# Patient Record
Sex: MALE | Race: WHITE | URBAN - NONMETROPOLITAN AREA
[De-identification: names, ages, dates, MRNs, and addresses within clinical notes are randomized per-mention and may not be internally consistent; named-entity substitution may affect disease eponyms.]

---

## 2020-02-10 ENCOUNTER — OFFICE VISIT (OUTPATIENT)
Dept: NEUROLOGY | Age: 70
End: 2020-02-10
Payer: MEDICARE

## 2020-02-10 VITALS
HEART RATE: 69 BPM | HEIGHT: 69 IN | WEIGHT: 195 LBS | SYSTOLIC BLOOD PRESSURE: 146 MMHG | BODY MASS INDEX: 28.88 KG/M2 | DIASTOLIC BLOOD PRESSURE: 77 MMHG

## 2020-02-10 PROBLEM — R51.9 HEADACHE DISORDER: Status: ACTIVE | Noted: 2020-02-10

## 2020-02-10 PROBLEM — G25.0 ESSENTIAL TREMOR: Status: ACTIVE | Noted: 2020-02-10

## 2020-02-10 PROCEDURE — 99204 OFFICE O/P NEW MOD 45 MIN: CPT | Performed by: PSYCHIATRY & NEUROLOGY

## 2020-02-10 RX ORDER — DIVALPROEX SODIUM 250 MG/1
TABLET, DELAYED RELEASE ORAL
COMMUNITY
Start: 2020-01-15

## 2020-02-10 RX ORDER — PRIMIDONE 50 MG/1
50 TABLET ORAL 2 TIMES DAILY
Qty: 60 TABLET | Refills: 5 | Status: SHIPPED | OUTPATIENT
Start: 2020-02-10 | End: 2020-07-20

## 2020-02-10 RX ORDER — METOPROLOL SUCCINATE 25 MG/1
TABLET, EXTENDED RELEASE ORAL
COMMUNITY

## 2020-02-10 RX ORDER — SIMVASTATIN 40 MG
TABLET ORAL
COMMUNITY
Start: 2020-01-09

## 2020-02-10 RX ORDER — GLIMEPIRIDE 4 MG/1
TABLET ORAL
COMMUNITY

## 2020-02-10 RX ORDER — CETIRIZINE HYDROCHLORIDE 10 MG/1
10 TABLET ORAL DAILY
COMMUNITY

## 2020-02-10 SDOH — HEALTH STABILITY: MENTAL HEALTH: HOW OFTEN DO YOU HAVE A DRINK CONTAINING ALCOHOL?: NEVER

## 2020-02-10 NOTE — PROGRESS NOTES
Chief Complaint   Patient presents with    New Patient     Referred by Dr. Mikey Pena for hand tremor       Srinivasa Jensen is a 71y.o. year old male who is seen for evaluation of tremor. This has been present for about 6 months to a year. The tremor is in the hands and exacerbated by activity and posture such as writing and drinking. He feels rest makes it better. There is no head, jaw or leg tremor. There is a stroke family history of Parkinson's disease and one member has progressive supranuclear palsy. He denies diplopia, dysarthria, dysphagia, weakness or ataxia. He has been on chronic depoke for about 20 years for left sided headaches which he no longer has. He was told he had seizures due to an abnormal EEG but has never had a clinical seizure. Active Ambulatory Problems     Diagnosis Date Noted    Essential tremor 02/10/2020    Headache disorder 02/10/2020     Resolved Ambulatory Problems     Diagnosis Date Noted    No Resolved Ambulatory Problems     Past Medical History:   Diagnosis Date    Diabetes ([address]     Hyperlipidemia     Hypertension     Seizures ([address]        Past Surgical History:   Procedure Laterality Date    APPENDECTOMY      HEMORRHOID SURGERY      HERNIA REPAIR         History reviewed. No pertinent family history.     Allergies   Allergen Reactions    Iodides        Social History     Socioeconomic History    Marital status:      Spouse name: Not on file    Number of children: Not on file    Years of education: Not on file    Highest education level: Not on file   Occupational History    Not on file   Social Needs    Financial resource strain: Not on file    Food insecurity:     Worry: Not on file     Inability: Not on file    Transportation needs:     Medical: Not on file     Non-medical: Not on file   Tobacco Use    Smoking status: Current Every Day Smoker     Packs/day: 1.00    Smokeless tobacco: Never Used   Substance and Sexual Activity    Alcohol use: Never     Frequency: Never    Drug use: Not on file    Sexual activity: Not on file   Lifestyle    Physical activity:     Days per week: Not on file     Minutes per session: Not on file    Stress: Not on file   Relationships    Social connections:     Talks on phone: Not on file     Gets together: Not on file     Attends Church service: Not on file     Active member of club or organization: Not on file     Attends meetings of clubs or organizations: Not on file     Relationship status: Not on file    Intimate partner violence:     Fear of current or ex partner: Not on file     Emotionally abused: Not on file     Physically abused: Not on file     Forced sexual activity: Not on file   Other Topics Concern    Not on file   Social History Narrative    Not on file     Review of Systems     Constitutional - No fever or chills. yes diaphoresis or significant fatigue. HENT -  No tinnitus or significant hearing loss. Eyes - no sudden vision change or eye pain  Respiratory - no significant shortness of breath or cough  Cardiovascular - no chest pain No palpitations or significant leg swelling  Gastrointestinal - no abdominal swelling or pain. Genitourinary - No difficulty urinating, dysuria  Musculoskeletal - yes back pain or myalgia. Skin - no color change or rash  Neurologic - No recent seizures. No lateralizing weakness. Hematologic - yes easy bruising or excessive bleeding. Psychiatric - no severe anxiety or nervousness. All other review of systems are negative. Current Outpatient Medications   Medication Sig Dispense Refill    divalproex (DEPAKOTE) 250 MG DR tablet TAKE 2 TABLETS BY MOUTH TWO (2) TIMES A DAY FOR 90 DAYS      metoprolol succinate (TOPROL XL) 25 MG extended release tablet metoprolol succinate ER 25 mg tablet,extended release 24 hr   Take 1 tablet every day by oral route for 90 days.       simvastatin (ZOCOR) 40 MG tablet TAKE 1 TABLET BY MOUTH ONCE DAILY      metFORMIN

## 2021-07-19 PROCEDURE — 88305 TISSUE EXAM BY PATHOLOGIST: CPT | Performed by: GENERAL PRACTICE

## 2021-07-20 ENCOUNTER — LAB REQUISITION (OUTPATIENT)
Dept: LAB | Facility: HOSPITAL | Age: 71
End: 2021-07-20

## 2021-07-20 DIAGNOSIS — Z00.00 ENCOUNTER FOR GENERAL ADULT MEDICAL EXAMINATION WITHOUT ABNORMAL FINDINGS: ICD-10-CM

## 2021-07-22 LAB
CYTO UR: NORMAL
LAB AP CASE REPORT: NORMAL
LAB AP CLINICAL INFORMATION: NORMAL
PATH REPORT.FINAL DX SPEC: NORMAL
PATH REPORT.GROSS SPEC: NORMAL

## 2023-01-26 ENCOUNTER — TRANSCRIBE ORDERS (OUTPATIENT)
Dept: ADMINISTRATIVE | Facility: HOSPITAL | Age: 73
End: 2023-01-26
Payer: MEDICARE

## 2023-01-26 ENCOUNTER — LAB REQUISITION (OUTPATIENT)
Dept: LAB | Facility: HOSPITAL | Age: 73
End: 2023-01-26
Payer: MEDICARE

## 2023-01-26 DIAGNOSIS — Z00.00 ENCOUNTER FOR GENERAL ADULT MEDICAL EXAMINATION WITHOUT ABNORMAL FINDINGS: ICD-10-CM

## 2023-01-26 DIAGNOSIS — R91.1 PULMONARY NODULE: Primary | ICD-10-CM

## 2023-01-26 PROCEDURE — 88305 TISSUE EXAM BY PATHOLOGIST: CPT | Performed by: GENERAL PRACTICE

## 2023-01-26 PROCEDURE — 88112 CYTOPATH CELL ENHANCE TECH: CPT | Performed by: GENERAL PRACTICE

## 2023-01-27 LAB
CYTO UR: NORMAL
LAB AP CASE REPORT: NORMAL
LAB AP CLINICAL INFORMATION: NORMAL
Lab: NORMAL
PATH REPORT.FINAL DX SPEC: NORMAL
PATH REPORT.GROSS SPEC: NORMAL

## 2023-02-09 ENCOUNTER — APPOINTMENT (OUTPATIENT)
Dept: OTHER | Facility: HOSPITAL | Age: 73
End: 2023-02-09
Payer: MEDICARE

## 2023-02-09 ENCOUNTER — HOSPITAL ENCOUNTER (OUTPATIENT)
Dept: CT IMAGING | Facility: HOSPITAL | Age: 73
Discharge: HOME | End: 2023-02-09
Payer: MEDICARE

## 2023-02-09 DIAGNOSIS — Z00.6 ENCOUNTER FOR EXAMINATION FOR NORMAL COMPARISON AND CONTROL IN CLINICAL RESEARCH PROGRAM: ICD-10-CM

## 2023-02-09 DIAGNOSIS — R91.1 PULMONARY NODULE: ICD-10-CM

## 2023-02-09 PROCEDURE — 0 FLUDEOXYGLUCOSE F18 SOLUTION: Performed by: GENERAL PRACTICE

## 2023-02-09 PROCEDURE — A9552 F18 FDG: HCPCS | Performed by: GENERAL PRACTICE

## 2023-02-09 PROCEDURE — 78815 PET IMAGE W/CT SKULL-THIGH: CPT

## 2023-02-09 RX ADMIN — FLUDEOXYGLUCOSE F18 1 DOSE: 300 INJECTION INTRAVENOUS at 10:29

## 2023-02-27 PROCEDURE — 88305 TISSUE EXAM BY PATHOLOGIST: CPT | Performed by: GENERAL PRACTICE

## 2023-02-28 ENCOUNTER — LAB REQUISITION (OUTPATIENT)
Dept: LAB | Facility: HOSPITAL | Age: 73
End: 2023-02-28
Payer: MEDICARE

## 2023-02-28 DIAGNOSIS — Z00.00 ENCOUNTER FOR GENERAL ADULT MEDICAL EXAMINATION WITHOUT ABNORMAL FINDINGS: ICD-10-CM

## 2023-02-28 PROCEDURE — 88305 TISSUE EXAM BY PATHOLOGIST: CPT | Performed by: GENERAL PRACTICE

## 2023-02-28 NOTE — PROGRESS NOTES
MEDICAL ONCOLOGY CONSULTATION    Pt Name: Ramon Huang  MRN: [id number]  YOB: 1950  Date of evaluation: 3/1/2023    REASON FOR CONSULTATION:  Lung mass  REQUESTING PHYSICIAN: Dr Gt Negro    History Obtained From:  patient and old medical records    HISTORY OF PRESENT ILLNESS:    Diagnosis  Left upper lobe lung mass, Jan 2023  Rectal polyps awaiting pathology    Treatment Summary  To be determined    Cancer History  Cierra Loredo was seen by me on 3/1/2023. Patient was found to have a lung mass. Had a bronchoscopy/bronchoalveolar lavage with Dr. Yariel Erazo that came back nondiagnostic. He had a PET scan and is here to discuss further treatment recommendations. 1/16/23 CT chest [facility]): Spiculated perihilar left upper lobe pulmonary nodule measures 2.7cm. This is concerning for primary neoplasm. PET/CT or bronchoscopy is recommended to further evaluate. 0.8cm noncalcified pulmonary nodule in the right upper lobe. This is at the lower limits of normal for Pet/CT detection. Benign granulomatous disease, metastatic disease, or 2nd primary pulmonary malignancy could be considered. No enlarged mediastinal or hilar lymph nodes. 1/26/23 Bronchoscopy by Dr Jamil Woodard/[facility]: The right upper lobe, lower lobe, middle lobe were negative. The left upper lobe, we could not see any lesion, lingula could not see any lesion, lower lobe could not see any lesion. We then brushed the left upper lobe and sent it for specimen. We washed the left upper lobe and sent it for specimen. 1/26/23  Lung, left upper lobe, washing: Negative for malignant cells. Reactive bronchial cells, macrophages, and inflammation. Lung, left upper lobe, brushing: Negative for malignant cells. Markedly reactive bronchial cells, macrophages, and inflammation. 2/9/23 PET scan [facility]): 2.8 cm hypermetabolic central LEFT upper lobe pulmonary nodule. This likely represents a primary lung neoplasm.  0.8 cm hypermetabolic pulmonary nodule in the RIGHT upper lobe. This is highly suspicious for either a contralateral pulmonary metastasis or second primary lung neoplasm. No hypermetabolic thoracic lymph nodes. No evidence of hypermetabolic metastatic disease in the neck, abdomen, or pelvis. Destini Estradand 5 cm hypermetabolic nodule in the RIGHT anterior rectum, highly concerning for underlying rectal polyp. Recommend correlation with colonoscopy/sigmoidoscopy. With  3/1/2023-referred to Dr. Lois Borden for bronchoscopy and biopsy of dominant lung lesion as well as 8 mm hypermetabolic pulmonary nodule. Past Medical History:    Past Medical History:   Diagnosis Date    Diabetes ([facility]     Hyperlipidemia     Hypertension     Seizures ([facility]        Past Surgical History:    Past Surgical History:   Procedure Laterality Date    APPENDECTOMY      HEMORRHOID SURGERY      HERNIA REPAIR         Social History:    Marital status:   Smoking status: Currently; 1 1/2 pack daily for 56 years  ETOH status: No  Resides: [place]    Family History:   Family History   Problem Relation Age of Onset    Cancer Father         multiple myeloma    Cancer Brother         bone    Cancer Maternal Grandfather         esophageal       Current Hospital Medications:    Current Outpatient Medications   Medication Sig Dispense Refill    lisinopril (PRINIVIL;ZESTRIL) 10 MG tablet       hydroCHLOROthiazide (HYDRODIURIL) 25 MG tablet       pantoprazole (PROTONIX) 40 MG tablet       loratadine (CLARITIN) 10 MG capsule Take 10 mg by mouth daily      primidone (MYSOLINE) 50 MG tablet Take 1 tablet by mouth twice daily 60 tablet 1    divalproex (DEPAKOTE) 250 MG DR tablet TAKE 2 TABLETS BY MOUTH TWO (2) TIMES A DAY FOR 90 DAYS      metoprolol succinate (TOPROL XL) 25 MG extended release tablet metoprolol succinate ER 25 mg tablet,extended release 24 hr   Take 1 tablet every day by oral route for 90 days.       simvastatin (ZOCOR) 40 MG tablet TAKE 1 TABLET BY MOUTH ONCE DAILY      metFORMIN (GLUCOPHAGE) 500 MG tablet TAKE 1 TABLET BY MOUTH TWICE DAILY      glimepiride (AMARYL) 4 MG tablet glimepiride 4 mg tablet   Take 1 tablet every day by oral route. Cholecalciferol (VITAMIN D3) 125 MCG (5000 UT) TABS Take by mouth      aspirin 81 MG tablet Take 81 mg by mouth daily (Patient not taking: Reported on 3/1/2023)      cetirizine (ZYRTEC) 10 MG tablet Take 10 mg by mouth daily (Patient not taking: Reported on 3/1/2023)       No current facility-administered medications for this visit. Allergies:    Allergies   Allergen Reactions    Iodides        Subjective   REVIEW OF SYSTEMS:   CONSTITUTIONAL: no fever, no night sweats, lung pain,weight loss,fatigue;  HEENT: no blurring of vision, no double vision, no hearing difficulty, no tinnitus, no ulceration, no dysplasia, no epistaxis;  LUNGS: dry cough, no hemoptysis, wheeze, shortness of breath;  CARDIOVASCULAR: no palpitation, no chest pain, shortness of breath;  GI: no abdominal pain, no nausea, no vomiting, no diarrhea, no constipation;  TARAH: no dysuria, no hematuria, no frequency or urgency, no nephrolithiasis;  MUSCULOSKELETAL:back pain, joint pain, no swelling, no stiffness;  ENDOCRINE: no polyuria, no polydipsia, no cold or heat intolerance;  HEMATOLOGY: no easy bruising or bleeding, no history of clotting disorder;  DERMATOLOGY: no skin rash, no eczema, no pruritus;  PSYCHIATRY: no depression, no anxiety, no panic attacks, no suicidal ideation, no homicidal ideation;  NEUROLOGY: seizure disorder,no syncope, no seizures, no numbness or tingling of hands, no numbness or tingling of feet, no paresis;    Objective   /64   Pulse 94   Ht 5' 9\" (1.753 m)   Wt 172 lb 6.4 oz (78.2 kg)   SpO2 98%   BMI 25.46 kg/m²     PHYSICAL EXAM:  CONSTITUTIONAL: Alert, appropriate, no acute distress  EYES: Non icteric, EOM intact, pupils equal round   ENT: Mucus membranes moist, no oral pharyngeal lesions, external inspection of ears and nose are normal  NECK: Supple, no masses. No palpable thyroid mass  CHEST/LUNGS: CTA bilaterally, normal respiratory effort   CARDIOVASCULAR: RRR, no murmurs. No lower extremity edema  ABDOMEN: soft non-tender, active bowel sounds, no HSM. No palpable masses  EXTREMITIES: warm, full ROM in all 4 extremities, no focal weakness. SKIN: warm, dry with no rashes or lesions  LYMPH: No cervical, clavicular, axillary, or inguinal lymphadenopathy  NEUROLOGIC: follows commands, non focal   PSYCH: mood and affect appropriate. Alert and oriented to time, place, person    LABORATORY RESULTS REVIEWED/ANALYZED BY ME:  3/1/23 CBC  WBC 12.49  HGB 13.4    Neut 8.95    RADIOLOGY STUDIES REVIEWED BY ME:  As above      ASSESSMENT:    Orders Placed This Encounter   Procedures    Amb External Referral To Pulmonology     Referral Priority:   Urgent     Referral Reason:   Specialty Services Required     Referred to Provider:   Giovana Chaudhry     Requested Specialty:   Pulmonology     Number of Visits Requested:   1        Sun Hardwick was seen today for new patient. Diagnoses and all orders for this visit:    Care plan discussed with patient    Mass of upper lobe of left lung  -     Amb External Referral To Pulmonology    Rectal polyp       Left upper lobe lung mass, Jan 2023-concern for malignancy.  -1/16/23 CT chest [facility]): Spiculated perihilar left upper lobe pulmonary nodule measures 2.7cm. This is concerning for primary neoplasm. PET/CT or bronchoscopy is recommended to further evaluate. 0.8cm noncalcified pulmonary nodule in the right upper lobe. This is at the lower limits of normal for Pet/CT detection. Benign granulomatous disease, metastatic disease, or 2nd primary pulmonary malignancy could be considered. No enlarged mediastinal or hilar lymph nodes.  -1/26/23  Lung, left upper lobe, washing: Negative for malignant cells. Reactive bronchial cells, macrophages, and inflammation.  Lung, left upper lobe, brushing: Negative for malignant cells. Markedly reactive bronchial cells, macrophages, and inflammation.  -2/9/23 PET scan [facility]): 2.8 cm hypermetabolic central LEFT upper lobe pulmonary nodule. This likely represents a primary lung neoplasm. 0.8 cm hypermetabolic pulmonary nodule in the RIGHT upper lobe. This is highly suspicious for either a contralateral pulmonary metastasis or second primary lung neoplasm. No hypermetabolic thoracic lymph nodes. No evidence of hypermetabolic metastatic disease in the neck, abdomen, or pelvis. Isha Amin 5 cm hypermetabolic nodule in the RIGHT anterior rectum, highly concerning for underlying rectal polyp. Recommend correlation with colonoscopy/sigmoidoscopy. Plan:  -To Dr. Burgess Kaba for biopsy of both pulmonary nodules  -Discussed with Dr. Torri Pérez. Rectal polyps  -Status post colonoscopy and removal of rectal polyp  -Follow-up pathology at [address]:  RTC with MD 3/14/23 in [place] office  Refer to Dr Burgess Kaba for biopsy  Follow-up 2/27 \[facility]\"" colonoscopy pathology  Obtain PFT report from [facility]  Discussed with Dr Kylie Coughlin am pre-charting as a registered nurse for Colton Singh MD. Electronically signed by Helio Ramirez RN on 3/1/2023 at 5:26 PM CST. Genet Marquez am scribing for Colton Singh MD. Electronically signed by Helio Ramirez RN on 3/1/2023 at 8:51 AM CST. I, Dr Giancarlo Matos, personally performed the services described in this documentation as scribed by Helio Ramirez RN in my presence and is both accurate and complete. I have seen, examined and reviewed this patient medication list, appropriate labs and imaging studies. I reviewed relevant medical records and others physicians notes. I discussed the plans of care with the patient. I answered all the questions to the patients satisfaction.  I have also reviewed the chief complaint (CC) and part of the history (History of Present Illness (HPI), Past Family Social Sumit GAUTHIER [facility]), or Review of Systems (ROS) and made changes when appropriated. (Please note that portions of this note were completed with a voice recognition program. Efforts were made to edit the dictations but occasionally words are mis-transcribed. )Electronically signed by Eliza Goddard MD on 3/1/2023 at 10:15 AM      The total time, 62 min I spent to see the patient today includes at least one or more of the following: preparing to see the patient by reviewing prior tests, prior notes or other relevant information, performing appropriate independent examination and evaluation, counseling, ordering of medications, tests or procedures, referrals, communicating with other healthcare professionals when appropriated to coordinate care, documenting clinic information in the electronic medical record or other health records, independently interpreting results of tests, managing test results and communicating the results to the patient/family or caregiver.

## 2023-03-01 ENCOUNTER — OFFICE VISIT (OUTPATIENT)
Dept: HEMATOLOGY | Age: 73
End: 2023-03-01
Payer: MEDICARE

## 2023-03-01 ENCOUNTER — HOSPITAL ENCOUNTER (OUTPATIENT)
Dept: INFUSION THERAPY | Age: 73
Discharge: HOME | End: 2023-03-01
Payer: MEDICARE

## 2023-03-01 VITALS
DIASTOLIC BLOOD PRESSURE: 64 MMHG | BODY MASS INDEX: 25.53 KG/M2 | HEART RATE: 94 BPM | HEIGHT: 69 IN | OXYGEN SATURATION: 98 % | WEIGHT: 172.4 LBS | SYSTOLIC BLOOD PRESSURE: 130 MMHG

## 2023-03-01 DIAGNOSIS — R91.8 MASS OF UPPER LOBE OF LEFT LUNG: Primary | ICD-10-CM

## 2023-03-01 DIAGNOSIS — R91.8 MASS OF UPPER LOBE OF LEFT LUNG: ICD-10-CM

## 2023-03-01 DIAGNOSIS — Z71.89 CARE PLAN DISCUSSED WITH PATIENT: Primary | ICD-10-CM

## 2023-03-01 DIAGNOSIS — K62.1 RECTAL POLYP: ICD-10-CM

## 2023-03-01 LAB
BASOPHILS ABSOLUTE: 0.04 K/UL (ref 0.01–0.08)
BASOPHILS RELATIVE PERCENT: 0.3 % (ref 0.1–1.2)
CYTO UR: NORMAL
EOSINOPHILS ABSOLUTE: 0.18 K/UL (ref 0.04–0.54)
EOSINOPHILS RELATIVE PERCENT: 1.4 % (ref 0.7–7)
HCT VFR BLD CALC: 43.6 % (ref 40.1–51)
HEMOGLOBIN: 13.4 G/DL (ref 13.7–17.5)
LAB AP CASE REPORT: NORMAL
LAB AP CLINICAL INFORMATION: NORMAL
LYMPHOCYTES ABSOLUTE: 2.25 K/UL (ref 1.18–3.74)
LYMPHOCYTES RELATIVE PERCENT: 18 % (ref 19.3–53.1)
Lab: NORMAL
MCH RBC QN AUTO: 30.6 PG (ref 25.7–32.2)
MCHC RBC AUTO-ENTMCNC: 30.7 G/DL (ref 32.3–36.5)
MCV RBC AUTO: 99.5 FL (ref 79–92.2)
MONOCYTES ABSOLUTE: 1.02 K/UL (ref 0.24–0.82)
MONOCYTES RELATIVE PERCENT: 8.2 % (ref 4.7–12.5)
NEUTROPHILS ABSOLUTE: 8.95 K/UL (ref 1.56–6.13)
NEUTROPHILS RELATIVE PERCENT: 71.7 % (ref 34–71.1)
PATH REPORT.FINAL DX SPEC: NORMAL
PATH REPORT.GROSS SPEC: NORMAL
PDW BLD-RTO: 14.5 % (ref 11.6–14.4)
PLATELET # BLD: 191 K/UL (ref 163–337)
PMV BLD AUTO: 10.7 FL (ref 7.4–10.4)
RBC # BLD: 4.38 M/UL (ref 4.63–6.08)
WBC # BLD: 12.49 K/UL (ref 4.23–9.07)

## 2023-03-01 PROCEDURE — 99202 OFFICE O/P NEW SF 15 MIN: CPT

## 2023-03-01 PROCEDURE — 99205 OFFICE O/P NEW HI 60 MIN: CPT | Performed by: INTERNAL MEDICINE

## 2023-03-01 PROCEDURE — 1123F ACP DISCUSS/DSCN MKR DOCD: CPT | Performed by: INTERNAL MEDICINE

## 2023-03-01 PROCEDURE — 85025 COMPLETE CBC W/AUTO DIFF WBC: CPT

## 2023-03-01 PROCEDURE — 36415 COLL VENOUS BLD VENIPUNCTURE: CPT

## 2023-03-01 RX ORDER — PANTOPRAZOLE SODIUM 40 MG/1
TABLET, DELAYED RELEASE ORAL
COMMUNITY
Start: 2023-01-11

## 2023-03-01 RX ORDER — HYDROCHLOROTHIAZIDE 25 MG/1
TABLET ORAL
COMMUNITY
Start: 2023-01-03

## 2023-03-01 RX ORDER — LISINOPRIL 10 MG/1
TABLET ORAL
COMMUNITY
Start: 2023-01-03

## 2023-03-01 RX ORDER — LORATADINE 10 MG/1
10 CAPSULE, LIQUID FILLED ORAL DAILY
COMMUNITY

## 2023-03-01 ASSESSMENT — PROMIS GLOBAL HEALTH SCALE
IN THE PAST 7 DAYS, HOW OFTEN HAVE YOU BEEN BOTHERED BY EMOTIONAL PROBLEMS, SUCH AS FEELING ANXIOUS, DEPRESSED, OR IRRITABLE [ON A SCALE FROM 1 (NEVER) TO 5 (ALWAYS)]?: 2
TO WHAT EXTENT ARE YOU ABLE TO CARRY OUT YOUR EVERYDAY PHYSICAL ACTIVITIES SUCH AS WALKING, CLIMBING STAIRS, CARRYING GROCERIES, OR MOVING A CHAIR [ON A SCALE OF 1 (NOT AT ALL) TO 5 (COMPLETELY)]?: 5
SUM OF RESPONSES TO QUESTIONS 2, 4, 5, & 10: 16
IN GENERAL, WOULD YOU SAY YOUR QUALITY OF LIFE IS...[ON A SCALE OF 1 (POOR) TO 5 (EXCELLENT)]: 5
IN THE PAST 7 DAYS, HOW WOULD YOU RATE YOUR PAIN ON AVERAGE [ON A SCALE FROM 0 (NO PAIN) TO 10 (WORST IMAGINABLE PAIN)]?: 2
IN GENERAL, HOW WOULD YOU RATE YOUR PHYSICAL HEALTH [ON A SCALE OF 1 (POOR) TO 5 (EXCELLENT)]?: 3
SUM OF RESPONSES TO QUESTIONS 3, 6, 7, & 8: 13
IN GENERAL, HOW WOULD YOU RATE YOUR SATISFACTION WITH YOUR SOCIAL ACTIVITIES AND RELATIONSHIPS [ON A SCALE OF 1 (POOR) TO 5 (EXCELLENT)]?: 5
IN GENERAL, HOW WOULD YOU RATE YOUR MENTAL HEALTH, INCLUDING YOUR MOOD AND YOUR ABILITY TO THINK [ON A SCALE OF 1 (POOR) TO 5 (EXCELLENT)]?: 4
IN THE PAST 7 DAYS, HOW WOULD YOU RATE YOUR FATIGUE ON AVERAGE [ON A SCALE FROM 1 (NONE) TO 5 (VERY SEVERE)]?: 3
IN GENERAL, WOULD YOU SAY YOUR HEALTH IS...[ON A SCALE OF 1 (POOR) TO 5 (EXCELLENT)]: 4
IN GENERAL, PLEASE RATE HOW WELL YOU CARRY OUT YOUR USUAL SOCIAL ACTIVITIES (INCLUDES ACTIVITIES AT HOME, AT WORK, AND IN YOUR COMMUNITY, AND RESPONSIBILITIES AS A PARENT, CHILD, SPOUSE, EMPLOYEE, FRIEND, ETC) [ON A SCALE OF 1 (POOR) TO 5 (EXCELLENT)]?: 5

## 2023-03-02 ENCOUNTER — TELEPHONE (OUTPATIENT)
Dept: HEMATOLOGY | Age: 73
End: 2023-03-02

## 2023-03-02 DIAGNOSIS — R91.8 MASS OF RIGHT LUNG: ICD-10-CM

## 2023-03-02 DIAGNOSIS — R91.8 MASS OF UPPER LOBE OF LEFT LUNG: Primary | ICD-10-CM

## 2023-03-02 NOTE — TELEPHONE ENCOUNTER
I have spoken to patient's wife Eliana Marks regarding referral to [address] office not being in insurance network. Dixon Villagomez has recommended for patient to be referred to /Pulmonology [address] [facility]. Patient's wife has stated to proceed with referral.Referral has been sent in.  I have requested for patient to call office if any problems arise with referral.

## 2023-03-07 ENCOUNTER — OFFICE VISIT (OUTPATIENT)
Dept: PULMONOLOGY | Age: 73
End: 2023-03-07
Payer: MEDICARE

## 2023-03-07 ENCOUNTER — TELEPHONE (OUTPATIENT)
Dept: PULMONOLOGY | Age: 73
End: 2023-03-07

## 2023-03-07 VITALS
WEIGHT: 173 LBS | OXYGEN SATURATION: 97 % | DIASTOLIC BLOOD PRESSURE: 70 MMHG | TEMPERATURE: 97.3 F | HEART RATE: 76 BPM | SYSTOLIC BLOOD PRESSURE: 136 MMHG | BODY MASS INDEX: 25.62 KG/M2 | HEIGHT: 69 IN

## 2023-03-07 DIAGNOSIS — F17.210 CIGARETTE NICOTINE DEPENDENCE WITHOUT COMPLICATION: ICD-10-CM

## 2023-03-07 DIAGNOSIS — Z99.89 OSA ON CPAP: ICD-10-CM

## 2023-03-07 DIAGNOSIS — U07.1 COVID-19: ICD-10-CM

## 2023-03-07 DIAGNOSIS — G47.33 OSA ON CPAP: ICD-10-CM

## 2023-03-07 DIAGNOSIS — R91.8 LUNG MASS: Primary | ICD-10-CM

## 2023-03-07 DIAGNOSIS — R91.1 LUNG NODULE: ICD-10-CM

## 2023-03-07 PROCEDURE — 1123F ACP DISCUSS/DSCN MKR DOCD: CPT | Performed by: INTERNAL MEDICINE

## 2023-03-07 PROCEDURE — 99204 OFFICE O/P NEW MOD 45 MIN: CPT | Performed by: INTERNAL MEDICINE

## 2023-03-07 ASSESSMENT — ENCOUNTER SYMPTOMS
WHEEZING: 0
SHORTNESS OF BREATH: 0
ABDOMINAL PAIN: 0
COUGH: 1
ANAL BLEEDING: 0
BACK PAIN: 0
RHINORRHEA: 0
APNEA: 0
ABDOMINAL DISTENTION: 0
CHEST TIGHTNESS: 0

## 2023-03-07 NOTE — PROGRESS NOTES
Pulmonary and Sleep Medicine    Tracy Laguna (:  1950) is a 67 y.o. male,New patient, here for evaluation of the following chief complaint(s):  New Patient (Referred by Dr Mitzy Munson- Lung mass )      Referring physician:  No referring provider defined for this encounter. ASSESSMENT/PLAN:  1. Lung mass  -     Case Request  -     CT CHEST WO CONTRAST; Future  2. Lung nodule  3. JESSIE on CPAP  4. COVID-, May 2022  5. Cigarette nicotine dependence without complication, discussed smoking cessation for 3 min. CT was reviewed from [facility].  The patient does have a left hilar mass with right upper lobe nodule. Discussed EBUS bronchoscopy and fine-needle aspiration. The patient is in agreement. We will repeat CT of the chest prior to the procedure. He did have pulmonary function study at an outside facility we will attempt to obtain a copy of the PFT. Christian Quick MD, [facility], [facility]    Return in about 2 weeks (around 3/21/2023). SUBJECTIVE/OBJECTIVE:  The patient is here for evaluation of lung mass. He the patient is here for had respiratory symptoms in January. He had a chest x-ray done at that showed an abnormality. This was followed by a CT of the chest that showed a left hilar 2.5 cm nodule right upper lobe 8 mm nodule. This was followed by a PET/CT. The PET was positive with an FDG uptake and hilar mass with SUV of 14 and an SUV uptake of about 3 and right upper lobe 8 mm nodule. Patient denies hemoptysis. He admits to some weight loss. He is a chronic heavy smoker. He smokes about a pack and a half a day. I was asked to see him regarding the above. There was an attempt at bronchoscopy in [facility] by Dr. Yobani Bravo but it was non diagnostic. Prior to Visit Medications    Medication Sig Taking?  Authorizing Provider   lisinopril (PRINIVIL;ZESTRIL) 10 MG tablet  Yes Historical Provider, MD   hydroCHLOROthiazide (HYDRODIURIL) 25 MG tablet  Yes Historical Provider, MD   pantoprazole (PROTONIX) 40 MG tablet  Yes Historical Provider, MD   loratadine (CLARITIN) 10 MG capsule Take 10 mg by mouth daily Yes Historical Provider, MD   primidone (MYSOLINE) 50 MG tablet Take 1 tablet by mouth twice daily Yes Yvon Hemphill MD   divalproex (DEPAKOTE) 250 MG DR tablet TAKE 2 TABLETS BY MOUTH TWO (2) TIMES A DAY FOR 90 DAYS Yes Historical Provider, MD   metoprolol succinate (TOPROL XL) 25 MG extended release tablet metoprolol succinate ER 25 mg tablet,extended release 24 hr   Take 1 tablet every day by oral route for 90 days. Yes Historical Provider, MD   simvastatin (ZOCOR) 40 MG tablet TAKE 1 TABLET BY MOUTH ONCE DAILY Yes Historical Provider, MD   metFORMIN (GLUCOPHAGE) 500 MG tablet TAKE 1 TABLET BY MOUTH TWICE DAILY Yes Historical Provider, MD   glimepiride (AMARYL) 4 MG tablet glimepiride 4 mg tablet   Take 1 tablet every day by oral route. Yes Historical Provider, MD   Cholecalciferol (VITAMIN D3) 125 MCG (5000 UT) TABS Take by mouth Yes Historical Provider, MD   aspirin 81 MG tablet Take 81 mg by mouth daily  Patient not taking: No sig reported  Historical Provider, MD   cetirizine (ZYRTEC) 10 MG tablet Take 10 mg by mouth daily  Patient not taking: No sig reported  Historical Provider, MD        Review of Systems   Constitutional:  Negative for activity change, appetite change, chills, diaphoresis and fatigue. HENT:  Negative for congestion, dental problem, drooling, ear discharge, postnasal drip and rhinorrhea. Eyes:  Negative for visual disturbance. Respiratory:  Positive for cough. Negative for apnea, chest tightness, shortness of breath and wheezing. Gastrointestinal:  Negative for abdominal distention, abdominal pain and anal bleeding. Endocrine: Negative for cold intolerance, heat intolerance and polydipsia. Genitourinary:  Negative for difficulty urinating, dysuria, enuresis and flank pain.    Musculoskeletal:  Negative for arthralgias, back pain and gait problem. Allergic/Immunologic: Negative for environmental allergies. Neurological:  Negative for dizziness, facial asymmetry, light-headedness and headaches. Vitals:    03/07/23 1351   BP: 136/70   Pulse: 76   Temp: 97.3 °F (36.3 °C)   SpO2: 97%     BMI Readings from Last 1 Encounters:   03/07/23 25.55 kg/m²         Physical Exam  Vitals reviewed. Constitutional:       Appearance: Normal appearance. HENT:      Head: Normocephalic and atraumatic. Nose: Nose normal.   Eyes:      Extraocular Movements: Extraocular movements intact. Conjunctiva/sclera: Conjunctivae normal.   Cardiovascular:      Rate and Rhythm: Normal rate and regular rhythm. Heart sounds: No murmur heard. No friction rub. Pulmonary:      Effort: Pulmonary effort is normal. No respiratory distress. Breath sounds: Normal breath sounds. No stridor. No wheezing, rhonchi or rales. Abdominal:      General: There is no distension. Palpations: There is no mass. Tenderness: There is no abdominal tenderness. There is no guarding or rebound. Musculoskeletal:      Cervical back: Normal range of motion and neck supple. Neurological:      Mental Status: He is alert and oriented to person, place, and time. This note was generated using a voice recognition software. Errors in voice recognition may have occurred. An electronic signature was used to authenticate this note.     --Jose Elias Carias MD

## 2023-03-08 ENCOUNTER — ANESTHESIA EVENT (OUTPATIENT)
Dept: ENDOSCOPY | Age: 73
End: 2023-03-08
Payer: MEDICARE

## 2023-03-08 ENCOUNTER — HOSPITAL ENCOUNTER (OUTPATIENT)
Dept: CT IMAGING | Age: 73
Discharge: HOME | End: 2023-03-08
Payer: MEDICARE

## 2023-03-08 DIAGNOSIS — R91.8 LUNG MASS: ICD-10-CM

## 2023-03-08 PROCEDURE — 71250 CT THORAX DX C-: CPT

## 2023-03-09 ENCOUNTER — PREP FOR PROCEDURE (OUTPATIENT)
Dept: PULMONOLOGY | Age: 73
End: 2023-03-09

## 2023-03-09 ENCOUNTER — ANESTHESIA (OUTPATIENT)
Dept: ENDOSCOPY | Age: 73
End: 2023-03-09
Payer: MEDICARE

## 2023-03-09 ENCOUNTER — HOSPITAL ENCOUNTER (OUTPATIENT)
Age: 73
Setting detail: OUTPATIENT SURGERY
Discharge: HOME | End: 2023-03-09
Attending: INTERNAL MEDICINE | Admitting: INTERNAL MEDICINE
Payer: MEDICARE

## 2023-03-09 VITALS
OXYGEN SATURATION: 99 % | DIASTOLIC BLOOD PRESSURE: 60 MMHG | RESPIRATION RATE: 16 BRPM | HEIGHT: 69 IN | WEIGHT: 173 LBS | BODY MASS INDEX: 25.62 KG/M2 | TEMPERATURE: 98 F | HEART RATE: 73 BPM | SYSTOLIC BLOOD PRESSURE: 130 MMHG

## 2023-03-09 DIAGNOSIS — R91.1 LUNG NODULE: ICD-10-CM

## 2023-03-09 LAB
GLUCOSE BLD-MCNC: 157 MG/DL (ref 70–99)
PERFORMED ON: ABNORMAL

## 2023-03-09 PROCEDURE — 3609027000 HC BRONCHOSCOPY: Performed by: INTERNAL MEDICINE

## 2023-03-09 PROCEDURE — 88342 IMHCHEM/IMCYTCHM 1ST ANTB: CPT

## 2023-03-09 PROCEDURE — 82962 GLUCOSE BLOOD TEST: CPT

## 2023-03-09 PROCEDURE — 7100000001 HC PACU RECOVERY - ADDTL 15 MIN: Performed by: INTERNAL MEDICINE

## 2023-03-09 PROCEDURE — 2500000003 HC RX 250 WO HCPCS: Performed by: NURSE ANESTHETIST, CERTIFIED REGISTERED

## 2023-03-09 PROCEDURE — 2580000003 HC RX 258: Performed by: INTERNAL MEDICINE

## 2023-03-09 PROCEDURE — 7100000011 HC PHASE II RECOVERY - ADDTL 15 MIN: Performed by: INTERNAL MEDICINE

## 2023-03-09 PROCEDURE — 3603165200 HC BRNCHSC EBUS GUIDED SAMPL 1/2 NODE STATION/STRUX: Performed by: INTERNAL MEDICINE

## 2023-03-09 PROCEDURE — 88173 CYTOPATH EVAL FNA REPORT: CPT

## 2023-03-09 PROCEDURE — 31652 BRONCH EBUS SAMPLNG 1/2 NODE: CPT | Performed by: INTERNAL MEDICINE

## 2023-03-09 PROCEDURE — 6360000002 HC RX W HCPCS: Performed by: NURSE ANESTHETIST, CERTIFIED REGISTERED

## 2023-03-09 PROCEDURE — 3700000000 HC ANESTHESIA ATTENDED CARE: Performed by: INTERNAL MEDICINE

## 2023-03-09 PROCEDURE — 3700000001 HC ADD 15 MINUTES (ANESTHESIA): Performed by: INTERNAL MEDICINE

## 2023-03-09 PROCEDURE — 7100000010 HC PHASE II RECOVERY - FIRST 15 MIN: Performed by: INTERNAL MEDICINE

## 2023-03-09 PROCEDURE — 7100000000 HC PACU RECOVERY - FIRST 15 MIN: Performed by: INTERNAL MEDICINE

## 2023-03-09 PROCEDURE — 31624 DX BRONCHOSCOPE/LAVAGE: CPT | Performed by: INTERNAL MEDICINE

## 2023-03-09 PROCEDURE — 88305 TISSUE EXAM BY PATHOLOGIST: CPT

## 2023-03-09 PROCEDURE — 88341 IMHCHEM/IMCYTCHM EA ADD ANTB: CPT

## 2023-03-09 PROCEDURE — 2709999900 HC NON-CHARGEABLE SUPPLY: Performed by: INTERNAL MEDICINE

## 2023-03-09 PROCEDURE — 88112 CYTOPATH CELL ENHANCE TECH: CPT

## 2023-03-09 RX ORDER — HYDROMORPHONE HYDROCHLORIDE 1 MG/ML
0.5 INJECTION, SOLUTION INTRAMUSCULAR; INTRAVENOUS; SUBCUTANEOUS EVERY 5 MIN PRN
Status: DISCONTINUED | OUTPATIENT
Start: 2023-03-09 | End: 2023-03-09 | Stop reason: HOSPADM

## 2023-03-09 RX ORDER — SODIUM CHLORIDE 0.9 % (FLUSH) 0.9 %
5-40 SYRINGE (ML) INJECTION EVERY 12 HOURS SCHEDULED
Status: DISCONTINUED | OUTPATIENT
Start: 2023-03-09 | End: 2023-03-09 | Stop reason: HOSPADM

## 2023-03-09 RX ORDER — LIDOCAINE HYDROCHLORIDE 10 MG/ML
INJECTION, SOLUTION INFILTRATION; PERINEURAL PRN
Status: DISCONTINUED | OUTPATIENT
Start: 2023-03-09 | End: 2023-03-09 | Stop reason: SDUPTHER

## 2023-03-09 RX ORDER — METOCLOPRAMIDE HYDROCHLORIDE 5 MG/ML
10 INJECTION INTRAMUSCULAR; INTRAVENOUS
Status: DISCONTINUED | OUTPATIENT
Start: 2023-03-09 | End: 2023-03-09 | Stop reason: HOSPADM

## 2023-03-09 RX ORDER — FENTANYL CITRATE 50 UG/ML
INJECTION, SOLUTION INTRAMUSCULAR; INTRAVENOUS PRN
Status: DISCONTINUED | OUTPATIENT
Start: 2023-03-09 | End: 2023-03-09 | Stop reason: SDUPTHER

## 2023-03-09 RX ORDER — HYDROMORPHONE HYDROCHLORIDE 1 MG/ML
0.25 INJECTION, SOLUTION INTRAMUSCULAR; INTRAVENOUS; SUBCUTANEOUS EVERY 5 MIN PRN
Status: DISCONTINUED | OUTPATIENT
Start: 2023-03-09 | End: 2023-03-09 | Stop reason: HOSPADM

## 2023-03-09 RX ORDER — SODIUM CHLORIDE 0.9 % (FLUSH) 0.9 %
5-40 SYRINGE (ML) INJECTION PRN
Status: DISCONTINUED | OUTPATIENT
Start: 2023-03-09 | End: 2023-03-09 | Stop reason: HOSPADM

## 2023-03-09 RX ORDER — ONDANSETRON 2 MG/ML
INJECTION INTRAMUSCULAR; INTRAVENOUS PRN
Status: DISCONTINUED | OUTPATIENT
Start: 2023-03-09 | End: 2023-03-09 | Stop reason: SDUPTHER

## 2023-03-09 RX ORDER — SODIUM CHLORIDE, SODIUM LACTATE, POTASSIUM CHLORIDE, CALCIUM CHLORIDE 600; 310; 30; 20 MG/100ML; MG/100ML; MG/100ML; MG/100ML
INJECTION, SOLUTION INTRAVENOUS CONTINUOUS
Status: DISCONTINUED | OUTPATIENT
Start: 2023-03-09 | End: 2023-03-09 | Stop reason: HOSPADM

## 2023-03-09 RX ORDER — PROPOFOL 10 MG/ML
INJECTION, EMULSION INTRAVENOUS PRN
Status: DISCONTINUED | OUTPATIENT
Start: 2023-03-09 | End: 2023-03-09 | Stop reason: SDUPTHER

## 2023-03-09 RX ORDER — ROCURONIUM BROMIDE 10 MG/ML
INJECTION, SOLUTION INTRAVENOUS PRN
Status: DISCONTINUED | OUTPATIENT
Start: 2023-03-09 | End: 2023-03-09 | Stop reason: SDUPTHER

## 2023-03-09 RX ORDER — MIDAZOLAM HYDROCHLORIDE 1 MG/ML
INJECTION INTRAMUSCULAR; INTRAVENOUS PRN
Status: DISCONTINUED | OUTPATIENT
Start: 2023-03-09 | End: 2023-03-09 | Stop reason: SDUPTHER

## 2023-03-09 RX ORDER — SODIUM CHLORIDE 9 MG/ML
INJECTION, SOLUTION INTRAVENOUS PRN
Status: DISCONTINUED | OUTPATIENT
Start: 2023-03-09 | End: 2023-03-09 | Stop reason: HOSPADM

## 2023-03-09 RX ORDER — DIPHENHYDRAMINE HYDROCHLORIDE 50 MG/ML
12.5 INJECTION INTRAMUSCULAR; INTRAVENOUS
Status: DISCONTINUED | OUTPATIENT
Start: 2023-03-09 | End: 2023-03-09 | Stop reason: HOSPADM

## 2023-03-09 RX ADMIN — SODIUM CHLORIDE, POTASSIUM CHLORIDE, SODIUM LACTATE AND CALCIUM CHLORIDE: 600; 310; 30; 20 INJECTION, SOLUTION INTRAVENOUS at 08:27

## 2023-03-09 RX ADMIN — ONDANSETRON 4 MG: 2 INJECTION INTRAMUSCULAR; INTRAVENOUS at 08:53

## 2023-03-09 RX ADMIN — SUGAMMADEX 314 MG: 100 INJECTION, SOLUTION INTRAVENOUS at 09:34

## 2023-03-09 RX ADMIN — LIDOCAINE HYDROCHLORIDE 40 MG: 10 INJECTION, SOLUTION INFILTRATION; PERINEURAL at 08:55

## 2023-03-09 RX ADMIN — PROPOFOL 150 MG: 10 INJECTION, EMULSION INTRAVENOUS at 08:55

## 2023-03-09 RX ADMIN — MIDAZOLAM 2 MG: 1 INJECTION INTRAMUSCULAR; INTRAVENOUS at 08:53

## 2023-03-09 RX ADMIN — ROCURONIUM BROMIDE 50 MG: 10 INJECTION, SOLUTION INTRAVENOUS at 08:57

## 2023-03-09 RX ADMIN — FENTANYL CITRATE 50 MCG: 50 INJECTION INTRAMUSCULAR; INTRAVENOUS at 08:55

## 2023-03-09 ASSESSMENT — LIFESTYLE VARIABLES: SMOKING_STATUS: 1

## 2023-03-09 ASSESSMENT — PAIN - FUNCTIONAL ASSESSMENT: PAIN_FUNCTIONAL_ASSESSMENT: 0-10

## 2023-03-09 NOTE — ANESTHESIA POSTPROCEDURE EVALUATION
Department of Anesthesiology  Postprocedure Note    Patient: Andrei Yost  MRN: [id number]  YOB: 1950  Date of evaluation: 3/9/2023      Procedure Summary     Date: 03/09/23 Room / Location: [address]    Anesthesia Start: 9719 Anesthesia Stop:     Procedures:       BRONCHOSCOPY ENDOBRONCHIAL ULTRASOUND (Left: Chest)      BRONCHOSCOPY Diagnosis:       Lung nodule      (Lung nodule [R91.1])    Surgeons: Leonid Stringer MD Responsible Provider: MARGO Li CRNA    Anesthesia Type: general ASA Status: 3          Anesthesia Type: No value filed.     Angela Phase I: Angela Score: 10    Angela Phase II:        Anesthesia Post Evaluation    Patient location during evaluation: PACU  Patient participation: complete - patient participated  Level of consciousness: sleepy but conscious  Pain score: 2  Airway patency: patent  Nausea & Vomiting: no nausea and no vomiting  Complications: no  Cardiovascular status: hemodynamically stable and blood pressure returned to baseline  Respiratory status: acceptable and nasal cannula  Hydration status: stable  Comments: /61   Pulse 80   Temp 97 °F (36.1 °C) (Oral)   Resp 19   Ht 5' 9\" (1.753 m)   Wt 173 lb (78.5 kg)   SpO2 92%   BMI 25.55 kg/m²

## 2023-03-09 NOTE — ANESTHESIA PRE PROCEDURE
Department of Anesthesiology  Preprocedure Note       Name:  Wesley Hood   Age:  67 y.o.  :  1950                                          MRN:  [id number]         Date:  3/9/2023      Surgeon: Elke Peres): Jabari Lopez MD    Procedure: Procedure(s):  BRONCHOSCOPY ENDOBRONCHIAL ULTRASOUND    Medications prior to admission:   Prior to Admission medications    Medication Sig Start Date End Date Taking? Authorizing Provider   lisinopril (PRINIVIL;ZESTRIL) 10 MG tablet  1/3/23   Historical Provider, MD   hydroCHLOROthiazide (HYDRODIURIL) 25 MG tablet  1/3/23   Historical Provider, MD   pantoprazole (PROTONIX) 40 MG tablet  23   Historical Provider, MD   loratadine (CLARITIN) 10 MG capsule Take 10 mg by mouth daily    Historical Provider, MD   primidone (MYSOLINE) 50 MG tablet Take 1 tablet by mouth twice daily 20   Veto Tate MD   divalproex (DEPAKOTE) 250 MG DR tablet TAKE 2 TABLETS BY MOUTH TWO (2) TIMES A DAY FOR 90 DAYS 1/15/20   Historical Provider, MD   metoprolol succinate (TOPROL XL) 25 MG extended release tablet metoprolol succinate ER 25 mg tablet,extended release 24 hr   Take 1 tablet every day by oral route for 90 days. Historical Provider, MD   simvastatin (ZOCOR) 40 MG tablet TAKE 1 TABLET BY MOUTH ONCE DAILY 20   Historical Provider, MD   metFORMIN (GLUCOPHAGE) 500 MG tablet TAKE 1 TABLET BY MOUTH TWICE DAILY 19   Historical Provider, MD   glimepiride (AMARYL) 4 MG tablet glimepiride 4 mg tablet   Take 1 tablet every day by oral route.     Historical Provider, MD   aspirin 81 MG tablet Take 81 mg by mouth daily  Patient not taking: No sig reported    Historical Provider, MD   Cholecalciferol (VITAMIN D3) 125 MCG (5000 UT) TABS Take by mouth    Historical Provider, MD   cetirizine (ZYRTEC) 10 MG tablet Take 10 mg by mouth daily  Patient not taking: No sig reported    Historical Provider, MD       Current medications:    Current Facility-Administered Medications   Medication Dose Route Frequency Provider Last Rate Last Admin    lactated ringers IV soln infusion   IntraVENous Continuous Loreto Singh MD           Allergies: Allergies   Allergen Reactions    Iodides        Problem List:    Patient Active Problem List   Diagnosis Code    Essential tremor G25.0    Headache disorder R51.9    Lung nodule R91.1    JESSIE on CPAP G47.33, Z99.89    COVID-19, May 2022 U07.1       Past Medical History:        Diagnosis Date    Diabetes ([facility] [address]     Hyperlipidemia     Hypertension     Seizures ([facility]        Past Surgical History:        Procedure Laterality Date    APPENDECTOMY      HEMORRHOID SURGERY      HERNIA REPAIR         Social History:    Social History     Tobacco Use    Smoking status: Every Day     Packs/day: 1.50     Years: 56.00     Pack years: 84.00     Types: Cigarettes    Smokeless tobacco: Never   Substance Use Topics    Alcohol use: Never                                Ready to quit: Not Answered  Counseling given: Not Answered      Vital Signs (Current): There were no vitals filed for this visit.                                            BP Readings from Last 3 Encounters:   03/07/23 136/70   03/01/23 130/64   02/10/20 (!) 146/77       NPO Status:                                                                                 BMI:   Wt Readings from Last 3 Encounters:   03/07/23 173 lb (78.5 kg)   03/01/23 172 lb 6.4 oz (78.2 kg)   02/10/20 195 lb (88.5 kg)     There is no height or weight on file to calculate BMI.    CBC:   Lab Results   Component Value Date/Time    WBC 12.49 03/01/2023 08:58 AM    RBC 4.38 03/01/2023 08:58 AM    HGB 13.4 03/01/2023 08:58 AM    HCT 43.6 03/01/2023 08:58 AM    MCV 99.5 03/01/2023 08:58 AM    RDW 14.5 03/01/2023 08:58 AM     03/01/2023 08:58 AM       CMP: No results found for: NA, K, CL, CO2, BUN, CREATININE, GFRAA, AGRATIO, LABGLOM, GLUCOSE, GLU, PROT, CALCIUM, BILITOT, ALKPHOS, AST, ALT    POC Tests: No results for input(s): POCGLU, POCNA, POCK, POCCL, POCBUN, POCHEMO, POCHCT in the last 72 hours. Coags: No results found for: PROTIME, INR, APTT    HCG (If Applicable): No results found for: PREGTESTUR, PREGSERUM, HCG, HCGQUANT     ABGs: No results found for: PHART, PO2ART, DHK0TWY, DBN0XWJ, BEART, G1YLAAMX     Type & Screen (If Applicable):  No results found for: LABABO, LABRH    Drug/Infectious Status (If Applicable):  No results found for: HIV, HEPCAB    COVID-19 Screening (If Applicable): No results found for: COVID19        Anesthesia Evaluation  Patient summary reviewed no history of anesthetic complications:   Airway: Mallampati: II  TM distance: >3 FB   Neck ROM: full  Mouth opening: < 3 FB   Dental:    (+) poor dentition      Pulmonary:normal exam    (+) sleep apnea: on CPAP,  current smoker          Patient smoked on day of surgery. ROS comment: 3/8/2023 CT Chest  1. Pulmonary emphysema with LEFT hilar/upper lobe lung mass measuring      approximately 4 cm in greatest dimension. There is mild nodular airspace      disease laterally to this lesion which may represent postobstructive      atelectasis or infiltrate.   2.10 mm irregular nodule within the RIGHT lung apex (series 2, image 101).     This is concerning for metastatic lesion. Additional 4 mm noncalcified      nodule at the RIGHT lung apex (series 2, image 85).   3.Evidence of prior granulomatous disease. Cardiovascular:  Exercise tolerance: poor (<4 METS),   (+) hypertension:, hyperlipidemia         Beta Blocker:  Dose within 24 Hrs         Neuro/Psych:   (+) seizures:,              ROS comment: Essential tremor GI/Hepatic/Renal: Neg GI/Hepatic/Renal ROS            Endo/Other:    (+) DiabetesType II DM, , .                 Abdominal:             Vascular: negative vascular ROS.          Other Findings: Very poor dentition, multiple missing teeth, explained to pt the potential risk of dental damage, pt voices understanding and wished to proceed          Anesthesia Plan      general     ASA 3       Induction: intravenous. MIPS: Postoperative opioids intended and Prophylactic antiemetics administered. Anesthetic plan and risks discussed with patient.         Attending anesthesiologist reviewed and agrees with Preprocedure content                MARGO Mir - CRNA   3/9/2023

## 2023-03-09 NOTE — INTERVAL H&P NOTE
Update History & Physical    The patient's History and Physical of March 7, 2023 was reviewed with the patient and I examined the patient. There was no change. The surgical site was confirmed by the patient and me. Plan: The risks, benefits, expected outcome, and alternative to the recommended procedure have been discussed with the patient. Patient understands and wants to proceed with the procedure.      Electronically signed by Arianne Chaves MD on 3/9/2023 at 8:34 AM

## 2023-03-09 NOTE — H&P (VIEW-ONLY)
Pulmonary and Sleep Medicine    Rosamaria Maloney (:  1950) is a 67 y.o. male,New patient, here for evaluation of the following chief complaint(s):  New Patient (Referred by Dr Cydney Hernández- Lung mass )      Referring physician:  No referring provider defined for this encounter. ASSESSMENT/PLAN:  1. Lung mass  -     Case Request  -     CT CHEST WO CONTRAST; Future  2. Lung nodule  3. JESSIE on CPAP  4. COVID-, May 2022  5. Cigarette nicotine dependence without complication, discussed smoking cessation for 3 min. CT was reviewed from [facility].  The patient does have a left hilar mass with right upper lobe nodule. Discussed EBUS bronchoscopy and fine-needle aspiration. The patient is in agreement. We will repeat CT of the chest prior to the procedure. He did have pulmonary function study at an outside facility we will attempt to obtain a copy of the PFT. Gail Cates MD, [facility], [facility]    Return in about 2 weeks (around 3/21/2023). SUBJECTIVE/OBJECTIVE:  The patient is here for evaluation of lung mass. He the patient is here for had respiratory symptoms in January. He had a chest x-ray done at that showed an abnormality. This was followed by a CT of the chest that showed a left hilar 2.5 cm nodule right upper lobe 8 mm nodule. This was followed by a PET/CT. The PET was positive with an FDG uptake and hilar mass with SUV of 14 and an SUV uptake of about 3 and right upper lobe 8 mm nodule. Patient denies hemoptysis. He admits to some weight loss. He is a chronic heavy smoker. He smokes about a pack and a half a day. I was asked to see him regarding the above. There was an attempt at bronchoscopy in [facility] by Dr. Cindy Bravo but it was non diagnostic. Prior to Visit Medications    Medication Sig Taking?  Authorizing Provider   lisinopril (PRINIVIL;ZESTRIL) 10 MG tablet  Yes Historical Provider, MD   hydroCHLOROthiazide (HYDRODIURIL) 25 MG tablet  Yes Historical Provider, MD   pantoprazole (PROTONIX) 40 MG tablet  Yes Historical Provider, MD   loratadine (CLARITIN) 10 MG capsule Take 10 mg by mouth daily Yes Historical Provider, MD   primidone (MYSOLINE) 50 MG tablet Take 1 tablet by mouth twice daily Yes Antolin Moura MD   divalproex (DEPAKOTE) 250 MG DR tablet TAKE 2 TABLETS BY MOUTH TWO (2) TIMES A DAY FOR 90 DAYS Yes Historical Provider, MD   metoprolol succinate (TOPROL XL) 25 MG extended release tablet metoprolol succinate ER 25 mg tablet,extended release 24 hr   Take 1 tablet every day by oral route for 90 days. Yes Historical Provider, MD   simvastatin (ZOCOR) 40 MG tablet TAKE 1 TABLET BY MOUTH ONCE DAILY Yes Historical Provider, MD   metFORMIN (GLUCOPHAGE) 500 MG tablet TAKE 1 TABLET BY MOUTH TWICE DAILY Yes Historical Provider, MD   glimepiride (AMARYL) 4 MG tablet glimepiride 4 mg tablet   Take 1 tablet every day by oral route. Yes Historical Provider, MD   Cholecalciferol (VITAMIN D3) 125 MCG (5000 UT) TABS Take by mouth Yes Historical Provider, MD   aspirin 81 MG tablet Take 81 mg by mouth daily  Patient not taking: No sig reported  Historical Provider, MD   cetirizine (ZYRTEC) 10 MG tablet Take 10 mg by mouth daily  Patient not taking: No sig reported  Historical Provider, MD        Review of Systems   Constitutional:  Negative for activity change, appetite change, chills, diaphoresis and fatigue. HENT:  Negative for congestion, dental problem, drooling, ear discharge, postnasal drip and rhinorrhea. Eyes:  Negative for visual disturbance. Respiratory:  Positive for cough. Negative for apnea, chest tightness, shortness of breath and wheezing. Gastrointestinal:  Negative for abdominal distention, abdominal pain and anal bleeding. Endocrine: Negative for cold intolerance, heat intolerance and polydipsia. Genitourinary:  Negative for difficulty urinating, dysuria, enuresis and flank pain.    Musculoskeletal:  Negative for arthralgias, back pain and gait problem. Allergic/Immunologic: Negative for environmental allergies. Neurological:  Negative for dizziness, facial asymmetry, light-headedness and headaches. Vitals:    03/07/23 1351   BP: 136/70   Pulse: 76   Temp: 97.3 °F (36.3 °C)   SpO2: 97%     BMI Readings from Last 1 Encounters:   03/07/23 25.55 kg/m²         Physical Exam  Vitals reviewed. Constitutional:       Appearance: Normal appearance. HENT:      Head: Normocephalic and atraumatic. Nose: Nose normal.   Eyes:      Extraocular Movements: Extraocular movements intact. Conjunctiva/sclera: Conjunctivae normal.   Cardiovascular:      Rate and Rhythm: Normal rate and regular rhythm. Heart sounds: No murmur heard. No friction rub. Pulmonary:      Effort: Pulmonary effort is normal. No respiratory distress. Breath sounds: Normal breath sounds. No stridor. No wheezing, rhonchi or rales. Abdominal:      General: There is no distension. Palpations: There is no mass. Tenderness: There is no abdominal tenderness. There is no guarding or rebound. Musculoskeletal:      Cervical back: Normal range of motion and neck supple. Neurological:      Mental Status: He is alert and oriented to person, place, and time. This note was generated using a voice recognition software. Errors in voice recognition may have occurred. An electronic signature was used to authenticate this note.     --Isai Garza MD

## 2023-03-09 NOTE — PROCEDURES
After consent and under general anesthesia provided by the anesthesia service the patient underwent video bronchoscopy through the endotracheal tube. The distal trachea was normal.  Aziza was sharp and midline. Right and left bronchial trees were explored. There was no endobronchial disease on the right. The left upper lobe apical posterior segment was extrinsically compressed. However there was no endobronchial tumor. Endobronchial ultrasound scope was then used to survey the mediastinum. There was no hilar or mediastinal adenopathy seen. The left upper lobe apical posterior segment bronchus was then wedged with the scope. The lung mass was identified. Fine-needle aspiration was done x7. No immediate complications. Specimen submitted in [facility] for cytology evaluation. Estimated blood loss: 1 cc. Complications: None. Specimen:  1. Left upper lobe apical posterior segment fine-needle aspirate x7 in [facility] for cytology. 2.  Left upper lobe bronchoalveolar lavage for cytology.   Disposition: Post anesthesia recovery per anesthesia service

## 2023-03-10 NOTE — PROGRESS NOTES
MEDICAL ONCOLOGY CONSULTATION    Pt Name: Lucy Adan  MRN: [id number]  YOB: 1950  Date of evaluation: 3/14/2023    REASON FOR CONSULTATION:  Lung mass  REQUESTING PHYSICIAN: Dr Armando King    History Obtained From:  patient and old medical records    HISTORY OF PRESENT ILLNESS:    Diagnosis  Left upper lobe lung mass, Jan 2023  Rectal polyps awaiting pathology    Treatment Summary  To be determined    Cancer History  Docia Hatchet was seen by me on 3/1/2023. Patient was found to have a lung mass. Had a bronchoscopy/bronchoalveolar lavage with Dr. Gloria Hart that came back nondiagnostic. He had a PET scan and is here to discuss further treatment recommendations. 1/16/23 CT chest [facility]): Spiculated perihilar left upper lobe pulmonary nodule measures 2.7cm. This is concerning for primary neoplasm. PET/CT or bronchoscopy is recommended to further evaluate. 0.8cm noncalcified pulmonary nodule in the right upper lobe. This is at the lower limits of normal for Pet/CT detection. Benign granulomatous disease, metastatic disease, or 2nd primary pulmonary malignancy could be considered. No enlarged mediastinal or hilar lymph nodes. 1/17/23 PFT testing [facility]): Moderate obstructive lung disease. 1/26/23 Bronchoscopy by Dr Shakila Woodard/[facility]: The right upper lobe, lower lobe, middle lobe were negative. The left upper lobe, we could not see any lesion, lingula could not see any lesion, lower lobe could not see any lesion. We then brushed the left upper lobe and sent it for specimen. We washed the left upper lobe and sent it for specimen. 1/26/23  Lung, left upper lobe, washing: Negative for malignant cells. Reactive bronchial cells, macrophages, and inflammation. Lung, left upper lobe, brushing: Negative for malignant cells. Markedly reactive bronchial cells, macrophages, and inflammation. 2/9/23 PET scan [facility]): 2.8 cm hypermetabolic central LEFT upper lobe pulmonary nodule.  This likely represents a primary lung neoplasm. 0.8 cm hypermetabolic pulmonary nodule in the RIGHT upper lobe. This is highly suspicious for either a contralateral pulmonary metastasis or second primary lung neoplasm. No hypermetabolic thoracic lymph nodes. No evidence of hypermetabolic metastatic disease in the neck, abdomen, or pelvis. 1.5 cm hypermetabolic nodule in the RIGHT anterior rectum, highly concerning for underlying rectal polyp. Recommend correlation with colonoscopy/sigmoidoscopy. 2/28/23 Colonoscopy pathology [facility]): Sections show a villous adenoma which is negative for high-grade glandular dysplasia or malignancy. It is fragmented, and the smaller detached fragments of uncertain orientation submitted in block D consist of villous adenoma, precluding definitive assessment of specimen margin. Focally, adenomatous epithelium is present at a cauterized tissue edge of the polyp. 3/1/2023- Referred to Dr. Annelise Trujillo for bronchoscopy and biopsy of dominant lung lesion as well as 8 mm hypermetabolic pulmonary nodule. Insurance not covered. 3/9/23- Bronchoscope with Dr. Livia Johnson: Pathology [address] [place] [address].      Past Medical History:    Past Medical History:   Diagnosis Date    Diabetes ([address]     Hyperlipidemia     Hypertension     Seizures ([facility] [address]        Past Surgical History:    Past Surgical History:   Procedure Laterality Date    APPENDECTOMY      BRONCHOSCOPY Left 3/9/2023    BRONCHOSCOPY ENDOBRONCHIAL ULTRASOUND performed by Jeyson Martel MD at [facility]    BRONCHOSCOPY  3/9/2023    BRONCHOSCOPY performed by Jeyson Martel MD at [facility]    HEMORRHOID SURGERY      HERNIA REPAIR         Social History:    Marital status:   Smoking status: Currently; 1 1/2 pack daily for 56 years  ETOH status: No  Resides: [place] [facility]    Family History:   Family History   Problem Relation Age of Onset    Cancer Father         multiple myeloma    Cancer Brother         bone    Cancer Maternal Grandfather esophageal       Current Hospital Medications:    Current Outpatient Medications   Medication Sig Dispense Refill    lisinopril (PRINIVIL;ZESTRIL) 10 MG tablet       hydroCHLOROthiazide (HYDRODIURIL) 25 MG tablet       pantoprazole (PROTONIX) 40 MG tablet       loratadine (CLARITIN) 10 MG capsule Take 10 mg by mouth daily      primidone (MYSOLINE) 50 MG tablet Take 1 tablet by mouth twice daily 60 tablet 1    divalproex (DEPAKOTE) 250 MG DR tablet TAKE 2 TABLETS BY MOUTH TWO (2) TIMES A DAY FOR 90 DAYS      metoprolol succinate (TOPROL XL) 25 MG extended release tablet metoprolol succinate ER 25 mg tablet,extended release 24 hr   Take 1 tablet every day by oral route for 90 days. simvastatin (ZOCOR) 40 MG tablet TAKE 1 TABLET BY MOUTH ONCE DAILY      metFORMIN (GLUCOPHAGE) 500 MG tablet TAKE 1 TABLET BY MOUTH TWICE DAILY      glimepiride (AMARYL) 4 MG tablet glimepiride 4 mg tablet   Take 1 tablet every day by oral route. aspirin 81 MG tablet Take 81 mg by mouth daily      Cholecalciferol (VITAMIN D3) 125 MCG (5000 UT) TABS Take by mouth      cetirizine (ZYRTEC) 10 MG tablet Take 10 mg by mouth daily       No current facility-administered medications for this visit. Allergies:    Allergies   Allergen Reactions    Iodides        Subjective   REVIEW OF SYSTEMS:   CONSTITUTIONAL: no fever, no night sweats,  fatigue;  HEENT: no blurring of vision, no double vision, no hearing difficulty, no tinnitus, no ulceration, no dysplasia, no epistaxis;   LUNGS: no cough, no hemoptysis, no wheeze,  no shortness of breath;  CARDIOVASCULAR: no palpitation, no chest pain, no shortness of breath;  GI: no abdominal pain, no nausea, no vomiting, no diarrhea, no constipation;  TARAH: no dysuria, no hematuria, no frequency or urgency, no nephrolithiasis;  MUSCULOSKELETAL: no joint pain, no swelling, no stiffness;  ENDOCRINE: no polyuria, no polydipsia, no cold or heat intolerance;  HEMATOLOGY: no easy bruising or bleeding, no history of clotting disorder;  DERMATOLOGY: no skin rash, no eczema, no pruritus;  PSYCHIATRY: no depression, no anxiety, no panic attacks, no suicidal ideation, no homicidal ideation;  NEUROLOGY: no syncope, no seizures, no numbness or tingling of hands, no numbness or tingling of feet, no paresis;       Objective   /64 (Site: Right Upper Arm, Position: Sitting, Cuff Size: Medium Adult)   Pulse 69   Temp 98.3 °F (36.8 °C) (Oral)   Ht 5' 9\" (1.753 m)   Wt 173 lb (78.5 kg)   SpO2 96%   BMI 25.55 kg/m²        PHYSICAL EXAM:  CONSTITUTIONAL: Alert, appropriate, no acute distress  EYES: Non icteric, EOM intact, pupils equal round   ENT: Mucus membranes moist, no oral pharyngeal lesions, external inspection of ears and nose are normal.  NECK: Supple, no masses. No palpable thyroid mass  CHEST/LUNGS: CTA bilaterally, normal respiratory effort   CARDIOVASCULAR: RRR, no murmurs. No lower extremity edema  ABDOMEN: soft non-tender, active bowel sounds, no HSM. No palpable masses  EXTREMITIES: warm, full ROM in all 4 extremities, no focal weakness. SKIN: warm, dry with no rashes or lesions  LYMPH: No cervical, clavicular, axillary, or inguinal lymphadenopathy  NEUROLOGIC: follows commands, non focal   PSYCH: mood and affect appropriate. Alert and oriented to time, place, person    LABORATORY RESULTS REVIEWED/ANALYZED BY ME:  2/28/23 Colonoscopy pathology [facility]): Sections show a villous adenoma which is negative for high-grade glandular dysplasia or malignancy. It is fragmented, and the smaller detached fragments of uncertain orientation submitted in block D consist of villous adenoma, precluding definitive assessment of specimen margin. Focally, adenomatous epithelium is present at a cauterized tissue edge of the polyp. 1/17/23 PFT study: Moderate obstructive lung deficit, no restrictive lung deficit, mild decrease in diffusing capacity, minimal response to bronchodilator.      RADIOLOGY STUDIES REVIEWED BY ME:  As above      ASSESSMENT:    No orders of the defined types were placed in this encounter. Shahla Tracey was seen today for follow-up. Diagnoses and all orders for this visit:    Care plan discussed with patient    Mass of upper lobe of left lung    Lung nodule       Left upper lobe lung mass, Jan 2023-concern for malignancy.  -1/16/23 CT chest [facility]): Spiculated perihilar left upper lobe pulmonary nodule measures 2.7cm. This is concerning for primary neoplasm. PET/CT or bronchoscopy is recommended to further evaluate. 0.8cm noncalcified pulmonary nodule in the right upper lobe. This is at the lower limits of normal for Pet/CT detection. Benign granulomatous disease, metastatic disease, or 2nd primary pulmonary malignancy could be considered. No enlarged mediastinal or hilar lymph nodes.  -1/26/23  Lung, left upper lobe, washing: Negative for malignant cells. Reactive bronchial cells, macrophages, and inflammation. Lung, left upper lobe, brushing: Negative for malignant cells. Markedly reactive bronchial cells, macrophages, and inflammation.  -2/9/23 PET scan [facility]): 2.8 cm hypermetabolic central LEFT upper lobe pulmonary nodule. This likely represents a primary lung neoplasm. 0.8 cm hypermetabolic pulmonary nodule in the RIGHT upper lobe. This is highly suspicious for either a contralateral pulmonary metastasis or second primary lung neoplasm. No hypermetabolic thoracic lymph nodes. No evidence of hypermetabolic metastatic disease in the neck, abdomen, or pelvis. 1.5 cm hypermetabolic nodule in the RIGHT anterior rectum, highly concerning for underlying rectal polyp. Recommend correlation with colonoscopy/sigmoidoscopy.      Plan:  -Referral to Dr Mahogany Hoff for suregry evaluation  -Repeat Ct chest June 2023    Rectal polyps  -Status post colonoscopy and removal of rectal polyp  -[facility]  2/28/23 Colonoscopy pathology [facility]): Sections show a villous adenoma which is negative for high-grade glandular dysplasia or malignancy. It is fragmented, and the smaller detached fragments of uncertain orientation submitted in block D consist of villous adenoma, precluding definitive assessment of specimen margin. Focally, adenomatous epithelium is present at a cauterized tissue edge of the polyp. PLAN:  RTC with MD 2 weeks in [facility] office  Referral Dr Shukri Jay, CT surgery for surgery evaluation  Recommend Nicotine patch 21mcg x4 weeks, followed by Nicotine patch 14mcg x2 weeks followed by Nicotine patch 7mcg x2 weeks-scripts sent  Continue to follow up with Dr. Lisa Li am pre-charting as a registered nurse for Alexandr Kenney MD. Electronically signed by Nick Garza RN on 3/14/2023 at 8:09 AM CST. Danelle Espinoza am scribing for Alexandr Kenney MD. Electronically signed by Carlos Monk RN on 3/14/2023 at 4:13 PM CDT. I, Dr Nicho Navarro, personally performed the services described in this documentation as scribed by Carlos Monk RN in my presence and is both accurate and complete. I have seen, examined and reviewed this patient medication list, appropriate labs and imaging studies. I reviewed relevant medical records and others physicians notes. I discussed the plans of care with the patient. I answered all the questions to the patients satisfaction. I have also reviewed the chief complaint (CC) and part of the history (History of Present Illness (HPI), Past Family Social History [facility]), or Review of Systems (ROS) and made changes when appropriated. (Please note that portions of this note were completed with a voice recognition program. Efforts were made to edit the dictations but occasionally words are mis-transcribed. )Electronically signed by Alexandr Kenney MD on 3/14/2023 at 4:13 PM

## 2023-03-14 ENCOUNTER — OFFICE VISIT (OUTPATIENT)
Dept: HEMATOLOGY | Age: 73
End: 2023-03-14
Payer: MEDICARE

## 2023-03-14 VITALS
WEIGHT: 173 LBS | HEART RATE: 69 BPM | SYSTOLIC BLOOD PRESSURE: 126 MMHG | BODY MASS INDEX: 25.62 KG/M2 | HEIGHT: 69 IN | OXYGEN SATURATION: 96 % | TEMPERATURE: 98.3 F | DIASTOLIC BLOOD PRESSURE: 64 MMHG

## 2023-03-14 DIAGNOSIS — C34.92 NSCLC OF LEFT LUNG (HCC): ICD-10-CM

## 2023-03-14 DIAGNOSIS — Z71.89 CARE PLAN DISCUSSED WITH PATIENT: ICD-10-CM

## 2023-03-14 DIAGNOSIS — R91.1 LUNG NODULE: ICD-10-CM

## 2023-03-14 DIAGNOSIS — C34.92 SQUAMOUS CELL CARCINOMA LUNG, LEFT (HCC): Primary | ICD-10-CM

## 2023-03-14 PROCEDURE — 1123F ACP DISCUSS/DSCN MKR DOCD: CPT | Performed by: INTERNAL MEDICINE

## 2023-03-14 PROCEDURE — 99214 OFFICE O/P EST MOD 30 MIN: CPT | Performed by: INTERNAL MEDICINE

## 2023-03-14 RX ORDER — NICOTINE 21 MG/24HR
1 PATCH, TRANSDERMAL 24 HOURS TRANSDERMAL DAILY
Qty: 28 PATCH | Refills: 0 | Status: SHIPPED | OUTPATIENT
Start: 2023-03-14 | End: 2023-04-11

## 2023-03-14 RX ORDER — NICOTINE 21 MG/24HR
1 PATCH, TRANSDERMAL 24 HOURS TRANSDERMAL DAILY
Qty: 14 PATCH | Refills: 5 | Status: SHIPPED | OUTPATIENT
Start: 2023-03-14 | End: 2023-03-28

## 2023-03-30 ENCOUNTER — OFFICE VISIT (OUTPATIENT)
Dept: CARDIOTHORACIC SURGERY | Age: 73
End: 2023-03-30
Payer: MEDICARE

## 2023-03-30 VITALS
BODY MASS INDEX: 25.62 KG/M2 | HEIGHT: 69 IN | WEIGHT: 173 LBS | SYSTOLIC BLOOD PRESSURE: 139 MMHG | HEART RATE: 72 BPM | DIASTOLIC BLOOD PRESSURE: 74 MMHG | OXYGEN SATURATION: 93 %

## 2023-03-30 DIAGNOSIS — R91.1 LUNG NODULE: Primary | ICD-10-CM

## 2023-03-30 PROBLEM — C34.92 SQUAMOUS CELL CARCINOMA LUNG, LEFT (HCC): Status: ACTIVE | Noted: 2023-03-30

## 2023-03-30 PROCEDURE — 1123F ACP DISCUSS/DSCN MKR DOCD: CPT | Performed by: SURGERY

## 2023-03-30 PROCEDURE — 99205 OFFICE O/P NEW HI 60 MIN: CPT | Performed by: SURGERY

## 2023-03-30 ASSESSMENT — ENCOUNTER SYMPTOMS
NAUSEA: 0
COUGH: 0
BACK PAIN: 0
SHORTNESS OF BREATH: 0
CHEST TIGHTNESS: 0
ABDOMINAL PAIN: 0
DIARRHEA: 0
VOMITING: 0
CONSTIPATION: 0

## 2023-03-30 NOTE — PROGRESS NOTES
Judgment: Judgment normal.        DATA:  Wt Readings from Last 3 Encounters:   03/14/23 173 lb (78.5 kg)   03/09/23 173 lb (78.5 kg)   03/07/23 173 lb (78.5 kg)     Temp Readings from Last 3 Encounters:   03/14/23 98.3 °F (36.8 °C) (Oral)   03/09/23 98 °F (36.7 °C) (Temporal)   03/07/23 97.3 °F (36.3 °C)     BP Readings from Last 3 Encounters:   03/14/23 126/64   03/09/23 130/60   03/07/23 136/70     Pulse Readings from Last 3 Encounters:   03/14/23 69   03/09/23 73   03/07/23 76      PET scan reviewed by me. There are two PET+ lesions - left hilum of the lung (4 cm diameter) and right apical segment RUL (0.8mm). No other PET+ lesions. PFT's - FEV1 50%, DLCO 70% predicted    ASSESSMENT AND PLAN:      Case discussed with Dr. Tatum Benito. 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 On inspection of the CT scan, the hilar mass appears resectable using a LULobecomty and will not require pneumonectomy. There is no obvious +Lns, so this resection is for cure. In addition, there is a RUL irregular shaped nodule that is also PET+ and therefore concerning for metastasis. This requires RUL wedge resection to help guide postop adjuvant therapy. The patient appears to be a reasonable candidate for surgery based on lack of pulmonary symptoms that would contraindicate lobectomy. Will obtain PFTs to help clarify this risk.       Brianna Stephenson MD

## 2023-04-17 ENCOUNTER — HOSPITAL ENCOUNTER (OUTPATIENT)
Dept: PREADMISSION TESTING | Age: 73
Discharge: HOME | End: 2023-04-21
Payer: MEDICARE

## 2023-04-17 ENCOUNTER — HOSPITAL ENCOUNTER (OUTPATIENT)
Dept: GENERAL RADIOLOGY | Age: 73
Discharge: HOME | End: 2023-04-17
Payer: MEDICARE

## 2023-04-17 VITALS — HEIGHT: 69 IN | BODY MASS INDEX: 27.03 KG/M2 | WEIGHT: 182.5 LBS

## 2023-04-17 DIAGNOSIS — R91.1 LUNG NODULE: ICD-10-CM

## 2023-04-17 LAB
ABO + RH BLD: NORMAL
ALBUMIN SERPL-MCNC: 4.3 G/DL (ref 3.5–5.2)
ALP SERPL-CCNC: 98 U/L (ref 40–130)
ALT SERPL-CCNC: 16 U/L (ref 5–41)
ANION GAP SERPL CALCULATED.3IONS-SCNC: 11 MMOL/L (ref 7–19)
APTT PPP: 33.5 SEC (ref 26–36.2)
AST SERPL-CCNC: 15 U/L (ref 5–40)
BASOPHILS # BLD: 0.1 K/UL (ref 0–0.2)
BASOPHILS NFR BLD: 0.7 % (ref 0–1)
BILIRUB SERPL-MCNC: <0.2 MG/DL (ref 0.2–1.2)
BLD GP AB SCN SERPL QL: NORMAL
BUN SERPL-MCNC: 30 MG/DL (ref 8–23)
CALCIUM SERPL-MCNC: 10.4 MG/DL (ref 8.8–10.2)
CHLORIDE SERPL-SCNC: 100 MMOL/L (ref 98–111)
CO2 SERPL-SCNC: 28 MMOL/L (ref 22–29)
CREAT SERPL-MCNC: 1.2 MG/DL (ref 0.5–1.2)
EKG P AXIS: 46 DEGREES
EKG P-R INTERVAL: 150 MS
EKG Q-T INTERVAL: 368 MS
EKG QRS DURATION: 88 MS
EKG QTC CALCULATION (BAZETT): 371 MS
EKG T AXIS: 66 DEGREES
EOSINOPHIL # BLD: 0.3 K/UL (ref 0–0.6)
EOSINOPHIL NFR BLD: 2.7 % (ref 0–5)
ERYTHROCYTE [DISTWIDTH] IN BLOOD BY AUTOMATED COUNT: 13.9 % (ref 11.5–14.5)
GLUCOSE SERPL-MCNC: 137 MG/DL (ref 74–109)
HCT VFR BLD AUTO: 39.3 % (ref 42–52)
HGB BLD-MCNC: 12.5 G/DL (ref 14–18)
IMM GRANULOCYTES # BLD: 0.1 K/UL
INR PPP: 1.06 (ref 0.88–1.18)
LYMPHOCYTES # BLD: 2.5 K/UL (ref 1.1–4.5)
LYMPHOCYTES NFR BLD: 24.7 % (ref 20–40)
MCH RBC QN AUTO: 31 PG (ref 27–31)
MCHC RBC AUTO-ENTMCNC: 31.8 G/DL (ref 33–37)
MCV RBC AUTO: 97.5 FL (ref 80–94)
MONOCYTES # BLD: 0.9 K/UL (ref 0–0.9)
MONOCYTES NFR BLD: 8.8 % (ref 0–10)
NEUTROPHILS # BLD: 6.2 K/UL (ref 1.5–7.5)
NEUTS SEG NFR BLD: 62.4 % (ref 50–65)
PLATELET # BLD AUTO: 225 K/UL (ref 130–400)
PMV BLD AUTO: 11 FL (ref 9.4–12.4)
POTASSIUM SERPL-SCNC: 4.7 MMOL/L (ref 3.5–5)
PROT SERPL-MCNC: 7.7 G/DL (ref 6.6–8.7)
PROTHROMBIN TIME: 13.7 SEC (ref 12–14.6)
RBC # BLD AUTO: 4.03 M/UL (ref 4.7–6.1)
SARS-COV-2 N GENE RESP QL NAA+PROBE: NOT DETECTED
SODIUM SERPL-SCNC: 139 MMOL/L (ref 136–145)
WBC # BLD AUTO: 10 K/UL (ref 4.8–10.8)

## 2023-04-17 PROCEDURE — U0003 INFECTIOUS AGENT DETECTION BY NUCLEIC ACID (DNA OR RNA); SEVERE ACUTE RESPIRATORY SYNDROME CORONAVIRUS 2 (SARS-COV-2) (CORONAVIRUS DISEASE [COVID-19]), AMPLIFIED PROBE TECHNIQUE, MAKING USE OF HIGH THROUGHPUT TECHNOLOGIES AS DESCRIBED BY CMS-2020-01-R: HCPCS

## 2023-04-17 PROCEDURE — 80053 COMPREHEN METABOLIC PANEL: CPT

## 2023-04-17 PROCEDURE — 85610 PROTHROMBIN TIME: CPT

## 2023-04-17 PROCEDURE — 93005 ELECTROCARDIOGRAM TRACING: CPT

## 2023-04-17 PROCEDURE — 85025 COMPLETE CBC W/AUTO DIFF WBC: CPT

## 2023-04-17 PROCEDURE — 86900 BLOOD TYPING SEROLOGIC ABO: CPT

## 2023-04-17 PROCEDURE — 86901 BLOOD TYPING SEROLOGIC RH(D): CPT

## 2023-04-17 PROCEDURE — 71046 X-RAY EXAM CHEST 2 VIEWS: CPT | Performed by: RADIOLOGY

## 2023-04-17 PROCEDURE — 71046 X-RAY EXAM CHEST 2 VIEWS: CPT

## 2023-04-17 PROCEDURE — U0005 INFEC AGEN DETEC AMPLI PROBE: HCPCS

## 2023-04-17 PROCEDURE — 93010 ELECTROCARDIOGRAM REPORT: CPT | Performed by: INTERNAL MEDICINE

## 2023-04-17 PROCEDURE — 86850 RBC ANTIBODY SCREEN: CPT

## 2023-04-17 PROCEDURE — 85730 THROMBOPLASTIN TIME PARTIAL: CPT

## 2023-04-17 NOTE — DISCHARGE INSTRUCTIONS
the morning of your surgery. DO NOT TAKE ANY SUPPLEMENTS or over the counter medications the morning of  surgery. PREOPERATIVE GUIDELINES WHEN RECEIVING ANESTHESIA    Do not eat or drink anything after midnight, the night before your surgery. No gum or candy the morning of surgery. This is extremely important for your safety. Take a bath (or shower) the night before your surgery and you may brush your teeth the morning of your surgery. You will be scheduled to arrive at the hospital 2 hours before your surgery, or follow your surgeon's instructions. Dress comfortably. Wear loose clothing that will be easy to remove and comfortable for your trip home. You may wear eyeglasses or contacts but bring your cases with you as they must be remove before your surgery. Hearing aids and dentures will need to be removed before your surgery. Do not wear any jewelry, including body jewelry. All jewelry will need to be removed prior to your surgery. Do not wear fingernail polish or make-up. It is best not to bring any valuables with you. If you are to stay in the hospital overnight, bring your robe, slippers and personal toiletries that you may need. POSTOPERATIVE GUIDELINES AFTER RECEIVING ANESTHESIA    If you are to go home after your surgery, you will need a responsible adult to drive you home. You will not be able to take public transportation after your discharge from the Operative Care Unit unless you are accompanied by a        responsible adult. On returning home, be sure to follow your physician's orders regarding diet, activity and medications. Remember, surgery with general anesthesia or sedation may leave you sleepy, very tired and with a decreased appetite for 12 to 24 hours. If you develop any post-surgical complications or problems, call your surgeon or [facility] Emergency Department ([phone]).        [facility]

## 2023-04-19 ENCOUNTER — ANESTHESIA EVENT (OUTPATIENT)
Dept: OPERATING ROOM | Age: 73
End: 2023-04-19
Payer: MEDICARE

## 2023-04-19 ENCOUNTER — APPOINTMENT (OUTPATIENT)
Dept: GENERAL RADIOLOGY | Age: 73
DRG: 163 | End: 2023-04-19
Attending: SURGERY
Payer: MEDICARE

## 2023-04-19 ENCOUNTER — HOSPITAL ENCOUNTER (INPATIENT)
Age: 73
LOS: 12 days | Discharge: HOME | DRG: 163 | End: 2023-05-01
Attending: SURGERY | Admitting: SURGERY
Payer: MEDICARE

## 2023-04-19 ENCOUNTER — ANESTHESIA (OUTPATIENT)
Dept: OPERATING ROOM | Age: 73
End: 2023-04-19
Payer: MEDICARE

## 2023-04-19 DIAGNOSIS — C34.92 SQUAMOUS CARCINOMA OF LUNG, LEFT (HCC): ICD-10-CM

## 2023-04-19 PROBLEM — R91.8 MASS OF LEFT LUNG: Status: ACTIVE | Noted: 2023-04-19

## 2023-04-19 LAB
ABO + RH BLD: NORMAL
ALLENS TEST: ABNORMAL
ANION GAP BLD CALC-SCNC: 6 MMOL/L
ANION GAP BLD CALC-SCNC: 7 MMOL/L
ANION GAP SERPL CALC-SCNC: 105 MEQ/L (ref 99–110)
ANION GAP SERPL CALC-SCNC: 107 MEQ/L (ref 99–110)
ANION GAP SERPL CALCULATED.3IONS-SCNC: 15 MMOL/L (ref 7–19)
BASE EXCESS ARTERIAL: -2 (ref -3–3)
BASE EXCESS ARTERIAL: -3.3 MMOL/L (ref -2–2)
BASE EXCESS ARTERIAL: -4.3 MMOL/L (ref -2–2)
BASE EXCESS ARTERIAL: 0 (ref -3–3)
BASOPHILS # BLD: 0 K/UL (ref 0–0.2)
BASOPHILS NFR BLD: 0 % (ref 0–1)
BLD GP AB SCN SERPL QL: NORMAL
BLOOD BANK DISPENSE STATUS: NORMAL
BLOOD BANK DISPENSE STATUS: NORMAL
BLOOD BANK PRODUCT CODE: NORMAL
BLOOD BANK PRODUCT CODE: NORMAL
BPU ID: NORMAL
BPU ID: NORMAL
BUN SERPL-MCNC: 32 MG/DL (ref 8–23)
CA-I BLD-SCNC: 1.12 MMOL/L (ref 1.1–1.3)
CA-I BLD-SCNC: 1.19 MMOL/L (ref 1.1–1.3)
CALCIUM SERPL-MCNC: 8.4 MG/DL (ref 8.8–10.2)
CARBOXYHEMOGLOBIN ARTERIAL: 2 % (ref 0–5)
CARBOXYHEMOGLOBIN ARTERIAL: 2 % (ref 0–5)
CHLORIDE SERPL-SCNC: 106 MMOL/L (ref 98–111)
CO2 BLD CALC-SCNC: 25 MEQ/L (ref 21–32)
CO2 BLD CALC-SCNC: 27 MEQ/L (ref 21–32)
CO2 SERPL-SCNC: 21 MMOL/L (ref 22–29)
CREAT SERPL-MCNC: 1.3 MG/DL (ref 0.3–1.3)
CREAT SERPL-MCNC: 1.3 MG/DL (ref 0.3–1.3)
CREAT SERPL-MCNC: 1.3 MG/DL (ref 0.5–1.2)
DESCRIPTION BLOOD BANK: NORMAL
DESCRIPTION BLOOD BANK: NORMAL
EOSINOPHIL # BLD: 0 K/UL (ref 0–0.6)
EOSINOPHIL NFR BLD: 0 % (ref 0–5)
ERYTHROCYTE [DISTWIDTH] IN BLOOD BY AUTOMATED COUNT: 16.3 % (ref 11.5–14.5)
FIO2: 40 %
FIO2: 50 %
GLUCOSE BLD-MCNC: 174 MG/DL (ref 70–99)
GLUCOSE BLD-MCNC: 224 MG/DL (ref 70–99)
GLUCOSE BLD-MCNC: 225 MG/DL (ref 70–99)
GLUCOSE BLD-MCNC: 236 MG/DL (ref 70–99)
GLUCOSE BLD-MCNC: 295 MG/DL (ref 70–99)
GLUCOSE BLD-MCNC: 315 MG/DL (ref 70–99)
GLUCOSE SERPL-MCNC: 248 MG/DL (ref 74–109)
HCO3 ARTERIAL: 22.1 MMOL/L (ref 22–26)
HCO3 ARTERIAL: 23.6 MMOL/L (ref 22–26)
HCT VFR BLD AUTO: 29 % (ref 37–52)
HCT VFR BLD AUTO: 31 % (ref 37–52)
HCT VFR BLD AUTO: 36.1 % (ref 42–52)
HEMOGLOBIN, ART, EXTENDED: 11.5 G/DL (ref 14–18)
HEMOGLOBIN, ART, EXTENDED: 11.8 G/DL (ref 14–18)
HGB BLD CALC-MCNC: 10.6 GM/DL (ref 12–18)
HGB BLD CALC-MCNC: 9.7 GM/DL (ref 12–18)
HGB BLD-MCNC: 11.6 G/DL (ref 14–18)
IMM GRANULOCYTES # BLD: 0.3 K/UL
LYMPHOCYTES # BLD: 2.5 K/UL (ref 1.1–4.5)
LYMPHOCYTES NFR BLD: 13 % (ref 20–40)
MCH RBC QN AUTO: 29.6 PG (ref 27–31)
MCHC RBC AUTO-ENTMCNC: 32.1 G/DL (ref 33–37)
MCV RBC AUTO: 92.1 FL (ref 80–94)
MECHANICAL RATE IN BPM: 12
METHEMOGLOBIN ARTERIAL: 0.8 %
METHEMOGLOBIN ARTERIAL: 1.3 %
MODE: ABNORMAL
MODE: ABNORMAL
MONOCYTES # BLD: 2.1 K/UL (ref 0–0.9)
MONOCYTES NFR BLD: 11 % (ref 0–10)
NEUTROPHILS # BLD: 14.4 K/UL (ref 1.5–7.5)
NEUTS BAND NFR BLD MANUAL: 1 % (ref 0–5)
NEUTS SEG NFR BLD: 75 % (ref 50–65)
O2 CONTENT ARTERIAL: 16 ML/DL
O2 CONTENT ARTERIAL: 16.4 ML/DL
O2 SAT, ARTERIAL: 100 % (ref 93–100)
O2 SAT, ARTERIAL: 100 % (ref 93–100)
O2 SAT, ARTERIAL: 96.5 %
O2 SAT, ARTERIAL: 97.1 %
O2 THERAPY: ABNORMAL
O2 THERAPY: ABNORMAL
PCO2 ARTERIAL: 45 MMHG (ref 35–45)
PCO2 ARTERIAL: 49 MMHG (ref 35–45)
PCO2 ARTERIAL: 52 MM HG (ref 35–48)
PCO2 ARTERIAL: 56 MM HG (ref 35–48)
PERFORMED ON: ABNORMAL
PH ARTERIAL: 7.29 (ref 7.35–7.45)
PH ARTERIAL: 7.29 (ref 7.3–7.5)
PH ARTERIAL: 7.29 (ref 7.3–7.5)
PH ARTERIAL: 7.3 (ref 7.35–7.45)
PLATELET # BLD AUTO: 188 K/UL (ref 130–400)
PLATELET SLIDE REVIEW: ADEQUATE
PMV BLD AUTO: 10.7 FL (ref 9.4–12.4)
PO2 ARTERIAL: 142 MMHG (ref 80–100)
PO2 ARTERIAL: 169 MMHG (ref 80–100)
PO2 ARTERIAL: 284 MM HG (ref 83–108)
PO2 ARTERIAL: 295 MM HG (ref 83–108)
POC SAMPLE TYPE: ABNORMAL
POC SAMPLE TYPE: ABNORMAL
POSITIVE END EXP PRESS: 5
POSITIVE END EXP PRESS: 5
POTASSIUM BLD-SCNC: 5.9 MMOL/L
POTASSIUM BLD-SCNC: 6.9 MMOL/L
POTASSIUM SERPL-SCNC: 5.7 MEQ/L (ref 3.5–5.1)
POTASSIUM SERPL-SCNC: 6.3 MEQ/L (ref 3.5–5.1)
POTASSIUM SERPL-SCNC: 6.3 MMOL/L (ref 3.5–5)
POTASSIUM SERPL-SCNC: 6.4 MMOL/L (ref 3.5–5)
RBC # BLD AUTO: 3.92 M/UL (ref 4.7–6.1)
SAMPLE SOURCE: ABNORMAL
SAMPLE SOURCE: ABNORMAL
SODIUM BLD-SCNC: 138 MEQ/L (ref 136–145)
SODIUM BLD-SCNC: 139 MEQ/L (ref 136–145)
SODIUM SERPL-SCNC: 142 MMOL/L (ref 136–145)
TCO2 ARTERIAL: 27 MMOL/L
TCO2 ARTERIAL: 29 MMOL/L
VT MECHANICAL: 450 %
WBC # BLD AUTO: 18.9 K/UL (ref 4.8–10.8)

## 2023-04-19 PROCEDURE — 0BTG0ZZ RESECTION OF LEFT UPPER LUNG LOBE, OPEN APPROACH: ICD-10-PCS | Performed by: SURGERY

## 2023-04-19 PROCEDURE — 82810 BLOOD GASES O2 SAT ONLY: CPT

## 2023-04-19 PROCEDURE — 82435 ASSAY OF BLOOD CHLORIDE: CPT

## 2023-04-19 PROCEDURE — 5A1935Z RESPIRATORY VENTILATION, LESS THAN 24 CONSECUTIVE HOURS: ICD-10-PCS | Performed by: SURGERY

## 2023-04-19 PROCEDURE — 86923 COMPATIBILITY TEST ELECTRIC: CPT

## 2023-04-19 PROCEDURE — C9290 INJ, BUPIVACAINE LIPOSOME: HCPCS

## 2023-04-19 PROCEDURE — 84295 ASSAY OF SERUM SODIUM: CPT

## 2023-04-19 PROCEDURE — 85014 HEMATOCRIT: CPT

## 2023-04-19 PROCEDURE — 37799 UNLISTED PX VASCULAR SURGERY: CPT

## 2023-04-19 PROCEDURE — 2580000003 HC RX 258

## 2023-04-19 PROCEDURE — 6370000000 HC RX 637 (ALT 250 FOR IP)

## 2023-04-19 PROCEDURE — 94640 AIRWAY INHALATION TREATMENT: CPT

## 2023-04-19 PROCEDURE — 84132 ASSAY OF SERUM POTASSIUM: CPT

## 2023-04-19 PROCEDURE — 3700000001 HC ADD 15 MINUTES (ANESTHESIA): Performed by: SURGERY

## 2023-04-19 PROCEDURE — 82947 ASSAY GLUCOSE BLOOD QUANT: CPT

## 2023-04-19 PROCEDURE — 86900 BLOOD TYPING SEROLOGIC ABO: CPT

## 2023-04-19 PROCEDURE — 99024 POSTOP FOLLOW-UP VISIT: CPT | Performed by: SURGERY

## 2023-04-19 PROCEDURE — 2580000003 HC RX 258: Performed by: ANESTHESIOLOGY

## 2023-04-19 PROCEDURE — 3600000019 HC SURGERY ROBOT ADDTL 15MIN: Performed by: SURGERY

## 2023-04-19 PROCEDURE — C1713 ANCHOR/SCREW BN/BN,TIS/BN: HCPCS | Performed by: SURGERY

## 2023-04-19 PROCEDURE — 82374 ASSAY BLOOD CARBON DIOXIDE: CPT

## 2023-04-19 PROCEDURE — C1725 CATH, TRANSLUMIN NON-LASER: HCPCS | Performed by: SURGERY

## 2023-04-19 PROCEDURE — 36415 COLL VENOUS BLD VENIPUNCTURE: CPT

## 2023-04-19 PROCEDURE — 86850 RBC ANTIBODY SCREEN: CPT

## 2023-04-19 PROCEDURE — 6360000002 HC RX W HCPCS

## 2023-04-19 PROCEDURE — 80048 BASIC METABOLIC PNL TOTAL CA: CPT

## 2023-04-19 PROCEDURE — 76942 ECHO GUIDE FOR BIOPSY: CPT

## 2023-04-19 PROCEDURE — C1781 MESH (IMPLANTABLE): HCPCS | Performed by: SURGERY

## 2023-04-19 PROCEDURE — 88309 TISSUE EXAM BY PATHOLOGIST: CPT

## 2023-04-19 PROCEDURE — C1729 CATH, DRAINAGE: HCPCS | Performed by: SURGERY

## 2023-04-19 PROCEDURE — 2000000000 HC ICU R&B

## 2023-04-19 PROCEDURE — 2720000010 HC SURG SUPPLY STERILE: Performed by: SURGERY

## 2023-04-19 PROCEDURE — 2700000000 HC OXYGEN THERAPY PER DAY

## 2023-04-19 PROCEDURE — 6360000002 HC RX W HCPCS: Performed by: ANESTHESIOLOGY

## 2023-04-19 PROCEDURE — 0BJL4ZZ INSPECTION OF LEFT LUNG, PERCUTANEOUS ENDOSCOPIC APPROACH: ICD-10-PCS | Performed by: SURGERY

## 2023-04-19 PROCEDURE — 94002 VENT MGMT INPAT INIT DAY: CPT

## 2023-04-19 PROCEDURE — 6360000002 HC RX W HCPCS: Performed by: SURGERY

## 2023-04-19 PROCEDURE — 88360 TUMOR IMMUNOHISTOCHEM/MANUAL: CPT

## 2023-04-19 PROCEDURE — P9016 RBC LEUKOCYTES REDUCED: HCPCS

## 2023-04-19 PROCEDURE — 82962 GLUCOSE BLOOD TEST: CPT

## 2023-04-19 PROCEDURE — 6370000000 HC RX 637 (ALT 250 FOR IP): Performed by: SURGERY

## 2023-04-19 PROCEDURE — 8E0W4CZ ROBOTIC ASSISTED PROCEDURE OF TRUNK REGION, PERCUTANEOUS ENDOSCOPIC APPROACH: ICD-10-PCS | Performed by: SURGERY

## 2023-04-19 PROCEDURE — 86901 BLOOD TYPING SEROLOGIC RH(D): CPT

## 2023-04-19 PROCEDURE — 82803 BLOOD GASES ANY COMBINATION: CPT

## 2023-04-19 PROCEDURE — 82800 BLOOD PH: CPT

## 2023-04-19 PROCEDURE — 85025 COMPLETE CBC W/AUTO DIFF WBC: CPT

## 2023-04-19 PROCEDURE — P9045 ALBUMIN (HUMAN), 5%, 250 ML: HCPCS

## 2023-04-19 PROCEDURE — 3600000009 HC SURGERY ROBOT BASE: Performed by: SURGERY

## 2023-04-19 PROCEDURE — 71045 X-RAY EXAM CHEST 1 VIEW: CPT

## 2023-04-19 PROCEDURE — 82565 ASSAY OF CREATININE: CPT

## 2023-04-19 PROCEDURE — C1889 IMPLANT/INSERT DEVICE, NOC: HCPCS | Performed by: SURGERY

## 2023-04-19 PROCEDURE — S2900 ROBOTIC SURGICAL SYSTEM: HCPCS | Performed by: SURGERY

## 2023-04-19 PROCEDURE — 2500000003 HC RX 250 WO HCPCS

## 2023-04-19 PROCEDURE — 3700000000 HC ANESTHESIA ATTENDED CARE: Performed by: SURGERY

## 2023-04-19 PROCEDURE — 2580000003 HC RX 258: Performed by: SURGERY

## 2023-04-19 PROCEDURE — 71045 X-RAY EXAM CHEST 1 VIEW: CPT | Performed by: RADIOLOGY

## 2023-04-19 PROCEDURE — 2709999900 HC NON-CHARGEABLE SUPPLY: Performed by: SURGERY

## 2023-04-19 PROCEDURE — 88305 TISSUE EXAM BY PATHOLOGIST: CPT

## 2023-04-19 PROCEDURE — 94760 N-INVAS EAR/PLS OXIMETRY 1: CPT

## 2023-04-19 PROCEDURE — 2500000003 HC RX 250 WO HCPCS: Performed by: SURGERY

## 2023-04-19 PROCEDURE — 82330 ASSAY OF CALCIUM: CPT

## 2023-04-19 DEVICE — CLIP INT L POLYMER LOK LIG HEM O LOK: Type: IMPLANTABLE DEVICE | Site: CHEST  WALL | Status: FUNCTIONAL

## 2023-04-19 DEVICE — PLATE BONE 8 H TI PRECONTOURED FOR 8 AND 9 RT RIB MATRIXRIB: Type: IMPLANTABLE DEVICE | Site: CHEST  WALL | Status: FUNCTIONAL

## 2023-04-19 DEVICE — SCREW BONE LOCKING 2.7X12 MM SELFDRILLING TITANIUM NONSTERIL: Type: IMPLANTABLE DEVICE | Site: CHEST  WALL | Status: FUNCTIONAL

## 2023-04-19 RX ORDER — LIDOCAINE HYDROCHLORIDE 10 MG/ML
INJECTION, SOLUTION EPIDURAL; INFILTRATION; INTRACAUDAL; PERINEURAL PRN
Status: DISCONTINUED | OUTPATIENT
Start: 2023-04-19 | End: 2023-04-19 | Stop reason: SDUPTHER

## 2023-04-19 RX ORDER — DEXAMETHASONE SODIUM PHOSPHATE 10 MG/ML
INJECTION, SOLUTION INTRAMUSCULAR; INTRAVENOUS PRN
Status: DISCONTINUED | OUTPATIENT
Start: 2023-04-19 | End: 2023-04-19 | Stop reason: SDUPTHER

## 2023-04-19 RX ORDER — SODIUM CHLORIDE 9 MG/ML
INJECTION, SOLUTION INTRAVENOUS PRN
Status: DISCONTINUED | OUTPATIENT
Start: 2023-04-19 | End: 2023-04-19 | Stop reason: HOSPADM

## 2023-04-19 RX ORDER — PROPOFOL 10 MG/ML
INJECTION, EMULSION INTRAVENOUS PRN
Status: DISCONTINUED | OUTPATIENT
Start: 2023-04-19 | End: 2023-04-19 | Stop reason: SDUPTHER

## 2023-04-19 RX ORDER — LIDOCAINE HYDROCHLORIDE 10 MG/ML
1 INJECTION, SOLUTION EPIDURAL; INFILTRATION; INTRACAUDAL; PERINEURAL
Status: DISCONTINUED | OUTPATIENT
Start: 2023-04-19 | End: 2023-04-19 | Stop reason: HOSPADM

## 2023-04-19 RX ORDER — ALBUMIN, HUMAN INJ 5% 5 %
SOLUTION INTRAVENOUS PRN
Status: DISCONTINUED | OUTPATIENT
Start: 2023-04-19 | End: 2023-04-19 | Stop reason: SDUPTHER

## 2023-04-19 RX ORDER — ALBUTEROL SULFATE 2.5 MG/3ML
2.5 SOLUTION RESPIRATORY (INHALATION)
Status: DISCONTINUED | OUTPATIENT
Start: 2023-04-19 | End: 2023-04-19

## 2023-04-19 RX ORDER — SODIUM CHLORIDE 9 MG/ML
INJECTION, SOLUTION INTRAVENOUS PRN
Status: DISCONTINUED | OUTPATIENT
Start: 2023-04-19 | End: 2023-04-20 | Stop reason: ALTCHOICE

## 2023-04-19 RX ORDER — SODIUM CHLORIDE 450 MG/100ML
INJECTION, SOLUTION INTRAVENOUS CONTINUOUS
Status: DISCONTINUED | OUTPATIENT
Start: 2023-04-19 | End: 2023-04-20

## 2023-04-19 RX ORDER — INSULIN LISPRO 100 [IU]/ML
0-4 INJECTION, SOLUTION INTRAVENOUS; SUBCUTANEOUS NIGHTLY
Status: DISCONTINUED | OUTPATIENT
Start: 2023-04-19 | End: 2023-05-01 | Stop reason: HOSPADM

## 2023-04-19 RX ORDER — FAMOTIDINE 20 MG/1
20 TABLET, FILM COATED ORAL ONCE
Status: DISCONTINUED | OUTPATIENT
Start: 2023-04-19 | End: 2023-04-19 | Stop reason: HOSPADM

## 2023-04-19 RX ORDER — SODIUM CHLORIDE 0.9 % (FLUSH) 0.9 %
5-40 SYRINGE (ML) INJECTION EVERY 12 HOURS SCHEDULED
Status: DISCONTINUED | OUTPATIENT
Start: 2023-04-19 | End: 2023-04-19 | Stop reason: HOSPADM

## 2023-04-19 RX ORDER — MORPHINE SULFATE 4 MG/ML
4 INJECTION, SOLUTION INTRAMUSCULAR; INTRAVENOUS
Status: DISCONTINUED | OUTPATIENT
Start: 2023-04-19 | End: 2023-04-20 | Stop reason: ALTCHOICE

## 2023-04-19 RX ORDER — ONDANSETRON 2 MG/ML
INJECTION INTRAMUSCULAR; INTRAVENOUS PRN
Status: DISCONTINUED | OUTPATIENT
Start: 2023-04-19 | End: 2023-04-19 | Stop reason: SDUPTHER

## 2023-04-19 RX ORDER — ENOXAPARIN SODIUM 100 MG/ML
40 INJECTION SUBCUTANEOUS EVERY 24 HOURS
Status: DISCONTINUED | OUTPATIENT
Start: 2023-04-19 | End: 2023-05-01 | Stop reason: HOSPADM

## 2023-04-19 RX ORDER — ROCURONIUM BROMIDE 10 MG/ML
INJECTION, SOLUTION INTRAVENOUS PRN
Status: DISCONTINUED | OUTPATIENT
Start: 2023-04-19 | End: 2023-04-19 | Stop reason: SDUPTHER

## 2023-04-19 RX ORDER — SODIUM CHLORIDE 0.9 % (FLUSH) 0.9 %
5-40 SYRINGE (ML) INJECTION EVERY 12 HOURS SCHEDULED
Status: DISCONTINUED | OUTPATIENT
Start: 2023-04-19 | End: 2023-05-01 | Stop reason: HOSPADM

## 2023-04-19 RX ORDER — IPRATROPIUM BROMIDE AND ALBUTEROL SULFATE 2.5; .5 MG/3ML; MG/3ML
1 SOLUTION RESPIRATORY (INHALATION)
Status: DISCONTINUED | OUTPATIENT
Start: 2023-04-19 | End: 2023-05-01 | Stop reason: HOSPADM

## 2023-04-19 RX ORDER — KETOROLAC TROMETHAMINE 30 MG/ML
15 INJECTION, SOLUTION INTRAMUSCULAR; INTRAVENOUS EVERY 6 HOURS PRN
Status: DISCONTINUED | OUTPATIENT
Start: 2023-04-19 | End: 2023-04-20

## 2023-04-19 RX ORDER — OXYCODONE HYDROCHLORIDE 5 MG/1
10 TABLET ORAL EVERY 4 HOURS PRN
Status: DISCONTINUED | OUTPATIENT
Start: 2023-04-19 | End: 2023-04-23

## 2023-04-19 RX ORDER — FUROSEMIDE 10 MG/ML
40 INJECTION INTRAMUSCULAR; INTRAVENOUS ONCE
Status: COMPLETED | OUTPATIENT
Start: 2023-04-19 | End: 2023-04-19

## 2023-04-19 RX ORDER — DEXMEDETOMIDINE HYDROCHLORIDE 4 UG/ML
.1-1.5 INJECTION, SOLUTION INTRAVENOUS CONTINUOUS
Status: DISCONTINUED | OUTPATIENT
Start: 2023-04-19 | End: 2023-04-22

## 2023-04-19 RX ORDER — OXYCODONE HYDROCHLORIDE 5 MG/1
5 TABLET ORAL EVERY 4 HOURS PRN
Status: DISCONTINUED | OUTPATIENT
Start: 2023-04-19 | End: 2023-04-23

## 2023-04-19 RX ORDER — CALCIUM GLUCONATE 20 MG/ML
1000 INJECTION, SOLUTION INTRAVENOUS ONCE
Status: COMPLETED | OUTPATIENT
Start: 2023-04-19 | End: 2023-04-19

## 2023-04-19 RX ORDER — SODIUM CHLORIDE 0.9 % (FLUSH) 0.9 %
5-40 SYRINGE (ML) INJECTION PRN
Status: DISCONTINUED | OUTPATIENT
Start: 2023-04-19 | End: 2023-05-01 | Stop reason: HOSPADM

## 2023-04-19 RX ORDER — SODIUM CHLORIDE 9 MG/ML
INJECTION, SOLUTION INTRAVENOUS PRN
Status: DISCONTINUED | OUTPATIENT
Start: 2023-04-19 | End: 2023-05-01 | Stop reason: HOSPADM

## 2023-04-19 RX ORDER — MORPHINE SULFATE 2 MG/ML
INJECTION, SOLUTION INTRAMUSCULAR; INTRAVENOUS
Status: DISPENSED
Start: 2023-04-19 | End: 2023-04-20

## 2023-04-19 RX ORDER — CEFAZOLIN SODIUM 1 G/3ML
INJECTION, POWDER, FOR SOLUTION INTRAMUSCULAR; INTRAVENOUS PRN
Status: DISCONTINUED | OUTPATIENT
Start: 2023-04-19 | End: 2023-04-19 | Stop reason: SDUPTHER

## 2023-04-19 RX ORDER — DEXTROSE MONOHYDRATE 100 MG/ML
INJECTION, SOLUTION INTRAVENOUS CONTINUOUS PRN
Status: DISCONTINUED | OUTPATIENT
Start: 2023-04-19 | End: 2023-04-20 | Stop reason: SDUPTHER

## 2023-04-19 RX ORDER — EPHEDRINE SULFATE 50 MG/ML
INJECTION, SOLUTION INTRAVENOUS PRN
Status: DISCONTINUED | OUTPATIENT
Start: 2023-04-19 | End: 2023-04-19 | Stop reason: SDUPTHER

## 2023-04-19 RX ORDER — DEXTROSE MONOHYDRATE 25 G/50ML
25 INJECTION, SOLUTION INTRAVENOUS ONCE
Status: DISCONTINUED | OUTPATIENT
Start: 2023-04-19 | End: 2023-04-19

## 2023-04-19 RX ORDER — FENTANYL CITRATE 50 UG/ML
INJECTION, SOLUTION INTRAMUSCULAR; INTRAVENOUS PRN
Status: DISCONTINUED | OUTPATIENT
Start: 2023-04-19 | End: 2023-04-19 | Stop reason: SDUPTHER

## 2023-04-19 RX ORDER — SODIUM CHLORIDE, SODIUM LACTATE, POTASSIUM CHLORIDE, CALCIUM CHLORIDE 600; 310; 30; 20 MG/100ML; MG/100ML; MG/100ML; MG/100ML
INJECTION, SOLUTION INTRAVENOUS CONTINUOUS
Status: DISCONTINUED | OUTPATIENT
Start: 2023-04-19 | End: 2023-04-20

## 2023-04-19 RX ORDER — INSULIN LISPRO 100 [IU]/ML
0-4 INJECTION, SOLUTION INTRAVENOUS; SUBCUTANEOUS
Status: DISCONTINUED | OUTPATIENT
Start: 2023-04-19 | End: 2023-05-01 | Stop reason: HOSPADM

## 2023-04-19 RX ORDER — MIDAZOLAM HYDROCHLORIDE 2 MG/2ML
2 INJECTION, SOLUTION INTRAMUSCULAR; INTRAVENOUS
Status: COMPLETED | OUTPATIENT
Start: 2023-04-19 | End: 2023-04-19

## 2023-04-19 RX ORDER — SODIUM CHLORIDE 0.9 % (FLUSH) 0.9 %
5-40 SYRINGE (ML) INJECTION PRN
Status: DISCONTINUED | OUTPATIENT
Start: 2023-04-19 | End: 2023-04-19 | Stop reason: HOSPADM

## 2023-04-19 RX ORDER — MORPHINE SULFATE 2 MG/ML
2 INJECTION, SOLUTION INTRAMUSCULAR; INTRAVENOUS
Status: DISCONTINUED | OUTPATIENT
Start: 2023-04-19 | End: 2023-04-22

## 2023-04-19 RX ORDER — SCOLOPAMINE TRANSDERMAL SYSTEM 1 MG/1
1 PATCH, EXTENDED RELEASE TRANSDERMAL
Status: DISCONTINUED | OUTPATIENT
Start: 2023-04-19 | End: 2023-04-19 | Stop reason: HOSPADM

## 2023-04-19 RX ORDER — BUPIVACAINE HYDROCHLORIDE 2.5 MG/ML
INJECTION, SOLUTION INFILTRATION; PERINEURAL
Status: COMPLETED | OUTPATIENT
Start: 2023-04-19 | End: 2023-04-19

## 2023-04-19 RX ORDER — SODIUM CHLORIDE 9 MG/ML
INJECTION, SOLUTION INTRAVENOUS CONTINUOUS PRN
Status: DISCONTINUED | OUTPATIENT
Start: 2023-04-19 | End: 2023-04-19 | Stop reason: SDUPTHER

## 2023-04-19 RX ADMIN — ROCURONIUM BROMIDE 30 MG: 10 INJECTION, SOLUTION INTRAVENOUS at 13:48

## 2023-04-19 RX ADMIN — FENTANYL CITRATE 50 MCG: 0.05 INJECTION, SOLUTION INTRAMUSCULAR; INTRAVENOUS at 10:25

## 2023-04-19 RX ADMIN — ROCURONIUM BROMIDE 30 MG: 10 INJECTION, SOLUTION INTRAVENOUS at 08:18

## 2023-04-19 RX ADMIN — DEXMEDETOMIDINE HYDROCHLORIDE 0.4 MCG/KG/HR: 4 INJECTION, SOLUTION INTRAVENOUS at 15:25

## 2023-04-19 RX ADMIN — HYDROMORPHONE HYDROCHLORIDE 0.4 MG: 1 INJECTION, SOLUTION INTRAMUSCULAR; INTRAVENOUS; SUBCUTANEOUS at 12:02

## 2023-04-19 RX ADMIN — EPHEDRINE SULFATE 30 MG: 50 INJECTION INTRAMUSCULAR; INTRAVENOUS; SUBCUTANEOUS at 12:56

## 2023-04-19 RX ADMIN — MIDAZOLAM 2 MG: 1 INJECTION INTRAMUSCULAR; INTRAVENOUS at 07:20

## 2023-04-19 RX ADMIN — ROCURONIUM BROMIDE 20 MG: 10 INJECTION, SOLUTION INTRAVENOUS at 11:40

## 2023-04-19 RX ADMIN — ROCURONIUM BROMIDE 20 MG: 10 INJECTION, SOLUTION INTRAVENOUS at 11:13

## 2023-04-19 RX ADMIN — INSULIN HUMAN 10 UNITS: 100 INJECTION, SOLUTION PARENTERAL at 20:26

## 2023-04-19 RX ADMIN — BUPIVACAINE HYDROCHLORIDE 15 ML: 2.5 INJECTION, SOLUTION INFILTRATION; PERINEURAL at 07:25

## 2023-04-19 RX ADMIN — ROCURONIUM BROMIDE 20 MG: 10 INJECTION, SOLUTION INTRAVENOUS at 12:18

## 2023-04-19 RX ADMIN — ROCURONIUM BROMIDE 30 MG: 10 INJECTION, SOLUTION INTRAVENOUS at 13:15

## 2023-04-19 RX ADMIN — PHENYLEPHRINE HYDROCHLORIDE 100 MCG: 10 INJECTION INTRAVENOUS at 12:23

## 2023-04-19 RX ADMIN — ENOXAPARIN SODIUM 40 MG: 100 INJECTION SUBCUTANEOUS at 19:58

## 2023-04-19 RX ADMIN — IPRATROPIUM BROMIDE AND ALBUTEROL SULFATE 1 AMPULE: 2.5; .5 SOLUTION RESPIRATORY (INHALATION) at 18:51

## 2023-04-19 RX ADMIN — ALBUMIN (HUMAN) 12.5 G: 12.5 INJECTION, SOLUTION INTRAVENOUS at 12:49

## 2023-04-19 RX ADMIN — SUGAMMADEX 500 MG: 100 INJECTION, SOLUTION INTRAVENOUS at 15:01

## 2023-04-19 RX ADMIN — SODIUM CHLORIDE, SODIUM LACTATE, POTASSIUM CHLORIDE, AND CALCIUM CHLORIDE: 600; 310; 30; 20 INJECTION, SOLUTION INTRAVENOUS at 10:33

## 2023-04-19 RX ADMIN — SODIUM CHLORIDE: 4.5 INJECTION, SOLUTION INTRAVENOUS at 18:45

## 2023-04-19 RX ADMIN — LIDOCAINE HYDROCHLORIDE 50 MG: 10 INJECTION, SOLUTION EPIDURAL; INFILTRATION; INTRACAUDAL; PERINEURAL at 07:48

## 2023-04-19 RX ADMIN — CEFAZOLIN SODIUM 2 G: 1 INJECTION, POWDER, FOR SOLUTION INTRAMUSCULAR; INTRAVENOUS at 12:00

## 2023-04-19 RX ADMIN — SODIUM CHLORIDE, SODIUM LACTATE, POTASSIUM CHLORIDE, AND CALCIUM CHLORIDE: 600; 310; 30; 20 INJECTION, SOLUTION INTRAVENOUS at 13:09

## 2023-04-19 RX ADMIN — FENTANYL CITRATE 50 MCG: 0.05 INJECTION, SOLUTION INTRAMUSCULAR; INTRAVENOUS at 08:39

## 2023-04-19 RX ADMIN — FENTANYL CITRATE 50 MCG: 0.05 INJECTION, SOLUTION INTRAMUSCULAR; INTRAVENOUS at 07:48

## 2023-04-19 RX ADMIN — CALCIUM GLUCONATE 1000 MG: 20 INJECTION, SOLUTION INTRAVENOUS at 20:01

## 2023-04-19 RX ADMIN — ROCURONIUM BROMIDE 30 MG: 10 INJECTION, SOLUTION INTRAVENOUS at 09:51

## 2023-04-19 RX ADMIN — SODIUM BICARBONATE 50 MEQ: 84 INJECTION, SOLUTION INTRAVENOUS at 19:50

## 2023-04-19 RX ADMIN — HYDROMORPHONE HYDROCHLORIDE 0.6 MG: 1 INJECTION, SOLUTION INTRAMUSCULAR; INTRAVENOUS; SUBCUTANEOUS at 15:01

## 2023-04-19 RX ADMIN — PHENYLEPHRINE HYDROCHLORIDE 200 MCG: 10 INJECTION INTRAVENOUS at 12:29

## 2023-04-19 RX ADMIN — DEXAMETHASONE SODIUM PHOSPHATE 5 MG: 10 INJECTION, SOLUTION INTRAMUSCULAR; INTRAVENOUS at 08:14

## 2023-04-19 RX ADMIN — PHENYLEPHRINE HYDROCHLORIDE 100 MCG: 10 INJECTION INTRAVENOUS at 12:24

## 2023-04-19 RX ADMIN — CEFAZOLIN SODIUM 2 G: 1 INJECTION, POWDER, FOR SOLUTION INTRAMUSCULAR; INTRAVENOUS at 08:00

## 2023-04-19 RX ADMIN — FENTANYL CITRATE 50 MCG: 0.05 INJECTION, SOLUTION INTRAMUSCULAR; INTRAVENOUS at 09:17

## 2023-04-19 RX ADMIN — FENTANYL CITRATE 50 MCG: 0.05 INJECTION, SOLUTION INTRAMUSCULAR; INTRAVENOUS at 08:27

## 2023-04-19 RX ADMIN — ROCURONIUM BROMIDE 20 MG: 10 INJECTION, SOLUTION INTRAVENOUS at 09:17

## 2023-04-19 RX ADMIN — SODIUM CHLORIDE: 9 INJECTION, SOLUTION INTRAVENOUS at 13:20

## 2023-04-19 RX ADMIN — PHENYLEPHRINE HYDROCHLORIDE 100 MCG: 10 INJECTION INTRAVENOUS at 13:45

## 2023-04-19 RX ADMIN — PROPOFOL 130 MG: 10 INJECTION, EMULSION INTRAVENOUS at 07:48

## 2023-04-19 RX ADMIN — MORPHINE SULFATE 4 MG: 4 INJECTION, SOLUTION INTRAMUSCULAR; INTRAVENOUS at 18:43

## 2023-04-19 RX ADMIN — PHENYLEPHRINE HYDROCHLORIDE 100 MCG: 10 INJECTION INTRAVENOUS at 12:47

## 2023-04-19 RX ADMIN — PHENYLEPHRINE HYDROCHLORIDE 100 MCG: 10 INJECTION INTRAVENOUS at 13:15

## 2023-04-19 RX ADMIN — INSULIN HUMAN 8 UNITS: 100 INJECTION, SOLUTION PARENTERAL at 13:32

## 2023-04-19 RX ADMIN — PHENYLEPHRINE HYDROCHLORIDE 100 MCG: 10 INJECTION INTRAVENOUS at 13:36

## 2023-04-19 RX ADMIN — PHENYLEPHRINE HYDROCHLORIDE 100 MCG: 10 INJECTION INTRAVENOUS at 13:39

## 2023-04-19 RX ADMIN — SODIUM CHLORIDE, PRESERVATIVE FREE 10 ML: 5 INJECTION INTRAVENOUS at 20:10

## 2023-04-19 RX ADMIN — FENTANYL CITRATE 50 MCG: 0.05 INJECTION, SOLUTION INTRAMUSCULAR; INTRAVENOUS at 08:30

## 2023-04-19 RX ADMIN — FUROSEMIDE 40 MG: 10 INJECTION, SOLUTION INTRAMUSCULAR; INTRAVENOUS at 17:10

## 2023-04-19 RX ADMIN — PROPOFOL 50 MG: 10 INJECTION, EMULSION INTRAVENOUS at 08:32

## 2023-04-19 RX ADMIN — SODIUM CHLORIDE, SODIUM LACTATE, POTASSIUM CHLORIDE, AND CALCIUM CHLORIDE: 600; 310; 30; 20 INJECTION, SOLUTION INTRAVENOUS at 06:15

## 2023-04-19 RX ADMIN — ONDANSETRON 4 MG: 2 INJECTION INTRAMUSCULAR; INTRAVENOUS at 14:33

## 2023-04-19 RX ADMIN — PROPOFOL 20 MG: 10 INJECTION, EMULSION INTRAVENOUS at 08:34

## 2023-04-19 RX ADMIN — ROCURONIUM BROMIDE 30 MG: 10 INJECTION, SOLUTION INTRAVENOUS at 10:44

## 2023-04-19 RX ADMIN — BUPIVACAINE 15 ML: 13.3 INJECTION, SUSPENSION, LIPOSOMAL INFILTRATION at 07:25

## 2023-04-19 RX ADMIN — MORPHINE SULFATE 4 MG: 4 INJECTION, SOLUTION INTRAMUSCULAR; INTRAVENOUS at 15:28

## 2023-04-19 RX ADMIN — SODIUM CHLORIDE: 9 INJECTION, SOLUTION INTRAVENOUS at 13:53

## 2023-04-19 RX ADMIN — PHENYLEPHRINE HYDROCHLORIDE 300 MCG: 10 INJECTION INTRAVENOUS at 12:56

## 2023-04-19 RX ADMIN — PHENYLEPHRINE HYDROCHLORIDE 10 MCG/MIN: 10 INJECTION INTRAVENOUS at 13:50

## 2023-04-19 RX ADMIN — SODIUM ZIRCONIUM CYCLOSILICATE 5 G: 5 POWDER, FOR SUSPENSION ORAL at 19:58

## 2023-04-19 RX ADMIN — OXYCODONE 10 MG: 5 TABLET ORAL at 23:12

## 2023-04-19 RX ADMIN — ROCURONIUM BROMIDE 50 MG: 10 INJECTION, SOLUTION INTRAVENOUS at 07:48

## 2023-04-19 RX ADMIN — MORPHINE SULFATE 4 MG: 4 INJECTION, SOLUTION INTRAMUSCULAR; INTRAVENOUS at 17:04

## 2023-04-19 ASSESSMENT — PAIN SCALES - GENERAL
PAINLEVEL_OUTOF10: 0
PAINLEVEL_OUTOF10: 7
PAINLEVEL_OUTOF10: 10

## 2023-04-19 ASSESSMENT — PAIN - FUNCTIONAL ASSESSMENT
PAIN_FUNCTIONAL_ASSESSMENT: PREVENTS OR INTERFERES WITH ALL ACTIVE AND SOME PASSIVE ACTIVITIES
PAIN_FUNCTIONAL_ASSESSMENT: PREVENTS OR INTERFERES WITH ALL ACTIVE AND SOME PASSIVE ACTIVITIES

## 2023-04-19 ASSESSMENT — PAIN DESCRIPTION - DESCRIPTORS
DESCRIPTORS: SORE
DESCRIPTORS: BURNING

## 2023-04-19 ASSESSMENT — PULMONARY FUNCTION TESTS
PIF_VALUE: 11
PIF_VALUE: 16
PIF_VALUE: 16
PIF_VALUE: 21
PIF_VALUE: 11
PIF_VALUE: 22
PIF_VALUE: 22

## 2023-04-19 ASSESSMENT — PAIN DESCRIPTION - ORIENTATION
ORIENTATION: LEFT
ORIENTATION: LEFT

## 2023-04-19 ASSESSMENT — PAIN DESCRIPTION - LOCATION
LOCATION: INCISION;RIB CAGE
LOCATION: CHEST
LOCATION: CHEST

## 2023-04-19 ASSESSMENT — LIFESTYLE VARIABLES: SMOKING_STATUS: 0

## 2023-04-19 NOTE — ANESTHESIA POSTPROCEDURE EVALUATION
Department of Anesthesiology  Postprocedure Note    Patient: Compa Lopez  MRN: [id number]  YOB: 1950  Date of evaluation: 4/19/2023      Procedure Summary     Date: 04/19/23 Room / Location: [address]    Anesthesia Start: 3256 Anesthesia Stop: 1279    Procedure: ROBOTIC LEFT UPPER LOBECTOMY, CONVERTED TO OPEN (Left) Diagnosis:       Squamous carcinoma of lung, left (HCC)      (Squamous carcinoma of lung, left ([facility] [address] [C34.92])    Surgeons: Lazaro Schulz MD Responsible Provider: MARGO Pizarro CRNA    Anesthesia Type: general, regional ASA Status: 3          Anesthesia Type: No value filed. Angela Phase I: Angela Score: 10    Angela Phase II:        Anesthesia Post Evaluation    Patient location during evaluation: ICU  Patient participation: complete - patient cannot participate  Level of consciousness: sedated and ventilated  Pain score: 0  Airway patency: patent  Nausea & Vomiting: no nausea and no vomiting  Complications: no  Cardiovascular status: hemodynamically stable and blood pressure returned to baseline  Respiratory status: acceptable, ventilator and intubated  Hydration status: stable  Comments: /66   Pulse (!) 108   Temp 98.8 °F (37.1 °C)   Resp 20   Ht 5' 9\" (1.753 m)   Wt 182 lb (82.6 kg)   SpO2 100%   BMI 26.88 kg/m²   Pt transported to ICU via hospital bed with monitors in place. CRNA, SRNA, RN at pt bedside. No complications noted during transport. VSS. Oxygen via ambu bag/ETT at 15 liters. Report given to [facility], ICU RN. Transfer of care noted.

## 2023-04-19 NOTE — ANESTHESIA PROCEDURE NOTES
Peripheral Block    Patient location during procedure: holding area  Reason for block: post-op pain management  Start time: 4/19/2023 7:25 AM  Staffing  Performed: anesthesiologist   Anesthesiologist: Tre Youngblood MD  Preanesthetic Checklist  Completed: patient identified, IV checked, site marked, risks and benefits discussed, surgical/procedural consents, equipment checked, pre-op evaluation, timeout performed, anesthesia consent given, oxygen available, monitors applied/VS acknowledged, fire risk safety assessment completed and verbalized and blood product R/B/A discussed and consented  Peripheral Block   Patient position: sitting  Prep: ChloraPrep  Provider prep: sterile gloves and mask  Patient monitoring: cardiac monitor, continuous pulse ox, frequent blood pressure checks, IV access and responsive to questions  Block type: Erector spinae  Laterality: left  Injection technique: single-shot  Guidance: ultrasound guided    Needle   Needle type: insulated echogenic nerve stimulator needle   Needle gauge: 20 G  Needle localization: ultrasound guidance  Needle length: 10 cm  Assessment   Injection assessment: negative aspiration for heme and no paresthesia on injection  Paresthesia pain: none  Slow fractionated injection: yes  Hemodynamics: stable  Real-time US image taken/store: yes  Outcomes: patient tolerated procedure well and uncomplicated    Medications Administered  bupivacaine (MARCAINE) injection 0.25% - Perineural   15 mL - 4/19/2023 7:25:00 AM  bupivacaine liposome (EXPAREL) injection 1.3% - Perineural   15 mL - 4/19/2023 7:25:00 AM

## 2023-04-19 NOTE — ANESTHESIA PRE PROCEDURE
Department of Anesthesiology  Preprocedure Note       Name:  Mau Ramirez   Age:  67 y.o.  :  1950                                          MRN:  [id number]         Date:  2023      Surgeon: Jory Arceo):  Mendoza Monet MD    Procedure: Procedure(s):  ROBOTIC LEFT UPPER LOBECTOMY    Medications prior to admission:   Prior to Admission medications    Medication Sig Start Date End Date Taking? Authorizing Provider   nicotine (NICODERM CQ) 7 MG/24HR Place 1 patch onto the skin every 24 hours    Historical Provider, MD   lisinopril (PRINIVIL;ZESTRIL) 10 MG tablet Take 1 tablet by mouth daily 1/3/23   Historical Provider, MD   hydroCHLOROthiazide (HYDRODIURIL) 25 MG tablet Take 1 tablet by mouth daily 1/3/23   Historical Provider, MD   pantoprazole (PROTONIX) 40 MG tablet Take 1 tablet by mouth in the morning and at bedtime 23   Historical Provider, MD   loratadine (CLARITIN) 10 MG capsule Take 1 capsule by mouth daily    Historical Provider, MD   primidone (MYSOLINE) 50 MG tablet Take 1 tablet by mouth twice daily  Patient taking differently: Take 1 tablet by mouth in the morning and at bedtime 20   Yani De Los Santos MD   divalproex (DEPAKOTE) 250 MG DR tablet Take 1 tablet by mouth in the morning and at bedtime 1/15/20   Historical Provider, MD   metoprolol succinate (TOPROL XL) 25 MG extended release tablet Take 1 tablet by mouth daily    Historical Provider, MD   simvastatin (ZOCOR) 40 MG tablet Take 1 tablet by mouth nightly 20   Historical Provider, MD   metFORMIN (GLUCOPHAGE) 500 MG tablet Take 1 tablet by mouth 2 times daily (with meals) 19   Historical Provider, MD   glimepiride (AMARYL) 4 MG tablet Take 1 tablet by mouth every morning (before breakfast)    Historical Provider, MD   Cholecalciferol (VITAMIN D3) 125 MCG (5000 UT) TABS Take 1 tablet by mouth daily    Historical Provider, MD       Current medications:    No current facility-administered medications for this visit.

## 2023-04-20 ENCOUNTER — APPOINTMENT (OUTPATIENT)
Dept: GENERAL RADIOLOGY | Age: 73
DRG: 163 | End: 2023-04-20
Attending: SURGERY
Payer: MEDICARE

## 2023-04-20 LAB
ALBUMIN SERPL-MCNC: 3.2 G/DL (ref 3.5–5.2)
ALBUMIN SERPL-MCNC: 3.3 G/DL (ref 3.5–5.2)
ALP SERPL-CCNC: 51 U/L (ref 40–130)
ALP SERPL-CCNC: 51 U/L (ref 40–130)
ALT SERPL-CCNC: 18 U/L (ref 5–41)
ALT SERPL-CCNC: 19 U/L (ref 5–41)
ANION GAP SERPL CALCULATED.3IONS-SCNC: 10 MMOL/L (ref 7–19)
ANION GAP SERPL CALCULATED.3IONS-SCNC: 11 MMOL/L (ref 7–19)
AST SERPL-CCNC: 43 U/L (ref 5–40)
AST SERPL-CCNC: 65 U/L (ref 5–40)
BASOPHILS # BLD: 0 K/UL (ref 0–0.2)
BASOPHILS NFR BLD: 0.1 % (ref 0–1)
BILIRUB SERPL-MCNC: 0.3 MG/DL (ref 0.2–1.2)
BILIRUB SERPL-MCNC: <0.2 MG/DL (ref 0.2–1.2)
BUN SERPL-MCNC: 40 MG/DL (ref 8–23)
BUN SERPL-MCNC: 42 MG/DL (ref 8–23)
CALCIUM SERPL-MCNC: 7.9 MG/DL (ref 8.8–10.2)
CALCIUM SERPL-MCNC: 8.2 MG/DL (ref 8.8–10.2)
CHLORIDE SERPL-SCNC: 100 MMOL/L (ref 98–111)
CHLORIDE SERPL-SCNC: 98 MMOL/L (ref 98–111)
CO2 SERPL-SCNC: 24 MMOL/L (ref 22–29)
CO2 SERPL-SCNC: 25 MMOL/L (ref 22–29)
CREAT SERPL-MCNC: 1.4 MG/DL (ref 0.5–1.2)
CREAT SERPL-MCNC: 1.5 MG/DL (ref 0.5–1.2)
EOSINOPHIL # BLD: 0 K/UL (ref 0–0.6)
EOSINOPHIL NFR BLD: 0 % (ref 0–5)
ERYTHROCYTE [DISTWIDTH] IN BLOOD BY AUTOMATED COUNT: 16.7 % (ref 11.5–14.5)
GLUCOSE BLD-MCNC: 238 MG/DL (ref 70–99)
GLUCOSE BLD-MCNC: 246 MG/DL (ref 70–99)
GLUCOSE BLD-MCNC: 293 MG/DL (ref 70–99)
GLUCOSE BLD-MCNC: 314 MG/DL (ref 70–99)
GLUCOSE SERPL-MCNC: 266 MG/DL (ref 74–109)
GLUCOSE SERPL-MCNC: 295 MG/DL (ref 74–109)
HCT VFR BLD AUTO: 30.1 % (ref 42–52)
HGB BLD-MCNC: 9.7 G/DL (ref 14–18)
IMM GRANULOCYTES # BLD: 0.1 K/UL
LYMPHOCYTES # BLD: 1.2 K/UL (ref 1.1–4.5)
LYMPHOCYTES NFR BLD: 9.2 % (ref 20–40)
MAGNESIUM SERPL-MCNC: 1.4 MG/DL (ref 1.6–2.4)
MAGNESIUM SERPL-MCNC: 2.3 MG/DL (ref 1.6–2.4)
MCH RBC QN AUTO: 29.5 PG (ref 27–31)
MCHC RBC AUTO-ENTMCNC: 32.2 G/DL (ref 33–37)
MCV RBC AUTO: 91.5 FL (ref 80–94)
MONOCYTES # BLD: 1.8 K/UL (ref 0–0.9)
MONOCYTES NFR BLD: 13.4 % (ref 0–10)
NEUTROPHILS # BLD: 10.2 K/UL (ref 1.5–7.5)
NEUTS SEG NFR BLD: 76.7 % (ref 50–65)
PERFORMED ON: ABNORMAL
PLATELET # BLD AUTO: 146 K/UL (ref 130–400)
PMV BLD AUTO: 10.7 FL (ref 9.4–12.4)
POTASSIUM SERPL-SCNC: 5.3 MMOL/L (ref 3.5–5)
POTASSIUM SERPL-SCNC: 6.5 MMOL/L (ref 3.5–5)
PROT SERPL-MCNC: 5.5 G/DL (ref 6.6–8.7)
PROT SERPL-MCNC: 5.6 G/DL (ref 6.6–8.7)
RBC # BLD AUTO: 3.29 M/UL (ref 4.7–6.1)
SODIUM SERPL-SCNC: 134 MMOL/L (ref 136–145)
SODIUM SERPL-SCNC: 134 MMOL/L (ref 136–145)
WBC # BLD AUTO: 13.3 K/UL (ref 4.8–10.8)

## 2023-04-20 PROCEDURE — 6360000002 HC RX W HCPCS: Performed by: SURGERY

## 2023-04-20 PROCEDURE — 6370000000 HC RX 637 (ALT 250 FOR IP): Performed by: SURGERY

## 2023-04-20 PROCEDURE — 83735 ASSAY OF MAGNESIUM: CPT

## 2023-04-20 PROCEDURE — 94760 N-INVAS EAR/PLS OXIMETRY 1: CPT

## 2023-04-20 PROCEDURE — 85025 COMPLETE CBC W/AUTO DIFF WBC: CPT

## 2023-04-20 PROCEDURE — 71045 X-RAY EXAM CHEST 1 VIEW: CPT | Performed by: STUDENT IN AN ORGANIZED HEALTH CARE EDUCATION/TRAINING PROGRAM

## 2023-04-20 PROCEDURE — 94640 AIRWAY INHALATION TREATMENT: CPT

## 2023-04-20 PROCEDURE — 99024 POSTOP FOLLOW-UP VISIT: CPT | Performed by: SURGERY

## 2023-04-20 PROCEDURE — 82962 GLUCOSE BLOOD TEST: CPT

## 2023-04-20 PROCEDURE — 2500000003 HC RX 250 WO HCPCS: Performed by: SURGERY

## 2023-04-20 PROCEDURE — 83036 HEMOGLOBIN GLYCOSYLATED A1C: CPT

## 2023-04-20 PROCEDURE — 6370000000 HC RX 637 (ALT 250 FOR IP): Performed by: NURSE PRACTITIONER

## 2023-04-20 PROCEDURE — 2000000000 HC ICU R&B

## 2023-04-20 PROCEDURE — 80053 COMPREHEN METABOLIC PANEL: CPT

## 2023-04-20 PROCEDURE — 2580000003 HC RX 258: Performed by: SURGERY

## 2023-04-20 PROCEDURE — 71045 X-RAY EXAM CHEST 1 VIEW: CPT

## 2023-04-20 PROCEDURE — 2700000000 HC OXYGEN THERAPY PER DAY

## 2023-04-20 PROCEDURE — P9045 ALBUMIN (HUMAN), 5%, 250 ML: HCPCS | Performed by: SURGERY

## 2023-04-20 RX ORDER — DEXTROSE MONOHYDRATE 100 MG/ML
INJECTION, SOLUTION INTRAVENOUS CONTINUOUS PRN
Status: DISCONTINUED | OUTPATIENT
Start: 2023-04-20 | End: 2023-05-01 | Stop reason: HOSPADM

## 2023-04-20 RX ORDER — BUMETANIDE 0.25 MG/ML
2 INJECTION INTRAMUSCULAR; INTRAVENOUS 2 TIMES DAILY
Status: DISCONTINUED | OUTPATIENT
Start: 2023-04-20 | End: 2023-04-29

## 2023-04-20 RX ORDER — ALBUMIN, HUMAN INJ 5% 5 %
25 SOLUTION INTRAVENOUS ONCE
Status: COMPLETED | OUTPATIENT
Start: 2023-04-20 | End: 2023-04-20

## 2023-04-20 RX ORDER — BUMETANIDE 0.25 MG/ML
2 INJECTION INTRAMUSCULAR; INTRAVENOUS ONCE
Status: COMPLETED | OUTPATIENT
Start: 2023-04-20 | End: 2023-04-20

## 2023-04-20 RX ORDER — MAGNESIUM SULFATE IN WATER 40 MG/ML
2000 INJECTION, SOLUTION INTRAVENOUS PRN
Status: DISCONTINUED | OUTPATIENT
Start: 2023-04-20 | End: 2023-05-01 | Stop reason: HOSPADM

## 2023-04-20 RX ORDER — ONDANSETRON 2 MG/ML
4 INJECTION INTRAMUSCULAR; INTRAVENOUS EVERY 6 HOURS PRN
Status: DISCONTINUED | OUTPATIENT
Start: 2023-04-20 | End: 2023-05-01 | Stop reason: HOSPADM

## 2023-04-20 RX ORDER — DOCUSATE SODIUM 100 MG/1
100 CAPSULE, LIQUID FILLED ORAL 2 TIMES DAILY
Status: DISCONTINUED | OUTPATIENT
Start: 2023-04-20 | End: 2023-05-01 | Stop reason: HOSPADM

## 2023-04-20 RX ORDER — CALCIUM GLUCONATE 20 MG/ML
1000 INJECTION, SOLUTION INTRAVENOUS ONCE
Status: COMPLETED | OUTPATIENT
Start: 2023-04-20 | End: 2023-04-20

## 2023-04-20 RX ORDER — POLYETHYLENE GLYCOL 3350 17 G/17G
17 POWDER, FOR SOLUTION ORAL DAILY
Status: DISCONTINUED | OUTPATIENT
Start: 2023-04-20 | End: 2023-05-01 | Stop reason: HOSPADM

## 2023-04-20 RX ADMIN — DOCUSATE SODIUM 100 MG: 100 CAPSULE, LIQUID FILLED ORAL at 20:09

## 2023-04-20 RX ADMIN — INSULIN LISPRO 3 UNITS: 100 INJECTION, SOLUTION INTRAVENOUS; SUBCUTANEOUS at 17:32

## 2023-04-20 RX ADMIN — SODIUM CHLORIDE, PRESERVATIVE FREE 10 ML: 5 INJECTION INTRAVENOUS at 20:11

## 2023-04-20 RX ADMIN — IPRATROPIUM BROMIDE AND ALBUTEROL SULFATE 1 AMPULE: 2.5; .5 SOLUTION RESPIRATORY (INHALATION) at 14:00

## 2023-04-20 RX ADMIN — SODIUM CHLORIDE, PRESERVATIVE FREE 10 ML: 5 INJECTION INTRAVENOUS at 08:04

## 2023-04-20 RX ADMIN — INSULIN LISPRO 1 UNITS: 100 INJECTION, SOLUTION INTRAVENOUS; SUBCUTANEOUS at 08:02

## 2023-04-20 RX ADMIN — SODIUM ZIRCONIUM CYCLOSILICATE 5 G: 10 POWDER, FOR SUSPENSION ORAL at 15:43

## 2023-04-20 RX ADMIN — SODIUM ZIRCONIUM CYCLOSILICATE 5 G: 10 POWDER, FOR SUSPENSION ORAL at 08:02

## 2023-04-20 RX ADMIN — IPRATROPIUM BROMIDE AND ALBUTEROL SULFATE 1 AMPULE: 2.5; .5 SOLUTION RESPIRATORY (INHALATION) at 18:22

## 2023-04-20 RX ADMIN — OXYCODONE 10 MG: 5 TABLET ORAL at 20:09

## 2023-04-20 RX ADMIN — DOCUSATE SODIUM 100 MG: 100 CAPSULE, LIQUID FILLED ORAL at 08:02

## 2023-04-20 RX ADMIN — BUMETANIDE 2 MG: 0.25 INJECTION INTRAMUSCULAR; INTRAVENOUS at 03:06

## 2023-04-20 RX ADMIN — MORPHINE SULFATE 2 MG: 2 INJECTION, SOLUTION INTRAMUSCULAR; INTRAVENOUS at 22:49

## 2023-04-20 RX ADMIN — OXYCODONE 10 MG: 5 TABLET ORAL at 11:27

## 2023-04-20 RX ADMIN — IPRATROPIUM BROMIDE AND ALBUTEROL SULFATE 1 AMPULE: 2.5; .5 SOLUTION RESPIRATORY (INHALATION) at 06:10

## 2023-04-20 RX ADMIN — CALCIUM GLUCONATE 1000 MG: 20 INJECTION, SOLUTION INTRAVENOUS at 03:14

## 2023-04-20 RX ADMIN — ENOXAPARIN SODIUM 40 MG: 100 INJECTION SUBCUTANEOUS at 20:10

## 2023-04-20 RX ADMIN — INSULIN HUMAN 10 UNITS: 100 INJECTION, SOLUTION PARENTERAL at 03:19

## 2023-04-20 RX ADMIN — INSULIN LISPRO 2 UNITS: 100 INJECTION, SOLUTION INTRAVENOUS; SUBCUTANEOUS at 11:30

## 2023-04-20 RX ADMIN — OXYCODONE 10 MG: 5 TABLET ORAL at 05:23

## 2023-04-20 RX ADMIN — MAGNESIUM SULFATE HEPTAHYDRATE 2000 MG: 40 INJECTION, SOLUTION INTRAVENOUS at 03:27

## 2023-04-20 RX ADMIN — SODIUM ZIRCONIUM CYCLOSILICATE 5 G: 10 POWDER, FOR SUSPENSION ORAL at 20:11

## 2023-04-20 RX ADMIN — OXYCODONE 10 MG: 5 TABLET ORAL at 15:43

## 2023-04-20 RX ADMIN — BUMETANIDE 2 MG: 0.25 INJECTION INTRAMUSCULAR; INTRAVENOUS at 15:42

## 2023-04-20 RX ADMIN — IPRATROPIUM BROMIDE AND ALBUTEROL SULFATE 1 AMPULE: 2.5; .5 SOLUTION RESPIRATORY (INHALATION) at 10:10

## 2023-04-20 RX ADMIN — POLYETHYLENE GLYCOL 3350 17 G: 17 POWDER, FOR SOLUTION ORAL at 08:03

## 2023-04-20 RX ADMIN — MORPHINE SULFATE 2 MG: 2 INJECTION, SOLUTION INTRAMUSCULAR; INTRAVENOUS at 02:47

## 2023-04-20 RX ADMIN — SODIUM ZIRCONIUM CYCLOSILICATE 5 G: 10 POWDER, FOR SUSPENSION ORAL at 03:19

## 2023-04-20 RX ADMIN — ALBUMIN (HUMAN) 25 G: 12.5 INJECTION, SOLUTION INTRAVENOUS at 03:12

## 2023-04-20 ASSESSMENT — PAIN DESCRIPTION - DESCRIPTORS
DESCRIPTORS: BURNING;CRAMPING
DESCRIPTORS: BURNING
DESCRIPTORS: ACHING;SORE
DESCRIPTORS: ACHING;SORE
DESCRIPTORS: SORE;ACHING

## 2023-04-20 ASSESSMENT — PAIN SCALES - GENERAL
PAINLEVEL_OUTOF10: 5
PAINLEVEL_OUTOF10: 8
PAINLEVEL_OUTOF10: 8
PAINLEVEL_OUTOF10: 5
PAINLEVEL_OUTOF10: 9
PAINLEVEL_OUTOF10: 7
PAINLEVEL_OUTOF10: 7
PAINLEVEL_OUTOF10: 9
PAINLEVEL_OUTOF10: 4
PAINLEVEL_OUTOF10: 0

## 2023-04-20 ASSESSMENT — PAIN DESCRIPTION - ORIENTATION
ORIENTATION: LEFT

## 2023-04-20 ASSESSMENT — PAIN DESCRIPTION - LOCATION
LOCATION: RIB CAGE
LOCATION: RIB CAGE
LOCATION: CHEST
LOCATION: CHEST
LOCATION: BACK;CHEST
LOCATION: GENERALIZED;INCISION

## 2023-04-20 ASSESSMENT — PAIN - FUNCTIONAL ASSESSMENT
PAIN_FUNCTIONAL_ASSESSMENT: ACTIVITIES ARE NOT PREVENTED
PAIN_FUNCTIONAL_ASSESSMENT: PREVENTS OR INTERFERES SOME ACTIVE ACTIVITIES AND ADLS
PAIN_FUNCTIONAL_ASSESSMENT: PREVENTS OR INTERFERES SOME ACTIVE ACTIVITIES AND ADLS

## 2023-04-20 ASSESSMENT — PAIN DESCRIPTION - PAIN TYPE
TYPE: SURGICAL PAIN
TYPE: SURGICAL PAIN

## 2023-04-20 ASSESSMENT — PAIN DESCRIPTION - FREQUENCY
FREQUENCY: CONTINUOUS
FREQUENCY: CONTINUOUS

## 2023-04-20 ASSESSMENT — PAIN DESCRIPTION - ONSET
ONSET: ON-GOING
ONSET: ON-GOING

## 2023-04-21 ENCOUNTER — APPOINTMENT (OUTPATIENT)
Dept: GENERAL RADIOLOGY | Age: 73
DRG: 163 | End: 2023-04-21
Attending: SURGERY
Payer: MEDICARE

## 2023-04-21 LAB
ALBUMIN SERPL-MCNC: 3.3 G/DL (ref 3.5–5.2)
ALP SERPL-CCNC: 64 U/L (ref 40–130)
ALT SERPL-CCNC: 25 U/L (ref 5–41)
ANION GAP SERPL CALCULATED.3IONS-SCNC: 12 MMOL/L (ref 7–19)
AST SERPL-CCNC: 76 U/L (ref 5–40)
BASOPHILS # BLD: 0 K/UL (ref 0–0.2)
BASOPHILS NFR BLD: 0.2 % (ref 0–1)
BILIRUB SERPL-MCNC: 0.4 MG/DL (ref 0.2–1.2)
BUN SERPL-MCNC: 36 MG/DL (ref 8–23)
CALCIUM SERPL-MCNC: 8.3 MG/DL (ref 8.8–10.2)
CHLORIDE SERPL-SCNC: 92 MMOL/L (ref 98–111)
CO2 SERPL-SCNC: 28 MMOL/L (ref 22–29)
CREAT SERPL-MCNC: 1.4 MG/DL (ref 0.5–1.2)
EOSINOPHIL # BLD: 0 K/UL (ref 0–0.6)
EOSINOPHIL NFR BLD: 0.1 % (ref 0–5)
ERYTHROCYTE [DISTWIDTH] IN BLOOD BY AUTOMATED COUNT: 16.4 % (ref 11.5–14.5)
GLUCOSE BLD-MCNC: 243 MG/DL (ref 70–99)
GLUCOSE BLD-MCNC: 284 MG/DL (ref 70–99)
GLUCOSE BLD-MCNC: 321 MG/DL (ref 70–99)
GLUCOSE BLD-MCNC: 329 MG/DL (ref 70–99)
GLUCOSE SERPL-MCNC: 257 MG/DL (ref 74–109)
HCT VFR BLD AUTO: 27.2 % (ref 42–52)
HGB BLD-MCNC: 8.7 G/DL (ref 14–18)
IMM GRANULOCYTES # BLD: 0.1 K/UL
LYMPHOCYTES # BLD: 1.6 K/UL (ref 1.1–4.5)
LYMPHOCYTES NFR BLD: 12.9 % (ref 20–40)
MAGNESIUM SERPL-MCNC: 2 MG/DL (ref 1.6–2.4)
MCH RBC QN AUTO: 29.7 PG (ref 27–31)
MCHC RBC AUTO-ENTMCNC: 32 G/DL (ref 33–37)
MCV RBC AUTO: 92.8 FL (ref 80–94)
MONOCYTES # BLD: 1.4 K/UL (ref 0–0.9)
MONOCYTES NFR BLD: 11.1 % (ref 0–10)
NEUTROPHILS # BLD: 9.5 K/UL (ref 1.5–7.5)
NEUTS SEG NFR BLD: 75.2 % (ref 50–65)
PERFORMED ON: ABNORMAL
PLATELET # BLD AUTO: 153 K/UL (ref 130–400)
PMV BLD AUTO: 10.9 FL (ref 9.4–12.4)
POTASSIUM SERPL-SCNC: 4.7 MMOL/L (ref 3.5–5)
PROT SERPL-MCNC: 6 G/DL (ref 6.6–8.7)
RBC # BLD AUTO: 2.93 M/UL (ref 4.7–6.1)
SODIUM SERPL-SCNC: 132 MMOL/L (ref 136–145)
WBC # BLD AUTO: 12.7 K/UL (ref 4.8–10.8)

## 2023-04-21 PROCEDURE — 2500000003 HC RX 250 WO HCPCS: Performed by: SURGERY

## 2023-04-21 PROCEDURE — 2700000000 HC OXYGEN THERAPY PER DAY

## 2023-04-21 PROCEDURE — 94150 VITAL CAPACITY TEST: CPT

## 2023-04-21 PROCEDURE — 6370000000 HC RX 637 (ALT 250 FOR IP): Performed by: SURGERY

## 2023-04-21 PROCEDURE — 99024 POSTOP FOLLOW-UP VISIT: CPT | Performed by: SURGERY

## 2023-04-21 PROCEDURE — 71045 X-RAY EXAM CHEST 1 VIEW: CPT | Performed by: STUDENT IN AN ORGANIZED HEALTH CARE EDUCATION/TRAINING PROGRAM

## 2023-04-21 PROCEDURE — 2140000000 HC CCU INTERMEDIATE R&B

## 2023-04-21 PROCEDURE — 6360000002 HC RX W HCPCS: Performed by: SURGERY

## 2023-04-21 PROCEDURE — 2580000003 HC RX 258: Performed by: SURGERY

## 2023-04-21 PROCEDURE — 97530 THERAPEUTIC ACTIVITIES: CPT

## 2023-04-21 PROCEDURE — 71045 X-RAY EXAM CHEST 1 VIEW: CPT

## 2023-04-21 PROCEDURE — 97535 SELF CARE MNGMENT TRAINING: CPT

## 2023-04-21 PROCEDURE — 97165 OT EVAL LOW COMPLEX 30 MIN: CPT

## 2023-04-21 PROCEDURE — 6370000000 HC RX 637 (ALT 250 FOR IP): Performed by: NURSE PRACTITIONER

## 2023-04-21 PROCEDURE — 94640 AIRWAY INHALATION TREATMENT: CPT

## 2023-04-21 PROCEDURE — 83735 ASSAY OF MAGNESIUM: CPT

## 2023-04-21 PROCEDURE — 6370000000 HC RX 637 (ALT 250 FOR IP): Performed by: PHYSICIAN ASSISTANT

## 2023-04-21 PROCEDURE — 82962 GLUCOSE BLOOD TEST: CPT

## 2023-04-21 PROCEDURE — 94760 N-INVAS EAR/PLS OXIMETRY 1: CPT

## 2023-04-21 PROCEDURE — 97161 PT EVAL LOW COMPLEX 20 MIN: CPT

## 2023-04-21 PROCEDURE — 36415 COLL VENOUS BLD VENIPUNCTURE: CPT

## 2023-04-21 PROCEDURE — 80053 COMPREHEN METABOLIC PANEL: CPT

## 2023-04-21 PROCEDURE — 85025 COMPLETE CBC W/AUTO DIFF WBC: CPT

## 2023-04-21 RX ORDER — PRIMIDONE 50 MG/1
50 TABLET ORAL 2 TIMES DAILY
Status: DISCONTINUED | OUTPATIENT
Start: 2023-04-21 | End: 2023-05-01 | Stop reason: HOSPADM

## 2023-04-21 RX ORDER — PANTOPRAZOLE SODIUM 40 MG/1
40 TABLET, DELAYED RELEASE ORAL 2 TIMES DAILY
Status: DISCONTINUED | OUTPATIENT
Start: 2023-04-21 | End: 2023-05-01 | Stop reason: HOSPADM

## 2023-04-21 RX ORDER — INSULIN GLARGINE 100 [IU]/ML
15 INJECTION, SOLUTION SUBCUTANEOUS NIGHTLY
Status: DISCONTINUED | OUTPATIENT
Start: 2023-04-21 | End: 2023-05-01 | Stop reason: HOSPADM

## 2023-04-21 RX ORDER — DIVALPROEX SODIUM 250 MG/1
250 TABLET, DELAYED RELEASE ORAL 2 TIMES DAILY
Status: DISCONTINUED | OUTPATIENT
Start: 2023-04-21 | End: 2023-05-01 | Stop reason: HOSPADM

## 2023-04-21 RX ORDER — METOPROLOL SUCCINATE 25 MG/1
25 TABLET, EXTENDED RELEASE ORAL DAILY
Status: DISCONTINUED | OUTPATIENT
Start: 2023-04-21 | End: 2023-05-01 | Stop reason: HOSPADM

## 2023-04-21 RX ORDER — ATORVASTATIN CALCIUM 40 MG/1
40 TABLET, FILM COATED ORAL NIGHTLY
Status: DISCONTINUED | OUTPATIENT
Start: 2023-04-21 | End: 2023-05-01 | Stop reason: HOSPADM

## 2023-04-21 RX ORDER — BISACODYL 10 MG
10 SUPPOSITORY, RECTAL RECTAL ONCE
Status: COMPLETED | OUTPATIENT
Start: 2023-04-21 | End: 2023-04-21

## 2023-04-21 RX ORDER — INSULIN GLARGINE 100 [IU]/ML
1 INJECTION, SOLUTION SUBCUTANEOUS NIGHTLY
Status: DISCONTINUED | OUTPATIENT
Start: 2023-04-21 | End: 2023-04-21

## 2023-04-21 RX ADMIN — MAGNESIUM HYDROXIDE 30 ML: 400 SUSPENSION ORAL at 08:44

## 2023-04-21 RX ADMIN — IPRATROPIUM BROMIDE AND ALBUTEROL SULFATE 1 AMPULE: 2.5; .5 SOLUTION RESPIRATORY (INHALATION) at 10:18

## 2023-04-21 RX ADMIN — IPRATROPIUM BROMIDE AND ALBUTEROL SULFATE 1 AMPULE: 2.5; .5 SOLUTION RESPIRATORY (INHALATION) at 18:50

## 2023-04-21 RX ADMIN — PANTOPRAZOLE SODIUM 40 MG: 40 TABLET, DELAYED RELEASE ORAL at 10:07

## 2023-04-21 RX ADMIN — ATORVASTATIN CALCIUM 40 MG: 40 TABLET, FILM COATED ORAL at 20:02

## 2023-04-21 RX ADMIN — OXYCODONE 10 MG: 5 TABLET ORAL at 13:15

## 2023-04-21 RX ADMIN — SODIUM CHLORIDE, PRESERVATIVE FREE 10 ML: 5 INJECTION INTRAVENOUS at 20:03

## 2023-04-21 RX ADMIN — IPRATROPIUM BROMIDE AND ALBUTEROL SULFATE 1 AMPULE: 2.5; .5 SOLUTION RESPIRATORY (INHALATION) at 14:55

## 2023-04-21 RX ADMIN — INSULIN LISPRO 3 UNITS: 100 INJECTION, SOLUTION INTRAVENOUS; SUBCUTANEOUS at 16:40

## 2023-04-21 RX ADMIN — INSULIN LISPRO 2 UNITS: 100 INJECTION, SOLUTION INTRAVENOUS; SUBCUTANEOUS at 08:43

## 2023-04-21 RX ADMIN — ENOXAPARIN SODIUM 40 MG: 100 INJECTION SUBCUTANEOUS at 20:02

## 2023-04-21 RX ADMIN — SODIUM ZIRCONIUM CYCLOSILICATE 5 G: 10 POWDER, FOR SUSPENSION ORAL at 13:15

## 2023-04-21 RX ADMIN — DOCUSATE SODIUM 100 MG: 100 CAPSULE, LIQUID FILLED ORAL at 08:44

## 2023-04-21 RX ADMIN — OXYCODONE 10 MG: 5 TABLET ORAL at 00:10

## 2023-04-21 RX ADMIN — DIVALPROEX SODIUM 250 MG: 250 TABLET, DELAYED RELEASE ORAL at 10:07

## 2023-04-21 RX ADMIN — PANTOPRAZOLE SODIUM 40 MG: 40 TABLET, DELAYED RELEASE ORAL at 20:02

## 2023-04-21 RX ADMIN — INSULIN GLARGINE 15 UNITS: 100 INJECTION, SOLUTION SUBCUTANEOUS at 20:04

## 2023-04-21 RX ADMIN — OXYCODONE 10 MG: 5 TABLET ORAL at 05:04

## 2023-04-21 RX ADMIN — OXYCODONE 5 MG: 5 TABLET ORAL at 20:16

## 2023-04-21 RX ADMIN — SODIUM ZIRCONIUM CYCLOSILICATE 5 G: 10 POWDER, FOR SUSPENSION ORAL at 08:44

## 2023-04-21 RX ADMIN — PRIMIDONE 50 MG: 50 TABLET ORAL at 20:02

## 2023-04-21 RX ADMIN — BUMETANIDE 2 MG: 0.25 INJECTION INTRAMUSCULAR; INTRAVENOUS at 08:43

## 2023-04-21 RX ADMIN — POLYETHYLENE GLYCOL 3350 17 G: 17 POWDER, FOR SOLUTION ORAL at 08:44

## 2023-04-21 RX ADMIN — DOCUSATE SODIUM 100 MG: 100 CAPSULE, LIQUID FILLED ORAL at 20:03

## 2023-04-21 RX ADMIN — BUMETANIDE 2 MG: 0.25 INJECTION INTRAMUSCULAR; INTRAVENOUS at 20:02

## 2023-04-21 RX ADMIN — DIVALPROEX SODIUM 250 MG: 250 TABLET, DELAYED RELEASE ORAL at 20:02

## 2023-04-21 RX ADMIN — INSULIN LISPRO 3 UNITS: 100 INJECTION, SOLUTION INTRAVENOUS; SUBCUTANEOUS at 13:28

## 2023-04-21 RX ADMIN — SODIUM CHLORIDE, PRESERVATIVE FREE 10 ML: 5 INJECTION INTRAVENOUS at 08:44

## 2023-04-21 RX ADMIN — PRIMIDONE 50 MG: 50 TABLET ORAL at 10:07

## 2023-04-21 RX ADMIN — IPRATROPIUM BROMIDE AND ALBUTEROL SULFATE 1 AMPULE: 2.5; .5 SOLUTION RESPIRATORY (INHALATION) at 06:17

## 2023-04-21 RX ADMIN — BISACODYL 10 MG: 10 SUPPOSITORY RECTAL at 13:15

## 2023-04-21 RX ADMIN — SODIUM ZIRCONIUM CYCLOSILICATE 5 G: 10 POWDER, FOR SUSPENSION ORAL at 20:03

## 2023-04-21 RX ADMIN — METOPROLOL SUCCINATE 25 MG: 25 TABLET, EXTENDED RELEASE ORAL at 10:07

## 2023-04-21 ASSESSMENT — PAIN DESCRIPTION - ORIENTATION
ORIENTATION: LEFT

## 2023-04-21 ASSESSMENT — PAIN DESCRIPTION - LOCATION
LOCATION: RIB CAGE;INCISION
LOCATION: CHEST
LOCATION: OTHER (COMMENT)
LOCATION: RIB CAGE;INCISION
LOCATION: RIB CAGE;BACK

## 2023-04-21 ASSESSMENT — PAIN DESCRIPTION - DESCRIPTORS
DESCRIPTORS: SORE
DESCRIPTORS: ACHING;DULL
DESCRIPTORS: SORE
DESCRIPTORS: ACHING
DESCRIPTORS: ACHING

## 2023-04-21 ASSESSMENT — PAIN SCALES - GENERAL
PAINLEVEL_OUTOF10: 3
PAINLEVEL_OUTOF10: 7
PAINLEVEL_OUTOF10: 5
PAINLEVEL_OUTOF10: 7
PAINLEVEL_OUTOF10: 8

## 2023-04-21 ASSESSMENT — PAIN - FUNCTIONAL ASSESSMENT
PAIN_FUNCTIONAL_ASSESSMENT: PREVENTS OR INTERFERES SOME ACTIVE ACTIVITIES AND ADLS
PAIN_FUNCTIONAL_ASSESSMENT: PREVENTS OR INTERFERES SOME ACTIVE ACTIVITIES AND ADLS
PAIN_FUNCTIONAL_ASSESSMENT: ACTIVITIES ARE NOT PREVENTED

## 2023-04-21 ASSESSMENT — PAIN SCALES - WONG BAKER: WONGBAKER_NUMERICALRESPONSE: 0

## 2023-04-21 ASSESSMENT — PAIN DESCRIPTION - ONSET: ONSET: ON-GOING

## 2023-04-21 ASSESSMENT — PAIN DESCRIPTION - PAIN TYPE: TYPE: SURGICAL PAIN

## 2023-04-21 ASSESSMENT — PAIN DESCRIPTION - FREQUENCY: FREQUENCY: CONTINUOUS

## 2023-04-22 ENCOUNTER — APPOINTMENT (OUTPATIENT)
Dept: GENERAL RADIOLOGY | Age: 73
DRG: 163 | End: 2023-04-22
Attending: SURGERY
Payer: MEDICARE

## 2023-04-22 LAB
ALBUMIN SERPL-MCNC: 3.1 G/DL (ref 3.5–5.2)
ALP SERPL-CCNC: 73 U/L (ref 40–130)
ALT SERPL-CCNC: 22 U/L (ref 5–41)
ANION GAP SERPL CALCULATED.3IONS-SCNC: 8 MMOL/L (ref 7–19)
AST SERPL-CCNC: 45 U/L (ref 5–40)
BILIRUB SERPL-MCNC: 0.5 MG/DL (ref 0.2–1.2)
BUN SERPL-MCNC: 34 MG/DL (ref 8–23)
CALCIUM SERPL-MCNC: 8.1 MG/DL (ref 8.8–10.2)
CHLORIDE SERPL-SCNC: 88 MMOL/L (ref 98–111)
CO2 SERPL-SCNC: 36 MMOL/L (ref 22–29)
CREAT SERPL-MCNC: 1.3 MG/DL (ref 0.5–1.2)
ERYTHROCYTE [DISTWIDTH] IN BLOOD BY AUTOMATED COUNT: 16.2 % (ref 11.5–14.5)
GLUCOSE BLD-MCNC: 259 MG/DL (ref 70–99)
GLUCOSE BLD-MCNC: 282 MG/DL (ref 70–99)
GLUCOSE BLD-MCNC: 283 MG/DL (ref 70–99)
GLUCOSE BLD-MCNC: 299 MG/DL (ref 70–99)
GLUCOSE BLD-MCNC: 313 MG/DL (ref 70–99)
GLUCOSE SERPL-MCNC: 277 MG/DL (ref 74–109)
HCT VFR BLD AUTO: 25.1 % (ref 42–52)
HGB BLD-MCNC: 7.9 G/DL (ref 14–18)
MCH RBC QN AUTO: 29 PG (ref 27–31)
MCHC RBC AUTO-ENTMCNC: 31.5 G/DL (ref 33–37)
MCV RBC AUTO: 92.3 FL (ref 80–94)
PERFORMED ON: ABNORMAL
PLATELET # BLD AUTO: 168 K/UL (ref 130–400)
PMV BLD AUTO: 9.9 FL (ref 9.4–12.4)
POTASSIUM SERPL-SCNC: 4 MMOL/L (ref 3.5–5)
PROT SERPL-MCNC: 6 G/DL (ref 6.6–8.7)
RBC # BLD AUTO: 2.72 M/UL (ref 4.7–6.1)
SODIUM SERPL-SCNC: 132 MMOL/L (ref 136–145)
WBC # BLD AUTO: 7.4 K/UL (ref 4.8–10.8)

## 2023-04-22 PROCEDURE — 6370000000 HC RX 637 (ALT 250 FOR IP): Performed by: PHYSICIAN ASSISTANT

## 2023-04-22 PROCEDURE — 36415 COLL VENOUS BLD VENIPUNCTURE: CPT

## 2023-04-22 PROCEDURE — 74018 RADEX ABDOMEN 1 VIEW: CPT | Performed by: RADIOLOGY

## 2023-04-22 PROCEDURE — 74018 RADEX ABDOMEN 1 VIEW: CPT

## 2023-04-22 PROCEDURE — 6360000002 HC RX W HCPCS: Performed by: PHYSICIAN ASSISTANT

## 2023-04-22 PROCEDURE — 94640 AIRWAY INHALATION TREATMENT: CPT

## 2023-04-22 PROCEDURE — 2500000003 HC RX 250 WO HCPCS: Performed by: PHYSICIAN ASSISTANT

## 2023-04-22 PROCEDURE — 71045 X-RAY EXAM CHEST 1 VIEW: CPT | Performed by: RADIOLOGY

## 2023-04-22 PROCEDURE — 6360000002 HC RX W HCPCS: Performed by: SURGERY

## 2023-04-22 PROCEDURE — 85027 COMPLETE CBC AUTOMATED: CPT

## 2023-04-22 PROCEDURE — 2580000003 HC RX 258: Performed by: PHYSICIAN ASSISTANT

## 2023-04-22 PROCEDURE — 71045 X-RAY EXAM CHEST 1 VIEW: CPT

## 2023-04-22 PROCEDURE — 94760 N-INVAS EAR/PLS OXIMETRY 1: CPT

## 2023-04-22 PROCEDURE — 80053 COMPREHEN METABOLIC PANEL: CPT

## 2023-04-22 PROCEDURE — 82962 GLUCOSE BLOOD TEST: CPT

## 2023-04-22 PROCEDURE — 6370000000 HC RX 637 (ALT 250 FOR IP): Performed by: SURGERY

## 2023-04-22 PROCEDURE — 2140000000 HC CCU INTERMEDIATE R&B

## 2023-04-22 PROCEDURE — 2700000000 HC OXYGEN THERAPY PER DAY

## 2023-04-22 RX ORDER — METOCLOPRAMIDE HYDROCHLORIDE 5 MG/ML
10 INJECTION INTRAMUSCULAR; INTRAVENOUS EVERY 6 HOURS
Status: DISCONTINUED | OUTPATIENT
Start: 2023-04-22 | End: 2023-04-23

## 2023-04-22 RX ADMIN — SODIUM CHLORIDE, PRESERVATIVE FREE 10 ML: 5 INJECTION INTRAVENOUS at 20:35

## 2023-04-22 RX ADMIN — ONDANSETRON 4 MG: 2 INJECTION INTRAMUSCULAR; INTRAVENOUS at 05:06

## 2023-04-22 RX ADMIN — ATORVASTATIN CALCIUM 40 MG: 40 TABLET, FILM COATED ORAL at 20:08

## 2023-04-22 RX ADMIN — BUMETANIDE 2 MG: 0.25 INJECTION INTRAMUSCULAR; INTRAVENOUS at 20:07

## 2023-04-22 RX ADMIN — PRIMIDONE 50 MG: 50 TABLET ORAL at 08:42

## 2023-04-22 RX ADMIN — INSULIN GLARGINE 15 UNITS: 100 INJECTION, SOLUTION SUBCUTANEOUS at 20:35

## 2023-04-22 RX ADMIN — OXYCODONE 5 MG: 5 TABLET ORAL at 16:08

## 2023-04-22 RX ADMIN — PANTOPRAZOLE SODIUM 40 MG: 40 TABLET, DELAYED RELEASE ORAL at 20:08

## 2023-04-22 RX ADMIN — PANTOPRAZOLE SODIUM 40 MG: 40 TABLET, DELAYED RELEASE ORAL at 08:42

## 2023-04-22 RX ADMIN — IPRATROPIUM BROMIDE AND ALBUTEROL SULFATE 1 AMPULE: 2.5; .5 SOLUTION RESPIRATORY (INHALATION) at 14:29

## 2023-04-22 RX ADMIN — DIVALPROEX SODIUM 250 MG: 250 TABLET, DELAYED RELEASE ORAL at 20:08

## 2023-04-22 RX ADMIN — METOPROLOL SUCCINATE 25 MG: 25 TABLET, EXTENDED RELEASE ORAL at 08:42

## 2023-04-22 RX ADMIN — DIVALPROEX SODIUM 250 MG: 250 TABLET, DELAYED RELEASE ORAL at 08:42

## 2023-04-22 RX ADMIN — DOCUSATE SODIUM 100 MG: 100 CAPSULE, LIQUID FILLED ORAL at 08:42

## 2023-04-22 RX ADMIN — POLYETHYLENE GLYCOL 3350 17 G: 17 POWDER, FOR SOLUTION ORAL at 08:42

## 2023-04-22 RX ADMIN — ENOXAPARIN SODIUM 40 MG: 100 INJECTION SUBCUTANEOUS at 20:08

## 2023-04-22 RX ADMIN — ONDANSETRON 4 MG: 2 INJECTION INTRAMUSCULAR; INTRAVENOUS at 17:44

## 2023-04-22 RX ADMIN — PRIMIDONE 50 MG: 50 TABLET ORAL at 20:08

## 2023-04-22 RX ADMIN — DOCUSATE SODIUM 100 MG: 100 CAPSULE, LIQUID FILLED ORAL at 20:08

## 2023-04-22 RX ADMIN — BUMETANIDE 2 MG: 0.25 INJECTION INTRAMUSCULAR; INTRAVENOUS at 08:34

## 2023-04-22 RX ADMIN — INSULIN LISPRO 2 UNITS: 100 INJECTION, SOLUTION INTRAVENOUS; SUBCUTANEOUS at 08:44

## 2023-04-22 RX ADMIN — IPRATROPIUM BROMIDE AND ALBUTEROL SULFATE 1 AMPULE: 2.5; .5 SOLUTION RESPIRATORY (INHALATION) at 10:24

## 2023-04-22 RX ADMIN — OXYCODONE 5 MG: 5 TABLET ORAL at 05:03

## 2023-04-22 RX ADMIN — INSULIN LISPRO 3 UNITS: 100 INJECTION, SOLUTION INTRAVENOUS; SUBCUTANEOUS at 17:44

## 2023-04-22 RX ADMIN — IPRATROPIUM BROMIDE AND ALBUTEROL SULFATE 1 AMPULE: 2.5; .5 SOLUTION RESPIRATORY (INHALATION) at 07:02

## 2023-04-22 RX ADMIN — METOCLOPRAMIDE 10 MG: 5 INJECTION, SOLUTION INTRAMUSCULAR; INTRAVENOUS at 20:35

## 2023-04-22 ASSESSMENT — PAIN SCALES - GENERAL
PAINLEVEL_OUTOF10: 5
PAINLEVEL_OUTOF10: 8

## 2023-04-22 ASSESSMENT — PAIN - FUNCTIONAL ASSESSMENT
PAIN_FUNCTIONAL_ASSESSMENT: PREVENTS OR INTERFERES SOME ACTIVE ACTIVITIES AND ADLS
PAIN_FUNCTIONAL_ASSESSMENT: ACTIVITIES ARE NOT PREVENTED

## 2023-04-22 ASSESSMENT — PAIN SCALES - WONG BAKER: WONGBAKER_NUMERICALRESPONSE: 0

## 2023-04-22 ASSESSMENT — PAIN DESCRIPTION - LOCATION
LOCATION: CHEST
LOCATION: GENERALIZED

## 2023-04-22 ASSESSMENT — PAIN DESCRIPTION - DESCRIPTORS
DESCRIPTORS: STABBING;ACHING;BURNING
DESCRIPTORS: ACHING

## 2023-04-22 ASSESSMENT — PAIN DESCRIPTION - ORIENTATION
ORIENTATION: MID
ORIENTATION: LEFT

## 2023-04-23 ENCOUNTER — APPOINTMENT (OUTPATIENT)
Dept: GENERAL RADIOLOGY | Age: 73
DRG: 163 | End: 2023-04-23
Attending: SURGERY
Payer: MEDICARE

## 2023-04-23 LAB
ALBUMIN SERPL-MCNC: 3.2 G/DL (ref 3.5–5.2)
ALP SERPL-CCNC: 100 U/L (ref 40–130)
ALT SERPL-CCNC: 22 U/L (ref 5–41)
ANION GAP SERPL CALCULATED.3IONS-SCNC: 10 MMOL/L (ref 7–19)
AST SERPL-CCNC: 40 U/L (ref 5–40)
BILIRUB SERPL-MCNC: 0.4 MG/DL (ref 0.2–1.2)
BUN SERPL-MCNC: 35 MG/DL (ref 8–23)
CALCIUM SERPL-MCNC: 8.4 MG/DL (ref 8.8–10.2)
CHLORIDE SERPL-SCNC: 89 MMOL/L (ref 98–111)
CO2 SERPL-SCNC: 35 MMOL/L (ref 22–29)
CREAT SERPL-MCNC: 1.4 MG/DL (ref 0.5–1.2)
ERYTHROCYTE [DISTWIDTH] IN BLOOD BY AUTOMATED COUNT: 15.8 % (ref 11.5–14.5)
GLUCOSE BLD-MCNC: 245 MG/DL (ref 70–99)
GLUCOSE BLD-MCNC: 272 MG/DL (ref 70–99)
GLUCOSE BLD-MCNC: 280 MG/DL (ref 70–99)
GLUCOSE SERPL-MCNC: 241 MG/DL (ref 74–109)
HCT VFR BLD AUTO: 24.4 % (ref 42–52)
HGB BLD-MCNC: 7.9 G/DL (ref 14–18)
MCH RBC QN AUTO: 30 PG (ref 27–31)
MCHC RBC AUTO-ENTMCNC: 32.4 G/DL (ref 33–37)
MCV RBC AUTO: 92.8 FL (ref 80–94)
PERFORMED ON: ABNORMAL
PLATELET # BLD AUTO: 217 K/UL (ref 130–400)
PMV BLD AUTO: 10.5 FL (ref 9.4–12.4)
POTASSIUM SERPL-SCNC: 4.2 MMOL/L (ref 3.5–5)
PROT SERPL-MCNC: 5.6 G/DL (ref 6.6–8.7)
RBC # BLD AUTO: 2.63 M/UL (ref 4.7–6.1)
SODIUM SERPL-SCNC: 134 MMOL/L (ref 136–145)
WBC # BLD AUTO: 7.6 K/UL (ref 4.8–10.8)

## 2023-04-23 PROCEDURE — 85027 COMPLETE CBC AUTOMATED: CPT

## 2023-04-23 PROCEDURE — 6370000000 HC RX 637 (ALT 250 FOR IP): Performed by: PHYSICIAN ASSISTANT

## 2023-04-23 PROCEDURE — 2140000000 HC CCU INTERMEDIATE R&B

## 2023-04-23 PROCEDURE — 6360000002 HC RX W HCPCS: Performed by: SURGERY

## 2023-04-23 PROCEDURE — 2700000000 HC OXYGEN THERAPY PER DAY

## 2023-04-23 PROCEDURE — 2500000003 HC RX 250 WO HCPCS: Performed by: PHYSICIAN ASSISTANT

## 2023-04-23 PROCEDURE — 94640 AIRWAY INHALATION TREATMENT: CPT

## 2023-04-23 PROCEDURE — 71045 X-RAY EXAM CHEST 1 VIEW: CPT | Performed by: STUDENT IN AN ORGANIZED HEALTH CARE EDUCATION/TRAINING PROGRAM

## 2023-04-23 PROCEDURE — 2580000003 HC RX 258: Performed by: SURGERY

## 2023-04-23 PROCEDURE — 82962 GLUCOSE BLOOD TEST: CPT

## 2023-04-23 PROCEDURE — 6370000000 HC RX 637 (ALT 250 FOR IP): Performed by: SURGERY

## 2023-04-23 PROCEDURE — 71045 X-RAY EXAM CHEST 1 VIEW: CPT

## 2023-04-23 PROCEDURE — 80053 COMPREHEN METABOLIC PANEL: CPT

## 2023-04-23 PROCEDURE — 36415 COLL VENOUS BLD VENIPUNCTURE: CPT

## 2023-04-23 PROCEDURE — 99024 POSTOP FOLLOW-UP VISIT: CPT | Performed by: SURGERY

## 2023-04-23 PROCEDURE — 6360000002 HC RX W HCPCS: Performed by: PHYSICIAN ASSISTANT

## 2023-04-23 PROCEDURE — 2580000003 HC RX 258: Performed by: PHYSICIAN ASSISTANT

## 2023-04-23 PROCEDURE — 94760 N-INVAS EAR/PLS OXIMETRY 1: CPT

## 2023-04-23 RX ORDER — SODIUM CHLORIDE, SODIUM LACTATE, POTASSIUM CHLORIDE, CALCIUM CHLORIDE 600; 310; 30; 20 MG/100ML; MG/100ML; MG/100ML; MG/100ML
INJECTION, SOLUTION INTRAVENOUS CONTINUOUS
Status: DISCONTINUED | OUTPATIENT
Start: 2023-04-23 | End: 2023-04-27

## 2023-04-23 RX ORDER — METOCLOPRAMIDE HYDROCHLORIDE 5 MG/ML
5 INJECTION INTRAMUSCULAR; INTRAVENOUS EVERY 6 HOURS
Status: DISCONTINUED | OUTPATIENT
Start: 2023-04-23 | End: 2023-05-01

## 2023-04-23 RX ORDER — BISACODYL 10 MG
10 SUPPOSITORY, RECTAL RECTAL DAILY PRN
Status: DISCONTINUED | OUTPATIENT
Start: 2023-04-23 | End: 2023-05-01

## 2023-04-23 RX ADMIN — PANTOPRAZOLE SODIUM 40 MG: 40 TABLET, DELAYED RELEASE ORAL at 08:49

## 2023-04-23 RX ADMIN — IPRATROPIUM BROMIDE AND ALBUTEROL SULFATE 1 AMPULE: 2.5; .5 SOLUTION RESPIRATORY (INHALATION) at 07:09

## 2023-04-23 RX ADMIN — DOCUSATE SODIUM 100 MG: 100 CAPSULE, LIQUID FILLED ORAL at 08:49

## 2023-04-23 RX ADMIN — ENOXAPARIN SODIUM 40 MG: 100 INJECTION SUBCUTANEOUS at 22:00

## 2023-04-23 RX ADMIN — SODIUM CHLORIDE, PRESERVATIVE FREE 10 ML: 5 INJECTION INTRAVENOUS at 22:55

## 2023-04-23 RX ADMIN — METOCLOPRAMIDE 5 MG: 5 INJECTION, SOLUTION INTRAMUSCULAR; INTRAVENOUS at 08:49

## 2023-04-23 RX ADMIN — IPRATROPIUM BROMIDE AND ALBUTEROL SULFATE 1 AMPULE: 2.5; .5 SOLUTION RESPIRATORY (INHALATION) at 14:46

## 2023-04-23 RX ADMIN — IPRATROPIUM BROMIDE AND ALBUTEROL SULFATE 1 AMPULE: 2.5; .5 SOLUTION RESPIRATORY (INHALATION) at 18:50

## 2023-04-23 RX ADMIN — DIVALPROEX SODIUM 250 MG: 250 TABLET, DELAYED RELEASE ORAL at 08:49

## 2023-04-23 RX ADMIN — SODIUM CHLORIDE, PRESERVATIVE FREE 10 ML: 5 INJECTION INTRAVENOUS at 22:00

## 2023-04-23 RX ADMIN — INSULIN GLARGINE 15 UNITS: 100 INJECTION, SOLUTION SUBCUTANEOUS at 22:00

## 2023-04-23 RX ADMIN — ONDANSETRON 4 MG: 2 INJECTION INTRAMUSCULAR; INTRAVENOUS at 22:54

## 2023-04-23 RX ADMIN — PRIMIDONE 50 MG: 50 TABLET ORAL at 08:49

## 2023-04-23 RX ADMIN — BUMETANIDE 2 MG: 0.25 INJECTION INTRAMUSCULAR; INTRAVENOUS at 22:54

## 2023-04-23 RX ADMIN — METOCLOPRAMIDE 10 MG: 5 INJECTION, SOLUTION INTRAMUSCULAR; INTRAVENOUS at 03:07

## 2023-04-23 RX ADMIN — IPRATROPIUM BROMIDE AND ALBUTEROL SULFATE 1 AMPULE: 2.5; .5 SOLUTION RESPIRATORY (INHALATION) at 10:31

## 2023-04-23 RX ADMIN — ONDANSETRON 4 MG: 2 INJECTION INTRAMUSCULAR; INTRAVENOUS at 14:03

## 2023-04-23 RX ADMIN — BUMETANIDE 2 MG: 0.25 INJECTION INTRAMUSCULAR; INTRAVENOUS at 08:49

## 2023-04-23 RX ADMIN — METOCLOPRAMIDE 5 MG: 5 INJECTION, SOLUTION INTRAMUSCULAR; INTRAVENOUS at 22:54

## 2023-04-23 RX ADMIN — OXYCODONE 5 MG: 5 TABLET ORAL at 03:05

## 2023-04-23 RX ADMIN — METOCLOPRAMIDE 5 MG: 5 INJECTION, SOLUTION INTRAMUSCULAR; INTRAVENOUS at 18:28

## 2023-04-23 RX ADMIN — SODIUM CHLORIDE, POTASSIUM CHLORIDE, SODIUM LACTATE AND CALCIUM CHLORIDE: 600; 310; 30; 20 INJECTION, SOLUTION INTRAVENOUS at 00:46

## 2023-04-23 RX ADMIN — METOPROLOL SUCCINATE 25 MG: 25 TABLET, EXTENDED RELEASE ORAL at 08:49

## 2023-04-23 ASSESSMENT — PAIN DESCRIPTION - ORIENTATION: ORIENTATION: LEFT

## 2023-04-23 ASSESSMENT — PAIN DESCRIPTION - LOCATION: LOCATION: INCISION;CHEST

## 2023-04-23 ASSESSMENT — PAIN DESCRIPTION - DESCRIPTORS: DESCRIPTORS: ACHING

## 2023-04-23 ASSESSMENT — PAIN SCALES - GENERAL: PAINLEVEL_OUTOF10: 6

## 2023-04-23 ASSESSMENT — PAIN - FUNCTIONAL ASSESSMENT: PAIN_FUNCTIONAL_ASSESSMENT: ACTIVITIES ARE NOT PREVENTED

## 2023-04-24 ENCOUNTER — APPOINTMENT (OUTPATIENT)
Dept: GENERAL RADIOLOGY | Age: 73
DRG: 163 | End: 2023-04-24
Attending: SURGERY
Payer: MEDICARE

## 2023-04-24 ENCOUNTER — APPOINTMENT (OUTPATIENT)
Dept: CT IMAGING | Age: 73
DRG: 163 | End: 2023-04-24
Attending: SURGERY
Payer: MEDICARE

## 2023-04-24 LAB
ALBUMIN SERPL-MCNC: 3.6 G/DL (ref 3.5–5.2)
ALP SERPL-CCNC: 91 U/L (ref 40–130)
ALT SERPL-CCNC: 21 U/L (ref 5–41)
ANION GAP SERPL CALCULATED.3IONS-SCNC: 11 MMOL/L (ref 7–19)
AST SERPL-CCNC: 35 U/L (ref 5–40)
BILIRUB SERPL-MCNC: 0.5 MG/DL (ref 0.2–1.2)
BUN SERPL-MCNC: 47 MG/DL (ref 8–23)
CALCIUM SERPL-MCNC: 8.8 MG/DL (ref 8.8–10.2)
CHLORIDE SERPL-SCNC: 90 MMOL/L (ref 98–111)
CO2 SERPL-SCNC: 32 MMOL/L (ref 22–29)
CREAT SERPL-MCNC: 1.5 MG/DL (ref 0.5–1.2)
ERYTHROCYTE [DISTWIDTH] IN BLOOD BY AUTOMATED COUNT: 15.9 % (ref 11.5–14.5)
GLUCOSE BLD-MCNC: 174 MG/DL (ref 70–99)
GLUCOSE BLD-MCNC: 203 MG/DL (ref 70–99)
GLUCOSE BLD-MCNC: 231 MG/DL (ref 70–99)
GLUCOSE BLD-MCNC: 239 MG/DL (ref 70–99)
GLUCOSE BLD-MCNC: 240 MG/DL (ref 70–99)
GLUCOSE SERPL-MCNC: 213 MG/DL (ref 74–109)
HCT VFR BLD AUTO: 24.6 % (ref 42–52)
HGB BLD-MCNC: 7.7 G/DL (ref 14–18)
MAGNESIUM SERPL-MCNC: 2.7 MG/DL (ref 1.6–2.4)
MCH RBC QN AUTO: 29.3 PG (ref 27–31)
MCHC RBC AUTO-ENTMCNC: 31.3 G/DL (ref 33–37)
MCV RBC AUTO: 93.5 FL (ref 80–94)
PERFORMED ON: ABNORMAL
PLATELET # BLD AUTO: 232 K/UL (ref 130–400)
PMV BLD AUTO: 9.6 FL (ref 9.4–12.4)
POTASSIUM SERPL-SCNC: 3.6 MMOL/L (ref 3.5–5)
PROT SERPL-MCNC: 6.7 G/DL (ref 6.6–8.7)
RBC # BLD AUTO: 2.63 M/UL (ref 4.7–6.1)
SODIUM SERPL-SCNC: 133 MMOL/L (ref 136–145)
WBC # BLD AUTO: 6.7 K/UL (ref 4.8–10.8)

## 2023-04-24 PROCEDURE — 6370000000 HC RX 637 (ALT 250 FOR IP): Performed by: PHYSICIAN ASSISTANT

## 2023-04-24 PROCEDURE — 6360000002 HC RX W HCPCS: Performed by: SURGERY

## 2023-04-24 PROCEDURE — 71045 X-RAY EXAM CHEST 1 VIEW: CPT

## 2023-04-24 PROCEDURE — P9045 ALBUMIN (HUMAN), 5%, 250 ML: HCPCS | Performed by: SURGERY

## 2023-04-24 PROCEDURE — 97116 GAIT TRAINING THERAPY: CPT

## 2023-04-24 PROCEDURE — 36415 COLL VENOUS BLD VENIPUNCTURE: CPT

## 2023-04-24 PROCEDURE — 2500000003 HC RX 250 WO HCPCS: Performed by: PHYSICIAN ASSISTANT

## 2023-04-24 PROCEDURE — 94640 AIRWAY INHALATION TREATMENT: CPT

## 2023-04-24 PROCEDURE — 71250 CT THORAX DX C-: CPT

## 2023-04-24 PROCEDURE — 85027 COMPLETE CBC AUTOMATED: CPT

## 2023-04-24 PROCEDURE — 94760 N-INVAS EAR/PLS OXIMETRY 1: CPT

## 2023-04-24 PROCEDURE — 0W9B30Z DRAINAGE OF LEFT PLEURAL CAVITY WITH DRAINAGE DEVICE, PERCUTANEOUS APPROACH: ICD-10-PCS | Performed by: SURGERY

## 2023-04-24 PROCEDURE — 2140000000 HC CCU INTERMEDIATE R&B

## 2023-04-24 PROCEDURE — 83735 ASSAY OF MAGNESIUM: CPT

## 2023-04-24 PROCEDURE — 80053 COMPREHEN METABOLIC PANEL: CPT

## 2023-04-24 PROCEDURE — 97530 THERAPEUTIC ACTIVITIES: CPT

## 2023-04-24 PROCEDURE — 82962 GLUCOSE BLOOD TEST: CPT

## 2023-04-24 PROCEDURE — 2700000000 HC OXYGEN THERAPY PER DAY

## 2023-04-24 PROCEDURE — 2580000003 HC RX 258: Performed by: SURGERY

## 2023-04-24 PROCEDURE — 99223 1ST HOSP IP/OBS HIGH 75: CPT | Performed by: INTERNAL MEDICINE

## 2023-04-24 PROCEDURE — 6370000000 HC RX 637 (ALT 250 FOR IP): Performed by: SURGERY

## 2023-04-24 PROCEDURE — 2580000003 HC RX 258: Performed by: PHYSICIAN ASSISTANT

## 2023-04-24 RX ORDER — OXYCODONE HYDROCHLORIDE AND ACETAMINOPHEN 5; 325 MG/1; MG/1
1 TABLET ORAL EVERY 4 HOURS PRN
Status: DISCONTINUED | OUTPATIENT
Start: 2023-04-24 | End: 2023-05-01 | Stop reason: HOSPADM

## 2023-04-24 RX ORDER — MORPHINE SULFATE 4 MG/ML
4 INJECTION, SOLUTION INTRAMUSCULAR; INTRAVENOUS ONCE
Status: COMPLETED | OUTPATIENT
Start: 2023-04-24 | End: 2023-04-24

## 2023-04-24 RX ORDER — ALBUMIN, HUMAN INJ 5% 5 %
25 SOLUTION INTRAVENOUS ONCE
Status: COMPLETED | OUTPATIENT
Start: 2023-04-24 | End: 2023-04-24

## 2023-04-24 RX ADMIN — SODIUM CHLORIDE, PRESERVATIVE FREE 10 ML: 5 INJECTION INTRAVENOUS at 22:22

## 2023-04-24 RX ADMIN — INSULIN GLARGINE 15 UNITS: 100 INJECTION, SOLUTION SUBCUTANEOUS at 22:23

## 2023-04-24 RX ADMIN — ALBUMIN (HUMAN) 25 G: 12.5 INJECTION, SOLUTION INTRAVENOUS at 01:50

## 2023-04-24 RX ADMIN — METFORMIN HYDROCHLORIDE 500 MG: 500 TABLET ORAL at 10:06

## 2023-04-24 RX ADMIN — METOPROLOL SUCCINATE 25 MG: 25 TABLET, EXTENDED RELEASE ORAL at 10:06

## 2023-04-24 RX ADMIN — METOCLOPRAMIDE 5 MG: 5 INJECTION, SOLUTION INTRAMUSCULAR; INTRAVENOUS at 22:21

## 2023-04-24 RX ADMIN — METOCLOPRAMIDE 5 MG: 5 INJECTION, SOLUTION INTRAMUSCULAR; INTRAVENOUS at 03:00

## 2023-04-24 RX ADMIN — MORPHINE SULFATE 4 MG: 4 INJECTION, SOLUTION INTRAMUSCULAR; INTRAVENOUS at 15:53

## 2023-04-24 RX ADMIN — METFORMIN HYDROCHLORIDE 500 MG: 500 TABLET ORAL at 16:21

## 2023-04-24 RX ADMIN — PANTOPRAZOLE SODIUM 40 MG: 40 TABLET, DELAYED RELEASE ORAL at 22:21

## 2023-04-24 RX ADMIN — DIVALPROEX SODIUM 250 MG: 250 TABLET, DELAYED RELEASE ORAL at 10:06

## 2023-04-24 RX ADMIN — DOCUSATE SODIUM 100 MG: 100 CAPSULE, LIQUID FILLED ORAL at 10:06

## 2023-04-24 RX ADMIN — METOCLOPRAMIDE 5 MG: 5 INJECTION, SOLUTION INTRAMUSCULAR; INTRAVENOUS at 10:18

## 2023-04-24 RX ADMIN — SODIUM CHLORIDE, PRESERVATIVE FREE 10 ML: 5 INJECTION INTRAVENOUS at 10:19

## 2023-04-24 RX ADMIN — SODIUM CHLORIDE, POTASSIUM CHLORIDE, SODIUM LACTATE AND CALCIUM CHLORIDE: 600; 310; 30; 20 INJECTION, SOLUTION INTRAVENOUS at 01:49

## 2023-04-24 RX ADMIN — IPRATROPIUM BROMIDE AND ALBUTEROL SULFATE 1 AMPULE: 2.5; .5 SOLUTION RESPIRATORY (INHALATION) at 10:35

## 2023-04-24 RX ADMIN — ATORVASTATIN CALCIUM 40 MG: 40 TABLET, FILM COATED ORAL at 22:22

## 2023-04-24 RX ADMIN — OXYCODONE HYDROCHLORIDE AND ACETAMINOPHEN 1 TABLET: 5; 325 TABLET ORAL at 16:29

## 2023-04-24 RX ADMIN — PRIMIDONE 50 MG: 50 TABLET ORAL at 10:05

## 2023-04-24 RX ADMIN — DIVALPROEX SODIUM 250 MG: 250 TABLET, DELAYED RELEASE ORAL at 22:22

## 2023-04-24 RX ADMIN — PANTOPRAZOLE SODIUM 40 MG: 40 TABLET, DELAYED RELEASE ORAL at 10:06

## 2023-04-24 RX ADMIN — PRIMIDONE 50 MG: 50 TABLET ORAL at 22:22

## 2023-04-24 RX ADMIN — IPRATROPIUM BROMIDE AND ALBUTEROL SULFATE 1 AMPULE: 2.5; .5 SOLUTION RESPIRATORY (INHALATION) at 06:30

## 2023-04-24 RX ADMIN — BUMETANIDE 2 MG: 0.25 INJECTION INTRAMUSCULAR; INTRAVENOUS at 22:21

## 2023-04-24 RX ADMIN — BISACODYL 10 MG: 10 SUPPOSITORY RECTAL at 05:37

## 2023-04-24 RX ADMIN — IPRATROPIUM BROMIDE AND ALBUTEROL SULFATE 1 AMPULE: 2.5; .5 SOLUTION RESPIRATORY (INHALATION) at 18:34

## 2023-04-24 RX ADMIN — BUMETANIDE 2 MG: 0.25 INJECTION INTRAMUSCULAR; INTRAVENOUS at 10:18

## 2023-04-24 RX ADMIN — METOCLOPRAMIDE 5 MG: 5 INJECTION, SOLUTION INTRAMUSCULAR; INTRAVENOUS at 15:17

## 2023-04-24 RX ADMIN — DOCUSATE SODIUM 100 MG: 100 CAPSULE, LIQUID FILLED ORAL at 22:22

## 2023-04-24 ASSESSMENT — PAIN SCALES - GENERAL: PAINLEVEL_OUTOF10: 9

## 2023-04-24 ASSESSMENT — PAIN - FUNCTIONAL ASSESSMENT: PAIN_FUNCTIONAL_ASSESSMENT: PREVENTS OR INTERFERES WITH MANY ACTIVE NOT PASSIVE ACTIVITIES

## 2023-04-24 ASSESSMENT — PAIN DESCRIPTION - DESCRIPTORS: DESCRIPTORS: ACHING

## 2023-04-24 ASSESSMENT — PAIN DESCRIPTION - ORIENTATION: ORIENTATION: LEFT

## 2023-04-24 ASSESSMENT — PAIN DESCRIPTION - LOCATION: LOCATION: CHEST

## 2023-04-25 ENCOUNTER — APPOINTMENT (OUTPATIENT)
Dept: CT IMAGING | Age: 73
DRG: 163 | End: 2023-04-25
Attending: SURGERY
Payer: MEDICARE

## 2023-04-25 ENCOUNTER — APPOINTMENT (OUTPATIENT)
Dept: GENERAL RADIOLOGY | Age: 73
DRG: 163 | End: 2023-04-25
Attending: SURGERY
Payer: MEDICARE

## 2023-04-25 PROBLEM — C34.92 SQUAMOUS CARCINOMA OF LUNG, LEFT (HCC): Status: ACTIVE | Noted: 2023-04-25

## 2023-04-25 PROBLEM — K56.7 ILEUS (HCC): Status: ACTIVE | Noted: 2023-04-25

## 2023-04-25 LAB
ALBUMIN SERPL-MCNC: 3.3 G/DL (ref 3.5–5.2)
ALP SERPL-CCNC: 84 U/L (ref 40–130)
ALT SERPL-CCNC: 19 U/L (ref 5–41)
ANION GAP SERPL CALCULATED.3IONS-SCNC: 15 MMOL/L (ref 7–19)
AST SERPL-CCNC: 26 U/L (ref 5–40)
BILIRUB SERPL-MCNC: 0.3 MG/DL (ref 0.2–1.2)
BUN SERPL-MCNC: 46 MG/DL (ref 8–23)
CALCIUM SERPL-MCNC: 8.2 MG/DL (ref 8.8–10.2)
CHLORIDE SERPL-SCNC: 94 MMOL/L (ref 98–111)
CO2 SERPL-SCNC: 32 MMOL/L (ref 22–29)
CREAT SERPL-MCNC: 1.3 MG/DL (ref 0.5–1.2)
ERYTHROCYTE [DISTWIDTH] IN BLOOD BY AUTOMATED COUNT: 15.8 % (ref 11.5–14.5)
GLUCOSE BLD-MCNC: 128 MG/DL (ref 70–99)
GLUCOSE BLD-MCNC: 132 MG/DL (ref 70–99)
GLUCOSE BLD-MCNC: 142 MG/DL (ref 70–99)
GLUCOSE BLD-MCNC: 171 MG/DL (ref 70–99)
GLUCOSE BLD-MCNC: 194 MG/DL (ref 70–99)
GLUCOSE SERPL-MCNC: 163 MG/DL (ref 74–109)
HCT VFR BLD AUTO: 24.7 % (ref 42–52)
HGB BLD-MCNC: 7.5 G/DL (ref 14–18)
LACTATE BLDV-SCNC: 1.3 MMOL/L (ref 0.5–1.9)
MAGNESIUM SERPL-MCNC: 2.7 MG/DL (ref 1.6–2.4)
MCH RBC QN AUTO: 29 PG (ref 27–31)
MCHC RBC AUTO-ENTMCNC: 30.4 G/DL (ref 33–37)
MCV RBC AUTO: 95.4 FL (ref 80–94)
PERFORMED ON: ABNORMAL
PLATELET # BLD AUTO: 255 K/UL (ref 130–400)
PMV BLD AUTO: 9.7 FL (ref 9.4–12.4)
POTASSIUM SERPL-SCNC: 3.7 MMOL/L (ref 3.5–5)
PROT SERPL-MCNC: 6.2 G/DL (ref 6.6–8.7)
RBC # BLD AUTO: 2.59 M/UL (ref 4.7–6.1)
SODIUM SERPL-SCNC: 141 MMOL/L (ref 136–145)
WBC # BLD AUTO: 7.2 K/UL (ref 4.8–10.8)

## 2023-04-25 PROCEDURE — 2140000000 HC CCU INTERMEDIATE R&B

## 2023-04-25 PROCEDURE — 99233 SBSQ HOSP IP/OBS HIGH 50: CPT | Performed by: INTERNAL MEDICINE

## 2023-04-25 PROCEDURE — 6370000000 HC RX 637 (ALT 250 FOR IP): Performed by: SURGERY

## 2023-04-25 PROCEDURE — 32551 INSERTION OF CHEST TUBE: CPT | Performed by: SURGERY

## 2023-04-25 PROCEDURE — 6360000002 HC RX W HCPCS: Performed by: PHYSICIAN ASSISTANT

## 2023-04-25 PROCEDURE — 6370000000 HC RX 637 (ALT 250 FOR IP): Performed by: PHYSICIAN ASSISTANT

## 2023-04-25 PROCEDURE — 83735 ASSAY OF MAGNESIUM: CPT

## 2023-04-25 PROCEDURE — 36415 COLL VENOUS BLD VENIPUNCTURE: CPT

## 2023-04-25 PROCEDURE — 2580000003 HC RX 258: Performed by: PHYSICIAN ASSISTANT

## 2023-04-25 PROCEDURE — 94640 AIRWAY INHALATION TREATMENT: CPT

## 2023-04-25 PROCEDURE — 2500000003 HC RX 250 WO HCPCS: Performed by: PHYSICIAN ASSISTANT

## 2023-04-25 PROCEDURE — 97110 THERAPEUTIC EXERCISES: CPT

## 2023-04-25 PROCEDURE — 97530 THERAPEUTIC ACTIVITIES: CPT

## 2023-04-25 PROCEDURE — 85027 COMPLETE CBC AUTOMATED: CPT

## 2023-04-25 PROCEDURE — 80053 COMPREHEN METABOLIC PANEL: CPT

## 2023-04-25 PROCEDURE — 99221 1ST HOSP IP/OBS SF/LOW 40: CPT | Performed by: SURGERY

## 2023-04-25 PROCEDURE — 71045 X-RAY EXAM CHEST 1 VIEW: CPT

## 2023-04-25 PROCEDURE — 74018 RADEX ABDOMEN 1 VIEW: CPT

## 2023-04-25 PROCEDURE — 94760 N-INVAS EAR/PLS OXIMETRY 1: CPT

## 2023-04-25 PROCEDURE — 83605 ASSAY OF LACTIC ACID: CPT

## 2023-04-25 PROCEDURE — 6360000002 HC RX W HCPCS: Performed by: SURGERY

## 2023-04-25 PROCEDURE — 82962 GLUCOSE BLOOD TEST: CPT

## 2023-04-25 PROCEDURE — 71250 CT THORAX DX C-: CPT

## 2023-04-25 PROCEDURE — 2700000000 HC OXYGEN THERAPY PER DAY

## 2023-04-25 PROCEDURE — 2580000003 HC RX 258: Performed by: SURGERY

## 2023-04-25 RX ADMIN — SODIUM CHLORIDE, PRESERVATIVE FREE 10 ML: 5 INJECTION INTRAVENOUS at 23:07

## 2023-04-25 RX ADMIN — METOCLOPRAMIDE 5 MG: 5 INJECTION, SOLUTION INTRAMUSCULAR; INTRAVENOUS at 03:44

## 2023-04-25 RX ADMIN — BUMETANIDE 2 MG: 0.25 INJECTION INTRAMUSCULAR; INTRAVENOUS at 09:44

## 2023-04-25 RX ADMIN — POLYETHYLENE GLYCOL 3350 17 G: 17 POWDER, FOR SOLUTION ORAL at 09:44

## 2023-04-25 RX ADMIN — ATORVASTATIN CALCIUM 40 MG: 40 TABLET, FILM COATED ORAL at 23:05

## 2023-04-25 RX ADMIN — PRIMIDONE 50 MG: 50 TABLET ORAL at 09:44

## 2023-04-25 RX ADMIN — SODIUM CHLORIDE, POTASSIUM CHLORIDE, SODIUM LACTATE AND CALCIUM CHLORIDE: 600; 310; 30; 20 INJECTION, SOLUTION INTRAVENOUS at 12:19

## 2023-04-25 RX ADMIN — METOCLOPRAMIDE 5 MG: 5 INJECTION, SOLUTION INTRAMUSCULAR; INTRAVENOUS at 16:27

## 2023-04-25 RX ADMIN — ENOXAPARIN SODIUM 40 MG: 100 INJECTION SUBCUTANEOUS at 23:14

## 2023-04-25 RX ADMIN — SODIUM CHLORIDE, PRESERVATIVE FREE 10 ML: 5 INJECTION INTRAVENOUS at 09:45

## 2023-04-25 RX ADMIN — DOCUSATE SODIUM 100 MG: 100 CAPSULE, LIQUID FILLED ORAL at 23:06

## 2023-04-25 RX ADMIN — METOCLOPRAMIDE 5 MG: 5 INJECTION, SOLUTION INTRAMUSCULAR; INTRAVENOUS at 09:44

## 2023-04-25 RX ADMIN — BUMETANIDE 2 MG: 0.25 INJECTION INTRAMUSCULAR; INTRAVENOUS at 23:06

## 2023-04-25 RX ADMIN — IPRATROPIUM BROMIDE AND ALBUTEROL SULFATE 1 AMPULE: 2.5; .5 SOLUTION RESPIRATORY (INHALATION) at 19:01

## 2023-04-25 RX ADMIN — DIVALPROEX SODIUM 250 MG: 250 TABLET, DELAYED RELEASE ORAL at 09:45

## 2023-04-25 RX ADMIN — DOCUSATE SODIUM 100 MG: 100 CAPSULE, LIQUID FILLED ORAL at 09:45

## 2023-04-25 RX ADMIN — DIVALPROEX SODIUM 250 MG: 250 TABLET, DELAYED RELEASE ORAL at 23:05

## 2023-04-25 RX ADMIN — IPRATROPIUM BROMIDE AND ALBUTEROL SULFATE 1 AMPULE: 2.5; .5 SOLUTION RESPIRATORY (INHALATION) at 14:32

## 2023-04-25 RX ADMIN — PRIMIDONE 50 MG: 50 TABLET ORAL at 23:06

## 2023-04-25 RX ADMIN — OXYCODONE HYDROCHLORIDE AND ACETAMINOPHEN 1 TABLET: 5; 325 TABLET ORAL at 10:48

## 2023-04-25 RX ADMIN — PANTOPRAZOLE SODIUM 40 MG: 40 TABLET, DELAYED RELEASE ORAL at 23:06

## 2023-04-25 RX ADMIN — OXYCODONE HYDROCHLORIDE AND ACETAMINOPHEN 1 TABLET: 5; 325 TABLET ORAL at 18:05

## 2023-04-25 RX ADMIN — METOCLOPRAMIDE 5 MG: 5 INJECTION, SOLUTION INTRAMUSCULAR; INTRAVENOUS at 23:07

## 2023-04-25 RX ADMIN — IPRATROPIUM BROMIDE AND ALBUTEROL SULFATE 1 AMPULE: 2.5; .5 SOLUTION RESPIRATORY (INHALATION) at 10:07

## 2023-04-25 RX ADMIN — INSULIN GLARGINE 15 UNITS: 100 INJECTION, SOLUTION SUBCUTANEOUS at 23:24

## 2023-04-25 RX ADMIN — METFORMIN HYDROCHLORIDE 500 MG: 500 TABLET ORAL at 09:56

## 2023-04-25 RX ADMIN — METOPROLOL SUCCINATE 25 MG: 25 TABLET, EXTENDED RELEASE ORAL at 09:44

## 2023-04-25 RX ADMIN — PANTOPRAZOLE SODIUM 40 MG: 40 TABLET, DELAYED RELEASE ORAL at 09:44

## 2023-04-25 RX ADMIN — IPRATROPIUM BROMIDE AND ALBUTEROL SULFATE 1 AMPULE: 2.5; .5 SOLUTION RESPIRATORY (INHALATION) at 06:52

## 2023-04-25 ASSESSMENT — ENCOUNTER SYMPTOMS
EYE PAIN: 0
ABDOMINAL DISTENTION: 1
VOMITING: 1
ABDOMINAL PAIN: 1
DIARRHEA: 0
SORE THROAT: 0
COUGH: 1
CONSTIPATION: 1
EYE REDNESS: 0
NAUSEA: 1
SHORTNESS OF BREATH: 1

## 2023-04-25 ASSESSMENT — PAIN SCALES - GENERAL
PAINLEVEL_OUTOF10: 7
PAINLEVEL_OUTOF10: 7

## 2023-04-26 ENCOUNTER — APPOINTMENT (OUTPATIENT)
Dept: GENERAL RADIOLOGY | Age: 73
DRG: 163 | End: 2023-04-26
Attending: SURGERY
Payer: MEDICARE

## 2023-04-26 LAB
ALBUMIN SERPL-MCNC: 3.2 G/DL (ref 3.5–5.2)
ALP SERPL-CCNC: 89 U/L (ref 40–130)
ALT SERPL-CCNC: 17 U/L (ref 5–41)
ANION GAP SERPL CALCULATED.3IONS-SCNC: 13 MMOL/L (ref 7–19)
AST SERPL-CCNC: 23 U/L (ref 5–40)
BILIRUB SERPL-MCNC: 0.3 MG/DL (ref 0.2–1.2)
BUN SERPL-MCNC: 37 MG/DL (ref 8–23)
CALCIUM SERPL-MCNC: 8.3 MG/DL (ref 8.8–10.2)
CHLORIDE SERPL-SCNC: 95 MMOL/L (ref 98–111)
CO2 SERPL-SCNC: 33 MMOL/L (ref 22–29)
CREAT SERPL-MCNC: 1.2 MG/DL (ref 0.5–1.2)
ERYTHROCYTE [DISTWIDTH] IN BLOOD BY AUTOMATED COUNT: 15.9 % (ref 11.5–14.5)
GLUCOSE BLD-MCNC: 145 MG/DL (ref 70–99)
GLUCOSE BLD-MCNC: 160 MG/DL (ref 70–99)
GLUCOSE BLD-MCNC: 66 MG/DL (ref 70–99)
GLUCOSE BLD-MCNC: 74 MG/DL (ref 70–99)
GLUCOSE BLD-MCNC: 94 MG/DL (ref 70–99)
GLUCOSE SERPL-MCNC: 108 MG/DL (ref 74–109)
HCT VFR BLD AUTO: 25.8 % (ref 42–52)
HGB BLD-MCNC: 8.1 G/DL (ref 14–18)
MAGNESIUM SERPL-MCNC: 2.3 MG/DL (ref 1.6–2.4)
MCH RBC QN AUTO: 28.8 PG (ref 27–31)
MCHC RBC AUTO-ENTMCNC: 31.4 G/DL (ref 33–37)
MCV RBC AUTO: 91.8 FL (ref 80–94)
PERFORMED ON: ABNORMAL
PERFORMED ON: NORMAL
PERFORMED ON: NORMAL
PLATELET # BLD AUTO: 313 K/UL (ref 130–400)
PMV BLD AUTO: 9.9 FL (ref 9.4–12.4)
POTASSIUM SERPL-SCNC: 3.4 MMOL/L (ref 3.5–5)
PROT SERPL-MCNC: 6.3 G/DL (ref 6.6–8.7)
RBC # BLD AUTO: 2.81 M/UL (ref 4.7–6.1)
SODIUM SERPL-SCNC: 141 MMOL/L (ref 136–145)
WBC # BLD AUTO: 14.3 K/UL (ref 4.8–10.8)

## 2023-04-26 PROCEDURE — 2140000000 HC CCU INTERMEDIATE R&B

## 2023-04-26 PROCEDURE — 99232 SBSQ HOSP IP/OBS MODERATE 35: CPT | Performed by: SURGERY

## 2023-04-26 PROCEDURE — 6360000002 HC RX W HCPCS: Performed by: PHYSICIAN ASSISTANT

## 2023-04-26 PROCEDURE — 2580000003 HC RX 258: Performed by: PHYSICIAN ASSISTANT

## 2023-04-26 PROCEDURE — 83735 ASSAY OF MAGNESIUM: CPT

## 2023-04-26 PROCEDURE — 6370000000 HC RX 637 (ALT 250 FOR IP): Performed by: PHYSICIAN ASSISTANT

## 2023-04-26 PROCEDURE — 2700000000 HC OXYGEN THERAPY PER DAY

## 2023-04-26 PROCEDURE — 82962 GLUCOSE BLOOD TEST: CPT

## 2023-04-26 PROCEDURE — 85027 COMPLETE CBC AUTOMATED: CPT

## 2023-04-26 PROCEDURE — 2500000003 HC RX 250 WO HCPCS: Performed by: PHYSICIAN ASSISTANT

## 2023-04-26 PROCEDURE — 94640 AIRWAY INHALATION TREATMENT: CPT

## 2023-04-26 PROCEDURE — 80053 COMPREHEN METABOLIC PANEL: CPT

## 2023-04-26 PROCEDURE — 6370000000 HC RX 637 (ALT 250 FOR IP): Performed by: SURGERY

## 2023-04-26 PROCEDURE — 6360000002 HC RX W HCPCS: Performed by: SURGERY

## 2023-04-26 PROCEDURE — 71045 X-RAY EXAM CHEST 1 VIEW: CPT

## 2023-04-26 PROCEDURE — 97530 THERAPEUTIC ACTIVITIES: CPT

## 2023-04-26 PROCEDURE — 36415 COLL VENOUS BLD VENIPUNCTURE: CPT

## 2023-04-26 PROCEDURE — 94760 N-INVAS EAR/PLS OXIMETRY 1: CPT

## 2023-04-26 PROCEDURE — 97116 GAIT TRAINING THERAPY: CPT

## 2023-04-26 PROCEDURE — 97110 THERAPEUTIC EXERCISES: CPT

## 2023-04-26 PROCEDURE — 2580000003 HC RX 258: Performed by: SURGERY

## 2023-04-26 RX ADMIN — DOCUSATE SODIUM 100 MG: 100 CAPSULE, LIQUID FILLED ORAL at 23:25

## 2023-04-26 RX ADMIN — ENOXAPARIN SODIUM 40 MG: 100 INJECTION SUBCUTANEOUS at 23:12

## 2023-04-26 RX ADMIN — INSULIN GLARGINE 15 UNITS: 100 INJECTION, SOLUTION SUBCUTANEOUS at 23:35

## 2023-04-26 RX ADMIN — METOCLOPRAMIDE 5 MG: 5 INJECTION, SOLUTION INTRAMUSCULAR; INTRAVENOUS at 04:08

## 2023-04-26 RX ADMIN — SODIUM CHLORIDE, POTASSIUM CHLORIDE, SODIUM LACTATE AND CALCIUM CHLORIDE: 600; 310; 30; 20 INJECTION, SOLUTION INTRAVENOUS at 01:10

## 2023-04-26 RX ADMIN — METOCLOPRAMIDE 5 MG: 5 INJECTION, SOLUTION INTRAMUSCULAR; INTRAVENOUS at 08:45

## 2023-04-26 RX ADMIN — ATORVASTATIN CALCIUM 40 MG: 40 TABLET, FILM COATED ORAL at 23:12

## 2023-04-26 RX ADMIN — IPRATROPIUM BROMIDE AND ALBUTEROL SULFATE 1 AMPULE: 2.5; .5 SOLUTION RESPIRATORY (INHALATION) at 14:34

## 2023-04-26 RX ADMIN — IPRATROPIUM BROMIDE AND ALBUTEROL SULFATE 1 AMPULE: 2.5; .5 SOLUTION RESPIRATORY (INHALATION) at 19:20

## 2023-04-26 RX ADMIN — IPRATROPIUM BROMIDE AND ALBUTEROL SULFATE 1 AMPULE: 2.5; .5 SOLUTION RESPIRATORY (INHALATION) at 07:05

## 2023-04-26 RX ADMIN — METOCLOPRAMIDE 5 MG: 5 INJECTION, SOLUTION INTRAMUSCULAR; INTRAVENOUS at 23:14

## 2023-04-26 RX ADMIN — DIVALPROEX SODIUM 250 MG: 250 TABLET, DELAYED RELEASE ORAL at 23:20

## 2023-04-26 RX ADMIN — DOCUSATE SODIUM 100 MG: 100 CAPSULE, LIQUID FILLED ORAL at 08:45

## 2023-04-26 RX ADMIN — PRIMIDONE 50 MG: 50 TABLET ORAL at 23:24

## 2023-04-26 RX ADMIN — PANTOPRAZOLE SODIUM 40 MG: 40 TABLET, DELAYED RELEASE ORAL at 23:26

## 2023-04-26 RX ADMIN — DEXTROSE MONOHYDRATE 125 ML: 100 INJECTION, SOLUTION INTRAVENOUS at 16:52

## 2023-04-26 RX ADMIN — METOCLOPRAMIDE 5 MG: 5 INJECTION, SOLUTION INTRAMUSCULAR; INTRAVENOUS at 15:01

## 2023-04-26 RX ADMIN — PRIMIDONE 50 MG: 50 TABLET ORAL at 08:45

## 2023-04-26 RX ADMIN — OXYCODONE HYDROCHLORIDE AND ACETAMINOPHEN 1 TABLET: 5; 325 TABLET ORAL at 08:43

## 2023-04-26 RX ADMIN — BUMETANIDE 2 MG: 0.25 INJECTION INTRAMUSCULAR; INTRAVENOUS at 23:17

## 2023-04-26 RX ADMIN — IPRATROPIUM BROMIDE AND ALBUTEROL SULFATE 1 AMPULE: 2.5; .5 SOLUTION RESPIRATORY (INHALATION) at 10:28

## 2023-04-26 RX ADMIN — DIVALPROEX SODIUM 250 MG: 250 TABLET, DELAYED RELEASE ORAL at 08:45

## 2023-04-26 RX ADMIN — PANTOPRAZOLE SODIUM 40 MG: 40 TABLET, DELAYED RELEASE ORAL at 08:45

## 2023-04-26 RX ADMIN — BUMETANIDE 2 MG: 0.25 INJECTION INTRAMUSCULAR; INTRAVENOUS at 08:45

## 2023-04-26 RX ADMIN — METOPROLOL SUCCINATE 25 MG: 25 TABLET, EXTENDED RELEASE ORAL at 08:45

## 2023-04-26 RX ADMIN — SODIUM CHLORIDE, PRESERVATIVE FREE 10 ML: 5 INJECTION INTRAVENOUS at 23:26

## 2023-04-26 ASSESSMENT — PAIN DESCRIPTION - FREQUENCY: FREQUENCY: CONTINUOUS

## 2023-04-26 ASSESSMENT — PAIN SCALES - GENERAL
PAINLEVEL_OUTOF10: 0
PAINLEVEL_OUTOF10: 0
PAINLEVEL_OUTOF10: 8
PAINLEVEL_OUTOF10: 2

## 2023-04-26 ASSESSMENT — PAIN DESCRIPTION - LOCATION
LOCATION: GENERALIZED
LOCATION: GENERALIZED

## 2023-04-26 ASSESSMENT — PAIN DESCRIPTION - ORIENTATION: ORIENTATION: LEFT

## 2023-04-26 ASSESSMENT — PAIN - FUNCTIONAL ASSESSMENT
PAIN_FUNCTIONAL_ASSESSMENT: PREVENTS OR INTERFERES WITH MANY ACTIVE NOT PASSIVE ACTIVITIES
PAIN_FUNCTIONAL_ASSESSMENT: PREVENTS OR INTERFERES SOME ACTIVE ACTIVITIES AND ADLS

## 2023-04-26 ASSESSMENT — PAIN DESCRIPTION - DESCRIPTORS
DESCRIPTORS: ACHING
DESCRIPTORS: ACHING

## 2023-04-26 ASSESSMENT — PAIN DESCRIPTION - PAIN TYPE: TYPE: SURGICAL PAIN

## 2023-04-26 ASSESSMENT — PAIN DESCRIPTION - ONSET: ONSET: PROGRESSIVE

## 2023-04-27 ENCOUNTER — APPOINTMENT (OUTPATIENT)
Dept: GENERAL RADIOLOGY | Age: 73
DRG: 163 | End: 2023-04-27
Attending: SURGERY
Payer: MEDICARE

## 2023-04-27 LAB
ALBUMIN SERPL-MCNC: 3.2 G/DL (ref 3.5–5.2)
ALP SERPL-CCNC: 100 U/L (ref 40–130)
ALT SERPL-CCNC: 16 U/L (ref 5–41)
ANION GAP SERPL CALCULATED.3IONS-SCNC: 18 MMOL/L (ref 7–19)
AST SERPL-CCNC: 28 U/L (ref 5–40)
BILIRUB SERPL-MCNC: 0.4 MG/DL (ref 0.2–1.2)
BILIRUB UR QL STRIP: NEGATIVE
BUN SERPL-MCNC: 38 MG/DL (ref 8–23)
CALCIUM SERPL-MCNC: 7.7 MG/DL (ref 8.8–10.2)
CHLORIDE SERPL-SCNC: 88 MMOL/L (ref 98–111)
CLARITY UR: CLEAR
CO2 SERPL-SCNC: 29 MMOL/L (ref 22–29)
COLOR UR: YELLOW
CREAT SERPL-MCNC: 1.2 MG/DL (ref 0.5–1.2)
ERYTHROCYTE [DISTWIDTH] IN BLOOD BY AUTOMATED COUNT: 15.7 % (ref 11.5–14.5)
GLUCOSE BLD-MCNC: 177 MG/DL (ref 70–99)
GLUCOSE BLD-MCNC: 180 MG/DL (ref 70–99)
GLUCOSE BLD-MCNC: 197 MG/DL (ref 70–99)
GLUCOSE BLD-MCNC: 227 MG/DL (ref 70–99)
GLUCOSE SERPL-MCNC: 153 MG/DL (ref 74–109)
GLUCOSE UR STRIP.AUTO-MCNC: NEGATIVE MG/DL
HCT VFR BLD AUTO: 24.2 % (ref 42–52)
HGB BLD-MCNC: 7.8 G/DL (ref 14–18)
HGB UR STRIP.AUTO-MCNC: NEGATIVE MG/L
KETONES UR STRIP.AUTO-MCNC: ABNORMAL MG/DL
LEUKOCYTE ESTERASE UR QL STRIP.AUTO: NEGATIVE
MAGNESIUM SERPL-MCNC: 2.3 MG/DL (ref 1.6–2.4)
MCH RBC QN AUTO: 29.1 PG (ref 27–31)
MCHC RBC AUTO-ENTMCNC: 32.2 G/DL (ref 33–37)
MCV RBC AUTO: 90.3 FL (ref 80–94)
NITRITE UR QL STRIP.AUTO: NEGATIVE
PERFORMED ON: ABNORMAL
PH UR STRIP.AUTO: 5 [PH] (ref 5–8)
PLATELET # BLD AUTO: 343 K/UL (ref 130–400)
PMV BLD AUTO: 10.2 FL (ref 9.4–12.4)
POTASSIUM SERPL-SCNC: 3.4 MMOL/L (ref 3.5–5)
PROT SERPL-MCNC: 5.7 G/DL (ref 6.6–8.7)
PROT UR STRIP.AUTO-MCNC: NEGATIVE MG/DL
RBC # BLD AUTO: 2.68 M/UL (ref 4.7–6.1)
SODIUM SERPL-SCNC: 135 MMOL/L (ref 136–145)
SP GR UR STRIP.AUTO: 1.01 (ref 1–1.03)
UROBILINOGEN UR STRIP.AUTO-MCNC: 0.2 E.U./DL
WBC # BLD AUTO: 17.9 K/UL (ref 4.8–10.8)

## 2023-04-27 PROCEDURE — 6360000002 HC RX W HCPCS: Performed by: PHYSICIAN ASSISTANT

## 2023-04-27 PROCEDURE — 82962 GLUCOSE BLOOD TEST: CPT

## 2023-04-27 PROCEDURE — 2580000003 HC RX 258: Performed by: PHYSICIAN ASSISTANT

## 2023-04-27 PROCEDURE — 6360000002 HC RX W HCPCS: Performed by: NURSE PRACTITIONER

## 2023-04-27 PROCEDURE — 2140000000 HC CCU INTERMEDIATE R&B

## 2023-04-27 PROCEDURE — 81003 URINALYSIS AUTO W/O SCOPE: CPT

## 2023-04-27 PROCEDURE — 94760 N-INVAS EAR/PLS OXIMETRY 1: CPT

## 2023-04-27 PROCEDURE — 6370000000 HC RX 637 (ALT 250 FOR IP): Performed by: PHYSICIAN ASSISTANT

## 2023-04-27 PROCEDURE — 99233 SBSQ HOSP IP/OBS HIGH 50: CPT | Performed by: INTERNAL MEDICINE

## 2023-04-27 PROCEDURE — 94640 AIRWAY INHALATION TREATMENT: CPT

## 2023-04-27 PROCEDURE — 2700000000 HC OXYGEN THERAPY PER DAY

## 2023-04-27 PROCEDURE — 6360000002 HC RX W HCPCS: Performed by: SURGERY

## 2023-04-27 PROCEDURE — 2500000003 HC RX 250 WO HCPCS: Performed by: PHYSICIAN ASSISTANT

## 2023-04-27 PROCEDURE — 6370000000 HC RX 637 (ALT 250 FOR IP): Performed by: SURGERY

## 2023-04-27 PROCEDURE — 71045 X-RAY EXAM CHEST 1 VIEW: CPT

## 2023-04-27 PROCEDURE — 83735 ASSAY OF MAGNESIUM: CPT

## 2023-04-27 PROCEDURE — 80053 COMPREHEN METABOLIC PANEL: CPT

## 2023-04-27 PROCEDURE — 85027 COMPLETE CBC AUTOMATED: CPT

## 2023-04-27 PROCEDURE — 36415 COLL VENOUS BLD VENIPUNCTURE: CPT

## 2023-04-27 PROCEDURE — 6370000000 HC RX 637 (ALT 250 FOR IP): Performed by: NURSE PRACTITIONER

## 2023-04-27 RX ORDER — POTASSIUM CHLORIDE 7.45 MG/ML
10 INJECTION INTRAVENOUS
Status: DISPENSED | OUTPATIENT
Start: 2023-04-27 | End: 2023-04-27

## 2023-04-27 RX ORDER — TAMSULOSIN HYDROCHLORIDE 0.4 MG/1
0.4 CAPSULE ORAL DAILY
Status: DISCONTINUED | OUTPATIENT
Start: 2023-04-27 | End: 2023-04-28

## 2023-04-27 RX ADMIN — ENOXAPARIN SODIUM 40 MG: 100 INJECTION SUBCUTANEOUS at 21:23

## 2023-04-27 RX ADMIN — POTASSIUM CHLORIDE 10 MEQ: 7.46 INJECTION, SOLUTION INTRAVENOUS at 21:27

## 2023-04-27 RX ADMIN — PANTOPRAZOLE SODIUM 40 MG: 40 TABLET, DELAYED RELEASE ORAL at 21:23

## 2023-04-27 RX ADMIN — METOPROLOL SUCCINATE 25 MG: 25 TABLET, EXTENDED RELEASE ORAL at 08:59

## 2023-04-27 RX ADMIN — TAMSULOSIN HYDROCHLORIDE 0.4 MG: 0.4 CAPSULE ORAL at 14:30

## 2023-04-27 RX ADMIN — OXYCODONE HYDROCHLORIDE AND ACETAMINOPHEN 1 TABLET: 5; 325 TABLET ORAL at 08:58

## 2023-04-27 RX ADMIN — SODIUM CHLORIDE, PRESERVATIVE FREE 10 ML: 5 INJECTION INTRAVENOUS at 08:59

## 2023-04-27 RX ADMIN — IPRATROPIUM BROMIDE AND ALBUTEROL SULFATE 1 AMPULE: 2.5; .5 SOLUTION RESPIRATORY (INHALATION) at 10:45

## 2023-04-27 RX ADMIN — ATORVASTATIN CALCIUM 40 MG: 40 TABLET, FILM COATED ORAL at 21:23

## 2023-04-27 RX ADMIN — POTASSIUM CHLORIDE 10 MEQ: 7.46 INJECTION, SOLUTION INTRAVENOUS at 14:30

## 2023-04-27 RX ADMIN — PRIMIDONE 50 MG: 50 TABLET ORAL at 08:58

## 2023-04-27 RX ADMIN — IPRATROPIUM BROMIDE AND ALBUTEROL SULFATE 1 AMPULE: 2.5; .5 SOLUTION RESPIRATORY (INHALATION) at 14:36

## 2023-04-27 RX ADMIN — BUMETANIDE 2 MG: 0.25 INJECTION INTRAMUSCULAR; INTRAVENOUS at 08:57

## 2023-04-27 RX ADMIN — IPRATROPIUM BROMIDE AND ALBUTEROL SULFATE 1 AMPULE: 2.5; .5 SOLUTION RESPIRATORY (INHALATION) at 19:56

## 2023-04-27 RX ADMIN — PANTOPRAZOLE SODIUM 40 MG: 40 TABLET, DELAYED RELEASE ORAL at 08:59

## 2023-04-27 RX ADMIN — DOCUSATE SODIUM 100 MG: 100 CAPSULE, LIQUID FILLED ORAL at 21:23

## 2023-04-27 RX ADMIN — OXYCODONE HYDROCHLORIDE AND ACETAMINOPHEN 1 TABLET: 5; 325 TABLET ORAL at 16:29

## 2023-04-27 RX ADMIN — IPRATROPIUM BROMIDE AND ALBUTEROL SULFATE 1 AMPULE: 2.5; .5 SOLUTION RESPIRATORY (INHALATION) at 06:50

## 2023-04-27 RX ADMIN — METOCLOPRAMIDE 5 MG: 5 INJECTION, SOLUTION INTRAMUSCULAR; INTRAVENOUS at 08:58

## 2023-04-27 RX ADMIN — DIVALPROEX SODIUM 250 MG: 250 TABLET, DELAYED RELEASE ORAL at 21:23

## 2023-04-27 RX ADMIN — DIVALPROEX SODIUM 250 MG: 250 TABLET, DELAYED RELEASE ORAL at 08:59

## 2023-04-27 RX ADMIN — INSULIN GLARGINE 15 UNITS: 100 INJECTION, SOLUTION SUBCUTANEOUS at 21:36

## 2023-04-27 RX ADMIN — METOCLOPRAMIDE 5 MG: 5 INJECTION, SOLUTION INTRAMUSCULAR; INTRAVENOUS at 15:30

## 2023-04-27 RX ADMIN — SODIUM CHLORIDE, PRESERVATIVE FREE 10 ML: 5 INJECTION INTRAVENOUS at 21:28

## 2023-04-27 RX ADMIN — METOCLOPRAMIDE 5 MG: 5 INJECTION, SOLUTION INTRAMUSCULAR; INTRAVENOUS at 03:52

## 2023-04-27 RX ADMIN — BUMETANIDE 2 MG: 0.25 INJECTION INTRAMUSCULAR; INTRAVENOUS at 21:23

## 2023-04-27 RX ADMIN — PRIMIDONE 50 MG: 50 TABLET ORAL at 21:23

## 2023-04-27 RX ADMIN — METOCLOPRAMIDE 5 MG: 5 INJECTION, SOLUTION INTRAMUSCULAR; INTRAVENOUS at 21:23

## 2023-04-27 ASSESSMENT — PAIN - FUNCTIONAL ASSESSMENT
PAIN_FUNCTIONAL_ASSESSMENT: PREVENTS OR INTERFERES WITH MANY ACTIVE NOT PASSIVE ACTIVITIES
PAIN_FUNCTIONAL_ASSESSMENT: PREVENTS OR INTERFERES WITH MANY ACTIVE NOT PASSIVE ACTIVITIES

## 2023-04-27 ASSESSMENT — PAIN DESCRIPTION - FREQUENCY
FREQUENCY: CONTINUOUS
FREQUENCY: CONTINUOUS

## 2023-04-27 ASSESSMENT — PAIN DESCRIPTION - ONSET
ONSET: PROGRESSIVE
ONSET: PROGRESSIVE

## 2023-04-27 ASSESSMENT — PAIN DESCRIPTION - LOCATION
LOCATION: CHEST
LOCATION: GENERALIZED

## 2023-04-27 ASSESSMENT — PAIN DESCRIPTION - PAIN TYPE
TYPE: SURGICAL PAIN
TYPE: SURGICAL PAIN

## 2023-04-27 ASSESSMENT — PAIN DESCRIPTION - DESCRIPTORS
DESCRIPTORS: ACHING
DESCRIPTORS: ACHING

## 2023-04-27 ASSESSMENT — PAIN SCALES - GENERAL
PAINLEVEL_OUTOF10: 0
PAINLEVEL_OUTOF10: 0
PAINLEVEL_OUTOF10: 9
PAINLEVEL_OUTOF10: 6
PAINLEVEL_OUTOF10: 2
PAINLEVEL_OUTOF10: 0

## 2023-04-27 ASSESSMENT — PAIN DESCRIPTION - ORIENTATION: ORIENTATION: LEFT

## 2023-04-28 ENCOUNTER — APPOINTMENT (OUTPATIENT)
Dept: GENERAL RADIOLOGY | Age: 73
DRG: 163 | End: 2023-04-28
Attending: SURGERY
Payer: MEDICARE

## 2023-04-28 LAB
ALBUMIN SERPL-MCNC: 3 G/DL (ref 3.5–5.2)
ALP SERPL-CCNC: 101 U/L (ref 40–130)
ALT SERPL-CCNC: 16 U/L (ref 5–41)
ANION GAP SERPL CALCULATED.3IONS-SCNC: 16 MMOL/L (ref 7–19)
AST SERPL-CCNC: 28 U/L (ref 5–40)
BILIRUB SERPL-MCNC: 0.4 MG/DL (ref 0.2–1.2)
BUN SERPL-MCNC: 33 MG/DL (ref 8–23)
CALCIUM SERPL-MCNC: 7.8 MG/DL (ref 8.8–10.2)
CHLORIDE SERPL-SCNC: 89 MMOL/L (ref 98–111)
CO2 SERPL-SCNC: 29 MMOL/L (ref 22–29)
CREAT SERPL-MCNC: 1.1 MG/DL (ref 0.5–1.2)
ERYTHROCYTE [DISTWIDTH] IN BLOOD BY AUTOMATED COUNT: 15.5 % (ref 11.5–14.5)
GLUCOSE BLD-MCNC: 168 MG/DL (ref 70–99)
GLUCOSE BLD-MCNC: 169 MG/DL (ref 70–99)
GLUCOSE BLD-MCNC: 186 MG/DL (ref 70–99)
GLUCOSE BLD-MCNC: 210 MG/DL (ref 70–99)
GLUCOSE BLD-MCNC: 272 MG/DL (ref 70–99)
GLUCOSE SERPL-MCNC: 169 MG/DL (ref 74–109)
HCT VFR BLD AUTO: 23.7 % (ref 42–52)
HGB BLD-MCNC: 7.7 G/DL (ref 14–18)
MAGNESIUM SERPL-MCNC: 2.1 MG/DL (ref 1.6–2.4)
MCH RBC QN AUTO: 29.3 PG (ref 27–31)
MCHC RBC AUTO-ENTMCNC: 32.5 G/DL (ref 33–37)
MCV RBC AUTO: 90.1 FL (ref 80–94)
PERFORMED ON: ABNORMAL
PLATELET # BLD AUTO: 341 K/UL (ref 130–400)
PMV BLD AUTO: 10 FL (ref 9.4–12.4)
POTASSIUM SERPL-SCNC: 3.2 MMOL/L (ref 3.5–5)
PROT SERPL-MCNC: 6.2 G/DL (ref 6.6–8.7)
RBC # BLD AUTO: 2.63 M/UL (ref 4.7–6.1)
SODIUM SERPL-SCNC: 134 MMOL/L (ref 136–145)
WBC # BLD AUTO: 16.8 K/UL (ref 4.8–10.8)

## 2023-04-28 PROCEDURE — 94760 N-INVAS EAR/PLS OXIMETRY 1: CPT

## 2023-04-28 PROCEDURE — 2140000000 HC CCU INTERMEDIATE R&B

## 2023-04-28 PROCEDURE — 2580000003 HC RX 258: Performed by: PHYSICIAN ASSISTANT

## 2023-04-28 PROCEDURE — 71045 X-RAY EXAM CHEST 1 VIEW: CPT

## 2023-04-28 PROCEDURE — 6370000000 HC RX 637 (ALT 250 FOR IP): Performed by: PHYSICIAN ASSISTANT

## 2023-04-28 PROCEDURE — 97530 THERAPEUTIC ACTIVITIES: CPT

## 2023-04-28 PROCEDURE — 99231 SBSQ HOSP IP/OBS SF/LOW 25: CPT | Performed by: SURGERY

## 2023-04-28 PROCEDURE — 36415 COLL VENOUS BLD VENIPUNCTURE: CPT

## 2023-04-28 PROCEDURE — 2500000003 HC RX 250 WO HCPCS: Performed by: PHYSICIAN ASSISTANT

## 2023-04-28 PROCEDURE — 80053 COMPREHEN METABOLIC PANEL: CPT

## 2023-04-28 PROCEDURE — 6370000000 HC RX 637 (ALT 250 FOR IP): Performed by: SURGERY

## 2023-04-28 PROCEDURE — 6360000002 HC RX W HCPCS: Performed by: SURGERY

## 2023-04-28 PROCEDURE — 2700000000 HC OXYGEN THERAPY PER DAY

## 2023-04-28 PROCEDURE — 94640 AIRWAY INHALATION TREATMENT: CPT

## 2023-04-28 PROCEDURE — 97116 GAIT TRAINING THERAPY: CPT

## 2023-04-28 PROCEDURE — 85027 COMPLETE CBC AUTOMATED: CPT

## 2023-04-28 PROCEDURE — 97535 SELF CARE MNGMENT TRAINING: CPT

## 2023-04-28 PROCEDURE — 82962 GLUCOSE BLOOD TEST: CPT

## 2023-04-28 PROCEDURE — 83735 ASSAY OF MAGNESIUM: CPT

## 2023-04-28 PROCEDURE — 6360000002 HC RX W HCPCS: Performed by: PHYSICIAN ASSISTANT

## 2023-04-28 PROCEDURE — 6370000000 HC RX 637 (ALT 250 FOR IP): Performed by: NURSE PRACTITIONER

## 2023-04-28 RX ORDER — POTASSIUM CHLORIDE 20 MEQ/1
40 TABLET, EXTENDED RELEASE ORAL ONCE
Status: COMPLETED | OUTPATIENT
Start: 2023-04-28 | End: 2023-04-28

## 2023-04-28 RX ORDER — TAMSULOSIN HYDROCHLORIDE 0.4 MG/1
0.8 CAPSULE ORAL DAILY
Status: DISCONTINUED | OUTPATIENT
Start: 2023-04-29 | End: 2023-05-01 | Stop reason: HOSPADM

## 2023-04-28 RX ADMIN — POLYETHYLENE GLYCOL 3350 17 G: 17 POWDER, FOR SOLUTION ORAL at 07:52

## 2023-04-28 RX ADMIN — POTASSIUM CHLORIDE 40 MEQ: 1500 TABLET, EXTENDED RELEASE ORAL at 06:51

## 2023-04-28 RX ADMIN — DIVALPROEX SODIUM 250 MG: 250 TABLET, DELAYED RELEASE ORAL at 21:12

## 2023-04-28 RX ADMIN — TAMSULOSIN HYDROCHLORIDE 0.4 MG: 0.4 CAPSULE ORAL at 07:51

## 2023-04-28 RX ADMIN — METOCLOPRAMIDE 5 MG: 5 INJECTION, SOLUTION INTRAMUSCULAR; INTRAVENOUS at 16:30

## 2023-04-28 RX ADMIN — IPRATROPIUM BROMIDE AND ALBUTEROL SULFATE 1 AMPULE: 2.5; .5 SOLUTION RESPIRATORY (INHALATION) at 10:11

## 2023-04-28 RX ADMIN — ATORVASTATIN CALCIUM 40 MG: 40 TABLET, FILM COATED ORAL at 21:12

## 2023-04-28 RX ADMIN — BUMETANIDE 2 MG: 0.25 INJECTION INTRAMUSCULAR; INTRAVENOUS at 21:12

## 2023-04-28 RX ADMIN — INSULIN GLARGINE 15 UNITS: 100 INJECTION, SOLUTION SUBCUTANEOUS at 21:13

## 2023-04-28 RX ADMIN — PANTOPRAZOLE SODIUM 40 MG: 40 TABLET, DELAYED RELEASE ORAL at 21:12

## 2023-04-28 RX ADMIN — IPRATROPIUM BROMIDE AND ALBUTEROL SULFATE 1 AMPULE: 2.5; .5 SOLUTION RESPIRATORY (INHALATION) at 06:53

## 2023-04-28 RX ADMIN — BUMETANIDE 2 MG: 0.25 INJECTION INTRAMUSCULAR; INTRAVENOUS at 07:51

## 2023-04-28 RX ADMIN — ENOXAPARIN SODIUM 40 MG: 100 INJECTION SUBCUTANEOUS at 21:12

## 2023-04-28 RX ADMIN — DOCUSATE SODIUM 100 MG: 100 CAPSULE, LIQUID FILLED ORAL at 07:51

## 2023-04-28 RX ADMIN — PANTOPRAZOLE SODIUM 40 MG: 40 TABLET, DELAYED RELEASE ORAL at 07:51

## 2023-04-28 RX ADMIN — PRIMIDONE 50 MG: 50 TABLET ORAL at 21:12

## 2023-04-28 RX ADMIN — IPRATROPIUM BROMIDE AND ALBUTEROL SULFATE 1 AMPULE: 2.5; .5 SOLUTION RESPIRATORY (INHALATION) at 18:30

## 2023-04-28 RX ADMIN — DIVALPROEX SODIUM 250 MG: 250 TABLET, DELAYED RELEASE ORAL at 07:51

## 2023-04-28 RX ADMIN — SODIUM CHLORIDE, PRESERVATIVE FREE 10 ML: 5 INJECTION INTRAVENOUS at 21:13

## 2023-04-28 RX ADMIN — OXYCODONE HYDROCHLORIDE AND ACETAMINOPHEN 1 TABLET: 5; 325 TABLET ORAL at 03:35

## 2023-04-28 RX ADMIN — METOPROLOL SUCCINATE 25 MG: 25 TABLET, EXTENDED RELEASE ORAL at 07:51

## 2023-04-28 RX ADMIN — PRIMIDONE 50 MG: 50 TABLET ORAL at 07:51

## 2023-04-28 RX ADMIN — IPRATROPIUM BROMIDE AND ALBUTEROL SULFATE 1 AMPULE: 2.5; .5 SOLUTION RESPIRATORY (INHALATION) at 14:11

## 2023-04-28 RX ADMIN — METOCLOPRAMIDE 5 MG: 5 INJECTION, SOLUTION INTRAMUSCULAR; INTRAVENOUS at 21:11

## 2023-04-28 RX ADMIN — METOCLOPRAMIDE 5 MG: 5 INJECTION, SOLUTION INTRAMUSCULAR; INTRAVENOUS at 03:35

## 2023-04-28 RX ADMIN — METOCLOPRAMIDE 5 MG: 5 INJECTION, SOLUTION INTRAMUSCULAR; INTRAVENOUS at 07:51

## 2023-04-28 RX ADMIN — DOCUSATE SODIUM 100 MG: 100 CAPSULE, LIQUID FILLED ORAL at 21:12

## 2023-04-28 ASSESSMENT — PAIN DESCRIPTION - ORIENTATION
ORIENTATION: LEFT
ORIENTATION: LOWER

## 2023-04-28 ASSESSMENT — PAIN DESCRIPTION - DESCRIPTORS: DESCRIPTORS: ACHING

## 2023-04-28 ASSESSMENT — PAIN SCALES - GENERAL
PAINLEVEL_OUTOF10: 3
PAINLEVEL_OUTOF10: 7
PAINLEVEL_OUTOF10: 0

## 2023-04-28 ASSESSMENT — PAIN DESCRIPTION - LOCATION
LOCATION: BACK
LOCATION: CHEST

## 2023-04-29 ENCOUNTER — APPOINTMENT (OUTPATIENT)
Dept: GENERAL RADIOLOGY | Age: 73
DRG: 163 | End: 2023-04-29
Attending: SURGERY
Payer: MEDICARE

## 2023-04-29 LAB
ALBUMIN SERPL-MCNC: 2.8 G/DL (ref 3.5–5.2)
ALP SERPL-CCNC: 106 U/L (ref 40–130)
ALT SERPL-CCNC: 15 U/L (ref 5–41)
ANION GAP SERPL CALCULATED.3IONS-SCNC: 12 MMOL/L (ref 7–19)
AST SERPL-CCNC: 27 U/L (ref 5–40)
BILIRUB SERPL-MCNC: 0.3 MG/DL (ref 0.2–1.2)
BUN SERPL-MCNC: 30 MG/DL (ref 8–23)
CALCIUM SERPL-MCNC: 7.7 MG/DL (ref 8.8–10.2)
CHLORIDE SERPL-SCNC: 93 MMOL/L (ref 98–111)
CO2 SERPL-SCNC: 30 MMOL/L (ref 22–29)
CREAT SERPL-MCNC: 1.1 MG/DL (ref 0.5–1.2)
ERYTHROCYTE [DISTWIDTH] IN BLOOD BY AUTOMATED COUNT: 15.6 % (ref 11.5–14.5)
GLUCOSE BLD-MCNC: 186 MG/DL (ref 70–99)
GLUCOSE BLD-MCNC: 209 MG/DL (ref 70–99)
GLUCOSE BLD-MCNC: 213 MG/DL (ref 70–99)
GLUCOSE BLD-MCNC: 276 MG/DL (ref 70–99)
GLUCOSE SERPL-MCNC: 153 MG/DL (ref 74–109)
HCT VFR BLD AUTO: 22.8 % (ref 42–52)
HGB BLD-MCNC: 7.2 G/DL (ref 14–18)
MAGNESIUM SERPL-MCNC: 2.1 MG/DL (ref 1.6–2.4)
MCH RBC QN AUTO: 28.7 PG (ref 27–31)
MCHC RBC AUTO-ENTMCNC: 31.6 G/DL (ref 33–37)
MCV RBC AUTO: 90.8 FL (ref 80–94)
PERFORMED ON: ABNORMAL
PLATELET # BLD AUTO: 335 K/UL (ref 130–400)
PMV BLD AUTO: 10.4 FL (ref 9.4–12.4)
POTASSIUM SERPL-SCNC: 3.4 MMOL/L (ref 3.5–5)
PROT SERPL-MCNC: 5.3 G/DL (ref 6.6–8.7)
RBC # BLD AUTO: 2.51 M/UL (ref 4.7–6.1)
SODIUM SERPL-SCNC: 135 MMOL/L (ref 136–145)
WBC # BLD AUTO: 16.1 K/UL (ref 4.8–10.8)

## 2023-04-29 PROCEDURE — 6370000000 HC RX 637 (ALT 250 FOR IP): Performed by: PHYSICIAN ASSISTANT

## 2023-04-29 PROCEDURE — 94640 AIRWAY INHALATION TREATMENT: CPT

## 2023-04-29 PROCEDURE — 97530 THERAPEUTIC ACTIVITIES: CPT

## 2023-04-29 PROCEDURE — 6360000002 HC RX W HCPCS: Performed by: SURGERY

## 2023-04-29 PROCEDURE — 71045 X-RAY EXAM CHEST 1 VIEW: CPT

## 2023-04-29 PROCEDURE — 85027 COMPLETE CBC AUTOMATED: CPT

## 2023-04-29 PROCEDURE — 99024 POSTOP FOLLOW-UP VISIT: CPT | Performed by: SURGERY

## 2023-04-29 PROCEDURE — 6360000002 HC RX W HCPCS: Performed by: PHYSICIAN ASSISTANT

## 2023-04-29 PROCEDURE — 94760 N-INVAS EAR/PLS OXIMETRY 1: CPT

## 2023-04-29 PROCEDURE — 36415 COLL VENOUS BLD VENIPUNCTURE: CPT

## 2023-04-29 PROCEDURE — 6370000000 HC RX 637 (ALT 250 FOR IP): Performed by: NURSE PRACTITIONER

## 2023-04-29 PROCEDURE — 2500000003 HC RX 250 WO HCPCS: Performed by: SURGERY

## 2023-04-29 PROCEDURE — 99233 SBSQ HOSP IP/OBS HIGH 50: CPT | Performed by: INTERNAL MEDICINE

## 2023-04-29 PROCEDURE — 71046 X-RAY EXAM CHEST 2 VIEWS: CPT

## 2023-04-29 PROCEDURE — 2580000003 HC RX 258: Performed by: PHYSICIAN ASSISTANT

## 2023-04-29 PROCEDURE — 2140000000 HC CCU INTERMEDIATE R&B

## 2023-04-29 PROCEDURE — 80053 COMPREHEN METABOLIC PANEL: CPT

## 2023-04-29 PROCEDURE — 83735 ASSAY OF MAGNESIUM: CPT

## 2023-04-29 PROCEDURE — 2700000000 HC OXYGEN THERAPY PER DAY

## 2023-04-29 PROCEDURE — 82962 GLUCOSE BLOOD TEST: CPT

## 2023-04-29 RX ORDER — BUMETANIDE 0.25 MG/ML
1 INJECTION INTRAMUSCULAR; INTRAVENOUS 2 TIMES DAILY
Status: DISCONTINUED | OUTPATIENT
Start: 2023-04-29 | End: 2023-05-01 | Stop reason: HOSPADM

## 2023-04-29 RX ADMIN — ENOXAPARIN SODIUM 40 MG: 100 INJECTION SUBCUTANEOUS at 21:24

## 2023-04-29 RX ADMIN — METOCLOPRAMIDE 5 MG: 5 INJECTION, SOLUTION INTRAMUSCULAR; INTRAVENOUS at 21:26

## 2023-04-29 RX ADMIN — IPRATROPIUM BROMIDE AND ALBUTEROL SULFATE 1 AMPULE: 2.5; .5 SOLUTION RESPIRATORY (INHALATION) at 06:58

## 2023-04-29 RX ADMIN — BUMETANIDE 1 MG: 0.25 INJECTION INTRAMUSCULAR; INTRAVENOUS at 08:58

## 2023-04-29 RX ADMIN — BUMETANIDE 1 MG: 0.25 INJECTION INTRAMUSCULAR; INTRAVENOUS at 21:25

## 2023-04-29 RX ADMIN — SODIUM CHLORIDE, PRESERVATIVE FREE 10 ML: 5 INJECTION INTRAVENOUS at 21:27

## 2023-04-29 RX ADMIN — TAMSULOSIN HYDROCHLORIDE 0.8 MG: 0.4 CAPSULE ORAL at 08:57

## 2023-04-29 RX ADMIN — ATORVASTATIN CALCIUM 40 MG: 40 TABLET, FILM COATED ORAL at 21:24

## 2023-04-29 RX ADMIN — METOCLOPRAMIDE 5 MG: 5 INJECTION, SOLUTION INTRAMUSCULAR; INTRAVENOUS at 04:37

## 2023-04-29 RX ADMIN — DOCUSATE SODIUM 100 MG: 100 CAPSULE, LIQUID FILLED ORAL at 21:24

## 2023-04-29 RX ADMIN — PRIMIDONE 50 MG: 50 TABLET ORAL at 08:57

## 2023-04-29 RX ADMIN — SODIUM CHLORIDE, PRESERVATIVE FREE 10 ML: 5 INJECTION INTRAVENOUS at 09:00

## 2023-04-29 RX ADMIN — METOPROLOL SUCCINATE 25 MG: 25 TABLET, EXTENDED RELEASE ORAL at 08:57

## 2023-04-29 RX ADMIN — PANTOPRAZOLE SODIUM 40 MG: 40 TABLET, DELAYED RELEASE ORAL at 21:24

## 2023-04-29 RX ADMIN — DIVALPROEX SODIUM 250 MG: 250 TABLET, DELAYED RELEASE ORAL at 21:25

## 2023-04-29 RX ADMIN — IPRATROPIUM BROMIDE AND ALBUTEROL SULFATE 1 AMPULE: 2.5; .5 SOLUTION RESPIRATORY (INHALATION) at 10:37

## 2023-04-29 RX ADMIN — IPRATROPIUM BROMIDE AND ALBUTEROL SULFATE 1 AMPULE: 2.5; .5 SOLUTION RESPIRATORY (INHALATION) at 18:35

## 2023-04-29 RX ADMIN — DIVALPROEX SODIUM 250 MG: 250 TABLET, DELAYED RELEASE ORAL at 08:57

## 2023-04-29 RX ADMIN — PRIMIDONE 50 MG: 50 TABLET ORAL at 21:25

## 2023-04-29 RX ADMIN — PANTOPRAZOLE SODIUM 40 MG: 40 TABLET, DELAYED RELEASE ORAL at 08:57

## 2023-04-29 RX ADMIN — METOCLOPRAMIDE 5 MG: 5 INJECTION, SOLUTION INTRAMUSCULAR; INTRAVENOUS at 08:59

## 2023-04-29 RX ADMIN — INSULIN GLARGINE 15 UNITS: 100 INJECTION, SOLUTION SUBCUTANEOUS at 21:27

## 2023-04-29 RX ADMIN — IPRATROPIUM BROMIDE AND ALBUTEROL SULFATE 1 AMPULE: 2.5; .5 SOLUTION RESPIRATORY (INHALATION) at 14:14

## 2023-04-30 LAB
GLUCOSE BLD-MCNC: 200 MG/DL (ref 70–99)
GLUCOSE BLD-MCNC: 201 MG/DL (ref 70–99)
GLUCOSE BLD-MCNC: 220 MG/DL (ref 70–99)
GLUCOSE BLD-MCNC: 336 MG/DL (ref 70–99)
PERFORMED ON: ABNORMAL

## 2023-04-30 PROCEDURE — 6370000000 HC RX 637 (ALT 250 FOR IP): Performed by: PHYSICIAN ASSISTANT

## 2023-04-30 PROCEDURE — 99024 POSTOP FOLLOW-UP VISIT: CPT | Performed by: SURGERY

## 2023-04-30 PROCEDURE — 6370000000 HC RX 637 (ALT 250 FOR IP): Performed by: NURSE PRACTITIONER

## 2023-04-30 PROCEDURE — 2140000000 HC CCU INTERMEDIATE R&B

## 2023-04-30 PROCEDURE — 82962 GLUCOSE BLOOD TEST: CPT

## 2023-04-30 PROCEDURE — 99232 SBSQ HOSP IP/OBS MODERATE 35: CPT | Performed by: INTERNAL MEDICINE

## 2023-04-30 PROCEDURE — 2500000003 HC RX 250 WO HCPCS: Performed by: SURGERY

## 2023-04-30 PROCEDURE — 6360000002 HC RX W HCPCS: Performed by: PHYSICIAN ASSISTANT

## 2023-04-30 PROCEDURE — 6360000002 HC RX W HCPCS: Performed by: SURGERY

## 2023-04-30 PROCEDURE — 2700000000 HC OXYGEN THERAPY PER DAY

## 2023-04-30 PROCEDURE — 2580000003 HC RX 258: Performed by: PHYSICIAN ASSISTANT

## 2023-04-30 PROCEDURE — 94760 N-INVAS EAR/PLS OXIMETRY 1: CPT

## 2023-04-30 PROCEDURE — 94640 AIRWAY INHALATION TREATMENT: CPT

## 2023-04-30 RX ADMIN — METOPROLOL SUCCINATE 25 MG: 25 TABLET, EXTENDED RELEASE ORAL at 09:43

## 2023-04-30 RX ADMIN — IPRATROPIUM BROMIDE AND ALBUTEROL SULFATE 1 AMPULE: 2.5; .5 SOLUTION RESPIRATORY (INHALATION) at 18:48

## 2023-04-30 RX ADMIN — IPRATROPIUM BROMIDE AND ALBUTEROL SULFATE 1 AMPULE: 2.5; .5 SOLUTION RESPIRATORY (INHALATION) at 14:11

## 2023-04-30 RX ADMIN — INSULIN GLARGINE 15 UNITS: 100 INJECTION, SOLUTION SUBCUTANEOUS at 19:59

## 2023-04-30 RX ADMIN — BUMETANIDE 1 MG: 0.25 INJECTION INTRAMUSCULAR; INTRAVENOUS at 19:58

## 2023-04-30 RX ADMIN — PANTOPRAZOLE SODIUM 40 MG: 40 TABLET, DELAYED RELEASE ORAL at 19:58

## 2023-04-30 RX ADMIN — METOCLOPRAMIDE 5 MG: 5 INJECTION, SOLUTION INTRAMUSCULAR; INTRAVENOUS at 09:44

## 2023-04-30 RX ADMIN — SODIUM CHLORIDE, PRESERVATIVE FREE 10 ML: 5 INJECTION INTRAVENOUS at 19:59

## 2023-04-30 RX ADMIN — METOCLOPRAMIDE 5 MG: 5 INJECTION, SOLUTION INTRAMUSCULAR; INTRAVENOUS at 19:57

## 2023-04-30 RX ADMIN — ATORVASTATIN CALCIUM 40 MG: 40 TABLET, FILM COATED ORAL at 19:58

## 2023-04-30 RX ADMIN — BUMETANIDE 1 MG: 0.25 INJECTION INTRAMUSCULAR; INTRAVENOUS at 09:44

## 2023-04-30 RX ADMIN — SODIUM CHLORIDE, PRESERVATIVE FREE 10 ML: 5 INJECTION INTRAVENOUS at 09:47

## 2023-04-30 RX ADMIN — METOCLOPRAMIDE 5 MG: 5 INJECTION, SOLUTION INTRAMUSCULAR; INTRAVENOUS at 02:00

## 2023-04-30 RX ADMIN — PANTOPRAZOLE SODIUM 40 MG: 40 TABLET, DELAYED RELEASE ORAL at 09:43

## 2023-04-30 RX ADMIN — DOCUSATE SODIUM 100 MG: 100 CAPSULE, LIQUID FILLED ORAL at 19:58

## 2023-04-30 RX ADMIN — IPRATROPIUM BROMIDE AND ALBUTEROL SULFATE 1 AMPULE: 2.5; .5 SOLUTION RESPIRATORY (INHALATION) at 06:40

## 2023-04-30 RX ADMIN — ENOXAPARIN SODIUM 40 MG: 100 INJECTION SUBCUTANEOUS at 19:58

## 2023-04-30 RX ADMIN — METOCLOPRAMIDE 5 MG: 5 INJECTION, SOLUTION INTRAMUSCULAR; INTRAVENOUS at 16:34

## 2023-04-30 RX ADMIN — DIVALPROEX SODIUM 250 MG: 250 TABLET, DELAYED RELEASE ORAL at 19:58

## 2023-04-30 RX ADMIN — PRIMIDONE 50 MG: 50 TABLET ORAL at 19:58

## 2023-04-30 RX ADMIN — PRIMIDONE 50 MG: 50 TABLET ORAL at 09:43

## 2023-04-30 RX ADMIN — INSULIN LISPRO 3 UNITS: 100 INJECTION, SOLUTION INTRAVENOUS; SUBCUTANEOUS at 12:23

## 2023-04-30 RX ADMIN — IPRATROPIUM BROMIDE AND ALBUTEROL SULFATE 1 AMPULE: 2.5; .5 SOLUTION RESPIRATORY (INHALATION) at 10:40

## 2023-04-30 RX ADMIN — DIVALPROEX SODIUM 250 MG: 250 TABLET, DELAYED RELEASE ORAL at 09:44

## 2023-04-30 RX ADMIN — TAMSULOSIN HYDROCHLORIDE 0.8 MG: 0.4 CAPSULE ORAL at 09:43

## 2023-04-30 ASSESSMENT — PAIN DESCRIPTION - LOCATION: LOCATION: CHEST

## 2023-04-30 ASSESSMENT — PAIN SCALES - GENERAL: PAINLEVEL_OUTOF10: 0

## 2023-05-01 VITALS
RESPIRATION RATE: 17 BRPM | HEIGHT: 69 IN | OXYGEN SATURATION: 94 % | DIASTOLIC BLOOD PRESSURE: 69 MMHG | WEIGHT: 164.8 LBS | TEMPERATURE: 97.7 F | SYSTOLIC BLOOD PRESSURE: 115 MMHG | BODY MASS INDEX: 24.41 KG/M2 | HEART RATE: 95 BPM

## 2023-05-01 DIAGNOSIS — Z98.890 STATUS POST THORACOTOMY: Primary | ICD-10-CM

## 2023-05-01 LAB
ALBUMIN SERPL-MCNC: 2.8 G/DL (ref 3.5–5.2)
ALP SERPL-CCNC: 102 U/L (ref 40–130)
ALT SERPL-CCNC: 18 U/L (ref 5–41)
ANION GAP SERPL CALCULATED.3IONS-SCNC: 11 MMOL/L (ref 7–19)
AST SERPL-CCNC: 28 U/L (ref 5–40)
BILIRUB SERPL-MCNC: 0.3 MG/DL (ref 0.2–1.2)
BUN SERPL-MCNC: 18 MG/DL (ref 8–23)
CALCIUM SERPL-MCNC: 8 MG/DL (ref 8.8–10.2)
CHLORIDE SERPL-SCNC: 91 MMOL/L (ref 98–111)
CO2 SERPL-SCNC: 31 MMOL/L (ref 22–29)
CREAT SERPL-MCNC: 0.9 MG/DL (ref 0.5–1.2)
ERYTHROCYTE [DISTWIDTH] IN BLOOD BY AUTOMATED COUNT: 15.9 % (ref 11.5–14.5)
GLUCOSE BLD-MCNC: 183 MG/DL (ref 70–99)
GLUCOSE BLD-MCNC: 263 MG/DL (ref 70–99)
GLUCOSE SERPL-MCNC: 180 MG/DL (ref 74–109)
HCT VFR BLD AUTO: 24.2 % (ref 42–52)
HGB BLD-MCNC: 7.7 G/DL (ref 14–18)
MCH RBC QN AUTO: 29.4 PG (ref 27–31)
MCHC RBC AUTO-ENTMCNC: 31.8 G/DL (ref 33–37)
MCV RBC AUTO: 92.4 FL (ref 80–94)
PERFORMED ON: ABNORMAL
PERFORMED ON: ABNORMAL
PLATELET # BLD AUTO: 335 K/UL (ref 130–400)
PMV BLD AUTO: 9.4 FL (ref 9.4–12.4)
POTASSIUM SERPL-SCNC: 2.8 MMOL/L (ref 3.5–5)
PROT SERPL-MCNC: 6.2 G/DL (ref 6.6–8.7)
RBC # BLD AUTO: 2.62 M/UL (ref 4.7–6.1)
SODIUM SERPL-SCNC: 133 MMOL/L (ref 136–145)
WBC # BLD AUTO: 17.4 K/UL (ref 4.8–10.8)

## 2023-05-01 PROCEDURE — 36415 COLL VENOUS BLD VENIPUNCTURE: CPT

## 2023-05-01 PROCEDURE — 82962 GLUCOSE BLOOD TEST: CPT

## 2023-05-01 PROCEDURE — 6370000000 HC RX 637 (ALT 250 FOR IP): Performed by: PHYSICIAN ASSISTANT

## 2023-05-01 PROCEDURE — 2500000003 HC RX 250 WO HCPCS: Performed by: SURGERY

## 2023-05-01 PROCEDURE — 80053 COMPREHEN METABOLIC PANEL: CPT

## 2023-05-01 PROCEDURE — 2580000003 HC RX 258: Performed by: PHYSICIAN ASSISTANT

## 2023-05-01 PROCEDURE — 94640 AIRWAY INHALATION TREATMENT: CPT

## 2023-05-01 PROCEDURE — 85027 COMPLETE CBC AUTOMATED: CPT

## 2023-05-01 PROCEDURE — 6370000000 HC RX 637 (ALT 250 FOR IP): Performed by: SURGERY

## 2023-05-01 PROCEDURE — 94760 N-INVAS EAR/PLS OXIMETRY 1: CPT

## 2023-05-01 PROCEDURE — 6370000000 HC RX 637 (ALT 250 FOR IP): Performed by: NURSE PRACTITIONER

## 2023-05-01 RX ORDER — TAMSULOSIN HYDROCHLORIDE 0.4 MG/1
0.8 CAPSULE ORAL DAILY
Qty: 60 CAPSULE | Refills: 0 | Status: SHIPPED | OUTPATIENT
Start: 2023-05-02

## 2023-05-01 RX ORDER — POTASSIUM CHLORIDE 20 MEQ/1
40 TABLET, EXTENDED RELEASE ORAL PRN
Status: DISCONTINUED | OUTPATIENT
Start: 2023-05-01 | End: 2023-05-01

## 2023-05-01 RX ORDER — FUROSEMIDE 20 MG/1
20 TABLET ORAL DAILY
Qty: 14 TABLET | Refills: 0 | Status: SHIPPED | OUTPATIENT
Start: 2023-05-01

## 2023-05-01 RX ORDER — POTASSIUM CHLORIDE 7.45 MG/ML
10 INJECTION INTRAVENOUS PRN
Status: DISCONTINUED | OUTPATIENT
Start: 2023-05-01 | End: 2023-05-01

## 2023-05-01 RX ORDER — POTASSIUM CHLORIDE 20 MEQ/1
40 TABLET, EXTENDED RELEASE ORAL ONCE
Status: COMPLETED | OUTPATIENT
Start: 2023-05-01 | End: 2023-05-01

## 2023-05-01 RX ADMIN — PRIMIDONE 50 MG: 50 TABLET ORAL at 08:23

## 2023-05-01 RX ADMIN — TAMSULOSIN HYDROCHLORIDE 0.8 MG: 0.4 CAPSULE ORAL at 08:23

## 2023-05-01 RX ADMIN — SODIUM CHLORIDE, PRESERVATIVE FREE 10 ML: 5 INJECTION INTRAVENOUS at 08:23

## 2023-05-01 RX ADMIN — IPRATROPIUM BROMIDE AND ALBUTEROL SULFATE 1 AMPULE: 2.5; .5 SOLUTION RESPIRATORY (INHALATION) at 07:07

## 2023-05-01 RX ADMIN — METOPROLOL SUCCINATE 25 MG: 25 TABLET, EXTENDED RELEASE ORAL at 08:23

## 2023-05-01 RX ADMIN — IPRATROPIUM BROMIDE AND ALBUTEROL SULFATE 1 AMPULE: 2.5; .5 SOLUTION RESPIRATORY (INHALATION) at 13:59

## 2023-05-01 RX ADMIN — POTASSIUM CHLORIDE 40 MEQ: 1500 TABLET, EXTENDED RELEASE ORAL at 08:23

## 2023-05-01 RX ADMIN — PANTOPRAZOLE SODIUM 40 MG: 40 TABLET, DELAYED RELEASE ORAL at 08:23

## 2023-05-01 RX ADMIN — IPRATROPIUM BROMIDE AND ALBUTEROL SULFATE 1 AMPULE: 2.5; .5 SOLUTION RESPIRATORY (INHALATION) at 10:28

## 2023-05-01 RX ADMIN — DIVALPROEX SODIUM 250 MG: 250 TABLET, DELAYED RELEASE ORAL at 08:23

## 2023-05-01 RX ADMIN — BUMETANIDE 1 MG: 0.25 INJECTION INTRAMUSCULAR; INTRAVENOUS at 08:23

## 2023-05-01 NOTE — PROGRESS NOTES
Occupational Therapy  Pt sitting EOB with wife. Wife and patient state he will be going home today and are waiting for him to be released. Pt got up and took himself to the bathroom with supervision.  Electronically signed by CARMELO Vargas on 5/1/2023 at 2:58 PM

## 2023-05-01 NOTE — DISCHARGE INSTRUCTIONS
[address], [address]  [address]  [address] MD Ahsish Scott MD    POST OPERATIVE INSTRUCTIONS    Daily Activity:     Walking is the best way to regain your strength after your operation. A walking program is outlined below. This is the minimal amount of activity expected; however, you may progress at a faster rate if capable. You may use a treadmill, stationary bike, or weather permitting walk outside. Week 1 5 minutes twice a day   Week 2 10 minutes twice a day   Week 3 15 minutes twice a day   Week 4 20 minutes twice a day     Do NOT lift anything over 10 pounds. Keep legs elevated when sitting. Place at least 2 pillows under your feet to prevent swelling. Use your incentive spirometer (breathing device) 4 to 5 times a day for the first 2 weeks. Take deep breaths and cough frequently. Do NOT drive a car, until your physician says you can at your follow up visit. You may ride in a car, but preferable not behind an airbag. Light Housework is ok (dusting , dishwashing, folding clothes). Sexual relations will not interfere with your healing, but you need to avoid weight-bearing positions. Shower daily with a liquid anti-bacterial soap (Dial). Avoid extremely hot water. To help prevent infection, do not use any powder, lotion or cream on your incision unless instructed by your physician. Your incision may appear red, bruised, dry or numb for several weeks. Support hose will need to be worn for 2 weeks after you go home. You may take the hose off to sleep at night, but put them back on before getting out of bed in the morning. You may stop wearing the hose 14 days after you go home. You may not have a good appetite for several weeks after surgery.  At this time, you will not be on a restricted diet, unless you were on a special diet (hypertension, diabetes) prior

## 2023-05-01 NOTE — PLAN OF CARE
Problem: Discharge Planning  Goal: Discharge to home or other facility with appropriate resources  Outcome: Adequate for Discharge     Problem: Chronic Conditions and Co-morbidities  Goal: Patient's chronic conditions and co-morbidity symptoms are monitored and maintained or improved  Outcome: Adequate for Discharge     Problem: Safety - Adult  Goal: Free from fall injury  Outcome: Adequate for Discharge     Problem: Pain  Goal: Verbalizes/displays adequate comfort level or baseline comfort level  Outcome: Adequate for Discharge     Problem: Gastrointestinal - Adult  Goal: Minimal or absence of nausea and vomiting  Outcome: Adequate for Discharge  Goal: Maintains or returns to baseline bowel function  Outcome: Adequate for Discharge  Goal: Maintains adequate nutritional intake  Outcome: Adequate for Discharge     Problem: Metabolic/Fluid and Electrolytes - Adult  Goal: Electrolytes maintained within normal limits  Outcome: Adequate for Discharge     Problem: Skin/Tissue Integrity  Goal: Absence of new skin breakdown  Description: 1. Monitor for areas of redness and/or skin breakdown  2. Assess vascular access sites hourly  3. Every 4-6 hours minimum:  Change oxygen saturation probe site  4. Every 4-6 hours:  If on nasal continuous positive airway pressure, respiratory therapy assess nares and determine need for appliance change or resting period.   Outcome: Adequate for Discharge     Problem: Nutrition Deficit:  Goal: Optimize nutritional status  Outcome: Adequate for Discharge     Problem: ABCDS Injury Assessment  Goal: Absence of physical injury  Outcome: Adequate for Discharge

## 2023-05-01 NOTE — CARE COORDINATION
Received call back from Alvaro at [facility]. They are unable to accept due to staffing. No other [place] agencies in patient's area. Unable to set up [place] services  Electronically signed by Amira Tsang on 5/1/23 at 3:05 PM CDT    Per Keely Epps at [facility], they are unable to accept due to not being in network with insurance. Referral called to [facility]. They are unable to accept due to staffing. Referral called to [facility]. Referral faxed and Pending  Electronically signed by Amira Tsang on 5/1/23 at 2:01 PM CDT      [place] referral received. Referral sent to [facility].   Pending Approval  Electronically signed by Amira Tsang on 5/1/23 at 10:36 AM CDT

## 2023-05-01 NOTE — PROGRESS NOTES
POST OP CARDIOTHORACIC SURGERY PROGRESS NOTE    Post op day: 12    SUBJECTIVE:  Mr. Gladys Jurado is sitting up in bed appearing well. He states he is ready to go home. Patient and his wife at bedside state that they feel they can manage well at home and have a granddaughter that will come to help them at any point if needed. Patient and wife are agreeable to home health. /63   Pulse 92   Temp 97.9 °F (36.6 °C) (Oral)   Resp 17   Ht 5' 9\" (1.753 m)   Wt 164 lb 12.8 oz (74.8 kg)   SpO2 92%   BMI 24.34 kg/m²   Average, Min, and Max for last 24 hours Vitals:  TEMPERATURE:  Temp  Av.7 °F (36.5 °C)  Min: 97 °F (36.1 °C)  Max: 98.1 °F (36.7 °C)  RESPIRATIONS RANGE: Resp  Av  Min: 17  Max: 19  PULSE RANGE: Pulse  Av  Min: 81  Max: 92  BLOOD PRESSURE RANGE:  Systolic (48TXP), NLW:495 , Min:136 , PKY:258   ; Diastolic (34QDC), FYH:53, Min:53, Max:63    PULSE OXIMETRY RANGE: SpO2  Av %  Min: 92 %  Max: 97 %    I/O last 3 completed shifts: In: 2150 [P.O.:2150]  Out: 2525 [Urine:2525]    CHEST: Lungs clear to auscultation bilateraly    CARDIOVASCULAR: RRR without murmur or rub    ABDOMEN: normoactive bowel sounds. Non-tender     INCISION: clean, dry and intact.     LABS:  CBC:   Lab Results   Component Value Date/Time    WBC 17.4 2023 06:27 AM    RBC 2.62 2023 06:27 AM    HGB 7.7 2023 06:27 AM    HCT 24.2 2023 06:27 AM    MCV 92.4 2023 06:27 AM    MCH 29.4 2023 06:27 AM    MCHC 31.8 2023 06:27 AM    RDW 15.9 2023 06:27 AM     2023 06:27 AM    MPV 9.4 2023 06:27 AM     CMP:    Lab Results   Component Value Date/Time     2023 06:27 AM    K 2.8 2023 06:27 AM    CL 91 2023 06:27 AM    CO2 31 2023 06:27 AM    BUN 18 2023 06:27 AM    CREATININE 0.9 2023 06:27 AM    LABGLOM >60 2023 06:27 AM    GLUCOSE 180 2023 06:27 AM    PROT 6.2 2023 06:27 AM    LABALBU 2.8 2023 06:27 AM

## 2023-05-01 NOTE — PROGRESS NOTES
Physical Therapy  Name: Miesha Tristan  MRN:  [id number]  Date of service:  5/1/2023 05/01/23 1304   Subjective   Subjective Attempt: Pt sitting EOB fully dressed with wife present, both state that he is going home soon and has been up ad harlan in room and doing well. Pt declines therapy tx at this time.            Electronically signed by Machelle Donaldson PTA on 5/1/2023 at 1:05 PM

## 2023-05-01 NOTE — DISCHARGE SUMMARY
[facility]  Discharge Summary    Patient ID: Fern Gomez      Patient's PCP: Shreya Pineda    Admit Date: 4/19/2023     Discharge Date: 5/1/2023      Admitting Physician: Refugio Waldron MD     Discharge Physician:  Refugio Waldron MD    Discharge Diagnoses:     Primary:   Mass of Left lung; Squamous cell carcinoma left lung. Secondary:   2. Hypertension. 3. Mixed hyperlipidemia. 4. Diabetes Mellitus Type II  5. COPD  6. Seizures  7. JESSIE on CPAP  8. Tremor    Procedures This Admit:   On 04/19/2023, Left robotic converted to open left upper lobectomy by Dr. Refugio Waldron. Consults:     IP CONSULT TO CASE MANAGEMENT  IP CONSULT TO PULMONOLOGY  IP CONSULT TO GENERAL SURGERY  IP CONSULT TO HOME CARE NEEDS    Complications:   Acute blood loss anemia secondary to surgery   Pneumothorax s/p chest tube removal.      The patient was seen and examined on day of discharge and this discharge summary is in conjunction with any daily progress note from day of discharge. History of Present Illness and Hospital Course: The patient is a 67year old male with PMH of DM II and past tobacco abuse (80py) who was referred to Dr. Marie Chen for consultation of lung masses. Pt had a CXR at that time showing a left sided lung mass at the hilum. Therefore, had Chest CT scan that confirmed left hilar mass and a right apical lesion concerning for possible metastasis. PET Scan showed both lesions+. Right sided lesion was unable to be biopsied percutaneously but the left sided lesion confirmed to be NSCCa by EBUS guided biopsy. Dr. Marie Chen felt patient was a reasonable candidate for Left upper lobectomy, therefore, the operation was explained and discussed in detail, including the risks and benefits, with the patient. Pt voiced understanding and is agreeable to proceed. Patient was taken to the OR on 04/19/2023, underwent left robotic converted to open left upper lobectomy.  During the surgery patient had

## 2023-05-02 RX ORDER — POTASSIUM CHLORIDE 20 MEQ/1
40 TABLET, EXTENDED RELEASE ORAL DAILY
Qty: 60 TABLET | Refills: 0 | Status: SHIPPED | OUTPATIENT
Start: 2023-05-02

## 2023-05-11 ENCOUNTER — OFFICE VISIT (OUTPATIENT)
Dept: CARDIOTHORACIC SURGERY | Age: 73
End: 2023-05-11

## 2023-05-11 ENCOUNTER — HOSPITAL ENCOUNTER (OUTPATIENT)
Dept: GENERAL RADIOLOGY | Age: 73
Discharge: HOME | End: 2023-05-11
Payer: MEDICARE

## 2023-05-11 VITALS
WEIGHT: 164 LBS | OXYGEN SATURATION: 96 % | HEART RATE: 85 BPM | DIASTOLIC BLOOD PRESSURE: 58 MMHG | SYSTOLIC BLOOD PRESSURE: 118 MMHG | HEIGHT: 69 IN | BODY MASS INDEX: 24.29 KG/M2

## 2023-05-11 DIAGNOSIS — R91.8 MASS OF UPPER LOBE OF LUNG: Primary | ICD-10-CM

## 2023-05-11 DIAGNOSIS — Z98.890 STATUS POST THORACOTOMY: ICD-10-CM

## 2023-05-11 PROCEDURE — 71046 X-RAY EXAM CHEST 2 VIEWS: CPT

## 2023-05-11 PROCEDURE — 99024 POSTOP FOLLOW-UP VISIT: CPT | Performed by: SURGERY

## 2023-05-11 PROCEDURE — 71046 X-RAY EXAM CHEST 2 VIEWS: CPT | Performed by: RADIOLOGY

## 2023-05-12 ASSESSMENT — ENCOUNTER SYMPTOMS
CHEST TIGHTNESS: 0
CONSTIPATION: 0
BACK PAIN: 0
SHORTNESS OF BREATH: 0
NAUSEA: 0
ABDOMINAL PAIN: 0
COUGH: 0
VOMITING: 0
DIARRHEA: 0

## 2023-05-12 NOTE — PROGRESS NOTES
Subjective:      Patient ID: Vipin Martinez is a 67 y.o. male. HPI  Underwent LULobectomy for hilar lung cancerous mass. Required intraop transfusions due to intraop bleeding. Recently given transfusion by primary MD as well for Hct 21. No feeling better. Review of Systems   Constitutional:  Negative for activity change, appetite change, chills, diaphoresis, fatigue, fever and unexpected weight change. HENT:  Negative for dental problem. Eyes:  Negative for visual disturbance. Respiratory:  Negative for cough, chest tightness and shortness of breath. No hoarseness. No hemoptysis. Cardiovascular:  Negative for chest pain, palpitations and leg swelling. No orthopnea or PND. Gastrointestinal:  Negative for abdominal pain, constipation, diarrhea, nausea and vomiting. Genitourinary:  Negative for dysuria, frequency, hematuria and urgency. Musculoskeletal:  Negative for back pain and joint swelling. Skin: Negative. Neurological:  Negative for dizziness, seizures, syncope, light-headedness and headaches. Psychiatric/Behavioral:  Negative for confusion and hallucinations. The patient is not nervous/anxious. Objective:   Physical Exam  Constitutional:       Appearance: Normal appearance. HENT:      Head: Atraumatic. Mouth/Throat:      Mouth: Mucous membranes are moist.   Eyes:      Pupils: Pupils are equal, round, and reactive to light. Cardiovascular:      Rate and Rhythm: Normal rate and regular rhythm. Heart sounds: Normal heart sounds. Pulmonary:      Effort: Pulmonary effort is normal.      Breath sounds: Normal breath sounds. Abdominal:      General: Abdomen is flat. Palpations: Abdomen is soft. Musculoskeletal:         General: Normal range of motion. Right lower leg: No edema. Left lower leg: No edema. Skin:     General: Skin is warm. Capillary Refill: Capillary refill takes less than 2 seconds.    Neurological:

## 2023-06-15 ENCOUNTER — OFFICE VISIT (OUTPATIENT)
Dept: CARDIOTHORACIC SURGERY | Age: 73
End: 2023-06-15

## 2023-06-15 VITALS
HEIGHT: 69 IN | WEIGHT: 164 LBS | SYSTOLIC BLOOD PRESSURE: 137 MMHG | DIASTOLIC BLOOD PRESSURE: 69 MMHG | HEART RATE: 85 BPM | OXYGEN SATURATION: 94 % | BODY MASS INDEX: 24.29 KG/M2

## 2023-06-15 DIAGNOSIS — R91.8 MASS OF UPPER LOBE OF LUNG: Primary | ICD-10-CM

## 2023-06-15 PROCEDURE — 99024 POSTOP FOLLOW-UP VISIT: CPT | Performed by: SURGERY

## 2023-06-19 ASSESSMENT — ENCOUNTER SYMPTOMS
SHORTNESS OF BREATH: 0
COUGH: 0
DIARRHEA: 0
CHEST TIGHTNESS: 0
NAUSEA: 0
BACK PAIN: 0
VOMITING: 0
ABDOMINAL PAIN: 0
CONSTIPATION: 0

## 2023-06-19 NOTE — PROGRESS NOTES
Subjective:      Patient ID: Radha Mistry is a 67 y.o. male. HPI Patient underwent LUlobectomy 1 month ago. He has recovered and is now cleared to return to work. He also has a  RUL lesion that is PET+. This requires intervention either with biopsy or to just treat with XRT. Review of Systems   Constitutional:  Negative for activity change, appetite change, chills, diaphoresis, fatigue, fever and unexpected weight change. HENT:  Negative for dental problem. Eyes:  Negative for visual disturbance. Respiratory:  Negative for cough, chest tightness and shortness of breath. No hoarseness. No hemoptysis. Cardiovascular:  Negative for chest pain, palpitations and leg swelling. No orthopnea or PND. Gastrointestinal:  Negative for abdominal pain, constipation, diarrhea, nausea and vomiting. Genitourinary:  Negative for dysuria, frequency, hematuria and urgency. Musculoskeletal:  Negative for back pain and joint swelling. Skin: Negative. Neurological:  Negative for dizziness, seizures, syncope, light-headedness and headaches. Psychiatric/Behavioral:  Negative for confusion and hallucinations. The patient is not nervous/anxious. Objective:   Physical Exam  Constitutional:       Appearance: Normal appearance. HENT:      Head: Atraumatic. Mouth/Throat:      Mouth: Mucous membranes are moist.   Eyes:      Pupils: Pupils are equal, round, and reactive to light. Cardiovascular:      Rate and Rhythm: Normal rate and regular rhythm. Heart sounds: Normal heart sounds. Pulmonary:      Effort: Pulmonary effort is normal.      Breath sounds: Normal breath sounds. Abdominal:      General: Abdomen is flat. Palpations: Abdomen is soft. Musculoskeletal:         General: Normal range of motion. Right lower leg: No edema. Left lower leg: No edema. Skin:     General: Skin is warm. Capillary Refill: Capillary refill takes less than 2 seconds.

## 2023-07-21 NOTE — PROGRESS NOTES
lung/hilum. Left upper lobe is partially aerated. Remainder of the left lung is collapsed. There is a large  left hydropneumothorax. Pleural air dissects into the anterior chest wall as noted on series 2, image 60. Probable mucous plugging/debris within the left mainstem bronchus (series 2, image 64). Irregular 11 x 8 mm nodule is seen within the right lung apex, similar since the previous study. Pulmonary emphysema. ASSESSMENT:    Orders Placed This Encounter   Procedures    Iron and TIBC     Standing Status:   Future     Number of Occurrences:   1     Standing Expiration Date:   7/24/2024    Folate     Standing Status:   Future     Number of Occurrences:   1     Standing Expiration Date:   7/24/2024    Ferritin     Standing Status:   Future     Number of Occurrences:   1     Standing Expiration Date:   7/24/2024    Haptoglobin     Standing Status:   Future     Number of Occurrences:   1     Standing Expiration Date:   7/24/2024    Lactate Dehydrogenase     Standing Status:   Future     Number of Occurrences:   1     Standing Expiration Date:   7/24/2024    Reticulocytes     Standing Status:   Future     Number of Occurrences:   1     Standing Expiration Date:   7/24/2024    Vitamin B12     Standing Status:   Future     Number of Occurrences:   1     Standing Expiration Date:   7/24/2024    Comprehensive Metabolic Panel    Amb External Referral To Radiation Oncology     Referral Priority:   Routine     Referral Reason:   Specialty Services Required     Referred to Provider:   Padmini Gaitan     Requested Specialty:   Radiology     Number of Visits Requested:   1       Betty Calderon was seen today for follow-up. Diagnoses and all orders for this visit:    Squamous cell carcinoma lung, left ([address])  -     Amb External Referral To Radiation Oncology    Care plan discussed with patient    Anemia, unspecified type  -     Iron and TIBC; Future  -     Folate; Future  -     Ferritin;  Future  -     Haptoglobin; Future  -

## 2023-07-24 ENCOUNTER — OFFICE VISIT (OUTPATIENT)
Dept: HEMATOLOGY | Age: 73
End: 2023-07-24
Payer: MEDICARE

## 2023-07-24 ENCOUNTER — HOSPITAL ENCOUNTER (OUTPATIENT)
Dept: INFUSION THERAPY | Age: 73
Discharge: HOME | End: 2023-07-24
Payer: MEDICARE

## 2023-07-24 VITALS
HEIGHT: 69 IN | HEART RATE: 61 BPM | WEIGHT: 173.2 LBS | OXYGEN SATURATION: 96 % | BODY MASS INDEX: 25.65 KG/M2 | SYSTOLIC BLOOD PRESSURE: 138 MMHG | DIASTOLIC BLOOD PRESSURE: 74 MMHG

## 2023-07-24 DIAGNOSIS — C34.92 SQUAMOUS CELL CARCINOMA LUNG, LEFT (HCC): Primary | ICD-10-CM

## 2023-07-24 DIAGNOSIS — D64.9 NORMOCYTIC ANEMIA: ICD-10-CM

## 2023-07-24 DIAGNOSIS — R91.8 MASS OF UPPER LOBE OF LEFT LUNG: ICD-10-CM

## 2023-07-24 DIAGNOSIS — D48.9 VILLOUS ADENOMA: ICD-10-CM

## 2023-07-24 DIAGNOSIS — Z71.89 CARE PLAN DISCUSSED WITH PATIENT: ICD-10-CM

## 2023-07-24 DIAGNOSIS — D64.9 ANEMIA, UNSPECIFIED TYPE: ICD-10-CM

## 2023-07-24 DIAGNOSIS — D50.8 OTHER IRON DEFICIENCY ANEMIA: ICD-10-CM

## 2023-07-24 DIAGNOSIS — R91.1 NODULE OF UPPER LOBE OF RIGHT LUNG: ICD-10-CM

## 2023-07-24 LAB
ALBUMIN SERPL-MCNC: 4.7 G/DL (ref 3.5–5.2)
ALP SERPL-CCNC: 109 U/L (ref 40–130)
ALT SERPL-CCNC: 15 U/L (ref 21–72)
ANION GAP SERPL CALCULATED.3IONS-SCNC: 16 MMOL/L (ref 7–19)
AST SERPL-CCNC: 17 U/L (ref 17–59)
BILIRUB SERPL-MCNC: 0.3 MG/DL (ref 0.2–1.3)
BUN SERPL-MCNC: 23 MG/DL (ref 9–20)
CALCIUM SERPL-MCNC: 9.9 MG/DL (ref 8.4–10.2)
CHLORIDE SERPL-SCNC: 104 MMOL/L (ref 98–111)
CO2 SERPL-SCNC: 25 MMOL/L (ref 22–29)
CREAT SERPL-MCNC: 1 MG/DL (ref 0.6–1.2)
ERYTHROCYTE [DISTWIDTH] IN BLOOD BY AUTOMATED COUNT: 15.6 % (ref 11.6–14.4)
FERRITIN SERPL-MCNC: 26 NG/ML (ref 30–400)
FOLATE SERPL-MCNC: 15 NG/ML (ref 4.5–32.2)
GLUCOSE SERPL-MCNC: 115 MG/DL (ref 74–106)
HAPTOGLOB SERPL-MCNC: 249 MG/DL (ref 30–200)
HCT VFR BLD AUTO: 34.8 % (ref 40.1–51)
HCT VFR BLD AUTO: 36.4 % (ref 40.1–51)
HGB BLD-MCNC: 10.5 G/DL (ref 13.7–17.5)
IRON SATN MFR SERPL: 12 % (ref 14–50)
IRON SERPL-MCNC: 59 UG/DL (ref 59–158)
LDH SERPL-CCNC: 149 U/L (ref 120–246)
LYMPHOCYTES # BLD: 2.4 K/UL (ref 1.18–3.74)
LYMPHOCYTES NFR BLD: 25.8 % (ref 19.3–53.1)
MCH RBC QN AUTO: 25.2 PG (ref 25.7–32.2)
MCHC RBC AUTO-ENTMCNC: 30.2 G/DL (ref 32.3–36.5)
MCV RBC AUTO: 83.5 FL (ref 79–92.2)
MONOCYTES # BLD: 0.93 K/UL (ref 0.24–0.82)
MONOCYTES NFR BLD: 10 % (ref 4.7–12.5)
NEUTROPHILS # BLD: 5.57 K/UL (ref 1.56–6.13)
NEUTS SEG NFR BLD: 60.1 % (ref 34–71.1)
PLATELET # BLD AUTO: 172 K/UL (ref 163–337)
PMV BLD AUTO: 11.2 FL (ref 7.4–10.4)
POTASSIUM SERPL-SCNC: 4.6 MMOL/L (ref 3.5–5.1)
PROT SERPL-MCNC: 9.1 G/DL (ref 6.3–8.2)
RBC # BLD AUTO: 4.17 M/UL (ref 4.63–6.08)
RETICS # AUTO: 0.07 M/UL (ref 0.03–0.12)
RETICS/RBC NFR: 1.64 % (ref 0.5–1.5)
SODIUM SERPL-SCNC: 145 MMOL/L (ref 137–145)
TIBC SERPL-MCNC: 488 UG/DL (ref 250–400)
VIT B12 SERPL-MCNC: 414 PG/ML (ref 211–946)
WBC # BLD AUTO: 9.29 K/UL (ref 4.23–9.07)

## 2023-07-24 PROCEDURE — 85025 COMPLETE CBC W/AUTO DIFF WBC: CPT

## 2023-07-24 PROCEDURE — 99214 OFFICE O/P EST MOD 30 MIN: CPT | Performed by: INTERNAL MEDICINE

## 2023-07-24 PROCEDURE — 80053 COMPREHEN METABOLIC PANEL: CPT

## 2023-07-24 PROCEDURE — 36415 COLL VENOUS BLD VENIPUNCTURE: CPT | Performed by: INTERNAL MEDICINE

## 2023-07-24 PROCEDURE — 99212 OFFICE O/P EST SF 10 MIN: CPT

## 2023-07-24 PROCEDURE — 36415 COLL VENOUS BLD VENIPUNCTURE: CPT

## 2023-07-24 PROCEDURE — 1123F ACP DISCUSS/DSCN MKR DOCD: CPT | Performed by: INTERNAL MEDICINE

## 2023-07-24 PROCEDURE — 85045 AUTOMATED RETICULOCYTE COUNT: CPT

## 2023-07-24 PROCEDURE — 83615 LACTATE (LD) (LDH) ENZYME: CPT

## 2023-07-25 ENCOUNTER — TELEPHONE (OUTPATIENT)
Dept: RADIATION ONCOLOGY | Facility: HOSPITAL | Age: 73
End: 2023-07-25
Payer: MEDICARE

## 2023-07-31 PROBLEM — C34.92 SQUAMOUS CELL CARCINOMA OF LEFT LUNG: Status: ACTIVE | Noted: 2023-03-30

## 2023-08-07 ENCOUNTER — HOSPITAL ENCOUNTER (OUTPATIENT)
Dept: RADIATION ONCOLOGY | Facility: HOSPITAL | Age: 73
Setting detail: RADIATION/ONCOLOGY SERIES
End: 2023-08-07
Payer: MEDICARE

## 2023-08-07 PROBLEM — Z90.2 S/P LOBECTOMY OF LUNG: Status: ACTIVE | Noted: 2023-08-07

## 2023-08-07 NOTE — PROGRESS NOTES
[facility], .SShaunShaun Ortiz MD      Adam Clancy, APRN  ____________________________________________________________               [facility]  Department of Radiation Oncology  [address] [place] [id number]  Office:  [phone]  Fax: [phone]    DATE: 08/09/2023  PATIENT: Boogie Artis 1950  Medical record #: History reviewed. No pertinent past medical history.                                                       REASON FOR CONSULTATION:   Chief Complaint   Patient presents with    Lung Nodule     Boogie Artis is a 72 y.o. male that has been referred to our clinic to be evaluated for consideration of radiotherapy to the lung. Reports fatigue, cough, and SOB. Denies activity change, appetite change, unexpected weight change, nasuea/vomiting, diarrhea, light-headedness, weakness, and headaches. He follows .            HISTORY OF PRESENT ILLNESS    01/16/2023 - CT Chest - [place]:  Spiculated perihilar left upper lobe pulmonary nodule measures 2.7 cm. This is concerning for primary neoplasm. PET/CT or bronch recommended for further evaluation.   0.8 cm noncalcified pulmonary nodule in the right upper lobe. This is at the lower limits of normal for PET/CT detection. Benign granulomatous disease, metastatic disease, or 2nd primary pulmonary malignancy could be considered.   No enlarged mediastinal or hilar lymph nodes    01/17/2023 - Pulmonary Function Tests - [place]:  Moderate obstructive lung disease    01/26/2023 - Bronchoscopy with biopsy per :  RUL, lower lobe, middle lobe were negative. The left upper lobe, no lesion, lingula no lesion seen.  SAMINA washing:  Negative for malignant cells; reactive bronchial cells, macrophages, and inflammation  SAMINA brushing:  Negative for malignant cells; markedly reactive bronchial cells, macrophages, and inflammation    02/09/2023 - PET Scan:  2.8 cm hypermetabolic central LEFT upper lobe  pulmonary nodule. This likely represents a primary lung neoplasm.  0.8 cm hypermetabolic pulmonary nodule in the RIGHT upper lobe. This is highly suspicious for either a contralateral pulmonary metastasis or second primary lung neoplasm.   No hypermetabolic thoracic lymph nodes. No evidence of hypermetabolic metastatic disease in the neck, abdomen, or pelvis.  1.5 cm hypermetabolic nodule in the RIGHT anterior rectum, highly concerning for underlying rectal polyp. Recommend correlation with colonoscopy/sigmoidoscopy.    02/27/2023 - Colonoscopy:  Rectal polyp, excision:  Villous adenoma, negative for high-grade glandular dysplasia or malignancy.  Colon polyp at 20 cm, biopsy:  Fragments of serrated polyp, favor sessile serrated lesion/polyp.  Colon polyp at 10 cm, biopsy:  Fragments of serrated polyp, favor sessile serrated lesion/polyp.    03/08/2023 - CT Chest without contrast:  Pulmonary emphysema with LEFT hilar/upper lobe lung mass measuring approximately 4 cm in greatest dimension. There is mild nodular airspace disease laterally to this lesion which may represent postobstructive atelectasis or infiltrate.   10 mm irregular nodule within the RIGHT lung apex (series 2, image 101). This is concerning for metastatic lesion. Additional 4 mm noncalcified nodule at the RIGHT lung apex (series 2, image 85).   Evidence of prior granulomatous disease.     03/09/2023 - Bronchoscopy/EBUS per :  Left upper lobe, EBUS (ThinPrep and cell block section):   Squamous cell carcinoma.   Bronchial washing (ThinPrep and cell block section):   No malignant cells identified.   Benign bronchial epithelial cells, and alveolar macrophages.     04/19/2023 -  Left upper lobectomy and lymph node excision per Dr.Robert Irwin:   Lymph node, level 9 lymph node biopsy: Benign fibroadipose tissue.   Lymph node, level 10 posterior hilar lymph node biopsy: 1 lymph node, negative for evidence of malignancy.   Lymph node, level 6 lymph  node biopsy: 2 lymph nodes, negative for evidence of malignancy.   Lymph node, level 11 interlobar lymph node biopsy: 1 lymph node, negative for evidence of malignancy.   Lymph node, level 10 anterior hilar lymph node biopsy: 2 lymph nodes, negative for evidence of malignancy.   Lymph node, level 12 lobar lymph node biopsy: 1 lymph node, negative for evidence of malignancy.   Lung, left upper lobectomy:   Invasive moderately differentiated squamous cell carcinoma.   Invasive carcinoma measures 2.5 cm in greatest dimension grossly.   Invasive carcinoma extends to within 0.2 cm of the nearest bronchial surgical excision margin.   Squamous metaplasia identified at bronchial surgical excision margin.   Emphysematous changes identified involving the nonneoplastic lung parenchyma.   2 peribronchial lymph nodes, negative for evidence of malignancy.   AJCC STAGE:  pT1c, pN0, pMx     04/25/2023 -  CT Chest without contrast:  Interval placement of large bore chest tube with decrease in previously described large left hydropneumothorax.Residual hydropneumothorax present with air dissecting through anterior chest wall into subcutaneous soft tissues, unchanged.   Interval resolution of previously described debris within left bronchus.   Unchanged right apical 1.0 cm nodule.     07/24/2023 - Appointment with :  Left upper lobe SCC [id number], stage I,Jan 2023  -1/16/23 CT chest ([facility]): Spiculated perihilar left upper lobe pulmonary nodule measures 2.7cm. This is concerning for primary neoplasm. PET/CT or bronchoscopy is recommended to further evaluate. 0.8cm noncalcified pulmonary nodule in the right upper lobe. This is at the lower limits of normal for Pet/CT detection. Benign granulomatous disease, metastatic disease, or 2nd primary pulmonary malignancy could be considered. No enlarged mediastinal or hilar lymph nodes.  -1/26/23 Lung, left upper lobe, washing: Negative for malignant cells. Reactive bronchial cells,  macrophages, and inflammation. Lung, left upper lobe, brushing: Negative for malignant cells. Markedly reactive bronchial cells, macrophages, and inflammation.  -2/9/23 PET scan ([facility]): 2.8 cm hypermetabolic central LEFT upper lobe pulmonary nodule. This likely represents a primary lung neoplasm. 0.8 cm hypermetabolic pulmonary nodule in the RIGHT upper lobe. This is highly suspicious for either a contralateral pulmonary metastasis or second primary lung neoplasm. No hypermetabolic thoracic lymph nodes. No evidence of hypermetabolic metastatic disease in the neck, abdomen, or pelvis. 1.5 cm hypermetabolic nodule in the RIGHT anterior rectum, highly concerning for underlying rectal polyp. Recommend correlation with colonoscopy/sigmoidoscopy.   4/19/23 Lung, left upper lobectomy: Invasive moderately differentiated squamous cell carcinoma. Invasive carcinoma measures 2.5 cm in greatest dimension grossly. Invasive carcinoma extends to within 0.2 cm of the nearest bronchial surgical excision margin. Squamous metaplasia identified at bronchial surgical excision margin. Emphysematous changes identified involving the nonneoplastic lung parenchyma. 2 peribronchial lymph nodes, negative for evidence of malignancy.Lymph node, level 9 lymph node biopsy: Benign fibroadipose tissue. Lymph node, level 10 posterior hilar lymph node biopsy: 1 lymph node, negative for evidence of malignancy. Lymph node, level 6 lymph node biopsy: 2 lymph nodes, negative for evidence of malignancy. Lymph node, level 11 interlobar lymph node biopsy: 1 lymph node, negative for evidence of malignancy. Lymph node, level 10 anterior hilar lymph node biopsy: 2 lymph nodes, negative for evidence of malignancy. Lymph node, level 12 lobar lymph node biopsy: 1 lymph node, negative for evidence of malignancy. [id number], stage I  Plan:  -Repeat CT chest Nov 2023  RUL subcentimeter nodule (PET+)  -2/9/23 PET scan ([facility]): 2.8 cm hypermetabolic central LEFT upper lobe  pulmonary nodule. This likely represents a primary lung neoplasm. 0.8 cm hypermetabolic pulmonary nodule in the RIGHT upper lobe. This is highly suspicious for either a contralateral pulmonary metastasis or second primary lung neoplasm. No hypermetabolic thoracic lymph nodes. No evidence of hypermetabolic metastatic disease in the neck, abdomen, or pelvis. 1.5 cm hypermetabolic nodule in the RIGHT anterior rectum, highly concerning for underlying rectal polyp. Recommend correlation with colonoscopy/sigmoidoscopy.   -Referral Dr Ortiz for consideration SBRT RUL hypermetabolic nodule.  PLAN:  RTC with MD 2 months in [place] office  CBC, CMP, B12, Folate, LDH, Haptoglobin, iron profile, Ferritin, retic  Continue follow-up with Dr.Robert Irwin/CardioThoracic Surgery  Continue to follow up with Dr. Rowe  Iron Sulfate 325 mg p.o. twice daily  Follow Up:  Return in 2 months (on 2023) for Appointment with Dr. Vaughan in [place].  Refer to Dr Ortiz     History obtained from  PATIENT, FAMILY, and CHART    PAST MEDICAL HISTORY  History reviewed. No pertinent past medical history.     PAST SURGICAL HISTORY  Past Surgical History:   Procedure Laterality Date    APPENDECTOMY      BRONCHOSCOPY      COLONOSCOPY  2023    HERNIA REPAIR      LUNG SURGERY Left 2023        FAMILY HISTORY  family history includes Alcohol abuse in his brother; Cancer in his brother, father, and paternal grandfather; Diabetes in his mother; Heart disease in his mother.    SOCIAL HISTORY  Social History     Tobacco Use    Smoking status: Former     Packs/day: 2.00     Years: 56.00     Pack years: 112.00     Types: Cigarettes     Start date:      Quit date: 3/17/2023     Years since quittin.4    Smokeless tobacco: Never   Substance Use Topics    Alcohol use: Not Currently    Drug use: Never        ALLERGIES  Iodides     MEDICATIONS  Current Outpatient Medications   Medication Sig Dispense Refill    Cholecalciferol 125 MCG  (5000 UT) tablet Take 1 tablet by mouth Daily.      divalproex (DEPAKOTE ER) 250 MG 24 hr tablet Take 1 tablet by mouth 2 (Two) Times a Day.      ferrous sulfate 324 (65 Fe) MG tablet delayed-release EC tablet Take 1 tablet by mouth Daily With Breakfast.      glimepiride (AMARYL) 4 MG tablet Take 1 tablet by mouth Daily.      hydroCHLOROthiazide (HYDRODIURIL) 25 MG tablet Take 1 tablet by mouth Daily.      lisinopril (PRINIVIL,ZESTRIL) 10 MG tablet Take 1 tablet by mouth Daily.      loratadine (CLARITIN) 10 MG tablet Take 1 tablet by mouth Daily.      metFORMIN (GLUCOPHAGE) 500 MG tablet Take 1 tablet by mouth 2 (Two) Times a Day.      metoprolol succinate XL (TOPROL-XL) 25 MG 24 hr tablet Take 1 tablet by mouth Daily.      pantoprazole (PROTONIX) 40 MG EC tablet Take 1 tablet by mouth 2 (Two) Times a Day.      primidone (MYSOLINE) 50 MG tablet Take 1 tablet by mouth 2 (Two) Times a Day.      simvastatin (ZOCOR) 40 MG tablet Take 1 tablet by mouth Daily.       No current facility-administered medications for this visit.       The following portions of the patient's history were reviewed and updated as appropriate: allergies, current medications, past family history, past medical history, past social history, past surgical history and problem list.    REVIEW OF SYSTEMS  Review of Systems   Constitutional:  Positive for fatigue. Negative for appetite change and unexpected weight change.   HENT:  Negative.     Respiratory:  Positive for cough and shortness of breath. Negative for hemoptysis.    Cardiovascular: Negative.  Negative for chest pain.   Gastrointestinal: Negative.    Genitourinary: Negative.     Musculoskeletal: Negative.    Skin: Negative.    Neurological: Negative.  Negative for dizziness and headaches.   Hematological: Negative.  Negative for adenopathy.   Psychiatric/Behavioral: Negative.     All other systems reviewed and are negative.    I have reviewed and confirmed the accuracy of the ROS as  "documented by the MA/LPN/RN Zacarias Ortiz III, MD    PHYSICAL EXAM  VITAL SIGNS:   Vitals:    08/09/23 0936   BP: 132/74   Pulse: 81   Resp: 24   SpO2: 97%   Weight: 88.9 kg (196 lb)   Height: 175.3 cm (69\")   PainSc:   5   PainLoc: Chest  Comment: Left chest/ post thoracotomy       Physical Exam  Vitals reviewed.   Constitutional:       Appearance: Normal appearance.   HENT:      Head: Normocephalic.   Cardiovascular:      Rate and Rhythm: Normal rate and regular rhythm.      Pulses: Normal pulses.      Heart sounds: Normal heart sounds.   Pulmonary:      Effort: Pulmonary effort is normal. No respiratory distress.      Breath sounds: Normal breath sounds.   Abdominal:      General: Bowel sounds are normal.   Musculoskeletal:         General: Normal range of motion.      Cervical back: Normal range of motion and neck supple.   Lymphadenopathy:      Cervical: No cervical adenopathy.   Skin:     General: Skin is warm.      Capillary Refill: Capillary refill takes less than 2 seconds.   Neurological:      General: No focal deficit present.      Mental Status: He is alert and oriented to person, place, and time.   Psychiatric:         Mood and Affect: Mood normal.         Behavior: Behavior normal.         Performance Status: ECOG (1) Restricted in physically strenuous activity, ambulatory and able to do work of light nature    Clinical Quality Measures  -Pain Documented by Standardized Tool, FPS Boogie Artis reports a pain score of 5.  Given his pain assessment as noted, treatment options were discussed and the following options were decided upon as a follow-up plan to address the patient's pain: continuation of current treatment plan for pain and use of non-medical modalities (ice, heat, stretching and/or behavior modifications).     Pain Medications               divalproex (DEPAKOTE ER) 250 MG 24 hr tablet Take 1 tablet by mouth 2 (Two) Times a Day.    primidone (MYSOLINE) 50 MG tablet Take 1 tablet by mouth 2 " (Two) Times a Day.          -Advanced Care Planning Advance Care Planning   ACP discussion was held with the patient during this visit. Patient does not have an advance directive, information provided.     -Body Mass Index Screening and Follow-Up Plan BMI is >= 25 and <30. (Overweight) The following options were offered after discussion;: none (medical contraindication)    -Tobacco Use: Screening and Cessation Intervention Social History    Tobacco Use      Smoking status: Former        Packs/day: 2.00        Years: 56.00        Pack years: 112        Types: Cigarettes        Start date:         Quit date: 3/17/2023        Years since quittin.4      Smokeless tobacco: Never    ASSESSMENT AND PLAN  1. Squamous cell carcinoma of left lung    2. S/P lobectomy of lung    3. Lung nodule      Orders Placed This Encounter   Procedures    NM PET/CT Skull Base to Mid Thigh     RECOMMENDATIONS: Boogie Artis was diagnosed with a PET+ neoplasm of the lung, RUL.    The indications and rationale of lung stereotactic/external beam radiation therapy according to the NCCN Guidelines has been discussed today. I have extensively reviewed the risks, benefits and alternatives of therapy with this diagnosis. The risks of radiation therapy includes but is not limited to radiation induced pulmonary fibrosis, progression of disease in spite of therapy with either local or systemic failure. I have seen, examined and reviewed this patient's medication list, appropriate labs and imaging studies as well as other physician notes. We discussed the goals and plans of care with the patient and family and answered all questions.     He has not had imaging since April. Following this discussion and in consideration of the diagnostic data/evaluation of the patient, I recommended obtaining a PET scan for further evaluation. He will return in 2 weeks for further treatment recommendations.     Continue ongoing management per primary care  physician and other specialists. Thank you for allowing me to assist in this patients care.     Patient Instructions   1) will order PET scan, they will call you  2) Return in 2 weeks for further discussion    Time Spent: I spent 54 minutes caring for Boogie on this date of service. This time includes time spent by me in the following activities: preparing for the visit, reviewing tests, obtaining and/or reviewing a separately obtained history, performing a medically appropriate examination and/or evaluation, counseling and educating the patient/family/caregiver, ordering medications, tests, or procedures, referring and communicating with other health care professionals, documenting information in the medical record, independently interpreting results and communicating that information with the patient/family/caregiver, and care coordination.     Zacarias Ortiz III, MD  08/09/2023

## 2023-08-09 ENCOUNTER — DOCUMENTATION (OUTPATIENT)
Dept: RADIATION ONCOLOGY | Facility: HOSPITAL | Age: 73
End: 2023-08-09
Payer: MEDICARE

## 2023-08-09 ENCOUNTER — CONSULT (OUTPATIENT)
Dept: RADIATION ONCOLOGY | Facility: HOSPITAL | Age: 73
End: 2023-08-09
Payer: MEDICARE

## 2023-08-09 VITALS
OXYGEN SATURATION: 97 % | HEIGHT: 69 IN | HEART RATE: 81 BPM | SYSTOLIC BLOOD PRESSURE: 132 MMHG | BODY MASS INDEX: 29.03 KG/M2 | RESPIRATION RATE: 24 BRPM | WEIGHT: 196 LBS | DIASTOLIC BLOOD PRESSURE: 74 MMHG

## 2023-08-09 DIAGNOSIS — C34.92 SQUAMOUS CELL CARCINOMA OF LEFT LUNG: Primary | ICD-10-CM

## 2023-08-09 DIAGNOSIS — R91.1 LUNG NODULE: ICD-10-CM

## 2023-08-09 DIAGNOSIS — Z90.2 S/P LOBECTOMY OF LUNG: ICD-10-CM

## 2023-08-09 PROCEDURE — G0463 HOSPITAL OUTPT CLINIC VISIT: HCPCS | Performed by: RADIOLOGY

## 2023-08-09 RX ORDER — FERROUS SULFATE TAB EC 324 MG (65 MG FE EQUIVALENT) 324 (65 FE) MG
324 TABLET DELAYED RESPONSE ORAL
COMMUNITY

## 2023-08-09 RX ORDER — LORATADINE 10 MG/1
1 TABLET ORAL DAILY
COMMUNITY

## 2023-08-09 RX ORDER — PANTOPRAZOLE SODIUM 40 MG/1
1 TABLET, DELAYED RELEASE ORAL 2 TIMES DAILY
COMMUNITY
Start: 2023-08-04

## 2023-08-09 RX ORDER — DIVALPROEX SODIUM 250 MG/1
250 TABLET, EXTENDED RELEASE ORAL 2 TIMES DAILY
COMMUNITY

## 2023-08-09 RX ORDER — HYDROCHLOROTHIAZIDE 25 MG/1
1 TABLET ORAL DAILY
COMMUNITY

## 2023-08-09 RX ORDER — PRIMIDONE 50 MG/1
1 TABLET ORAL 2 TIMES DAILY
COMMUNITY
Start: 2023-03-14

## 2023-08-09 RX ORDER — SIMVASTATIN 40 MG
40 TABLET ORAL DAILY
COMMUNITY

## 2023-08-09 RX ORDER — LISINOPRIL 10 MG/1
10 TABLET ORAL DAILY
COMMUNITY

## 2023-08-09 RX ORDER — GLIMEPIRIDE 4 MG/1
1 TABLET ORAL DAILY
COMMUNITY

## 2023-08-09 RX ORDER — METOPROLOL SUCCINATE 25 MG/1
25 TABLET, EXTENDED RELEASE ORAL DAILY
COMMUNITY

## 2023-08-09 NOTE — PROGRESS NOTES
"SANDRO met with Mr. Artis who is here for a radiation consultation for squamous cell carcinoma lung, left. SANDRO introduced self and explained role and source of support. He is 72 years old and lives with his wife. His support system includes his wife and two daughters. Mr. Artis states he currently works two, 12hour days at the [employer] as a . He has been working there for three years but states he also worked there for about 10 years in the 80's. Mr. Artis states he gets fatigued very easily. He is feeling nervous with the possibility of radiation due to what "others have told him." Emotional support provided and encouraged him to express his feelings. He does not take any medication for anxiety/depression, and he does not see a counselor. SANDRO encouraged him to call if assistance is needed in the future.   "

## 2023-08-14 ENCOUNTER — HOSPITAL ENCOUNTER (OUTPATIENT)
Dept: CT IMAGING | Facility: HOSPITAL | Age: 73
Discharge: HOME | End: 2023-08-14
Payer: MEDICARE

## 2023-08-14 DIAGNOSIS — Z90.2 S/P LOBECTOMY OF LUNG: ICD-10-CM

## 2023-08-14 DIAGNOSIS — R91.1 LUNG NODULE: ICD-10-CM

## 2023-08-14 DIAGNOSIS — C34.92 SQUAMOUS CELL CARCINOMA OF LEFT LUNG: ICD-10-CM

## 2023-08-14 PROCEDURE — A9552 F18 FDG: HCPCS

## 2023-08-14 PROCEDURE — 0 FLUDEOXYGLUCOSE F18 SOLUTION

## 2023-08-14 PROCEDURE — 78815 PET IMAGE W/CT SKULL-THIGH: CPT

## 2023-08-14 RX ADMIN — FLUDEOXYGLUCOSE F18 1 DOSE: 300 INJECTION INTRAVENOUS at 13:39

## 2023-08-15 PROBLEM — Z87.891 FORMER SMOKER: Status: ACTIVE | Noted: 2023-08-15

## 2023-08-16 ENCOUNTER — LAB (OUTPATIENT)
Dept: LAB | Facility: HOSPITAL | Age: 73
End: 2023-08-16
Payer: MEDICARE

## 2023-08-16 ENCOUNTER — OFFICE VISIT (OUTPATIENT)
Dept: RADIATION ONCOLOGY | Facility: HOSPITAL | Age: 73
End: 2023-08-16
Payer: MEDICARE

## 2023-08-16 ENCOUNTER — HOSPITAL ENCOUNTER (OUTPATIENT)
Dept: RADIATION ONCOLOGY | Facility: HOSPITAL | Age: 73
Discharge: HOME | End: 2023-08-16
Payer: MEDICARE

## 2023-08-16 VITALS
DIASTOLIC BLOOD PRESSURE: 59 MMHG | HEART RATE: 89 BPM | BODY MASS INDEX: 25.46 KG/M2 | OXYGEN SATURATION: 95 % | HEIGHT: 69 IN | SYSTOLIC BLOOD PRESSURE: 137 MMHG | WEIGHT: 171.9 LBS

## 2023-08-16 DIAGNOSIS — Z90.2 S/P LOBECTOMY OF LUNG: ICD-10-CM

## 2023-08-16 DIAGNOSIS — C34.92 SQUAMOUS CELL CARCINOMA OF LEFT LUNG: ICD-10-CM

## 2023-08-16 DIAGNOSIS — Z87.891 FORMER SMOKER: ICD-10-CM

## 2023-08-16 DIAGNOSIS — N40.2 PROSTATE NODULE: Primary | ICD-10-CM

## 2023-08-16 DIAGNOSIS — N40.2 PROSTATE NODULE: ICD-10-CM

## 2023-08-16 PROCEDURE — 84153 ASSAY OF PSA TOTAL: CPT

## 2023-08-16 PROCEDURE — 77334 RADIATION TREATMENT AID(S): CPT | Performed by: RADIOLOGY

## 2023-08-16 PROCEDURE — G0463 HOSPITAL OUTPT CLINIC VISIT: HCPCS | Performed by: RADIOLOGY

## 2023-08-16 PROCEDURE — 36415 COLL VENOUS BLD VENIPUNCTURE: CPT

## 2023-08-16 NOTE — PROGRESS NOTES
[facility] MD      Adam Clancy APRN  ____________________________________________________________  [facility]  Department of Radiation Oncology  [address] [place] [id number]  Office:  [phone]  Fax: [phone]    DATE:  08/16/2023  PATIENT: Boogie Artis 1950                                 MEDICAL RECORD #: [id number]    Reason for Follow up Visit:  Boogie Artis s a very pleasant 72 y.o. patient that returns to the clinic today for further radiotherapy recommendations regarding a PET+ neoplasm of the lung, RUL. Denies activity change, appetite change, unexpected weight change, nasuea/vomiting, diarrhea, light-headedness, weakness, and headaches. He follows      History of Present Illness  Diagnosed with a PET+ neoplasm of the lung, RUL.    01/16/2023 - CT Chest - [facility]:  Spiculated perihilar left upper lobe pulmonary nodule measures 2.7 cm. This is concerning for primary neoplasm. PET/CT or bronch recommended for further evaluation.   0.8 cm noncalcified pulmonary nodule in the right upper lobe. This is at the lower limits of normal for PET/CT detection. Benign granulomatous disease, metastatic disease, or 2nd primary pulmonary malignancy could be considered.   No enlarged mediastinal or hilar lymph nodes    01/17/2023 - Pulmonary Function Tests - [facility]:  Moderate obstructive lung disease    01/26/2023 - Bronchoscopy with biopsy per :  RUL, lower lobe, middle lobe were negative. The left upper lobe, no lesion, lingula no lesion seen.  SAMINA washing:  Negative for malignant cells; reactive bronchial cells, macrophages, and inflammation  SAMINA brushing:  Negative for malignant cells; markedly reactive bronchial cells, macrophages, and inflammation    02/09/2023 - PET Scan:  2.8 cm hypermetabolic central LEFT upper lobe pulmonary nodule. This likely represents a primary lung neoplasm.  0.8 cm hypermetabolic  pulmonary nodule in the RIGHT upper lobe. This is highly suspicious for either a contralateral pulmonary metastasis or second primary lung neoplasm.   No hypermetabolic thoracic lymph nodes. No evidence of hypermetabolic metastatic disease in the neck, abdomen, or pelvis.  1.5 cm hypermetabolic nodule in the RIGHT anterior rectum, highly concerning for underlying rectal polyp. Recommend correlation with colonoscopy/sigmoidoscopy.    02/27/2023 - Colonoscopy:  Rectal polyp, excision:  Villous adenoma, negative for high-grade glandular dysplasia or malignancy.  Colon polyp at 20 cm, biopsy:  Fragments of serrated polyp, favor sessile serrated lesion/polyp.  Colon polyp at 10 cm, biopsy:  Fragments of serrated polyp, favor sessile serrated lesion/polyp.    03/08/2023 - CT Chest without contrast:  Pulmonary emphysema with LEFT hilar/upper lobe lung mass measuring approximately 4 cm in greatest dimension. There is mild nodular airspace disease laterally to this lesion which may represent postobstructive atelectasis or infiltrate.   10 mm irregular nodule within the RIGHT lung apex (series 2, image 101). This is concerning for metastatic lesion. Additional 4 mm noncalcified nodule at the RIGHT lung apex (series 2, image 85).   Evidence of prior granulomatous disease.     03/09/2023 - Bronchoscopy/EBUS per :  Left upper lobe, EBUS (ThinPrep and cell block section):   Squamous cell carcinoma.   Bronchial washing (ThinPrep and cell block section):   No malignant cells identified.   Benign bronchial epithelial cells, and alveolar macrophages.     04/19/2023 -  Left upper lobectomy and lymph node excision per Dr.Robert Irwin:   Lymph node, level 9 lymph node biopsy: Benign fibroadipose tissue.   Lymph node, level 10 posterior hilar lymph node biopsy: 1 lymph node, negative for evidence of malignancy.   Lymph node, level 6 lymph node biopsy: 2 lymph nodes, negative for evidence of malignancy.   Lymph node, level 11  interlobar lymph node biopsy: 1 lymph node, negative for evidence of malignancy.   Lymph node, level 10 anterior hilar lymph node biopsy: 2 lymph nodes, negative for evidence of malignancy.   Lymph node, level 12 lobar lymph node biopsy: 1 lymph node, negative for evidence of malignancy.   Lung, left upper lobectomy:   Invasive moderately differentiated squamous cell carcinoma.   Invasive carcinoma measures 2.5 cm in greatest dimension grossly.   Invasive carcinoma extends to within 0.2 cm of the nearest bronchial surgical excision margin.   Squamous metaplasia identified at bronchial surgical excision margin.   Emphysematous changes identified involving the nonneoplastic lung parenchyma.   2 peribronchial lymph nodes, negative for evidence of malignancy.   AJCC STAGE:  pT1c, pN0, pMx     04/25/2023 -  CT Chest without contrast:  Interval placement of large bore chest tube with decrease in previously described large left hydropneumothorax.Residual hydropneumothorax present with air dissecting through anterior chest wall into subcutaneous soft tissues, unchanged.   Interval resolution of previously described debris within left bronchus.   Unchanged right apical 1.0 cm nodule.     07/24/2023 - Appointment with :  Left upper lobe SCC [id number], stage I,Jan 2023  -1/16/23 CT chest ([facility]): Spiculated perihilar left upper lobe pulmonary nodule measures 2.7cm. This is concerning for primary neoplasm. PET/CT or bronchoscopy is recommended to further evaluate. 0.8cm noncalcified pulmonary nodule in the right upper lobe. This is at the lower limits of normal for Pet/CT detection. Benign granulomatous disease, metastatic disease, or 2nd primary pulmonary malignancy could be considered. No enlarged mediastinal or hilar lymph nodes.  -1/26/23 Lung, left upper lobe, washing: Negative for malignant cells. Reactive bronchial cells, macrophages, and inflammation. Lung, left upper lobe, brushing: Negative for malignant  cells. Markedly reactive bronchial cells, macrophages, and inflammation.  -2/9/23 PET scan ([facility]): 2.8 cm hypermetabolic central LEFT upper lobe pulmonary nodule. This likely represents a primary lung neoplasm. 0.8 cm hypermetabolic pulmonary nodule in the RIGHT upper lobe. This is highly suspicious for either a contralateral pulmonary metastasis or second primary lung neoplasm. No hypermetabolic thoracic lymph nodes. No evidence of hypermetabolic metastatic disease in the neck, abdomen, or pelvis. 1.5 cm hypermetabolic nodule in the RIGHT anterior rectum, highly concerning for underlying rectal polyp. Recommend correlation with colonoscopy/sigmoidoscopy.   4/19/23 Lung, left upper lobectomy: Invasive moderately differentiated squamous cell carcinoma. Invasive carcinoma measures 2.5 cm in greatest dimension grossly. Invasive carcinoma extends to within 0.2 cm of the nearest bronchial surgical excision margin. Squamous metaplasia identified at bronchial surgical excision margin. Emphysematous changes identified involving the nonneoplastic lung parenchyma. 2 peribronchial lymph nodes, negative for evidence of malignancy.Lymph node, level 9 lymph node biopsy: Benign fibroadipose tissue. Lymph node, level 10 posterior hilar lymph node biopsy: 1 lymph node, negative for evidence of malignancy. Lymph node, level 6 lymph node biopsy: 2 lymph nodes, negative for evidence of malignancy. Lymph node, level 11 interlobar lymph node biopsy: 1 lymph node, negative for evidence of malignancy. Lymph node, level 10 anterior hilar lymph node biopsy: 2 lymph nodes, negative for evidence of malignancy. Lymph node, level 12 lobar lymph node biopsy: 1 lymph node, negative for evidence of malignancy. [id number], stage I  Plan:  -Repeat CT chest Nov 2023  RUL subcentimeter nodule (PET+)  -2/9/23 PET scan ([facility]): 2.8 cm hypermetabolic central LEFT upper lobe pulmonary nodule. This likely represents a primary lung neoplasm. 0.8 cm hypermetabolic  pulmonary nodule in the RIGHT upper lobe. This is highly suspicious for either a contralateral pulmonary metastasis or second primary lung neoplasm. No hypermetabolic thoracic lymph nodes. No evidence of hypermetabolic metastatic disease in the neck, abdomen, or pelvis. 1.5 cm hypermetabolic nodule in the RIGHT anterior rectum, highly concerning for underlying rectal polyp. Recommend correlation with colonoscopy/sigmoidoscopy.   -Referral Dr Ortiz for consideration SBRT RUL hypermetabolic nodule.  PLAN:  RTC with MD 2 months in [place] office  CBC, CMP, B12, Folate, LDH, Haptoglobin, iron profile, Ferritin, retic  Continue follow-up with Dr.Robert Irwin/CardioThoracic Surgery  Continue to follow up with Dr. Rowe  Iron Sulfate 325 mg p.o. twice daily  Follow Up:  Return in 2 months (on 9/24/2023) for Appointment with Dr. Vaughan in [place].  Refer to Dr Ortiz     08/09/2023 - Appointment with :  will order PET scan, they will call you  Return in 2 weeks for further discussion    08/14/2023 - PET Scan:  Abnormal PET/CT, there is increased size of a hypermetabolic pulmonary nodule in the right upper lobe lung apex that measures 13 mm, compared with 8 mm on the previous PET/CT. This has a maximum SUV of 8.1. This is concerning for active neoplasm.  Interval resection of the left upper lobe with posttreatment changes along the medial margin of the upper mediastinum, including mild infiltration of the mediastinal fat planes.  There is a subcutaneous nodule with surrounding fatty infiltration over the low back at the level of the upper sacrum which demonstrates hypermetabolism. This could be inflammatory, less likely a metastatic nodule. This measures 21 mm, follow-up ultrasound is recommended to determine if this is solid. Ultrasound-guided biopsy could be performed if indicated.  Interval resolution of the hypermetabolic nodule associated with the right rectum, however there is a new hypermetabolic nodule  that maps to the right prostate gland. Correlation with PSA values is recommended.      History obtained from  PATIENT and CHART    PAST MEDICAL HISTORY  History reviewed. No pertinent past medical history.     PAST SURGICAL HISTORY  Past Surgical History:   Procedure Laterality Date    APPENDECTOMY      BRONCHOSCOPY      COLONOSCOPY  2023    HERNIA REPAIR      LUNG SURGERY Left 2023      FAMILY HISTORY  family history includes Alcohol abuse in his brother; Cancer in his brother, father, and paternal grandfather; Diabetes in his mother; Heart disease in his mother.    SOCIAL HISTORY  Social History     Tobacco Use    Smoking status: Former     Packs/day: 2.00     Years: 56.00     Pack years: 112.00     Types: Cigarettes     Start date:      Quit date: 3/17/2023     Years since quittin.4    Smokeless tobacco: Never   Substance Use Topics    Alcohol use: Not Currently    Drug use: Never      Iodides     MEDICATIONS  Current Outpatient Medications   Medication Sig Dispense Refill    Cholecalciferol 125 MCG (5000 UT) tablet Take 1 tablet by mouth Daily.      divalproex (DEPAKOTE ER) 250 MG 24 hr tablet Take 1 tablet by mouth 2 (Two) Times a Day.      ferrous sulfate 324 (65 Fe) MG tablet delayed-release EC tablet Take 1 tablet by mouth Daily With Breakfast.      glimepiride (AMARYL) 4 MG tablet Take 1 tablet by mouth Daily.      hydroCHLOROthiazide (HYDRODIURIL) 25 MG tablet Take 1 tablet by mouth Daily.      lisinopril (PRINIVIL,ZESTRIL) 10 MG tablet Take 1 tablet by mouth Daily.      loratadine (CLARITIN) 10 MG tablet Take 1 tablet by mouth Daily.      metFORMIN (GLUCOPHAGE) 500 MG tablet Take 1 tablet by mouth 2 (Two) Times a Day.      metoprolol succinate XL (TOPROL-XL) 25 MG 24 hr tablet Take 1 tablet by mouth Daily.      pantoprazole (PROTONIX) 40 MG EC tablet Take 1 tablet by mouth 2 (Two) Times a Day.      primidone (MYSOLINE) 50 MG tablet Take 1 tablet by mouth 2 (Two) Times a Day.    "   simvastatin (ZOCOR) 40 MG tablet Take 1 tablet by mouth Daily.       No current facility-administered medications for this visit.      Current outpatient and discharge medications have been reconciled for the patient.  Reviewed by: Zacarias Ortiz III, MD    The following portions of the patient's history were reviewed and updated as appropriate: allergies, current medications, past family history, past medical history, past social history, past surgical history and problem list.    REVIEW OF SYSTEMS  Review of Systems   Constitutional: Negative.  Negative for activity change and fatigue.   HENT: Negative.  Negative for trouble swallowing.    Respiratory: Negative.  Negative for cough and shortness of breath.    Cardiovascular: Negative.  Negative for chest pain.   Gastrointestinal: Negative.    Genitourinary: Negative.    Musculoskeletal: Negative.    Skin: Negative.    Neurological: Negative.  Negative for dizziness and weakness.   Hematological: Negative.  Negative for adenopathy.   Psychiatric/Behavioral: Negative.     All other systems reviewed and are negative.    PHYSICAL EXAM  VITAL SIGNS:   Vitals:    08/16/23 1327   BP: 137/59   Pulse: 89   SpO2: 95%  Comment: room air   Weight: 78 kg (171 lb 14.4 oz)   Height: 175.3 cm (69\")   PainSc: 0-No pain     Physical Exam  Vitals reviewed.   Constitutional:       Appearance: Normal appearance.   HENT:      Head: Normocephalic.   Cardiovascular:      Rate and Rhythm: Normal rate and regular rhythm.      Pulses: Normal pulses.      Heart sounds: Normal heart sounds.   Pulmonary:      Effort: Pulmonary effort is normal.      Breath sounds: Normal breath sounds.   Abdominal:      General: Bowel sounds are normal.   Musculoskeletal:         General: Normal range of motion.      Cervical back: Normal range of motion and neck supple.   Skin:     General: Skin is warm.      Capillary Refill: Capillary refill takes less than 2 seconds.   Neurological:      General: No " focal deficit present.      Mental Status: He is alert and oriented to person, place, and time.   Psychiatric:         Mood and Affect: Mood normal.         Behavior: Behavior normal.       Performance Status: ECOG (0) Fully active, able to carry on all predisease performance without restriction    Clinical Quality Measures  -Pain Documented  Boogie Artis reports a pain score of 0.  Given his pain assessment as noted, treatment options were discussed and the following options were decided upon as a follow-up plan to address the patient's pain:  No pain, no plan given .  Pain Medications               divalproex (DEPAKOTE ER) 250 MG 24 hr tablet Take 1 tablet by mouth 2 (Two) Times a Day.    primidone (MYSOLINE) 50 MG tablet Take 1 tablet by mouth 2 (Two) Times a Day.          -Advanced Care Planning Advance Care Planning   ACP discussion was held with the patient during this visit. Patient does not have an advance directive, information provided.    -Body Mass Index Screening and Follow-Up Plan  BMI is >= 25 and <30. (Overweight) The following options were offered after discussion;: none (medical contraindication)    -Tobacco Use: Screening and Cessation Intervention Social History    Tobacco Use      Smoking status: Former        Packs/day: 2.00        Years: 56.00        Pack years: 112        Types: Cigarettes        Start date:         Quit date: 3/17/2023        Years since quittin.4      Smokeless tobacco: Never    ASSESSMENT AND PLAN  1. Prostate nodule    2. Squamous cell carcinoma of left lung    3. S/P lobectomy of lung    4. Former smoker      Orders Placed This Encounter   Procedures    PSA Diagnostic     Standing Status:   Future     Number of Occurrences:   1     Standing Expiration Date:   2024     Order Specific Question:   Release to patient     Answer:   Routine Release [[id number]]     RECOMMENDATIONS: Boogie Artis returns to the clinic today for further radiotherapy  recommendations regarding a PET+ neoplasm of the lung, RUL.     Pet scan on 8/15/2023 revealed abnormal PET/CT, there is increased size of a hypermetabolic pulmonary nodule in the right upper lobe lung apex that measures 13 mm, compared with 8 mm on the previous PET/CT. This has a maximum SUV of 8.1. This is concerning for active neoplasm. Interval resection of the left upper lobe with posttreatment changes along the medial margin of the upper mediastinum, including mild infiltration of the mediastinal fat planes. There is a subcutaneous nodule with surrounding fatty infiltration over the low back at the level of the upper sacrum which demonstrates hypermetabolism. This could be inflammatory, less likely a metastatic nodule. This measures 21 mm, follow-up ultrasound is recommended to determine if this is solid. Ultrasound-guided biopsy could be performed if indicated. Interval resolution of the hypermetabolic nodule associated with the right rectum, however there is a new hypermetabolic nodule that maps to the right prostate gland. Correlation with PSA values is recommended.    I have recommended to treat the right lung nodule with SBRT, I anticipate a course over 4-5 fractions, final course pending.     He does have a visible insect bite on his low back and I do believe this correlates with the PET findings of activity on the upper sacrum. Will order PSA for prostate gland findings. He has not had one drawn in some time.     Patient Instructions   1) Plan on 4-5 pinpoint treatments to the lung nodule.   2) We will call you when to start treatments.   3) PSA today    Todays appointment time was spent in counseling, coordination of care and surveillance related to patients diagnosis as well as radiation therapy possible and probable after effects.   Zacarias Ortiz III, MD  08/16/2023

## 2023-08-16 NOTE — PATIENT INSTRUCTIONS
1) Plan on 4-5 pinpoint treatments to the lung nodule.   2) We will call you when to start treatments.   3) PSA today

## 2023-08-17 LAB — PSA SERPL-MCNC: 2.9 NG/ML (ref 0–4)

## 2023-08-18 ENCOUNTER — DOCUMENTATION (OUTPATIENT)
Dept: RADIATION ONCOLOGY | Facility: HOSPITAL | Age: 73
End: 2023-08-18
Payer: MEDICARE

## 2023-08-18 DIAGNOSIS — N40.2 PROSTATE NODULE: Primary | ICD-10-CM

## 2023-08-18 NOTE — PROGRESS NOTES
I spoke on the phone with this patient's wife Sarah and discussed his PSA of 2.9. This is considered a normal level, however on recent PET scan imaging for a lung nodule workup, a nodule within the prostate was noted. I will refer him to urology for further management recommendations.

## 2023-08-29 PROCEDURE — 77293 RESPIRATOR MOTION MGMT SIMUL: CPT | Performed by: RADIOLOGY

## 2023-08-29 PROCEDURE — 77338 DESIGN MLC DEVICE FOR IMRT: CPT | Performed by: RADIOLOGY

## 2023-08-29 PROCEDURE — 77300 RADIATION THERAPY DOSE PLAN: CPT | Performed by: RADIOLOGY

## 2023-08-29 PROCEDURE — 77301 RADIOTHERAPY DOSE PLAN IMRT: CPT | Performed by: RADIOLOGY

## 2023-09-01 ENCOUNTER — HOSPITAL ENCOUNTER (OUTPATIENT)
Dept: RADIATION ONCOLOGY | Facility: HOSPITAL | Age: 73
Setting detail: RADIATION/ONCOLOGY SERIES
End: 2023-09-01
Payer: MEDICARE

## 2023-09-12 ENCOUNTER — HOSPITAL ENCOUNTER (OUTPATIENT)
Dept: RADIATION ONCOLOGY | Facility: HOSPITAL | Age: 73
Setting detail: RADIATION/ONCOLOGY SERIES
Discharge: HOME | End: 2023-09-12
Payer: MEDICARE

## 2023-09-12 LAB
RAD ONC ARIA COURSE ID: NORMAL
RAD ONC ARIA COURSE LAST TREATMENT DATE: NORMAL
RAD ONC ARIA COURSE START DATE: NORMAL
RAD ONC ARIA COURSE TREATMENT ELAPSED DAYS: 0
RAD ONC ARIA FIRST TREATMENT DATE: NORMAL
RAD ONC ARIA PLAN FRACTIONS TREATED TO DATE: 1
RAD ONC ARIA PLAN ID: NORMAL
RAD ONC ARIA PLAN NAME: NORMAL
RAD ONC ARIA PLAN PRESCRIBED DOSE PER FRACTION: 12.5 GY
RAD ONC ARIA PLAN PRIMARY REFERENCE POINT: NORMAL
RAD ONC ARIA PLAN TOTAL FRACTIONS PRESCRIBED: 4
RAD ONC ARIA PLAN TOTAL PRESCRIBED DOSE: 5000 CGY
RAD ONC ARIA REFERENCE POINT DOSAGE GIVEN TO DATE: 12.5 GY
RAD ONC ARIA REFERENCE POINT ID: NORMAL
RAD ONC ARIA REFERENCE POINT SESSION DOSAGE GIVEN: 12.5 GY

## 2023-09-12 PROCEDURE — 77373 STRTCTC BDY RAD THER TX DLVR: CPT | Performed by: RADIOLOGY

## 2023-09-15 ENCOUNTER — TELEPHONE (OUTPATIENT)
Dept: HEMATOLOGY | Age: 73
End: 2023-09-15

## 2023-09-15 NOTE — TELEPHONE ENCOUNTER
I have spoken with patient's spouse regarding insurance coverage with our clinic. I have explained that there is a waiver for continuation of care to continue seeing Dr. Odette Hilliard. Patient's spouse  has expressed that they will get in touch with insurance company to fill waiver out.

## 2023-09-19 ENCOUNTER — HOSPITAL ENCOUNTER (OUTPATIENT)
Dept: RADIATION ONCOLOGY | Facility: HOSPITAL | Age: 73
Discharge: HOME | End: 2023-09-19
Payer: MEDICARE

## 2023-09-19 LAB
RAD ONC ARIA COURSE ID: NORMAL
RAD ONC ARIA COURSE LAST TREATMENT DATE: NORMAL
RAD ONC ARIA COURSE START DATE: NORMAL
RAD ONC ARIA COURSE TREATMENT ELAPSED DAYS: 7
RAD ONC ARIA FIRST TREATMENT DATE: NORMAL
RAD ONC ARIA PLAN FRACTIONS TREATED TO DATE: 2
RAD ONC ARIA PLAN ID: NORMAL
RAD ONC ARIA PLAN NAME: NORMAL
RAD ONC ARIA PLAN PRESCRIBED DOSE PER FRACTION: 12.5 GY
RAD ONC ARIA PLAN PRIMARY REFERENCE POINT: NORMAL
RAD ONC ARIA PLAN TOTAL FRACTIONS PRESCRIBED: 4
RAD ONC ARIA PLAN TOTAL PRESCRIBED DOSE: 5000 CGY
RAD ONC ARIA REFERENCE POINT DOSAGE GIVEN TO DATE: 25 GY
RAD ONC ARIA REFERENCE POINT ID: NORMAL
RAD ONC ARIA REFERENCE POINT SESSION DOSAGE GIVEN: 12.5 GY

## 2023-09-19 PROCEDURE — 77373 STRTCTC BDY RAD THER TX DLVR: CPT | Performed by: RADIOLOGY

## 2023-09-22 ENCOUNTER — TELEPHONE (OUTPATIENT)
Dept: HEMATOLOGY | Age: 73
End: 2023-09-22

## 2023-09-22 NOTE — TELEPHONE ENCOUNTER
Continuity of Care requested today through Medicare.   10/1/2023 - 1/1/2024    Faxed previous office note to [phone]   5-14 day TAT     REQUESTED PROCEDURE CPT [facility]    REQUESTED LAB CPT 02205 [id number]    REQUESTED DIAGNOSIS CODE  C34.92 D64.9 D50.8 D48.9

## 2023-09-25 ENCOUNTER — HOSPITAL ENCOUNTER (OUTPATIENT)
Dept: RADIATION ONCOLOGY | Facility: HOSPITAL | Age: 73
Setting detail: RADIATION/ONCOLOGY SERIES
Discharge: HOME | End: 2023-09-25
Payer: MEDICARE

## 2023-09-25 LAB
RAD ONC ARIA COURSE ID: NORMAL
RAD ONC ARIA COURSE LAST TREATMENT DATE: NORMAL
RAD ONC ARIA COURSE START DATE: NORMAL
RAD ONC ARIA COURSE TREATMENT ELAPSED DAYS: 13
RAD ONC ARIA FIRST TREATMENT DATE: NORMAL
RAD ONC ARIA PLAN FRACTIONS TREATED TO DATE: 3
RAD ONC ARIA PLAN ID: NORMAL
RAD ONC ARIA PLAN NAME: NORMAL
RAD ONC ARIA PLAN PRESCRIBED DOSE PER FRACTION: 12.5 GY
RAD ONC ARIA PLAN PRIMARY REFERENCE POINT: NORMAL
RAD ONC ARIA PLAN TOTAL FRACTIONS PRESCRIBED: 4
RAD ONC ARIA PLAN TOTAL PRESCRIBED DOSE: 5000 CGY
RAD ONC ARIA REFERENCE POINT DOSAGE GIVEN TO DATE: 37.5 GY
RAD ONC ARIA REFERENCE POINT ID: NORMAL
RAD ONC ARIA REFERENCE POINT SESSION DOSAGE GIVEN: 12.5 GY

## 2023-09-25 PROCEDURE — 77373 STRTCTC BDY RAD THER TX DLVR: CPT | Performed by: RADIOLOGY

## 2023-09-27 ENCOUNTER — HOSPITAL ENCOUNTER (OUTPATIENT)
Dept: RADIATION ONCOLOGY | Facility: HOSPITAL | Age: 73
Setting detail: RADIATION/ONCOLOGY SERIES
Discharge: HOME | End: 2023-09-27
Payer: MEDICARE

## 2023-09-27 DIAGNOSIS — Z92.3 HISTORY OF RADIATION THERAPY: ICD-10-CM

## 2023-09-27 DIAGNOSIS — C34.92 SQUAMOUS CELL CARCINOMA OF LEFT LUNG: Primary | ICD-10-CM

## 2023-09-27 DIAGNOSIS — Z90.2 S/P LOBECTOMY OF LUNG: ICD-10-CM

## 2023-09-27 DIAGNOSIS — Z87.891 FORMER SMOKER: ICD-10-CM

## 2023-09-27 LAB
RAD ONC ARIA COURSE ID: NORMAL
RAD ONC ARIA COURSE LAST TREATMENT DATE: NORMAL
RAD ONC ARIA COURSE START DATE: NORMAL
RAD ONC ARIA COURSE TREATMENT ELAPSED DAYS: 15
RAD ONC ARIA FIRST TREATMENT DATE: NORMAL
RAD ONC ARIA PLAN FRACTIONS TREATED TO DATE: 4
RAD ONC ARIA PLAN ID: NORMAL
RAD ONC ARIA PLAN NAME: NORMAL
RAD ONC ARIA PLAN PRESCRIBED DOSE PER FRACTION: 12.5 GY
RAD ONC ARIA PLAN PRIMARY REFERENCE POINT: NORMAL
RAD ONC ARIA PLAN TOTAL FRACTIONS PRESCRIBED: 4
RAD ONC ARIA PLAN TOTAL PRESCRIBED DOSE: 5000 CGY
RAD ONC ARIA REFERENCE POINT DOSAGE GIVEN TO DATE: 50 GY
RAD ONC ARIA REFERENCE POINT ID: NORMAL
RAD ONC ARIA REFERENCE POINT SESSION DOSAGE GIVEN: 12.5 GY

## 2023-09-27 PROCEDURE — 77373 STRTCTC BDY RAD THER TX DLVR: CPT | Performed by: RADIOLOGY

## 2023-09-27 PROCEDURE — 77336 RADIATION PHYSICS CONSULT: CPT | Performed by: RADIOLOGY

## 2023-09-28 ENCOUNTER — TELEPHONE (OUTPATIENT)
Dept: RADIATION ONCOLOGY | Facility: HOSPITAL | Age: 73
End: 2023-09-28
Payer: MEDICARE

## 2023-09-28 NOTE — TELEPHONE ENCOUNTER
Spoke with patient's wife.  She's states that patient is unavailable at the moment to speak with me, but that she will have him call me back.

## 2023-09-28 NOTE — TELEPHONE ENCOUNTER
----- Message from SABAS Burgess sent at 9/27/2023 11:13 AM CDT -----  Ct chest ordered - will you make sure patient schedules appointment with urology as previously discussed with him?   Thanks!     ----- Message -----  From: Jailyn Mane RN  Sent: 9/26/2023   9:23 AM CDT  To: SABAS Burgess    Patient completes SBRT lung treatment Wednesday.  Can you please order a CT chest to be performed prior to his appointment with us in December?    Thanks!  Jailyn

## 2023-09-29 NOTE — TELEPHONE ENCOUNTER
Patient returned phone call. He states that he will call urology today to get an appointment set up.

## 2023-09-29 NOTE — PROGRESS NOTES
Subjective    Mr. Artis is 72 y.o. male    Chief Complaint: Prostate Nodule     History of Present Illness  Patient referred for a hyper metabolic area on PET scan cording to the right side of his prostate.  PSA is 2.9.  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  AUA 8/35 with incomplete emptying, urgency, weak stream, straining, nocturia X 1.  No family history of prostate cancer.   Lab Results   Component Value Date    PSA 2.900 08/16/2023       The following portions of the patient's history were reviewed and updated as appropriate: allergies, current medications, past family history, past medical history, past social history, past surgical history and problem list.    Review of Systems      Current Outpatient Medications:     cetirizine (zyrTEC) 10 MG tablet, Take 1 tablet by mouth Daily., Disp: , Rfl:     Cholecalciferol 125 MCG (5000 UT) tablet, Take 1 tablet by mouth Daily., Disp: , Rfl:     divalproex (DEPAKOTE ER) 250 MG 24 hr tablet, Take 1 tablet by mouth 2 (Two) Times a Day., Disp: , Rfl:     ferrous sulfate 324 (65 Fe) MG tablet delayed-release EC tablet, Take 1 tablet by mouth Daily With Breakfast., Disp: , Rfl:     furosemide (LASIX) 20 MG tablet, TAKE 1 TABLET BY MOUTH ONCE DAILY Oral for 14 Days, Disp: , Rfl:     glimepiride (AMARYL) 4 MG tablet, Take 1 tablet by mouth Daily., Disp: , Rfl:     hydroCHLOROthiazide (HYDRODIURIL) 25 MG tablet, Take 1 tablet by mouth Daily., Disp: , Rfl:     lisinopril (PRINIVIL,ZESTRIL) 10 MG tablet, Take 1 tablet by mouth Daily., Disp: , Rfl:     loratadine (CLARITIN) 10 MG tablet, Take 1 tablet by mouth Daily., Disp: , Rfl:     metFORMIN (GLUCOPHAGE) 500 MG tablet, Take 1 tablet by mouth 2 (Two) Times a Day., Disp: , Rfl:     metoprolol succinate XL (TOPROL-XL) 25 MG 24 hr tablet, Take 1 tablet by mouth Daily., Disp: , Rfl:     pantoprazole (PROTONIX) 40 MG EC tablet, Take 1 tablet by mouth 2 (Two) Times a Day., Disp: , Rfl:      "potassium chloride (K-DUR,KLOR-CON) 20 MEQ CR tablet, Oral for 30 Days, Disp: , Rfl:     primidone (MYSOLINE) 50 MG tablet, Take 1 tablet by mouth 2 (Two) Times a Day., Disp: , Rfl:     simvastatin (ZOCOR) 40 MG tablet, Take 1 tablet by mouth Daily., Disp: , Rfl:     History reviewed. No pertinent past medical history.    Past Surgical History:   Procedure Laterality Date    APPENDECTOMY      BRONCHOSCOPY      COLONOSCOPY  2023    HERNIA REPAIR      LUNG SURGERY Left 2023       Social History     Socioeconomic History    Marital status:    Tobacco Use    Smoking status: Former     Packs/day: 2.00     Years: 56.00     Additional pack years: 0.00     Total pack years: 112.00     Types: Cigarettes     Start date:      Quit date: 3/17/2023     Years since quittin.5     Passive exposure: Never    Smokeless tobacco: Never   Substance and Sexual Activity    Alcohol use: Not Currently    Drug use: Never    Sexual activity: Defer       Family History   Problem Relation Age of Onset    Diabetes Mother     Heart disease Mother     Cancer Father     Cancer Brother     Alcohol abuse Brother     Cancer Paternal Grandfather        Objective    Temp 97.4 øF (36.3 øC)   Ht 175.3 cm (69\")   Wt 79.1 kg (174 lb 6.4 oz)   BMI 25.75 kg/mý     Physical Exam  Genitourinary:     Comments: 30 gm prostate smooth no nodules          Results for orders placed or performed in visit on 10/09/23   POC Urinalysis Dipstick, Multipro    Specimen: Urine   Result Value Ref Range    Color Yellow Yellow, Straw, Dark Yellow, Ashley    Clarity, UA Clear Clear    Glucose, UA Negative Negative mg/dL    Bilirubin Negative Negative    Ketones, UA Trace (A) Negative    Specific Gravity  1.020 1.005 - 1.030    Blood, UA Negative Negative    pH, Urine 5.5 5.0 - 8.0    Protein, POC Negative Negative mg/dL    Urobilinogen, UA 0.2 E.U./dL Normal, 0.2 E.U./dL    Nitrite, UA Negative Negative    Leukocytes Negative Negative "     IPSS Questionnaire (AUA-7):  Incomplete emptying  Over the past month, how often have you had a sensation of not emptying your bladder completely after you finish?: Less than 1 time in 5 (10/09/23 1413)  Frequency  Over the past month, how often have you had to urinate again less than two hours after you finishing urinating ?: Not at all (10/09/23 1413)  Intermittency  Over the past month, how often have you found you stopped and started again several time when you urinated ?: Not at all (10/09/23 1413)  Urgency  Over the last month, how difficult  have you found it to postpone urination ?: Less than half the time (10/09/23 1413)  Weak Stream  Over the past month, how often have you had a weak urinary stream ?: More than half the time (10/09/23 1413)  Straining  Over the past month, how often have you had to push or strain to begin urination ?: Less than 1 time 5 (10/09/23 1413)  Nocturia  Over the past month, how many times did you most typically get up to urinate from the time you went to bed until the time you got up in the morning ?: 1 time (10/09/23 1413)  Quality of life due to urinary symptoms  If you were to spend the rest of your life with your urinary condition the way it is now, how would feel about that?: Pleased (10/09/23 1413)    Scores  Total IPSS Score: 9 (10/09/23 1413)  Total Score = Symtomatic Level: moderately symptomatic: 8-19 (10/09/23 1413)        Assessment and Plan    Diagnoses and all orders for this visit:    1. Prostate nodule (Primary)  -     POC Urinalysis Dipstick, Multipro    2. BPH with urinary obstruction        I had a long discussion with the patient today regarding management of a nodule seen on the PET scan of the prostate.  We discussed this could be a false positive secondary to inflammation.  We discussed the possibility of undiagnosed prostate cancer.  I think it is reasonable to obtain an MRI.  If his MRI is negative with his low PSA level of 2.9 then I think it safe to  avoid a prostate biopsy.  Patient voiced understanding we will proceed with the plan as outlined above.

## 2023-10-09 ENCOUNTER — OFFICE VISIT (OUTPATIENT)
Dept: UROLOGY | Facility: CLINIC | Age: 73
End: 2023-10-09
Payer: MEDICARE

## 2023-10-09 VITALS — BODY MASS INDEX: 25.83 KG/M2 | TEMPERATURE: 97.4 F | WEIGHT: 174.4 LBS | HEIGHT: 69 IN

## 2023-10-09 DIAGNOSIS — N13.8 BPH WITH URINARY OBSTRUCTION: ICD-10-CM

## 2023-10-09 DIAGNOSIS — N40.2 PROSTATE NODULE: Primary | ICD-10-CM

## 2023-10-09 DIAGNOSIS — N40.1 BPH WITH URINARY OBSTRUCTION: ICD-10-CM

## 2023-10-09 LAB
BILIRUB BLD-MCNC: NEGATIVE MG/DL
CLARITY, POC: CLEAR
COLOR UR: YELLOW
GLUCOSE UR STRIP-MCNC: NEGATIVE MG/DL
KETONES UR QL: ABNORMAL
LEUKOCYTE EST, POC: NEGATIVE
NITRITE UR-MCNC: NEGATIVE MG/ML
PH UR: 5.5 [PH] (ref 5–8)
PROT UR STRIP-MCNC: NEGATIVE MG/DL
RBC # UR STRIP: NEGATIVE /UL
SP GR UR: 1.02 (ref 1–1.03)
UROBILINOGEN UR QL: ABNORMAL

## 2023-10-09 PROCEDURE — 1159F MED LIST DOCD IN RCRD: CPT | Performed by: UROLOGY

## 2023-10-09 PROCEDURE — 81001 URINALYSIS AUTO W/SCOPE: CPT | Performed by: UROLOGY

## 2023-10-09 PROCEDURE — 99204 OFFICE O/P NEW MOD 45 MIN: CPT | Performed by: UROLOGY

## 2023-10-09 PROCEDURE — 1160F RVW MEDS BY RX/DR IN RCRD: CPT | Performed by: UROLOGY

## 2023-10-09 RX ORDER — POTASSIUM CHLORIDE 20 MEQ/1
TABLET, EXTENDED RELEASE ORAL
COMMUNITY

## 2023-10-09 RX ORDER — FUROSEMIDE 20 MG/1
TABLET ORAL
COMMUNITY

## 2023-10-09 RX ORDER — CETIRIZINE HYDROCHLORIDE 10 MG/1
1 TABLET ORAL DAILY
COMMUNITY

## 2023-11-10 NOTE — PROGRESS NOTES
Subjective    Mr. Artis is 73 y.o. male    Chief Complaint: Prostate Nodule     History of Present Illness    Patient referred for a hyper metabolic area on PET scan  to the right side of his prostate.  PSA is 2.9.  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  AUA 8/35 with incomplete emptying, urgency, weak stream, straining, nocturia X 1.  No family history of prostate cancer.     The following portions of the patient's history were reviewed and updated as appropriate: allergies, current medications, past family history, past medical history, past social history, past surgical history and problem list.    Review of Systems      Current Outpatient Medications:     cetirizine (zyrTEC) 10 MG tablet, Take 1 tablet by mouth Daily., Disp: , Rfl:     Cholecalciferol 125 MCG (5000 UT) tablet, Take 1 tablet by mouth Daily., Disp: , Rfl:     divalproex (DEPAKOTE ER) 250 MG 24 hr tablet, Take 1 tablet by mouth 2 (Two) Times a Day., Disp: , Rfl:     furosemide (LASIX) 20 MG tablet, TAKE 1 TABLET BY MOUTH ONCE DAILY Oral for 14 Days, Disp: , Rfl:     glimepiride (AMARYL) 4 MG tablet, Take 1 tablet by mouth Daily., Disp: , Rfl:     hydroCHLOROthiazide (HYDRODIURIL) 25 MG tablet, Take 1 tablet by mouth Daily., Disp: , Rfl:     lisinopril (PRINIVIL,ZESTRIL) 10 MG tablet, Take 1 tablet by mouth Daily., Disp: , Rfl:     loratadine (CLARITIN) 10 MG tablet, Take 1 tablet by mouth Daily., Disp: , Rfl:     metFORMIN (GLUCOPHAGE) 500 MG tablet, Take 1 tablet by mouth 2 (Two) Times a Day., Disp: , Rfl:     metoprolol succinate XL (TOPROL-XL) 25 MG 24 hr tablet, Take 1 tablet by mouth Daily., Disp: , Rfl:     pantoprazole (PROTONIX) 40 MG EC tablet, Take 1 tablet by mouth 2 (Two) Times a Day., Disp: , Rfl:     potassium chloride (K-DUR,KLOR-CON) 20 MEQ CR tablet, Oral for 30 Days, Disp: , Rfl:     primidone (MYSOLINE) 50 MG tablet, Take 1 tablet by mouth 2 (Two) Times a Day., Disp: , Rfl:     simvastatin  (ZOCOR) 40 MG tablet, Take 1 tablet by mouth Daily., Disp: , Rfl:     History reviewed. No pertinent past medical history.    Past Surgical History:   Procedure Laterality Date    APPENDECTOMY      BRONCHOSCOPY      COLONOSCOPY  2023    HERNIA REPAIR      LUNG SURGERY Left 2023       Social History     Socioeconomic History    Marital status:    Tobacco Use    Smoking status: Former     Packs/day: 2.00     Years: 56.00     Additional pack years: 0.00     Total pack years: 112.00     Types: Cigarettes     Start date:      Quit date: 3/17/2023     Years since quittin.7     Passive exposure: Never    Smokeless tobacco: Never   Vaping Use    Vaping Use: Never used   Substance and Sexual Activity    Alcohol use: Not Currently    Drug use: Never    Sexual activity: Defer       Family History   Problem Relation Age of Onset    Diabetes Mother     Heart disease Mother     Cancer Father     Cancer Brother     Alcohol abuse Brother     Cancer Paternal Grandfather        Objective    There were no vitals taken for this visit.    Physical Exam        Results for orders placed or performed during the hospital encounter of 23   POC Creatinine    Specimen: Blood   Result Value Ref Range    Creatinine 1.60 (H) 0.60 - 1.30 mg/dL     Assessment and Plan    Diagnoses and all orders for this visit:    1. Prostate nodule (Primary)  -     Cancel: POC Urinalysis Dipstick, Multipro           I personally reviewed MRI today.  His MRI is negative for any suspicious lesions.  He has a volume of 34 cc.  I believe his hypermetabolic activity on PET scan was likely inflammatory.  I do not think he requires a biopsy based on his negative MRI and PSA level.  He will follow-up as needed.

## 2023-11-20 ENCOUNTER — HOSPITAL ENCOUNTER (OUTPATIENT)
Dept: MRI IMAGING | Facility: HOSPITAL | Age: 73
Discharge: HOME | End: 2023-11-20
Admitting: UROLOGY
Payer: MEDICARE

## 2023-11-20 DIAGNOSIS — N40.2 PROSTATE NODULE: ICD-10-CM

## 2023-11-20 PROCEDURE — 72197 MRI PELVIS W/O & W/DYE: CPT

## 2023-11-20 PROCEDURE — 0 GADOBENATE DIMEGLUMINE 529 MG/ML SOLUTION: Performed by: UROLOGY

## 2023-11-20 PROCEDURE — 82565 ASSAY OF CREATININE: CPT

## 2023-11-20 PROCEDURE — A9577 INJ MULTIHANCE: HCPCS | Performed by: UROLOGY

## 2023-11-20 RX ADMIN — GADOBENATE DIMEGLUMINE 20 ML: 529 INJECTION, SOLUTION INTRAVENOUS at 14:50

## 2023-11-21 LAB — CREAT BLDA-MCNC: 1.6 MG/DL (ref 0.6–1.3)

## 2023-11-29 ENCOUNTER — OFFICE VISIT (OUTPATIENT)
Dept: UROLOGY | Facility: CLINIC | Age: 73
End: 2023-11-29
Payer: MEDICARE

## 2023-11-29 DIAGNOSIS — N40.1 BPH WITH URINARY OBSTRUCTION: ICD-10-CM

## 2023-11-29 DIAGNOSIS — N40.2 PROSTATE NODULE: Primary | ICD-10-CM

## 2023-11-29 DIAGNOSIS — N13.8 BPH WITH URINARY OBSTRUCTION: ICD-10-CM

## 2023-11-29 PROCEDURE — 1159F MED LIST DOCD IN RCRD: CPT | Performed by: UROLOGY

## 2023-11-29 PROCEDURE — 99213 OFFICE O/P EST LOW 20 MIN: CPT | Performed by: UROLOGY

## 2023-11-29 PROCEDURE — 1160F RVW MEDS BY RX/DR IN RCRD: CPT | Performed by: UROLOGY

## 2023-12-13 ENCOUNTER — HOSPITAL ENCOUNTER (OUTPATIENT)
Dept: RADIATION ONCOLOGY | Facility: HOSPITAL | Age: 73
Setting detail: RADIATION/ONCOLOGY SERIES
End: 2023-12-13
Payer: MEDICARE

## 2023-12-20 ENCOUNTER — HOSPITAL ENCOUNTER (OUTPATIENT)
Dept: CT IMAGING | Facility: HOSPITAL | Age: 73
Discharge: HOME | End: 2023-12-20
Payer: MEDICARE

## 2023-12-20 DIAGNOSIS — C34.92 SQUAMOUS CELL CARCINOMA OF LEFT LUNG: ICD-10-CM

## 2023-12-20 DIAGNOSIS — Z92.3 HISTORY OF RADIATION THERAPY: ICD-10-CM

## 2023-12-20 DIAGNOSIS — Z87.891 FORMER SMOKER: ICD-10-CM

## 2023-12-20 DIAGNOSIS — Z90.2 S/P LOBECTOMY OF LUNG: ICD-10-CM

## 2023-12-20 PROCEDURE — 71250 CT THORAX DX C-: CPT

## 2023-12-26 PROBLEM — Z92.3 HISTORY OF RADIATION THERAPY: Status: ACTIVE | Noted: 2023-12-26

## 2023-12-26 NOTE — PROGRESS NOTES
[facility], .SShaunShaun Ortiz MD      Adam Clancy, APRN  ____________________________________________________________  [facility]  Department of Radiation Oncology  [address] [place] [id number]  Office:  [phone]  Fax: [phone]    DATE: 12/27/2023  PATIENT: Boogie Artis1950                                 MEDICAL RECORD #: [id number]    Reason for Follow up Visit:  Boogie Artis is a very pleasant 73 y.o. patient that completed radiation to the lung and returns to the clinic today for inital follow up exam. Reports SOB. Denies activity change, appetite change, unexpected weight change, nasuea/vomiting, diarrhea, light-headedness, weakness, and headaches.     History of Present Illness:  Diagnosed with a PET+ neoplasm of the lung, RUL. He completed 5000 cGy in 4 fractions to the right upper lobe of the lung on 09/27/2023.     01/16/2023 - CT Chest - [place]:  Spiculated perihilar left upper lobe pulmonary nodule measures 2.7 cm. This is concerning for primary neoplasm. PET/CT or bronch recommended for further evaluation.   0.8 cm noncalcified pulmonary nodule in the right upper lobe. This is at the lower limits of normal for PET/CT detection. Benign granulomatous disease, metastatic disease, or 2nd primary pulmonary malignancy could be considered.   No enlarged mediastinal or hilar lymph nodes    01/17/2023 - Pulmonary Function Tests - [place]:  Moderate obstructive lung disease    01/26/2023 - Bronchoscopy with biopsy per :  RUL, lower lobe, middle lobe were negative. The left upper lobe, no lesion, lingula no lesion seen.  SAMINA washing:  Negative for malignant cells; reactive bronchial cells, macrophages, and inflammation  SAMINA brushing:  Negative for malignant cells; markedly reactive bronchial cells, macrophages, and inflammation    02/09/2023 - PET Scan:  2.8 cm hypermetabolic central LEFT upper lobe pulmonary nodule. This likely  represents a primary lung neoplasm.  0.8 cm hypermetabolic pulmonary nodule in the RIGHT upper lobe. This is highly suspicious for either a contralateral pulmonary metastasis or second primary lung neoplasm.   No hypermetabolic thoracic lymph nodes. No evidence of hypermetabolic metastatic disease in the neck, abdomen, or pelvis.  1.5 cm hypermetabolic nodule in the RIGHT anterior rectum, highly concerning for underlying rectal polyp. Recommend correlation with colonoscopy/sigmoidoscopy.    02/27/2023 - Colonoscopy:  Rectal polyp, excision:  Villous adenoma, negative for high-grade glandular dysplasia or malignancy.  Colon polyp at 20 cm, biopsy:  Fragments of serrated polyp, favor sessile serrated lesion/polyp.  Colon polyp at 10 cm, biopsy:  Fragments of serrated polyp, favor sessile serrated lesion/polyp.    03/08/2023 - CT Chest without contrast:  Pulmonary emphysema with LEFT hilar/upper lobe lung mass measuring approximately 4 cm in greatest dimension. There is mild nodular airspace disease laterally to this lesion which may represent postobstructive atelectasis or infiltrate.   10 mm irregular nodule within the RIGHT lung apex (series 2, image 101). This is concerning for metastatic lesion. Additional 4 mm noncalcified nodule at the RIGHT lung apex (series 2, image 85).   Evidence of prior granulomatous disease.     03/09/2023 - Bronchoscopy/EBUS per :  Left upper lobe, EBUS (ThinPrep and cell block section):   Squamous cell carcinoma.   Bronchial washing (ThinPrep and cell block section):   No malignant cells identified.   Benign bronchial epithelial cells, and alveolar macrophages.     04/19/2023 -  Left upper lobectomy and lymph node excision per Dr.Robert Irwin:   Lymph node, level 9 lymph node biopsy: Benign fibroadipose tissue.   Lymph node, level 10 posterior hilar lymph node biopsy: 1 lymph node, negative for evidence of malignancy.   Lymph node, level 6 lymph node biopsy: 2 lymph nodes,  negative for evidence of malignancy.   Lymph node, level 11 interlobar lymph node biopsy: 1 lymph node, negative for evidence of malignancy.   Lymph node, level 10 anterior hilar lymph node biopsy: 2 lymph nodes, negative for evidence of malignancy.   Lymph node, level 12 lobar lymph node biopsy: 1 lymph node, negative for evidence of malignancy.   Lung, left upper lobectomy:   Invasive moderately differentiated squamous cell carcinoma.   Invasive carcinoma measures 2.5 cm in greatest dimension grossly.   Invasive carcinoma extends to within 0.2 cm of the nearest bronchial surgical excision margin.   Squamous metaplasia identified at bronchial surgical excision margin.   Emphysematous changes identified involving the nonneoplastic lung parenchyma.   2 peribronchial lymph nodes, negative for evidence of malignancy.   AJCC STAGE:  pT1c, pN0, pMx     04/25/2023 -  CT Chest without contrast:  Interval placement of large bore chest tube with decrease in previously described large left hydropneumothorax.Residual hydropneumothorax present with air dissecting through anterior chest wall into subcutaneous soft tissues, unchanged.   Interval resolution of previously described debris within left bronchus.   Unchanged right apical 1.0 cm nodule.     07/24/2023 - Appointment with :  Left upper lobe SCC [id number], stage I,Jan 2023  -1/16/23 CT chest ([facility]): Spiculated perihilar left upper lobe pulmonary nodule measures 2.7cm. This is concerning for primary neoplasm. PET/CT or bronchoscopy is recommended to further evaluate. 0.8cm noncalcified pulmonary nodule in the right upper lobe. This is at the lower limits of normal for Pet/CT detection. Benign granulomatous disease, metastatic disease, or 2nd primary pulmonary malignancy could be considered. No enlarged mediastinal or hilar lymph nodes.  -1/26/23 Lung, left upper lobe, washing: Negative for malignant cells. Reactive bronchial cells, macrophages, and inflammation.  Lung, left upper lobe, brushing: Negative for malignant cells. Markedly reactive bronchial cells, macrophages, and inflammation.  -2/9/23 PET scan ([facility]): 2.8 cm hypermetabolic central LEFT upper lobe pulmonary nodule. This likely represents a primary lung neoplasm. 0.8 cm hypermetabolic pulmonary nodule in the RIGHT upper lobe. This is highly suspicious for either a contralateral pulmonary metastasis or second primary lung neoplasm. No hypermetabolic thoracic lymph nodes. No evidence of hypermetabolic metastatic disease in the neck, abdomen, or pelvis. 1.5 cm hypermetabolic nodule in the RIGHT anterior rectum, highly concerning for underlying rectal polyp. Recommend correlation with colonoscopy/sigmoidoscopy.   4/19/23 Lung, left upper lobectomy: Invasive moderately differentiated squamous cell carcinoma. Invasive carcinoma measures 2.5 cm in greatest dimension grossly. Invasive carcinoma extends to within 0.2 cm of the nearest bronchial surgical excision margin. Squamous metaplasia identified at bronchial surgical excision margin. Emphysematous changes identified involving the nonneoplastic lung parenchyma. 2 peribronchial lymph nodes, negative for evidence of malignancy.Lymph node, level 9 lymph node biopsy: Benign fibroadipose tissue. Lymph node, level 10 posterior hilar lymph node biopsy: 1 lymph node, negative for evidence of malignancy. Lymph node, level 6 lymph node biopsy: 2 lymph nodes, negative for evidence of malignancy. Lymph node, level 11 interlobar lymph node biopsy: 1 lymph node, negative for evidence of malignancy. Lymph node, level 10 anterior hilar lymph node biopsy: 2 lymph nodes, negative for evidence of malignancy. Lymph node, level 12 lobar lymph node biopsy: 1 lymph node, negative for evidence of malignancy. [id number], stage I  Plan:  -Repeat CT chest Nov 2023  RUL subcentimeter nodule (PET+)  -2/9/23 PET scan ([facility]): 2.8 cm hypermetabolic central LEFT upper lobe pulmonary nodule. This likely  represents a primary lung neoplasm. 0.8 cm hypermetabolic pulmonary nodule in the RIGHT upper lobe. This is highly suspicious for either a contralateral pulmonary metastasis or second primary lung neoplasm. No hypermetabolic thoracic lymph nodes. No evidence of hypermetabolic metastatic disease in the neck, abdomen, or pelvis. 1.5 cm hypermetabolic nodule in the RIGHT anterior rectum, highly concerning for underlying rectal polyp. Recommend correlation with colonoscopy/sigmoidoscopy.   -Referral Dr Ortiz for consideration SBRT RUL hypermetabolic nodule.  PLAN:  RTC with MD 2 months in [place] office  CBC, CMP, B12, Folate, LDH, Haptoglobin, iron profile, Ferritin, retic  Continue follow-up with Dr.Robert Irwin/CardioThoracic Surgery  Continue to follow up with Dr. Rowe  Iron Sulfate 325 mg p.o. twice daily  Follow Up:  Return in 2 months (on 9/24/2023) for Appointment with Dr. Vaughan in [place].  Refer to Dr Ortiz     08/09/2023 - Appointment with :  will order PET scan, they will call you  Return in 2 weeks for further discussion    08/14/2023 - PET Scan:  Abnormal PET/CT, there is increased size of a hypermetabolic pulmonary nodule in the right upper lobe lung apex that measures 13 mm, compared with 8 mm on the previous PET/CT. This has a maximum SUV of 8.1. This is concerning for active neoplasm.  Interval resection of the left upper lobe with posttreatment changes along the medial margin of the upper mediastinum, including mild infiltration of the mediastinal fat planes.  There is a subcutaneous nodule with surrounding fatty infiltration over the low back at the level of the upper sacrum which demonstrates hypermetabolism. This could be inflammatory, less likely a metastatic nodule. This measures 21 mm, follow-up ultrasound is recommended to determine if this is solid. Ultrasound-guided biopsy could be performed if indicated.  Interval resolution of the hypermetabolic nodule associated with the  right rectum, however there is a new hypermetabolic nodule that maps to the right prostate gland. Correlation with PSA values is recommended.    08/16/2023 - Consult with :  I have recommended to treat the right lung nodule with SBRT, I anticipate a course over 4-5 fractions, final course pending.   He does have a visible insect bite on his low back and I do believe this correlates with the PET findings of activity on the upper sacrum. Will order PSA for prostate gland findings. He has not had one drawn in some time.   Plan:  Plan on 4-5 pinpoint treatments to the lung nodule.   We will call you when to start treatments.   PSA today    08/18/2023 - Documentation per :  I spoke on the phone with this patient's wife Sarah and discussed his PSA of 2.9. This is considered a normal level, however on recent PET scan imaging for a lung nodule workup, a nodule within the prostate was noted.   I will refer him to urology for further management recommendations.    09/12/2023 - 09/27/2023 - Completed radiation course:  Received 5000 cGy in 4 fractions to the right upper lobe of the lung.    11/20/2023 - MRI Pelvis with and without contrast:  No suspicious lesions in the prostate (PI-RADS 3 or greater).     11/29/2023 - Appointment with :  I personally reviewed MRI today.    His MRI is negative for any suspicious lesions.    He has a volume of 34 cc.    I believe his hypermetabolic activity on PET scan was likely inflammatory.  I do not think he requires a biopsy based on his negative MRI and PSA level.    He will follow-up as needed.     12/20/2023 - CT Chest without contrast:  Decreased size of 6 mm spiculated right upper lobe pulmonary nodule (previously 13 mm). This nodule was hypermetabolic on prior PET/CT and highly suspicious for primary lung neoplasm.  Postoperative change of left upper lobectomy. No definite change/increase in soft tissue thickening along the lobectomy margin and mediastinum.  Recommend continued imaging surveillance.  No evidence of disease progression in the chest. No thoracic lymphadenopathy.      History obtained from  PATIENT, FAMILY, and CHART    PAST MEDICAL HISTORY  History reviewed. No pertinent past medical history.     PAST SURGICAL HISTORY  Past Surgical History:   Procedure Laterality Date    APPENDECTOMY      BRONCHOSCOPY      COLONOSCOPY  2023    HERNIA REPAIR      LUNG SURGERY Left 2023      FAMILY HISTORY  family history includes Alcohol abuse in his brother; Cancer in his brother, father, and paternal grandfather; Diabetes in his mother; Heart disease in his mother.    SOCIAL HISTORY  Social History     Tobacco Use    Smoking status: Former     Packs/day: 2.00     Years: 56.00     Additional pack years: 0.00     Total pack years: 112.00     Types: Cigarettes     Start date:      Quit date: 3/17/2023     Years since quittin.7     Passive exposure: Never    Smokeless tobacco: Never   Vaping Use    Vaping Use: Never used   Substance Use Topics    Alcohol use: Not Currently    Drug use: Never      Iodides     MEDICATIONS  Current Outpatient Medications   Medication Sig Dispense Refill    cetirizine (zyrTEC) 10 MG tablet Take 1 tablet by mouth Daily.      Cholecalciferol 125 MCG (5000 UT) tablet Take 1 tablet by mouth Daily.      divalproex (DEPAKOTE ER) 250 MG 24 hr tablet Take 1 tablet by mouth 2 (Two) Times a Day.      furosemide (LASIX) 20 MG tablet TAKE 1 TABLET BY MOUTH ONCE DAILY Oral for 14 Days      glimepiride (AMARYL) 4 MG tablet Take 1 tablet by mouth Daily.      hydroCHLOROthiazide (HYDRODIURIL) 25 MG tablet Take 1 tablet by mouth Daily.      lisinopril (PRINIVIL,ZESTRIL) 10 MG tablet Take 1 tablet by mouth Daily.      loratadine (CLARITIN) 10 MG tablet Take 1 tablet by mouth Daily.      metFORMIN (GLUCOPHAGE) 500 MG tablet Take 1 tablet by mouth 2 (Two) Times a Day.      metoprolol succinate XL (TOPROL-XL) 25 MG 24 hr tablet Take 1  "tablet by mouth Daily.      pantoprazole (PROTONIX) 40 MG EC tablet Take 1 tablet by mouth 2 (Two) Times a Day.      potassium chloride (K-DUR,KLOR-CON) 20 MEQ CR tablet Oral for 30 Days      primidone (MYSOLINE) 50 MG tablet Take 1 tablet by mouth 2 (Two) Times a Day.      simvastatin (ZOCOR) 40 MG tablet Take 1 tablet by mouth Daily.       No current facility-administered medications for this visit.      Current outpatient and discharge medications have been reconciled for the patient.  Reviewed by: Zacarias Ortiz III, MD    The following portions of the patient's history were reviewed and updated as appropriate: allergies, current medications, past family history, past medical history, past social history, past surgical history and problem list.    REVIEW OF SYSTEMS  Review of Systems   Constitutional: Negative.  Negative for activity change, fatigue and unexpected weight change.   HENT: Negative.     Respiratory:  Positive for shortness of breath. Negative for cough.    Cardiovascular: Negative.  Negative for chest pain.   Gastrointestinal: Negative.    Genitourinary: Negative.    Skin: Negative.    Neurological: Negative.  Negative for dizziness, weakness, light-headedness and headaches.   Hematological: Negative.  Negative for adenopathy.   Psychiatric/Behavioral: Negative.     All other systems reviewed and are negative.    PHYSICAL EXAM  VITAL SIGNS:   Vitals:    12/27/23 1402   BP: 129/62   Pulse: 75   SpO2: 95%   Weight: 79.7 kg (175 lb 9.6 oz)   Height: 175.3 cm (69\")   PainSc: 0-No pain       Physical Exam  Vitals reviewed.   Constitutional:       Appearance: Normal appearance.   HENT:      Head: Normocephalic.   Cardiovascular:      Rate and Rhythm: Normal rate and regular rhythm.      Pulses: Normal pulses.      Heart sounds: Normal heart sounds.   Pulmonary:      Effort: Pulmonary effort is normal.      Breath sounds: Normal breath sounds.   Abdominal:      General: Bowel sounds are normal. "   Musculoskeletal:         General: Normal range of motion.      Cervical back: Normal range of motion and neck supple.   Skin:     General: Skin is warm.      Capillary Refill: Capillary refill takes less than 2 seconds.   Neurological:      General: No focal deficit present.      Mental Status: He is alert and oriented to person, place, and time.   Psychiatric:         Mood and Affect: Mood normal.         Behavior: Behavior normal.         Performance Status: ECOG (0) Fully active, able to carry on all predisease performance without restriction    Clinical Quality Measures  -Pain Documented  Boogie Artis reports a pain score of 0.  Given his pain assessment as noted, treatment options were discussed and the following options were decided upon as a follow-up plan to address the patient's pain:  No pain, no plan given .  Pain Medications               divalproex (DEPAKOTE ER) 250 MG 24 hr tablet Take 1 tablet by mouth 2 (Two) Times a Day.    primidone (MYSOLINE) 50 MG tablet Take 1 tablet by mouth 2 (Two) Times a Day.          -Advanced Care Planning Advance Care Planning   ACP discussion was held with the patient during this visit. Patient does not have an advance directive, information provided.    -Body Mass Index Screening and Follow-Up Plan  BMI is >= 25 and <30. (Overweight) The following options were offered after discussion;: none (medical contraindication)    -Tobacco Use: Screening and Cessation Intervention Social History    Tobacco Use      Smoking status: Former        Packs/day: 2.00        Years: 56.00        Additional pack years: 0.00        Total pack years: 112.00        Types: Cigarettes        Start date:         Quit date: 3/17/2023        Years since quittin.7        Passive exposure: Never      Smokeless tobacco: Never    ASSESSMENT AND PLAN  1. Squamous cell carcinoma of left lung    2. Prostate nodule    3. S/P lobectomy of lung    4. History of radiation therapy    5. Former  smoker      Orders Placed This Encounter   Procedures    CT Chest Without Contrast     Standing Status:   Future     Standing Expiration Date:   12/27/2024     Order Specific Question:   Does this exam require Alfred Bronch Protocol?     Answer:   No     Order Specific Question:   Release to patient     Answer:   Routine Release [[id number]]     RECOMMENDATIONS: Boogie Artis is status post completion of radiation therapy to the lung and presents to our clinic today for surveillance exam and to review imaging. Diagnosed with a PET+ neoplasm of the lung, RUL. He completed 5000 cGy in 4 fractions to the right upper lobe of the lung on 09/27/2023.     CT-scan of the chest on 12/20/2023 revealed decreased size of 6 mm spiculated right upper lobe pulmonary nodule (previously 13 mm). This nodule was hypermetabolic on prior PET/CT and highly suspicious for primary lung neoplasm. Postoperative change of left upper lobectomy. No definite change/increase in soft tissue thickening along the lobectomy margin and mediastinum. Recommend continued imaging surveillance. No evidence of disease progression in the chest. No thoracic lymphadenopathy.    On exam, I do not see evidence for recurrent or metastatic disease at this time. We will continue routine follow-up/surveillance as discussed in 3 months with follow up CT scan before visit and I have instructed him to continue to see the other health care providers as per their scheduling.    Patient Instructions   1) Return in 3 months with a CT chest before.    Todays appointment time was spent in counseling, coordination of care and surveillance related to patients diagnosis as well as radiation therapy possible and probable after effects.     Zacarias Ortiz III, MD  12/27/2023

## 2023-12-27 ENCOUNTER — OFFICE VISIT (OUTPATIENT)
Dept: RADIATION ONCOLOGY | Facility: HOSPITAL | Age: 73
End: 2023-12-27
Payer: MEDICARE

## 2023-12-27 VITALS
HEIGHT: 69 IN | DIASTOLIC BLOOD PRESSURE: 62 MMHG | HEART RATE: 75 BPM | OXYGEN SATURATION: 95 % | BODY MASS INDEX: 26.01 KG/M2 | SYSTOLIC BLOOD PRESSURE: 129 MMHG | WEIGHT: 175.6 LBS

## 2023-12-27 DIAGNOSIS — Z92.3 HISTORY OF RADIATION THERAPY: ICD-10-CM

## 2023-12-27 DIAGNOSIS — N40.2 PROSTATE NODULE: ICD-10-CM

## 2023-12-27 DIAGNOSIS — C34.92 SQUAMOUS CELL CARCINOMA OF LEFT LUNG: Primary | ICD-10-CM

## 2023-12-27 DIAGNOSIS — Z90.2 S/P LOBECTOMY OF LUNG: ICD-10-CM

## 2023-12-27 DIAGNOSIS — Z87.891 FORMER SMOKER: ICD-10-CM

## 2023-12-27 PROCEDURE — G0463 HOSPITAL OUTPT CLINIC VISIT: HCPCS | Performed by: RADIOLOGY

## 2024-03-19 ENCOUNTER — HOSPITAL ENCOUNTER (OUTPATIENT)
Dept: CT IMAGING | Facility: HOSPITAL | Age: 74
Discharge: HOME | End: 2024-03-19
Payer: MEDICARE

## 2024-03-19 DIAGNOSIS — Z87.891 FORMER SMOKER: ICD-10-CM

## 2024-03-19 DIAGNOSIS — N40.2 PROSTATE NODULE: ICD-10-CM

## 2024-03-19 DIAGNOSIS — Z90.2 S/P LOBECTOMY OF LUNG: ICD-10-CM

## 2024-03-19 DIAGNOSIS — C34.92 SQUAMOUS CELL CARCINOMA OF LEFT LUNG: ICD-10-CM

## 2024-03-19 DIAGNOSIS — Z92.3 HISTORY OF RADIATION THERAPY: ICD-10-CM

## 2024-03-19 PROCEDURE — 71250 CT THORAX DX C-: CPT

## 2024-03-26 ENCOUNTER — HOSPITAL ENCOUNTER (OUTPATIENT)
Dept: RADIATION ONCOLOGY | Facility: HOSPITAL | Age: 74
Setting detail: RADIATION/ONCOLOGY SERIES
End: 2024-03-26
Payer: MEDICARE

## 2024-03-26 NOTE — PROGRESS NOTES
[facility], .SShaunShaun Ortiz MD      Adam Clancy, APRN  ____________________________________________________________  [facility]  Department of Radiation Oncology  [address] [place] [id number]  Office:  [phone]  Fax: [phone]    DATE: 03/27/2024  PATIENT: Boogie Artis1950                                 MEDICAL RECORD #: [id number]    Reason for Follow up Visit:  Boogie Artis is a very pleasant 73 y.o. patient that completed radiation to the lung and returns to the clinic today for routine follow up exam. Reports SOB. Denies appetite change, unexpected weight change, nasuea/vomiting, diarrhea, light-headedness, weakness, and headaches.     History of Present Illness:  Diagnosed with a PET+ neoplasm of the lung, RUL. He completed 5000 cGy in 4 fractions to the right upper lobe of the lung on 09/27/2023.     01/16/2023 - CT Chest - [facility]:  Spiculated perihilar left upper lobe pulmonary nodule measures 2.7 cm. This is concerning for primary neoplasm. PET/CT or bronch recommended for further evaluation.   0.8 cm noncalcified pulmonary nodule in the right upper lobe. This is at the lower limits of normal for PET/CT detection. Benign granulomatous disease, metastatic disease, or 2nd primary pulmonary malignancy could be considered.   No enlarged mediastinal or hilar lymph nodes    01/17/2023 - Pulmonary Function Tests - [place]:  Moderate obstructive lung disease    01/26/2023 - Bronchoscopy with biopsy per :  RUL, lower lobe, middle lobe were negative. The left upper lobe, no lesion, lingula no lesion seen.  SAMINA washing:  Negative for malignant cells; reactive bronchial cells, macrophages, and inflammation  SAMINA brushing:  Negative for malignant cells; markedly reactive bronchial cells, macrophages, and inflammation    02/09/2023 - PET Scan:  2.8 cm hypermetabolic central LEFT upper lobe pulmonary nodule. This likely represents a primary  lung neoplasm.  0.8 cm hypermetabolic pulmonary nodule in the RIGHT upper lobe. This is highly suspicious for either a contralateral pulmonary metastasis or second primary lung neoplasm.   No hypermetabolic thoracic lymph nodes. No evidence of hypermetabolic metastatic disease in the neck, abdomen, or pelvis.  1.5 cm hypermetabolic nodule in the RIGHT anterior rectum, highly concerning for underlying rectal polyp. Recommend correlation with colonoscopy/sigmoidoscopy.    02/27/2023 - Colonoscopy:  Rectal polyp, excision:  Villous adenoma, negative for high-grade glandular dysplasia or malignancy.  Colon polyp at 20 cm, biopsy:  Fragments of serrated polyp, favor sessile serrated lesion/polyp.  Colon polyp at 10 cm, biopsy:  Fragments of serrated polyp, favor sessile serrated lesion/polyp.    03/08/2023 - CT Chest without contrast:  Pulmonary emphysema with LEFT hilar/upper lobe lung mass measuring approximately 4 cm in greatest dimension. There is mild nodular airspace disease laterally to this lesion which may represent postobstructive atelectasis or infiltrate.   10 mm irregular nodule within the RIGHT lung apex (series 2, image 101). This is concerning for metastatic lesion. Additional 4 mm noncalcified nodule at the RIGHT lung apex (series 2, image 85).   Evidence of prior granulomatous disease.     03/09/2023 - Bronchoscopy/EBUS per :  Left upper lobe, EBUS (ThinPrep and cell block section):   Squamous cell carcinoma.   Bronchial washing (ThinPrep and cell block section):   No malignant cells identified.   Benign bronchial epithelial cells, and alveolar macrophages.     04/19/2023 -  Left upper lobectomy and lymph node excision per Dr.Robert Irwin:   Lymph node, level 9 lymph node biopsy: Benign fibroadipose tissue.   Lymph node, level 10 posterior hilar lymph node biopsy: 1 lymph node, negative for evidence of malignancy.   Lymph node, level 6 lymph node biopsy: 2 lymph nodes, negative for evidence  of malignancy.   Lymph node, level 11 interlobar lymph node biopsy: 1 lymph node, negative for evidence of malignancy.   Lymph node, level 10 anterior hilar lymph node biopsy: 2 lymph nodes, negative for evidence of malignancy.   Lymph node, level 12 lobar lymph node biopsy: 1 lymph node, negative for evidence of malignancy.   Lung, left upper lobectomy:   Invasive moderately differentiated squamous cell carcinoma.   Invasive carcinoma measures 2.5 cm in greatest dimension grossly.   Invasive carcinoma extends to within 0.2 cm of the nearest bronchial surgical excision margin.   Squamous metaplasia identified at bronchial surgical excision margin.   Emphysematous changes identified involving the nonneoplastic lung parenchyma.   2 peribronchial lymph nodes, negative for evidence of malignancy.   AJCC STAGE:  pT1c, pN0, pMx     04/25/2023 -  CT Chest without contrast:  Interval placement of large bore chest tube with decrease in previously described large left hydropneumothorax.Residual hydropneumothorax present with air dissecting through anterior chest wall into subcutaneous soft tissues, unchanged.   Interval resolution of previously described debris within left bronchus.   Unchanged right apical 1.0 cm nodule.     07/24/2023 - Appointment with :  Left upper lobe SCC [id number], stage I,Jan 2023  -1/16/23 CT chest ([facility]): Spiculated perihilar left upper lobe pulmonary nodule measures 2.7cm. This is concerning for primary neoplasm. PET/CT or bronchoscopy is recommended to further evaluate. 0.8cm noncalcified pulmonary nodule in the right upper lobe. This is at the lower limits of normal for Pet/CT detection. Benign granulomatous disease, metastatic disease, or 2nd primary pulmonary malignancy could be considered. No enlarged mediastinal or hilar lymph nodes.  -1/26/23 Lung, left upper lobe, washing: Negative for malignant cells. Reactive bronchial cells, macrophages, and inflammation. Lung, left upper lobe,  brushing: Negative for malignant cells. Markedly reactive bronchial cells, macrophages, and inflammation.  -2/9/23 PET scan ([facility]): 2.8 cm hypermetabolic central LEFT upper lobe pulmonary nodule. This likely represents a primary lung neoplasm. 0.8 cm hypermetabolic pulmonary nodule in the RIGHT upper lobe. This is highly suspicious for either a contralateral pulmonary metastasis or second primary lung neoplasm. No hypermetabolic thoracic lymph nodes. No evidence of hypermetabolic metastatic disease in the neck, abdomen, or pelvis. 1.5 cm hypermetabolic nodule in the RIGHT anterior rectum, highly concerning for underlying rectal polyp. Recommend correlation with colonoscopy/sigmoidoscopy.   4/19/23 Lung, left upper lobectomy: Invasive moderately differentiated squamous cell carcinoma. Invasive carcinoma measures 2.5 cm in greatest dimension grossly. Invasive carcinoma extends to within 0.2 cm of the nearest bronchial surgical excision margin. Squamous metaplasia identified at bronchial surgical excision margin. Emphysematous changes identified involving the nonneoplastic lung parenchyma. 2 peribronchial lymph nodes, negative for evidence of malignancy.Lymph node, level 9 lymph node biopsy: Benign fibroadipose tissue. Lymph node, level 10 posterior hilar lymph node biopsy: 1 lymph node, negative for evidence of malignancy. Lymph node, level 6 lymph node biopsy: 2 lymph nodes, negative for evidence of malignancy. Lymph node, level 11 interlobar lymph node biopsy: 1 lymph node, negative for evidence of malignancy. Lymph node, level 10 anterior hilar lymph node biopsy: 2 lymph nodes, negative for evidence of malignancy. Lymph node, level 12 lobar lymph node biopsy: 1 lymph node, negative for evidence of malignancy. [id number], stage I  Plan:  -Repeat CT chest Nov 2023  RUL subcentimeter nodule (PET+)  -2/9/23 PET scan ([facility]): 2.8 cm hypermetabolic central LEFT upper lobe pulmonary nodule. This likely represents a primary  lung neoplasm. 0.8 cm hypermetabolic pulmonary nodule in the RIGHT upper lobe. This is highly suspicious for either a contralateral pulmonary metastasis or second primary lung neoplasm. No hypermetabolic thoracic lymph nodes. No evidence of hypermetabolic metastatic disease in the neck, abdomen, or pelvis. 1.5 cm hypermetabolic nodule in the RIGHT anterior rectum, highly concerning for underlying rectal polyp. Recommend correlation with colonoscopy/sigmoidoscopy.   -Referral Dr Ortiz for consideration SBRT RUL hypermetabolic nodule.  PLAN:  RTC with MD 2 months in [place] office  CBC, CMP, B12, Folate, LDH, Haptoglobin, iron profile, Ferritin, retic  Continue follow-up with Dr.Robert Irwin/CardioThoracic Surgery  Continue to follow up with Dr. Rowe  Iron Sulfate 325 mg p.o. twice daily  Follow Up:  Return in 2 months (on 9/24/2023) for Appointment with Dr. Vaughan in [place].  Refer to Dr Ortiz     08/09/2023 - Appointment with :  will order PET scan, they will call you  Return in 2 weeks for further discussion    08/14/2023 - PET Scan:  Abnormal PET/CT, there is increased size of a hypermetabolic pulmonary nodule in the right upper lobe lung apex that measures 13 mm, compared with 8 mm on the previous PET/CT. This has a maximum SUV of 8.1. This is concerning for active neoplasm.  Interval resection of the left upper lobe with posttreatment changes along the medial margin of the upper mediastinum, including mild infiltration of the mediastinal fat planes.  There is a subcutaneous nodule with surrounding fatty infiltration over the low back at the level of the upper sacrum which demonstrates hypermetabolism. This could be inflammatory, less likely a metastatic nodule. This measures 21 mm, follow-up ultrasound is recommended to determine if this is solid. Ultrasound-guided biopsy could be performed if indicated.  Interval resolution of the hypermetabolic nodule associated with the right rectum, however  there is a new hypermetabolic nodule that maps to the right prostate gland. Correlation with PSA values is recommended.    08/16/2023 - Consult with :  I have recommended to treat the right lung nodule with SBRT, I anticipate a course over 4-5 fractions, final course pending.   He does have a visible insect bite on his low back and I do believe this correlates with the PET findings of activity on the upper sacrum. Will order PSA for prostate gland findings. He has not had one drawn in some time.   Plan:  Plan on 4-5 pinpoint treatments to the lung nodule.   We will call you when to start treatments.   PSA today    08/18/2023 - Documentation per :  I spoke on the phone with this patient's wife Sarah and discussed his PSA of 2.9. This is considered a normal level, however on recent PET scan imaging for a lung nodule workup, a nodule within the prostate was noted.   I will refer him to urology for further management recommendations.    09/12/2023 - 09/27/2023 - Completed radiation course:  Received 5000 cGy in 4 fractions to the right upper lobe of the lung.    11/20/2023 - MRI Pelvis with and without contrast:  No suspicious lesions in the prostate (PI-RADS 3 or greater).     11/29/2023 - Appointment with :  I personally reviewed MRI today.    His MRI is negative for any suspicious lesions.    He has a volume of 34 cc.    I believe his hypermetabolic activity on PET scan was likely inflammatory.  I do not think he requires a biopsy based on his negative MRI and PSA level.    He will follow-up as needed.     12/20/2023 - CT Chest without contrast:  Decreased size of 6 mm spiculated right upper lobe pulmonary nodule (previously 13 mm). This nodule was hypermetabolic on prior PET/CT and highly suspicious for primary lung neoplasm.  Postoperative change of left upper lobectomy. No definite change/increase in soft tissue thickening along the lobectomy margin and mediastinum. Recommend continued  imaging surveillance.  No evidence of disease progression in the chest. No thoracic lymphadenopathy.    2023 - Appointment with :  Return in 3 months with a CT chest before.     2024 - CT chest without contrast:  1.1 cm irregular right upper lobe nodule, highly suspicious for malignancy.  5 mm left upper lobe nodule is indeterminate but does raise the suspicion for malignancy.  Stable size of the previously irradiated right apex nodule. Minimally increased adjacent subpleural parenchymal abnormality, likely related to post radiation changes.  Left upper lobectomy with stable postsurgical margins.  No mediastinal lymphadenopathy.     History obtained from  PATIENT, FAMILY, and CHART    PAST MEDICAL HISTORY  No past medical history on file.     PAST SURGICAL HISTORY  Past Surgical History:   Procedure Laterality Date    APPENDECTOMY      BRONCHOSCOPY      COLONOSCOPY  2023    HERNIA REPAIR      LUNG SURGERY Left 2023      FAMILY HISTORY  family history includes Alcohol abuse in his brother; Cancer in his brother, father, and paternal grandfather; Diabetes in his mother; Heart disease in his mother.    SOCIAL HISTORY  Social History     Tobacco Use    Smoking status: Former     Current packs/day: 0.00     Average packs/day: 2.0 packs/day for 57.2 years (114.4 ttl pk-yrs)     Types: Cigarettes     Start date:      Quit date: 3/17/2023     Years since quittin.0     Passive exposure: Never    Smokeless tobacco: Never   Vaping Use    Vaping status: Never Used   Substance Use Topics    Alcohol use: Not Currently    Drug use: Never      Iodides     MEDICATIONS  Current Outpatient Medications   Medication Sig Dispense Refill    cetirizine (zyrTEC) 10 MG tablet Take 1 tablet by mouth Daily.      Cholecalciferol 125 MCG (5000 UT) tablet Take 1 tablet by mouth Daily.      divalproex (DEPAKOTE ER) 250 MG 24 hr tablet Take 1 tablet by mouth 2 (Two) Times a Day.      furosemide  (LASIX) 20 MG tablet TAKE 1 TABLET BY MOUTH ONCE DAILY Oral for 14 Days      glimepiride (AMARYL) 4 MG tablet Take 1 tablet by mouth Daily.      hydroCHLOROthiazide (HYDRODIURIL) 25 MG tablet Take 1 tablet by mouth Daily.      lisinopril (PRINIVIL,ZESTRIL) 10 MG tablet Take 1 tablet by mouth Daily.      loratadine (CLARITIN) 10 MG tablet Take 1 tablet by mouth Daily.      metFORMIN (GLUCOPHAGE) 500 MG tablet Take 1 tablet by mouth 2 (Two) Times a Day.      metoprolol succinate XL (TOPROL-XL) 25 MG 24 hr tablet Take 1 tablet by mouth Daily.      pantoprazole (PROTONIX) 40 MG EC tablet Take 1 tablet by mouth 2 (Two) Times a Day.      potassium chloride (K-DUR,KLOR-CON) 20 MEQ CR tablet Oral for 30 Days      primidone (MYSOLINE) 50 MG tablet Take 1 tablet by mouth 2 (Two) Times a Day.      simvastatin (ZOCOR) 40 MG tablet Take 1 tablet by mouth Daily.       No current facility-administered medications for this visit.      Current outpatient and discharge medications have been reconciled for the patient.  Reviewed by: Zacarias Ortiz III, MD    The following portions of the patient's history were reviewed and updated as appropriate: allergies, current medications, past family history, past medical history, past social history, past surgical history and problem list.    REVIEW OF SYSTEMS  Review of Systems   Constitutional: Negative.  Negative for activity change, fatigue and unexpected weight change.   HENT: Negative.     Respiratory:  Positive for shortness of breath. Negative for cough.    Cardiovascular: Negative.    Gastrointestinal: Negative.    Genitourinary: Negative.    Musculoskeletal: Negative.    Skin: Negative.    Neurological: Negative.  Negative for dizziness and weakness.   Hematological: Negative.  Negative for adenopathy.   Psychiatric/Behavioral: Negative.     All other systems reviewed and are negative.    PHYSICAL EXAM  VITAL SIGNS:   Vitals:    03/27/24 1347   BP: 137/60   Pulse: 65   SpO2: 96%  "  Weight: 79.7 kg (175 lb 9.6 oz)   Height: 175.3 cm (69\")   PainSc:   3   PainLoc: Chest  Comment: prev. lobectomy     Physical Exam  Vitals reviewed.   Constitutional:       Appearance: Normal appearance.   HENT:      Head: Normocephalic.   Cardiovascular:      Rate and Rhythm: Normal rate and regular rhythm.      Pulses: Normal pulses.      Heart sounds: Normal heart sounds.   Pulmonary:      Effort: Pulmonary effort is normal.      Breath sounds: Normal breath sounds.   Abdominal:      General: Bowel sounds are normal.   Musculoskeletal:         General: Normal range of motion.      Cervical back: Normal range of motion and neck supple.   Lymphadenopathy:      Cervical: No cervical adenopathy.   Skin:     General: Skin is warm.      Capillary Refill: Capillary refill takes less than 2 seconds.   Neurological:      General: No focal deficit present.      Mental Status: He is alert and oriented to person, place, and time.   Psychiatric:         Mood and Affect: Mood normal.         Behavior: Behavior normal.         Performance Status: ECOG (0) Fully active, able to carry on all predisease performance without restriction    Clinical Quality Measures  -Pain Documented  Boogie Artis reports a pain score of 3.  Given his pain assessment as noted, treatment options were discussed and the following options were decided upon as a follow-up plan to address the patient's pain:  No pain, no plan given .  Pain Medications               divalproex (DEPAKOTE ER) 250 MG 24 hr tablet Take 1 tablet by mouth 2 (Two) Times a Day.    primidone (MYSOLINE) 50 MG tablet Take 1 tablet by mouth 2 (Two) Times a Day.          -Advanced Care Planning Advance Care Planning   ACP discussion was held with the patient during this visit. Patient does not have an advance directive, information provided.    -Body Mass Index Screening and Follow-Up Plan  BMI is >= 25 and <30. (Overweight) The following options were offered after discussion;: " none (medical contraindication)    -Tobacco Use: Screening and Cessation Intervention Social History    Tobacco Use      Smoking status: Former        Packs/day: 0.00        Years: 2.0 packs/day for 57.2 years (114.4 ttl pk-yrs)        Types: Cigarettes        Start date:         Quit date: 3/17/2023        Years since quittin.0        Passive exposure: Never      Smokeless tobacco: Never    ASSESSMENT AND PLAN  1. Nodule of upper lobe of right lung    2. Squamous cell carcinoma of left lung    3. S/P lobectomy of lung    4. History of radiation therapy    5. Former smoker      Orders Placed This Encounter   Procedures    Ambulatory Referral to Pulmonology     Referral Priority:   Urgent     Referral Type:   Consultation     Referral Reason:   Specialty Services Required     Referred to Provider:   Nicola Monsivais MD     Requested Specialty:   Pulmonary Disease     Number of Visits Requested:   1     RECOMMENDATIONS: Boogie Artis is status post completion of radiation therapy to the lung and presents to our clinic today for surveillance exam and to review imaging. Diagnosed with a PET+ neoplasm of the lung, RUL. He completed 5000 cGy in 4 fractions to the right upper lobe of the lung on 2023.     CT-scan of the chest on 2024 revealed 1.1 cm irregular right upper lobe nodule, highly suspicious for malignancy. 5 mm left upper lobe nodule is indeterminate but does raise the suspicion for malignancy. Stable size of the previously irradiated right apex nodule. Minimally increased adjacent subpleural parenchymal abnormality, likely related to post radiation changes. Left upper lobectomy with stable postsurgical margins. No mediastinal lymphadenopathy.    Will refer to pulmonology for bronchoscopy considerations of the new right upper lung nodule. He will return in 3 weeks for further recommendations.  I have instructed him to continue to see the other health care providers as per their  scheduling.    Patient Instructions   1) Referral to pulmonology for biopsy of new right lung nodule  2) Return in 3 weeks    Todays appointment time of 40 minutes was spent in counseling, coordination of care and surveillance related to patients diagnosis as well as radiation therapy possible and probable after effects.     Zacarias Ortiz III, MD  03/27/2024

## 2024-03-27 ENCOUNTER — OFFICE VISIT (OUTPATIENT)
Dept: RADIATION ONCOLOGY | Facility: HOSPITAL | Age: 74
End: 2024-03-27
Payer: MEDICARE

## 2024-03-27 VITALS
DIASTOLIC BLOOD PRESSURE: 60 MMHG | OXYGEN SATURATION: 96 % | WEIGHT: 175.6 LBS | HEIGHT: 69 IN | HEART RATE: 65 BPM | SYSTOLIC BLOOD PRESSURE: 137 MMHG | BODY MASS INDEX: 26.01 KG/M2

## 2024-03-27 DIAGNOSIS — Z87.891 FORMER SMOKER: ICD-10-CM

## 2024-03-27 DIAGNOSIS — R91.1 NODULE OF UPPER LOBE OF RIGHT LUNG: Primary | ICD-10-CM

## 2024-03-27 DIAGNOSIS — Z90.2 S/P LOBECTOMY OF LUNG: ICD-10-CM

## 2024-03-27 DIAGNOSIS — Z92.3 HISTORY OF RADIATION THERAPY: ICD-10-CM

## 2024-03-27 DIAGNOSIS — C34.92 SQUAMOUS CELL CARCINOMA OF LEFT LUNG: ICD-10-CM

## 2024-03-27 PROCEDURE — G0463 HOSPITAL OUTPT CLINIC VISIT: HCPCS | Performed by: RADIOLOGY

## 2024-04-01 ENCOUNTER — TELEPHONE (OUTPATIENT)
Dept: RADIATION ONCOLOGY | Facility: HOSPITAL | Age: 74
End: 2024-04-01
Payer: MEDICARE

## 2024-04-01 NOTE — TELEPHONE ENCOUNTER
Patient called to cancel 4/15 follow up appointment with Dr. Zacarias Ortiz due to not seeing Dr. Monsivais until 4/16  Patient states he will call back to reschedule after bx.

## 2024-04-16 ENCOUNTER — PREP FOR SURGERY (OUTPATIENT)
Dept: OTHER | Facility: HOSPITAL | Age: 74
End: 2024-04-16
Payer: MEDICARE

## 2024-04-16 ENCOUNTER — OFFICE VISIT (OUTPATIENT)
Dept: PULMONOLOGY | Facility: CLINIC | Age: 74
End: 2024-04-16
Payer: MEDICARE

## 2024-04-16 VITALS
DIASTOLIC BLOOD PRESSURE: 82 MMHG | HEIGHT: 69 IN | BODY MASS INDEX: 26.07 KG/M2 | SYSTOLIC BLOOD PRESSURE: 136 MMHG | WEIGHT: 176 LBS | OXYGEN SATURATION: 97 % | HEART RATE: 68 BPM

## 2024-04-16 DIAGNOSIS — R91.1 NODULE OF UPPER LOBE OF RIGHT LUNG: Primary | ICD-10-CM

## 2024-04-16 DIAGNOSIS — R06.2 WHEEZING: Primary | ICD-10-CM

## 2024-04-16 DIAGNOSIS — K08.9 POOR DENTITION: ICD-10-CM

## 2024-04-16 DIAGNOSIS — Z87.891 HISTORY OF CIGARETTE SMOKING: ICD-10-CM

## 2024-04-16 DIAGNOSIS — R91.1 NODULE OF UPPER LOBE OF RIGHT LUNG: ICD-10-CM

## 2024-04-16 RX ORDER — PREDNISONE 20 MG/1
40 TABLET ORAL DAILY
Qty: 10 TABLET | Refills: 0 | Status: SHIPPED | OUTPATIENT
Start: 2024-04-16 | End: 2024-04-21

## 2024-04-16 RX ORDER — SODIUM CHLORIDE 0.9 % (FLUSH) 0.9 %
10 SYRINGE (ML) INJECTION AS NEEDED
OUTPATIENT
Start: 2024-04-16

## 2024-04-16 RX ORDER — SODIUM CHLORIDE 0.9 % (FLUSH) 0.9 %
10 SYRINGE (ML) INJECTION EVERY 12 HOURS SCHEDULED
OUTPATIENT
Start: 2024-04-16

## 2024-04-16 RX ORDER — SODIUM CHLORIDE 9 MG/ML
40 INJECTION, SOLUTION INTRAVENOUS AS NEEDED
OUTPATIENT
Start: 2024-04-16

## 2024-04-16 NOTE — PROCEDURES
Spirometry    Performed by: Matteo Landry CMA  Authorized by: Nicola Monsivais MD     Pre Drug % Predicted    FVC: 68%   FEV1: 48%   FEF 25-75%: 24%   FEV1/FVC: 55.73%    Interpretation   Spirometry   Spirometry shows severe obstruction. There is reduced midflow suggesting small airway/airflow obstruction.   Electronically signed by Nicola Monsivais MD, 4/16/2024, 12:22 CDT

## 2024-04-16 NOTE — PROGRESS NOTES
Patient had a negative screening prior to the PFT. PFT performed. See scanned results for additional information.

## 2024-04-16 NOTE — PROGRESS NOTES
Background:  Pt w moo lung ca resected 4/2023, rul lung ca, xrt 9/2023, new rul nodule 3/2024   Chief Complaint  squamous cell carcinoma of left lung    Subjective    History of Present Illness     Boogie Artis is here for follow up with [facility].  History of Present Illness  He worked in the [employer], worked around asbestos, drove a coal truck, breathed rock dust, worked with black top and smoked.  There is hx lung cancer and MOO lobectomy and suspected recurrence in right apex treated with sbrt.  He has dyspnea and wheezing. He had the flu a few weeks ago.  He reports loss of teeth associated with prior bronchoscopy procedures in [place] and at [facility].     Tobacco Use: Medium Risk (4/16/2024)    Patient History     Smoking Tobacco Use: Former     Smokeless Tobacco Use: Never     Passive Exposure: Never      Current Outpatient Medications   Medication Instructions    cetirizine (zyrTEC) 10 MG tablet 1 tablet, Oral, Daily    Cholecalciferol 125 MCG (5000 UT) tablet 1 tablet, Oral, Daily    divalproex (DEPAKOTE ER) 250 mg, Oral, 2 Times Daily    Fluticasone-Umeclidin-Vilant (TRELEGY) 100-62.5-25 MCG/ACT inhaler 1 puff, Inhalation, Daily    furosemide (LASIX) 20 MG tablet TAKE 1 TABLET BY MOUTH ONCE DAILY Oral for 14 Days    glimepiride (AMARYL) 4 MG tablet 1 tablet, Oral, Daily    hydroCHLOROthiazide (HYDRODIURIL) 25 MG tablet 1 tablet, Oral, Daily    lisinopril (PRINIVIL,ZESTRIL) 10 mg, Oral, Daily    loratadine (CLARITIN) 10 MG tablet 1 tablet, Oral, Daily    metFORMIN (GLUCOPHAGE) 500 MG tablet 1 tablet, Oral, 2 Times Daily    metoprolol succinate XL (TOPROL-XL) 25 mg, Oral, Daily    pantoprazole (PROTONIX) 40 MG EC tablet 1 tablet, Oral, 2 Times Daily    potassium chloride (K-DUR,KLOR-CON) 20 MEQ CR tablet Oral for 30 Days    predniSONE (DELTASONE) 40 mg, Oral, Daily    primidone (MYSOLINE) 50 MG tablet 1 tablet, Oral, 2 Times Daily    simvastatin (ZOCOR)  "40 mg, Oral, Daily      Objective     Vital Signs:   /82   Pulse 68   Ht 175.3 cm (69\")   Wt 79.8 kg (176 lb)   SpO2 97% Comment: RA  BMI 25.99 kg/m²   Physical Exam  Constitutional:       Appearance: Normal appearance. He is not ill-appearing or diaphoretic.   HENT:      Mouth/Throat:      Comments: Some missing teeth.  Eyes:      Extraocular Movements: Extraocular movements intact.   Pulmonary:      Effort: Pulmonary effort is normal. No respiratory distress.      Breath sounds: Wheezing present. No rhonchi or rales.   Skin:     Findings: No erythema or rash.   Neurological:      Mental Status: He is alert.        Result Review  Data Reviewed:    CT Chest Without Contrast Diagnostic    Result Date: 3/19/2024  Impression:  1.1 cm irregular right upper lobe nodule, highly suspicious for malignancy.  5 mm left upper lobe nodule is indeterminate but does raise the suspicion for malignancy.  Stable size of the previously irradiated right apex nodule. Minimally increased adjacent subpleural parenchymal abnormality, likely related to post radiation changes.  Left upper lobectomy with stable postsurgical margins.  No mediastinal lymphadenopathy.    This report was signed and finalized on 3/19/2024 12:59 PM by Yong Mendez.       PFT Values          4/16/2024    10:15   Pre Drug PFT Results   FVC 68   FEV1 48   FEF 25-75% 24   FEV1/FVC 55.73                Assessment and Plan    Diagnoses and all orders for this visit:    1. Wheezing (Primary)  -     Spirometry; Future  -     Fluticasone-Umeclidin-Vilant (TRELEGY) 100-62.5-25 MCG/ACT inhaler; Inhale 1 puff Daily for 30 days.  Dispense: 14 each; Refill: 0  -     Spirometry  -     predniSONE (DELTASONE) 20 MG tablet; Take 2 tablets by mouth Daily for 5 days.  Dispense: 10 tablet; Refill: 0    2. History of cigarette smoking    3. Nodule of upper lobe of right lung    4. Poor dentition    For wheezing, add bronchodilator.100 mcg trelegy sample given, we reviewed " PFT showing severe obstruction.  Likely moderate to severe copd with hx smoking  For lung nodule, will plan ion bronchoscopy to better assess rul nodule  Ask anesthesia to take particular care with remaining teeth  We demonstrated proper technique for use of ellipta device  70 minutes spent with patient management today  Follow Up   Return in about 4 weeks (around 5/14/2024).  Patient was given instructions and counseling regarding his condition or for health maintenance advice. Please see specific information pulled into the AVS if appropriate.    Electronically signed by Nicola Monsivais MD, 4/16/2024, 21:13 CDT

## 2024-04-17 NOTE — H&P
Background:  Pt w moo lung ca resected 4/2023, rul lung ca, xrt 9/2023, new rul nodule 3/2024   Chief Complaint  squamous cell carcinoma of left lung    Subjective    History of Present Illness     Boogie Artis is here for follow up with [facility].  History of Present Illness  He worked in the group home, worked around asbestos, drove a coal truck, breathed rock dust, worked with black top and smoked.  There is hx lung cancer and MOO lobectomy and suspected recurrence in right apex treated with sbrt.  He has dyspnea and wheezing. He had the flu a few weeks ago.  He reports loss of teeth associated with prior bronchoscopy procedures in [place] and at [facility].     Tobacco Use: Medium Risk (4/16/2024)    Patient History     Smoking Tobacco Use: Former     Smokeless Tobacco Use: Never     Passive Exposure: Never      Current Outpatient Medications   Medication Instructions    cetirizine (zyrTEC) 10 MG tablet 1 tablet, Oral, Daily    Cholecalciferol 125 MCG (5000 UT) tablet 1 tablet, Oral, Daily    divalproex (DEPAKOTE ER) 250 mg, Oral, 2 Times Daily    Fluticasone-Umeclidin-Vilant (TRELEGY) 100-62.5-25 MCG/ACT inhaler 1 puff, Inhalation, Daily    furosemide (LASIX) 20 MG tablet TAKE 1 TABLET BY MOUTH ONCE DAILY Oral for 14 Days    glimepiride (AMARYL) 4 MG tablet 1 tablet, Oral, Daily    hydroCHLOROthiazide (HYDRODIURIL) 25 MG tablet 1 tablet, Oral, Daily    lisinopril (PRINIVIL,ZESTRIL) 10 mg, Oral, Daily    loratadine (CLARITIN) 10 MG tablet 1 tablet, Oral, Daily    metFORMIN (GLUCOPHAGE) 500 MG tablet 1 tablet, Oral, 2 Times Daily    metoprolol succinate XL (TOPROL-XL) 25 mg, Oral, Daily    pantoprazole (PROTONIX) 40 MG EC tablet 1 tablet, Oral, 2 Times Daily    potassium chloride (K-DUR,KLOR-CON) 20 MEQ CR tablet Oral for 30 Days    predniSONE (DELTASONE) 40 mg, Oral, Daily    primidone (MYSOLINE) 50 MG tablet 1 tablet, Oral, 2 Times Daily    simvastatin (ZOCOR)  "40 mg, Oral, Daily      Objective     Vital Signs:   /82   Pulse 68   Ht 175.3 cm (69\")   Wt 79.8 kg (176 lb)   SpO2 97% Comment: RA  BMI 25.99 kg/m²   Physical Exam  Constitutional:       Appearance: Normal appearance. He is not ill-appearing or diaphoretic.   HENT:      Mouth/Throat:      Comments: Some missing teeth.  Eyes:      Extraocular Movements: Extraocular movements intact.   Pulmonary:      Effort: Pulmonary effort is normal. No respiratory distress.      Breath sounds: Wheezing present. No rhonchi or rales.   Skin:     Findings: No erythema or rash.   Neurological:      Mental Status: He is alert.        Result Review  Data Reviewed:    CT Chest Without Contrast Diagnostic    Result Date: 3/19/2024  Impression:  1.1 cm irregular right upper lobe nodule, highly suspicious for malignancy.  5 mm left upper lobe nodule is indeterminate but does raise the suspicion for malignancy.  Stable size of the previously irradiated right apex nodule. Minimally increased adjacent subpleural parenchymal abnormality, likely related to post radiation changes.  Left upper lobectomy with stable postsurgical margins.  No mediastinal lymphadenopathy.    This report was signed and finalized on 3/19/2024 12:59 PM by Yong Mendez.       PFT Values          4/16/2024    10:15   Pre Drug PFT Results   FVC 68   FEV1 48   FEF 25-75% 24   FEV1/FVC 55.73                Assessment and Plan    Diagnoses and all orders for this visit:    1. Wheezing (Primary)  -     Spirometry; Future  -     Fluticasone-Umeclidin-Vilant (TRELEGY) 100-62.5-25 MCG/ACT inhaler; Inhale 1 puff Daily for 30 days.  Dispense: 14 each; Refill: 0  -     Spirometry  -     predniSONE (DELTASONE) 20 MG tablet; Take 2 tablets by mouth Daily for 5 days.  Dispense: 10 tablet; Refill: 0    2. History of cigarette smoking    3. Nodule of upper lobe of right lung    4. Poor dentition    For wheezing, add bronchodilator.100 mcg trelegy sample given, we reviewed " PFT showing severe obstruction.  Likely moderate to severe copd  For lung nodule, will plan ion bronchoscopy to better assess rul nodule.  Risks and nature of procedure under general anesthesia were discussed  Ask anesthesia to take particular care with remaining teeth  We demonstrated proper technique for use of ellipta device    Follow Up   Return in about 4 weeks (around 5/14/2024).  Patient was given instructions and counseling regarding his condition or for health maintenance advice. Please see specific information pulled into the AVS if appropriate.    Electronically signed by Nicola Monsivais MD, 4/16/2024, 21:13 CDT

## 2024-04-18 ENCOUNTER — TELEPHONE (OUTPATIENT)
Dept: PULMONOLOGY | Facility: CLINIC | Age: 74
End: 2024-04-18
Payer: MEDICARE

## 2024-04-18 PROBLEM — R91.1 NODULE OF UPPER LOBE OF RIGHT LUNG: Status: ACTIVE | Noted: 2024-04-16

## 2024-04-18 NOTE — TELEPHONE ENCOUNTER
Patient scheduled for Bronchoscopy w/ ION Robot & EBUS on Monday 04/29/24 to follow your first case.

## 2024-04-22 ENCOUNTER — TELEPHONE (OUTPATIENT)
Dept: PULMONOLOGY | Facility: CLINIC | Age: 74
End: 2024-04-22
Payer: MEDICARE

## 2024-04-22 ENCOUNTER — PRE-ADMISSION TESTING (OUTPATIENT)
Dept: PREADMISSION TESTING | Facility: HOSPITAL | Age: 74
End: 2024-04-22
Payer: MEDICARE

## 2024-04-22 ENCOUNTER — HOSPITAL ENCOUNTER (OUTPATIENT)
Dept: CT IMAGING | Facility: HOSPITAL | Age: 74
Discharge: HOME | End: 2024-04-22
Payer: MEDICARE

## 2024-04-22 VITALS
OXYGEN SATURATION: 96 % | HEART RATE: 66 BPM | DIASTOLIC BLOOD PRESSURE: 65 MMHG | SYSTOLIC BLOOD PRESSURE: 143 MMHG | HEIGHT: 68 IN | RESPIRATION RATE: 16 BRPM | BODY MASS INDEX: 27.03 KG/M2 | WEIGHT: 178.35 LBS

## 2024-04-22 DIAGNOSIS — R91.1 NODULE OF UPPER LOBE OF RIGHT LUNG: ICD-10-CM

## 2024-04-22 LAB
ANION GAP SERPL CALCULATED.3IONS-SCNC: 11 MMOL/L (ref 5–15)
BUN SERPL-MCNC: 29 MG/DL (ref 8–23)
BUN/CREAT SERPL: 23.8 (ref 7–25)
CALCIUM SPEC-SCNC: 10.9 MG/DL (ref 8.6–10.5)
CHLORIDE SERPL-SCNC: 100 MMOL/L (ref 98–107)
CO2 SERPL-SCNC: 27 MMOL/L (ref 22–29)
CREAT SERPL-MCNC: 1.22 MG/DL (ref 0.76–1.27)
DEPRECATED RDW RBC AUTO: 48.3 FL (ref 37–54)
EGFRCR SERPLBLD CKD-EPI 2021: 62.6 ML/MIN/1.73
ERYTHROCYTE [DISTWIDTH] IN BLOOD BY AUTOMATED COUNT: 13.8 % (ref 12.3–15.4)
GLUCOSE SERPL-MCNC: 202 MG/DL (ref 65–99)
HCT VFR BLD AUTO: 39.6 % (ref 37.5–51)
HGB BLD-MCNC: 12.9 G/DL (ref 13–17.7)
MCH RBC QN AUTO: 30.9 PG (ref 26.6–33)
MCHC RBC AUTO-ENTMCNC: 32.6 G/DL (ref 31.5–35.7)
MCV RBC AUTO: 95 FL (ref 79–97)
PLATELET # BLD AUTO: 231 10*3/MM3 (ref 140–450)
PMV BLD AUTO: 10.5 FL (ref 6–12)
POTASSIUM SERPL-SCNC: 5.9 MMOL/L (ref 3.5–5.2)
RBC # BLD AUTO: 4.17 10*6/MM3 (ref 4.14–5.8)
SODIUM SERPL-SCNC: 138 MMOL/L (ref 136–145)
WBC NRBC COR # BLD AUTO: 13.43 10*3/MM3 (ref 3.4–10.8)

## 2024-04-22 PROCEDURE — 85027 COMPLETE CBC AUTOMATED: CPT

## 2024-04-22 PROCEDURE — 80048 BASIC METABOLIC PNL TOTAL CA: CPT

## 2024-04-22 PROCEDURE — 36415 COLL VENOUS BLD VENIPUNCTURE: CPT

## 2024-04-22 PROCEDURE — 71250 CT THORAX DX C-: CPT

## 2024-04-22 PROCEDURE — 93005 ELECTROCARDIOGRAM TRACING: CPT

## 2024-04-22 NOTE — DISCHARGE INSTRUCTIONS

## 2024-04-22 NOTE — PROGRESS NOTES
Please call the patient regarding his abnormal result. There was hemolysis that made the potassium look too high.  Get a repeat.  Ok to get [facility] at [facility] as long as they will fax the report to us

## 2024-04-22 NOTE — TELEPHONE ENCOUNTER
Left message for patient to call back to confirm new arrival time for biopsy scheduled on 04/29/24.  11:00 am instead of 12:00 pm

## 2024-04-23 DIAGNOSIS — E87.5 HYPERKALEMIA: Primary | ICD-10-CM

## 2024-04-23 NOTE — PROGRESS NOTES
Please call the patient regarding his abnormal result.  Nodule is still there the same.  Still a go for doing the bronchoscopy.

## 2024-04-23 NOTE — PROGRESS NOTES
Spoke with patient and relayed test results. Patient voiced understanding. Patient agreeable to repeating the Potassium at [facility]. Orders pended and sent to Dr. Monsivais to sign.

## 2024-04-24 DIAGNOSIS — E87.5 HYPERKALEMIA: ICD-10-CM

## 2024-04-28 LAB
QT INTERVAL: 354 MS
QTC INTERVAL: 368 MS

## 2024-04-29 ENCOUNTER — ANESTHESIA (OUTPATIENT)
Dept: PERIOP | Facility: HOSPITAL | Age: 74
End: 2024-04-29
Payer: MEDICARE

## 2024-04-29 ENCOUNTER — HOSPITAL ENCOUNTER (OUTPATIENT)
Facility: HOSPITAL | Age: 74
Setting detail: HOSPITAL OUTPATIENT SURGERY
Discharge: HOME | End: 2024-04-29
Attending: INTERNAL MEDICINE | Admitting: INTERNAL MEDICINE
Payer: MEDICARE

## 2024-04-29 ENCOUNTER — ANESTHESIA EVENT (OUTPATIENT)
Dept: PERIOP | Facility: HOSPITAL | Age: 74
End: 2024-04-29
Payer: MEDICARE

## 2024-04-29 ENCOUNTER — APPOINTMENT (OUTPATIENT)
Dept: GENERAL RADIOLOGY | Facility: HOSPITAL | Age: 74
End: 2024-04-29
Payer: MEDICARE

## 2024-04-29 ENCOUNTER — DOCUMENTATION (OUTPATIENT)
Dept: RADIATION ONCOLOGY | Facility: HOSPITAL | Age: 74
End: 2024-04-29
Payer: MEDICARE

## 2024-04-29 VITALS
HEART RATE: 63 BPM | OXYGEN SATURATION: 93 % | DIASTOLIC BLOOD PRESSURE: 58 MMHG | SYSTOLIC BLOOD PRESSURE: 125 MMHG | RESPIRATION RATE: 16 BRPM | TEMPERATURE: 97.5 F

## 2024-04-29 DIAGNOSIS — K13.70 ORAL LESION: Primary | ICD-10-CM

## 2024-04-29 DIAGNOSIS — R91.1 NODULE OF UPPER LOBE OF RIGHT LUNG: ICD-10-CM

## 2024-04-29 LAB
GLUCOSE BLDC GLUCOMTR-MCNC: 157 MG/DL (ref 70–130)
GLUCOSE BLDC GLUCOMTR-MCNC: 189 MG/DL (ref 70–130)
KOH PREP NAIL: NORMAL

## 2024-04-29 PROCEDURE — 88112 CYTOPATH CELL ENHANCE TECH: CPT | Performed by: INTERNAL MEDICINE

## 2024-04-29 PROCEDURE — 87186 SC STD MICRODIL/AGAR DIL: CPT | Performed by: INTERNAL MEDICINE

## 2024-04-29 PROCEDURE — 82948 REAGENT STRIP/BLOOD GLUCOSE: CPT

## 2024-04-29 PROCEDURE — 87116 MYCOBACTERIA CULTURE: CPT | Performed by: INTERNAL MEDICINE

## 2024-04-29 PROCEDURE — 25010000002 PROPOFOL 10 MG/ML EMULSION: Performed by: NURSE ANESTHETIST, CERTIFIED REGISTERED

## 2024-04-29 PROCEDURE — 71045 X-RAY EXAM CHEST 1 VIEW: CPT

## 2024-04-29 PROCEDURE — 31629 BRONCHOSCOPY/NEEDLE BX EACH: CPT | Performed by: INTERNAL MEDICINE

## 2024-04-29 PROCEDURE — 25010000002 FENTANYL CITRATE (PF) 100 MCG/2ML SOLUTION: Performed by: NURSE ANESTHETIST, CERTIFIED REGISTERED

## 2024-04-29 PROCEDURE — 87102 FUNGUS ISOLATION CULTURE: CPT | Performed by: INTERNAL MEDICINE

## 2024-04-29 PROCEDURE — 87220 TISSUE EXAM FOR FUNGI: CPT | Performed by: INTERNAL MEDICINE

## 2024-04-29 PROCEDURE — 88342 IMHCHEM/IMCYTCHM 1ST ANTB: CPT | Performed by: INTERNAL MEDICINE

## 2024-04-29 PROCEDURE — 31654 BRONCH EBUS IVNTJ PERPH LES: CPT | Performed by: INTERNAL MEDICINE

## 2024-04-29 PROCEDURE — C1726 CATH, BAL DIL, NON-VASCULAR: HCPCS | Performed by: INTERNAL MEDICINE

## 2024-04-29 PROCEDURE — 87205 SMEAR GRAM STAIN: CPT | Performed by: INTERNAL MEDICINE

## 2024-04-29 PROCEDURE — 87070 CULTURE OTHR SPECIMN AEROBIC: CPT | Performed by: INTERNAL MEDICINE

## 2024-04-29 PROCEDURE — 88172 CYTP DX EVAL FNA 1ST EA SITE: CPT | Performed by: INTERNAL MEDICINE

## 2024-04-29 PROCEDURE — 25810000003 LACTATED RINGERS PER 1000 ML: Performed by: INTERNAL MEDICINE

## 2024-04-29 PROCEDURE — 76000 FLUOROSCOPY <1 HR PHYS/QHP: CPT

## 2024-04-29 PROCEDURE — 87206 SMEAR FLUORESCENT/ACID STAI: CPT | Performed by: INTERNAL MEDICINE

## 2024-04-29 PROCEDURE — 88305 TISSUE EXAM BY PATHOLOGIST: CPT | Performed by: INTERNAL MEDICINE

## 2024-04-29 PROCEDURE — 31627 NAVIGATIONAL BRONCHOSCOPY: CPT | Performed by: INTERNAL MEDICINE

## 2024-04-29 RX ORDER — NALOXONE HCL 0.4 MG/ML
0.4 VIAL (ML) INJECTION AS NEEDED
Status: DISCONTINUED | OUTPATIENT
Start: 2024-04-29 | End: 2024-04-29 | Stop reason: HOSPADM

## 2024-04-29 RX ORDER — LABETALOL HYDROCHLORIDE 5 MG/ML
5 INJECTION, SOLUTION INTRAVENOUS
Status: DISCONTINUED | OUTPATIENT
Start: 2024-04-29 | End: 2024-04-29 | Stop reason: HOSPADM

## 2024-04-29 RX ORDER — IBUPROFEN 600 MG/1
600 TABLET ORAL EVERY 6 HOURS PRN
Status: DISCONTINUED | OUTPATIENT
Start: 2024-04-29 | End: 2024-04-29 | Stop reason: HOSPADM

## 2024-04-29 RX ORDER — ONDANSETRON 2 MG/ML
4 INJECTION INTRAMUSCULAR; INTRAVENOUS ONCE AS NEEDED
Status: DISCONTINUED | OUTPATIENT
Start: 2024-04-29 | End: 2024-04-29 | Stop reason: HOSPADM

## 2024-04-29 RX ORDER — SODIUM CHLORIDE, SODIUM LACTATE, POTASSIUM CHLORIDE, CALCIUM CHLORIDE 600; 310; 30; 20 MG/100ML; MG/100ML; MG/100ML; MG/100ML
1000 INJECTION, SOLUTION INTRAVENOUS CONTINUOUS
Status: DISCONTINUED | OUTPATIENT
Start: 2024-04-29 | End: 2024-04-29 | Stop reason: HOSPADM

## 2024-04-29 RX ORDER — DROPERIDOL 2.5 MG/ML
0.62 INJECTION, SOLUTION INTRAMUSCULAR; INTRAVENOUS ONCE AS NEEDED
Status: DISCONTINUED | OUTPATIENT
Start: 2024-04-29 | End: 2024-04-29 | Stop reason: HOSPADM

## 2024-04-29 RX ORDER — PROPOFOL 10 MG/ML
VIAL (ML) INTRAVENOUS AS NEEDED
Status: DISCONTINUED | OUTPATIENT
Start: 2024-04-29 | End: 2024-04-29 | Stop reason: SURG

## 2024-04-29 RX ORDER — HYDROCODONE BITARTRATE AND ACETAMINOPHEN 5; 325 MG/1; MG/1
1 TABLET ORAL EVERY 4 HOURS PRN
Status: DISCONTINUED | OUTPATIENT
Start: 2024-04-29 | End: 2024-04-29 | Stop reason: HOSPADM

## 2024-04-29 RX ORDER — SODIUM CHLORIDE 0.9 % (FLUSH) 0.9 %
3 SYRINGE (ML) INJECTION AS NEEDED
Status: DISCONTINUED | OUTPATIENT
Start: 2024-04-29 | End: 2024-04-29 | Stop reason: HOSPADM

## 2024-04-29 RX ORDER — FLUMAZENIL 0.1 MG/ML
0.2 INJECTION INTRAVENOUS AS NEEDED
Status: DISCONTINUED | OUTPATIENT
Start: 2024-04-29 | End: 2024-04-29 | Stop reason: HOSPADM

## 2024-04-29 RX ORDER — SODIUM CHLORIDE 0.9 % (FLUSH) 0.9 %
10 SYRINGE (ML) INJECTION AS NEEDED
Status: DISCONTINUED | OUTPATIENT
Start: 2024-04-29 | End: 2024-04-29 | Stop reason: HOSPADM

## 2024-04-29 RX ORDER — LIDOCAINE HYDROCHLORIDE 10 MG/ML
0.5 INJECTION, SOLUTION EPIDURAL; INFILTRATION; INTRACAUDAL; PERINEURAL ONCE AS NEEDED
Status: DISCONTINUED | OUTPATIENT
Start: 2024-04-29 | End: 2024-04-29 | Stop reason: HOSPADM

## 2024-04-29 RX ORDER — FENTANYL CITRATE 50 UG/ML
50 INJECTION, SOLUTION INTRAMUSCULAR; INTRAVENOUS
Status: DISCONTINUED | OUTPATIENT
Start: 2024-04-29 | End: 2024-04-29 | Stop reason: HOSPADM

## 2024-04-29 RX ORDER — SODIUM CHLORIDE 0.9 % (FLUSH) 0.9 %
10 SYRINGE (ML) INJECTION EVERY 12 HOURS SCHEDULED
Status: DISCONTINUED | OUTPATIENT
Start: 2024-04-29 | End: 2024-04-29 | Stop reason: HOSPADM

## 2024-04-29 RX ORDER — FENTANYL CITRATE 50 UG/ML
INJECTION, SOLUTION INTRAMUSCULAR; INTRAVENOUS AS NEEDED
Status: DISCONTINUED | OUTPATIENT
Start: 2024-04-29 | End: 2024-04-29 | Stop reason: SURG

## 2024-04-29 RX ORDER — SODIUM CHLORIDE 0.9 % (FLUSH) 0.9 %
3-10 SYRINGE (ML) INJECTION AS NEEDED
Status: DISCONTINUED | OUTPATIENT
Start: 2024-04-29 | End: 2024-04-29 | Stop reason: HOSPADM

## 2024-04-29 RX ORDER — HYDROCODONE BITARTRATE AND ACETAMINOPHEN 10; 325 MG/1; MG/1
1 TABLET ORAL EVERY 4 HOURS PRN
Status: DISCONTINUED | OUTPATIENT
Start: 2024-04-29 | End: 2024-04-29 | Stop reason: HOSPADM

## 2024-04-29 RX ORDER — SODIUM CHLORIDE, SODIUM LACTATE, POTASSIUM CHLORIDE, CALCIUM CHLORIDE 600; 310; 30; 20 MG/100ML; MG/100ML; MG/100ML; MG/100ML
100 INJECTION, SOLUTION INTRAVENOUS CONTINUOUS
Status: DISCONTINUED | OUTPATIENT
Start: 2024-04-29 | End: 2024-04-29 | Stop reason: HOSPADM

## 2024-04-29 RX ORDER — ROCURONIUM BROMIDE 10 MG/ML
INJECTION, SOLUTION INTRAVENOUS AS NEEDED
Status: DISCONTINUED | OUTPATIENT
Start: 2024-04-29 | End: 2024-04-29 | Stop reason: SURG

## 2024-04-29 RX ORDER — SODIUM CHLORIDE 0.9 % (FLUSH) 0.9 %
3 SYRINGE (ML) INJECTION EVERY 12 HOURS SCHEDULED
Status: DISCONTINUED | OUTPATIENT
Start: 2024-04-29 | End: 2024-04-29 | Stop reason: HOSPADM

## 2024-04-29 RX ORDER — LIDOCAINE HYDROCHLORIDE 20 MG/ML
INJECTION, SOLUTION EPIDURAL; INFILTRATION; INTRACAUDAL; PERINEURAL AS NEEDED
Status: DISCONTINUED | OUTPATIENT
Start: 2024-04-29 | End: 2024-04-29 | Stop reason: SURG

## 2024-04-29 RX ORDER — SODIUM CHLORIDE 9 MG/ML
40 INJECTION, SOLUTION INTRAVENOUS AS NEEDED
Status: DISCONTINUED | OUTPATIENT
Start: 2024-04-29 | End: 2024-04-29 | Stop reason: HOSPADM

## 2024-04-29 RX ADMIN — FENTANYL CITRATE 100 MCG: 50 INJECTION, SOLUTION INTRAMUSCULAR; INTRAVENOUS at 13:03

## 2024-04-29 RX ADMIN — SODIUM CHLORIDE, POTASSIUM CHLORIDE, SODIUM LACTATE AND CALCIUM CHLORIDE 1000 ML: 600; 310; 30; 20 INJECTION, SOLUTION INTRAVENOUS at 11:30

## 2024-04-29 RX ADMIN — PROPOFOL 120 MG: 10 INJECTION, EMULSION INTRAVENOUS at 13:03

## 2024-04-29 RX ADMIN — ROCURONIUM BROMIDE 70 MG: 50 INJECTION INTRAVENOUS at 13:03

## 2024-04-29 RX ADMIN — LIDOCAINE HYDROCHLORIDE 100 MG: 20 INJECTION, SOLUTION EPIDURAL; INFILTRATION; INTRACAUDAL; PERINEURAL at 13:03

## 2024-04-29 NOTE — ANESTHESIA POSTPROCEDURE EVALUATION
Patient: Boogie Artis    Procedure Summary       Date: 04/29/24 Room / Location: [facility] OR 09 /  [facility] OR    Anesthesia Start: 1259 Anesthesia Stop: 1350    Procedures:       BRONCHOSCOPY WITH ION ROBOT and BRONCHOSCOPY WITH ENDOBRONCHIAL ULTRASOUND (Bronchus)      BRONCHOSCOPY WITH ENDOBRONCHIAL ULTRASOUND (Bronchus)      BRONCHOSCOPY NAVIGATION WITH ENDOBRONCHIAL ULTRASOUND AND ION ROBOT Diagnosis:       Nodule of upper lobe of right lung      (Nodule of upper lobe of right lung [R91.1])    Surgeons: Nicola Monsivais MD Provider: Lorrie Quezada CRNA    Anesthesia Type: general ASA Status: 3            Anesthesia Type: general    Vitals  Vitals Value Taken Time   /61 04/29/24 1416   Temp     Pulse 72 04/29/24 1425   Resp 16 04/29/24 1414   SpO2 95 % 04/29/24 1425   Vitals shown include unfiled device data.        Post Anesthesia Care and Evaluation    Patient location during evaluation: PHASE II  Patient participation: complete - patient participated  Level of consciousness: awake and awake and alert  Pain score: 0  Pain management: adequate    Airway patency: patent  Anesthetic complications: No anesthetic complications  PONV Status: none  Cardiovascular status: acceptable  Respiratory status: acceptable  Hydration status: acceptable    Comments: Patient discharged according to acceptable Stephy score per RN assessment. See nursing records for further information.     Blood pressure 127/70, pulse 73, temperature 96.8 °F (36 °C), temperature source Temporal, resp. rate 16, SpO2 94%.

## 2024-04-29 NOTE — OP NOTE
Procedure Note (AdvancedBronchoscopy)    Date of Operation: 04/29/24  Pre-op Diagnosis: Nodule of upper lobe of right lung  Post-op Diagnosis:  Same.  Rapid onsite eval positive for malignant appearing cells.  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  Also oral lesion identified by anesthesia on induction of anesthesia and intubation  Surgeon: Nicola Monsivais MD  Anesthesia: General    Operation: Flexible fiberoptic bronchoscopy, Bronchoscopy, Diagnostic, Bronchoscopy, Therapeutic, Ion robotic shape sensing navigational bronchoscopy, Radial probe endobronchial ultrasound, Bronchoalveolar lavage, BAL, Bronchial wash, and Transbronchial needle aspirate of lung with navigational and fluoroscopic guidance  Findings: concentric radial probe view of lesion in posterior rul segment, spontaneously hemorrhagic lesions in left mainstem bronchus.  Oral lesion identified by anesthesia   Specimen: tbna rul, wash left mainstem, bal right upper lobe  Estimated Blood Loss: Minimal  Complications: none    Indications and History:  The patient is a 73 y.o.  male with Nodule of upper lobe of right lung.  The risks, benefits, complications, treatment options and expected outcomes were discussed with the patient.  The possibilities of reaction to medication, pulmonary aspiration, perforation of a viscus, bleeding, failure to diagnose a condition and creating a complication requiring transfusion or operation were discussed with the patient who freely signed the consent.  Time out was taken prior to the procedure.    Description of Procedure:    After the induction of general anesthesia, the patient was positioned supine and the Olympus BF type P180 bronchoscope was introduced through the endotracheal tube.  The scope was then passed into the trachea.     The trachea, mainstem bronchi, RUL, RML, Bronchus Intermedius, RLL, SAMINA, SAMINA upper division and lingula, and LLL,  and all primary segmental airways were examined and were normal except as described below:    Endobronchial findings:  Trachea: Normal mucosa  Isabela: Sharp  Right main bronchus: Normal mucosa  Right upper lobe bronchus: Blunted  with narrowing of posterior rul segmental bronchus  Right middle lobe bronchus: Normal mucosa  Right lower lobe bronchus: Normal mucosa  Left main bronchus: Edematous mucosa and hemorrhagic tumor implants in multiple locations in mainstem  Left upper lobe bronchus: stump  Left lower lobe bronchus: Edematous mucosa    The conventional bronchoscope was removed after the collection of : bronch wash with iced cold saline for control of spontaneous left mainstem bronchus hemorrhage and therapeutic aspiration of bloody secretions.    Using the planning laptop and Planpoint software, the lesion of interest was identified, and marked, a pathway was generated, and anatomic borders were identified.The ION system was magnetically docked to the ETT. The shape sensing catheter with vision probe was introduced via the  into the ETT.    Registration was started by advancing the bronchoscope into the right and left mainstem bronchi. The main isabela was confirmed by rotating the live-view image to match the navigation-view image of the main isabela. Registration was completed by advancing the catheter into the identified the lobar bronchi. There was minimal visual divergence. Navigation was complicated by : need for partial registration due to left sided pathology. Using the , the shape sensing bronchoscope was advanced to the target lesion. The mobile C-Arm was brought in, and the vision probe was removed. A peripheral radial ultrasound probe was utilized to better localize the lesion in the right upper lobe posterior segment, giving a concentric view. Lesion was immediately adjacent to bronchoscope tip    Transbronchial needle aspirations of the target lesion were performed using an  Intuitive Flexision 23 gauge needle and sent for routine cytology. The procedure was guided by fluoroscopy and radial ultrasound. Transbronchial needle aspiration technique was selected because the sampling site was not accessible using standard bronchoscopic techniques. 6 needle aspirations were obtained. Rapid On-Site Evaluation: positive for malignant appearing cells    Transbronchial biopsies of the target lesion were deferred due to metastatic nature of disease, positive needle sampling, and risk for bleeding in this patient with known hemorrhagic tumor    The patient was undocked from the ion robot arm.  The Patient was extubated and taken to the Recovery Room in satisfactory condition.      Nicola Monsivais MD at 13:49 CDT, 4/29/2024

## 2024-04-29 NOTE — ANESTHESIA PREPROCEDURE EVALUATION
Anesthesia Evaluation     no history of anesthetic complications:   NPO Solid Status: > 8 hours  NPO Liquid Status: > 8 hours           Airway   Mallampati: I  TM distance: >3 FB  No difficulty expected  Dental    (+) poor dentition        Pulmonary    (+) a smoker (quit 2023) Former, lung cancer, COPD,sleep apnea on CPAP    ROS comment: Pt is s/p recent lung resection at [facility], reports new nodule that is being evaluated  He reports dental injury with previous bronchoscopy. Anesthesia record from [facility] reviewed, gr IIa view with mac 3.5  Cardiovascular   Exercise tolerance: good (4-7 METS)    (+) hypertension, hyperlipidemia  (-) CAD      Neuro/Psych  (-) seizures, TIA, CVA  GI/Hepatic/Renal/Endo    (+) GERD, renal disease- stones, diabetes mellitus    Musculoskeletal     Abdominal    Substance History      OB/GYN          Other      history of cancer active        Phys Exam Other: Previously gr IIa view with mac 3.5              Anesthesia Plan    ASA 3     general     intravenous induction     Anesthetic plan, risks, benefits, and alternatives have been provided, discussed and informed consent has been obtained with: patient.    CODE STATUS:

## 2024-04-29 NOTE — ANESTHESIA PROCEDURE NOTES
Airway  Urgency: elective    Date/Time: 4/29/2024 1:04 PM  Airway not difficult    General Information and Staff    Patient location during procedure: OR  CRNA/CAA: Lorrie Quezada CRNA    Indications and Patient Condition  Indications for airway management: airway protection    Preoxygenated: yes  Mask difficulty assessment: 0 - not attempted    Final Airway Details  Final airway type: endotracheal airway      Successful airway: ETT  Cuffed: yes   Successful intubation technique: video laryngoscopy  Facilitating devices/methods: intubating stylet  Endotracheal tube insertion site: oral  Blade: Malik  Blade size: 3  ETT size (mm): 8.5  Cormack-Lehane Classification: grade I - full view of glottis  Placement verified by: chest auscultation and capnometry   Cuff volume (mL): 8  Measured from: lips  ETT/EBT  to lips (cm): 22  Number of attempts at approach: 1  Assessment: lips, teeth, and gum same as pre-op and atraumatic intubation    Additional Comments  atraumatic

## 2024-04-30 NOTE — PROGRESS NOTES
Dr. Nicola Monsivais notified me that patient cytology was positive in his lung.  He is scheduled to return to our office for treatment recommendations.    Also, it was incidentally noted by anesthesia during his procedure that he has a lesion in the oral cavity that needs additional evaluation and may potentially be a another malignancy.

## 2024-05-03 ENCOUNTER — HOSPITAL ENCOUNTER (OUTPATIENT)
Dept: RADIATION ONCOLOGY | Facility: HOSPITAL | Age: 74
Setting detail: RADIATION/ONCOLOGY SERIES
End: 2024-05-03
Payer: MEDICARE

## 2024-05-03 LAB
BEAKER LAB AP INTRAOPERATIVE CONSULTATION: NORMAL
CYTO UR: NORMAL
LAB AP CASE REPORT: NORMAL
LAB AP DIAGNOSIS COMMENT: NORMAL
Lab: NORMAL
PATH REPORT.FINAL DX SPEC: NORMAL
PATH REPORT.GROSS SPEC: NORMAL

## 2024-05-04 LAB
BACTERIA SPEC RESP CULT: ABNORMAL
GRAM STN SPEC: ABNORMAL

## 2024-05-05 ENCOUNTER — DOCUMENTATION (OUTPATIENT)
Age: 74
End: 2024-05-05
Payer: MEDICARE

## 2024-05-06 ENCOUNTER — OFFICE VISIT (OUTPATIENT)
Age: 74
End: 2024-05-06
Payer: MEDICARE

## 2024-05-06 ENCOUNTER — HOSPITAL ENCOUNTER (OUTPATIENT)
Dept: RADIATION ONCOLOGY | Facility: HOSPITAL | Age: 74
Setting detail: RADIATION/ONCOLOGY SERIES
Discharge: HOME | End: 2024-05-06
Payer: MEDICARE

## 2024-05-06 VITALS
WEIGHT: 176.3 LBS | SYSTOLIC BLOOD PRESSURE: 125 MMHG | DIASTOLIC BLOOD PRESSURE: 50 MMHG | OXYGEN SATURATION: 96 % | HEIGHT: 68 IN | BODY MASS INDEX: 26.72 KG/M2 | HEART RATE: 67 BPM

## 2024-05-06 DIAGNOSIS — Z90.2 S/P LOBECTOMY OF LUNG: ICD-10-CM

## 2024-05-06 DIAGNOSIS — Z87.891 FORMER SMOKER: ICD-10-CM

## 2024-05-06 DIAGNOSIS — Z92.3 HISTORY OF RADIATION THERAPY: ICD-10-CM

## 2024-05-06 DIAGNOSIS — C34.92 SQUAMOUS CELL CARCINOMA OF LEFT LUNG: Primary | ICD-10-CM

## 2024-05-06 DIAGNOSIS — C34.11 MALIGNANT NEOPLASM OF UPPER LOBE OF RIGHT LUNG: ICD-10-CM

## 2024-05-06 PROBLEM — R91.8 MASS OF LEFT LUNG: Status: ACTIVE | Noted: 2023-04-19

## 2024-05-06 LAB
FUNGUS WND CULT: NORMAL
RAD ONC ARIA COURSE END DATE: NORMAL
RAD ONC ARIA COURSE ID: NORMAL
RAD ONC ARIA COURSE LAST TREATMENT DATE: NORMAL
RAD ONC ARIA COURSE START DATE: NORMAL
RAD ONC ARIA COURSE TREATMENT ELAPSED DAYS: 15
RAD ONC ARIA FIRST TREATMENT DATE: NORMAL
RAD ONC ARIA PLAN FRACTIONS TREATED TO DATE: 4
RAD ONC ARIA PLAN ID: NORMAL
RAD ONC ARIA PLAN NAME: NORMAL
RAD ONC ARIA PLAN PRESCRIBED DOSE PER FRACTION: 12.5 GY
RAD ONC ARIA PLAN PRIMARY REFERENCE POINT: NORMAL
RAD ONC ARIA PLAN TOTAL FRACTIONS PRESCRIBED: 4
RAD ONC ARIA PLAN TOTAL PRESCRIBED DOSE: 5000 CGY
RAD ONC ARIA REFERENCE POINT DOSAGE GIVEN TO DATE: 50 GY
RAD ONC ARIA REFERENCE POINT ID: NORMAL

## 2024-05-06 PROCEDURE — G0463 HOSPITAL OUTPT CLINIC VISIT: HCPCS | Performed by: RADIOLOGY

## 2024-05-06 PROCEDURE — 77334 RADIATION TREATMENT AID(S): CPT | Performed by: RADIOLOGY

## 2024-05-06 PROCEDURE — 77470 SPECIAL RADIATION TREATMENT: CPT | Performed by: RADIOLOGY

## 2024-05-06 NOTE — PROGRESS NOTES
Let pt know this looked ok.  An aspergillus germ is also growing from the culture, probably a contaminant.  Nothing else to do for now.  Keep follow up as planned.  We hope his appointment with Dr. Ortiz went well today.  We have also referred him to ENT to get the throat area examined after the anesthetist saw something unusual there when she was putting him to sleep

## 2024-05-06 NOTE — PROGRESS NOTES
"Spoke with patient and relayed test results. Patient voiced understanding. Per patient appointment with Dr. Ortiz went well and will be scheduled for \"4-5 Radiation treatments\"."

## 2024-05-09 ENCOUNTER — APPOINTMENT (OUTPATIENT)
Dept: RADIATION ONCOLOGY | Facility: HOSPITAL | Age: 74
End: 2024-05-09
Payer: MEDICARE

## 2024-05-09 PROCEDURE — 77293 RESPIRATOR MOTION MGMT SIMUL: CPT | Performed by: RADIOLOGY

## 2024-05-09 PROCEDURE — 77338 DESIGN MLC DEVICE FOR IMRT: CPT | Performed by: RADIOLOGY

## 2024-05-09 PROCEDURE — 77300 RADIATION THERAPY DOSE PLAN: CPT | Performed by: RADIOLOGY

## 2024-05-09 PROCEDURE — 77301 RADIOTHERAPY DOSE PLAN IMRT: CPT | Performed by: RADIOLOGY

## 2024-05-10 ENCOUNTER — APPOINTMENT (OUTPATIENT)
Dept: RADIATION ONCOLOGY | Facility: HOSPITAL | Age: 74
End: 2024-05-10
Payer: MEDICARE

## 2024-05-15 LAB — FUNGUS WND CULT: ABNORMAL

## 2024-05-17 NOTE — PROGRESS NOTES
Chief Complaint  COPD    Subjective    History of Present Illness {CC  Problem List  Visit Diagnosis   Encounters  Notes  Medications  Labs  Result Review Imaging  Media     Boogie Artis presents to [facility] for:    History of Present Illness  Mr. Artis is here for postprocedural follow-up.  He has a history of lung cancer and is status post lobectomy in 2023.  He also completed radiation to his right upper lobe in the past as well.  He underwent Ion navigational bronchoscopy with Dr. Monsivais in April 2024 for a new right upper lobe nodule.  Pathology was consistent with squamous cell carcinoma.  He is seeing radiation oncology with plans to start radiation next week.  He had a PFT completed he is here to review as well.  He had a culture from the bronchoscopy positive for Aspergillus.  Dr. Monsivais reviewed and is trying to get further identification.  He believes this is a colonizer but if patient develops symptoms we will consider treating.       Prior to Admission medications    Medication Sig Start Date End Date Taking? Authorizing Provider   cetirizine (zyrTEC) 10 MG tablet Take 1 tablet by mouth Daily.    Callie Russell MD   Cholecalciferol 125 MCG (5000 UT) tablet Take 1 tablet by mouth Daily.    Callie Russell MD   divalproex (DEPAKOTE ER) 250 MG 24 hr tablet Take 1 tablet by mouth 2 (Two) Times a Day.    Callie Russell MD   Fluticasone-Umeclidin-Vilant (TRELEGY) 100-62.5-25 MCG/ACT inhaler Inhale 1 puff Daily for 30 days. 4/16/24 5/16/24  Nicola Monsivais MD   Fluticasone-Umeclidin-Vilant (TRELEGY) 100-62.5-25 MCG/ACT inhaler Inhale 1 puff Daily for 30 days. 4/29/24 5/29/24  Nicola Monsivais MD   furosemide (LASIX) 20 MG tablet TAKE 1 TABLET BY MOUTH ONCE DAILY Oral for 14 Days    Callie Russell MD   glimepiride (AMARYL) 4 MG tablet Take 1 tablet by mouth Daily.    Callie Russell MD  "  hydroCHLOROthiazide (HYDRODIURIL) 25 MG tablet Take 1 tablet by mouth Daily.    Callie Russell MD   lisinopril (PRINIVIL,ZESTRIL) 10 MG tablet Take 1 tablet by mouth Daily.    Callie Russell MD   loratadine (CLARITIN) 10 MG tablet Take 1 tablet by mouth Daily.    Callie Russell MD   metFORMIN (GLUCOPHAGE) 500 MG tablet Take 1 tablet by mouth 2 (Two) Times a Day.    Callie Russell MD   metoprolol succinate XL (TOPROL-XL) 25 MG 24 hr tablet Take 1 tablet by mouth Daily.    Callie Russell MD   pantoprazole (PROTONIX) 40 MG EC tablet Take 1 tablet by mouth 2 (Two) Times a Day. 23   Callie Russell MD   primidone (MYSOLINE) 50 MG tablet Take 1 tablet by mouth 2 (Two) Times a Day. 3/14/23   Callie Russell MD   simvastatin (ZOCOR) 40 MG tablet Take 1 tablet by mouth Daily.    Callie Russell MD       Social History     Socioeconomic History    Marital status:    Tobacco Use    Smoking status: Former     Current packs/day: 0.00     Average packs/day: 2.0 packs/day for 57.2 years (114.4 ttl pk-yrs)     Types: Cigarettes     Start date:      Quit date: 3/17/2023     Years since quittin.1     Passive exposure: Never    Smokeless tobacco: Never   Vaping Use    Vaping status: Never Used   Substance and Sexual Activity    Alcohol use: Not Currently    Drug use: Never    Sexual activity: Defer       Objective   Vital Signs:   /60   Pulse 70   Ht 172.7 cm (68\")   Wt 81.2 kg (179 lb)   SpO2 95% Comment: RA  BMI 27.22 kg/m²     Physical Exam  Constitutional:       General: He is not in acute distress.  HENT:      Head: Normocephalic.      Nose: Nose normal.      Mouth/Throat:      Mouth: Mucous membranes are moist.   Eyes:      General: No scleral icterus.  Cardiovascular:      Rate and Rhythm: Normal rate.   Pulmonary:      Effort: No respiratory distress.      Breath sounds: Decreased breath sounds present.   Abdominal:      General: There is no " distension.   Neurological:      Mental Status: He is alert and oriented to person, place, and time.   Psychiatric:         Mood and Affect: Mood normal.         Behavior: Behavior is cooperative.        Result Review :{ Labs  Result Review  Imaging  Med Tab  Media :    PFT Values          4/16/2024    10:15   Pre Drug PFT Results   FVC 68   FEV1 48   FEF 25-75% 24   FEV1/FVC 55.73         Results for orders placed in visit on 04/16/24    Spirometry    Narrative  Spirometry    Performed by: Matteo Landry CMA  Authorized by: Nicola Monsivais MD  Pre Drug % Predicted  FVC: 68%  FEV1: 48%  FEF 25-75%: 24%  FEV1/FVC: 55.73%    Interpretation  Spirometry  Spirometry shows severe obstruction. There is reduced midflow suggesting small airway/airflow obstruction.  Electronically signed by Nicola Monsivais MD, 4/16/2024, 12:22 CDT                     Assessment and Plan {CC Problem List  Visit Diagnosis  ROS  Review (Popup)  [facility] Maintenance  Quality  BestPractice  Medications  SmartSets  SnapShot Encounters  Media      Diagnoses and all orders for this visit:    1. Stage 3 severe COPD by GOLD classification (Primary)    2. Nodule of upper lobe of right lung        We reviewed pathology consistent with squamous cell carcinoma.  Patient already has appointment with radiation oncology with plans to start radiation next week.  Dr. Ortiz will be following surveillance scans.  We discussed positive Aspergillus culture from bronchoscopy which Dr. Monsivais believes is likely a colonizer.  Patient knows if he develops symptoms he will reach out to our office.  We are trying to get further identification on this as well.  We reviewed PFT showing severe obstruction.  He was started on Trelegy at last visit.  Continue Trelegy for COPD regimen.  Continue albuterol as needed.  Office follow-up in 6 months or sooner if needed.    Erma Saucedo, APRN  5/20/2024  16:01 CDT    Follow Up {Instructions Charge Capture   Follow-up Communications   Return in about 6 months (around 11/20/2024) for spirometry.    Patient was given instructions and counseling regarding his condition or for health maintenance advice. Please see specific information pulled into the AVS if appropriate.

## 2024-05-20 ENCOUNTER — OFFICE VISIT (OUTPATIENT)
Dept: PULMONOLOGY | Facility: CLINIC | Age: 74
End: 2024-05-20
Payer: MEDICARE

## 2024-05-20 VITALS
OXYGEN SATURATION: 95 % | BODY MASS INDEX: 27.13 KG/M2 | DIASTOLIC BLOOD PRESSURE: 60 MMHG | HEIGHT: 68 IN | SYSTOLIC BLOOD PRESSURE: 122 MMHG | HEART RATE: 70 BPM | WEIGHT: 179 LBS

## 2024-05-20 DIAGNOSIS — J44.9 STAGE 3 SEVERE COPD BY GOLD CLASSIFICATION: Primary | Chronic | ICD-10-CM

## 2024-05-20 DIAGNOSIS — R91.1 NODULE OF UPPER LOBE OF RIGHT LUNG: ICD-10-CM

## 2024-05-20 PROCEDURE — 99214 OFFICE O/P EST MOD 30 MIN: CPT | Performed by: NURSE PRACTITIONER

## 2024-05-20 RX ORDER — ALBUTEROL SULFATE 90 UG/1
AEROSOL, METERED RESPIRATORY (INHALATION) EVERY 6 HOURS SCHEDULED
COMMUNITY
Start: 2024-05-13

## 2024-05-22 ENCOUNTER — DOCUMENTATION (OUTPATIENT)
Age: 74
End: 2024-05-22
Payer: MEDICARE

## 2024-05-22 LAB — FUNGUS WND CULT: ABNORMAL

## 2024-05-22 NOTE — PROGRESS NOTES
Chap stick to patient.     Patient's cultures grew Aspergillus which may not require treatment unless he develops signs of infection.  However can be spread and may be contagious so will need to utilize precautions.

## 2024-05-28 ENCOUNTER — HOSPITAL ENCOUNTER (OUTPATIENT)
Dept: RADIATION ONCOLOGY | Facility: HOSPITAL | Age: 74
Setting detail: RADIATION/ONCOLOGY SERIES
Discharge: HOME | End: 2024-05-28
Payer: MEDICARE

## 2024-05-28 VITALS — SYSTOLIC BLOOD PRESSURE: 138 MMHG | DIASTOLIC BLOOD PRESSURE: 56 MMHG | OXYGEN SATURATION: 96 % | HEART RATE: 68 BPM

## 2024-05-28 LAB
RAD ONC ARIA COURSE ID: NORMAL
RAD ONC ARIA COURSE LAST TREATMENT DATE: NORMAL
RAD ONC ARIA COURSE START DATE: NORMAL
RAD ONC ARIA COURSE TREATMENT ELAPSED DAYS: 0
RAD ONC ARIA FIRST TREATMENT DATE: NORMAL
RAD ONC ARIA PLAN FRACTIONS TREATED TO DATE: 1
RAD ONC ARIA PLAN ID: NORMAL
RAD ONC ARIA PLAN PRESCRIBED DOSE PER FRACTION: 10 GY
RAD ONC ARIA PLAN PRIMARY REFERENCE POINT: NORMAL
RAD ONC ARIA PLAN TOTAL FRACTIONS PRESCRIBED: 5
RAD ONC ARIA PLAN TOTAL PRESCRIBED DOSE: 5000 CGY
RAD ONC ARIA REFERENCE POINT DOSAGE GIVEN TO DATE: 10 GY
RAD ONC ARIA REFERENCE POINT ID: NORMAL
RAD ONC ARIA REFERENCE POINT SESSION DOSAGE GIVEN: 10 GY

## 2024-05-28 PROCEDURE — 77373 STRTCTC BDY RAD THER TX DLVR: CPT | Performed by: RADIOLOGY

## 2024-05-29 ENCOUNTER — HOSPITAL ENCOUNTER (OUTPATIENT)
Dept: RADIATION ONCOLOGY | Facility: HOSPITAL | Age: 74
Setting detail: RADIATION/ONCOLOGY SERIES
Discharge: HOME | End: 2024-05-29
Payer: MEDICARE

## 2024-05-29 VITALS — OXYGEN SATURATION: 94 % | DIASTOLIC BLOOD PRESSURE: 71 MMHG | HEART RATE: 72 BPM | SYSTOLIC BLOOD PRESSURE: 138 MMHG

## 2024-05-29 LAB
RAD ONC ARIA COURSE ID: NORMAL
RAD ONC ARIA COURSE LAST TREATMENT DATE: NORMAL
RAD ONC ARIA COURSE START DATE: NORMAL
RAD ONC ARIA COURSE TREATMENT ELAPSED DAYS: 1
RAD ONC ARIA FIRST TREATMENT DATE: NORMAL
RAD ONC ARIA PLAN FRACTIONS TREATED TO DATE: 2
RAD ONC ARIA PLAN ID: NORMAL
RAD ONC ARIA PLAN PRESCRIBED DOSE PER FRACTION: 10 GY
RAD ONC ARIA PLAN PRIMARY REFERENCE POINT: NORMAL
RAD ONC ARIA PLAN TOTAL FRACTIONS PRESCRIBED: 5
RAD ONC ARIA PLAN TOTAL PRESCRIBED DOSE: 5000 CGY
RAD ONC ARIA REFERENCE POINT DOSAGE GIVEN TO DATE: 20 GY
RAD ONC ARIA REFERENCE POINT ID: NORMAL
RAD ONC ARIA REFERENCE POINT SESSION DOSAGE GIVEN: 10 GY

## 2024-05-29 PROCEDURE — 77373 STRTCTC BDY RAD THER TX DLVR: CPT | Performed by: RADIOLOGY

## 2024-06-03 ENCOUNTER — HOSPITAL ENCOUNTER (OUTPATIENT)
Dept: RADIATION ONCOLOGY | Facility: HOSPITAL | Age: 74
Setting detail: RADIATION/ONCOLOGY SERIES
Discharge: HOME | End: 2024-06-03
Payer: MEDICARE

## 2024-06-03 ENCOUNTER — OFFICE VISIT (OUTPATIENT)
Dept: OTOLARYNGOLOGY | Facility: CLINIC | Age: 74
End: 2024-06-03
Payer: MEDICARE

## 2024-06-03 ENCOUNTER — HOSPITAL ENCOUNTER (OUTPATIENT)
Dept: RADIATION ONCOLOGY | Facility: HOSPITAL | Age: 74
Setting detail: RADIATION/ONCOLOGY SERIES
End: 2024-06-03
Payer: MEDICARE

## 2024-06-03 VITALS — WEIGHT: 184 LBS | BODY MASS INDEX: 27.89 KG/M2 | RESPIRATION RATE: 16 BRPM | TEMPERATURE: 97.8 F | HEIGHT: 68 IN

## 2024-06-03 VITALS — SYSTOLIC BLOOD PRESSURE: 132 MMHG | DIASTOLIC BLOOD PRESSURE: 67 MMHG | HEART RATE: 114 BPM | OXYGEN SATURATION: 98 %

## 2024-06-03 DIAGNOSIS — C34.92 SQUAMOUS CELL CARCINOMA OF LEFT LUNG: Primary | ICD-10-CM

## 2024-06-03 DIAGNOSIS — Z92.3 HISTORY OF RADIATION THERAPY: ICD-10-CM

## 2024-06-03 DIAGNOSIS — J38.4 VOCAL CORD EDEMA: ICD-10-CM

## 2024-06-03 DIAGNOSIS — Z87.891 FORMER SMOKER: ICD-10-CM

## 2024-06-03 DIAGNOSIS — J38.3 LESION OF TRUE VOCAL CORD: Primary | ICD-10-CM

## 2024-06-03 DIAGNOSIS — Z90.2 S/P LOBECTOMY OF LUNG: ICD-10-CM

## 2024-06-03 DIAGNOSIS — K13.79 MASS OF ORAL CAVITY: ICD-10-CM

## 2024-06-03 DIAGNOSIS — R49.0 DYSPHONIA: ICD-10-CM

## 2024-06-03 DIAGNOSIS — C34.11 MALIGNANT NEOPLASM OF UPPER LOBE OF RIGHT LUNG: ICD-10-CM

## 2024-06-03 DIAGNOSIS — D21.9 FIBROMA: ICD-10-CM

## 2024-06-03 LAB
RAD ONC ARIA COURSE ID: NORMAL
RAD ONC ARIA COURSE LAST TREATMENT DATE: NORMAL
RAD ONC ARIA COURSE START DATE: NORMAL
RAD ONC ARIA COURSE TREATMENT ELAPSED DAYS: 6
RAD ONC ARIA FIRST TREATMENT DATE: NORMAL
RAD ONC ARIA PLAN FRACTIONS TREATED TO DATE: 3
RAD ONC ARIA PLAN ID: NORMAL
RAD ONC ARIA PLAN PRESCRIBED DOSE PER FRACTION: 10 GY
RAD ONC ARIA PLAN PRIMARY REFERENCE POINT: NORMAL
RAD ONC ARIA PLAN TOTAL FRACTIONS PRESCRIBED: 5
RAD ONC ARIA PLAN TOTAL PRESCRIBED DOSE: 5000 CGY
RAD ONC ARIA REFERENCE POINT DOSAGE GIVEN TO DATE: 30 GY
RAD ONC ARIA REFERENCE POINT ID: NORMAL
RAD ONC ARIA REFERENCE POINT SESSION DOSAGE GIVEN: 10 GY

## 2024-06-03 PROCEDURE — 77373 STRTCTC BDY RAD THER TX DLVR: CPT | Performed by: RADIOLOGY

## 2024-06-03 PROCEDURE — 31575 DIAGNOSTIC LARYNGOSCOPY: CPT | Performed by: OTOLARYNGOLOGY

## 2024-06-03 PROCEDURE — 1159F MED LIST DOCD IN RCRD: CPT | Performed by: OTOLARYNGOLOGY

## 2024-06-03 PROCEDURE — 99204 OFFICE O/P NEW MOD 45 MIN: CPT | Performed by: OTOLARYNGOLOGY

## 2024-06-03 PROCEDURE — 1160F RVW MEDS BY RX/DR IN RCRD: CPT | Performed by: OTOLARYNGOLOGY

## 2024-06-03 RX ORDER — CHLORAL HYDRATE 500 MG
CAPSULE ORAL
COMMUNITY

## 2024-06-03 RX ORDER — FLUTICASONE FUROATE, UMECLIDINIUM BROMIDE AND VILANTEROL TRIFENATATE 100; 62.5; 25 UG/1; UG/1; UG/1
1 POWDER RESPIRATORY (INHALATION)
COMMUNITY

## 2024-06-03 NOTE — PROGRESS NOTES
Shant Dimas Jr, MD  [facility] EAR NOSE & THROAT  [address], [address]  [place] [address]  Fax [phone]  Phone [phone]      Visit Type: NEW PATIENT   Chief Complaint   Patient presents with    throat lesion           HPI  Accompanied by:Wife  He complains of Lesion throat.   Lesion noted with general anes. He has something deeper than tonsils.  Has had no real symptoms in throat. Has been treated for lung cancer.  Has had XRT and no chemo.  Smoke- 56 yrs, quit 1 yr ago.  Drink- none  No voice changes until recent surgery.  No hemoptysis  Has some mild dysphagia      Past Medical History:   Diagnosis Date    Bleeding ulcer     Cancer     lung cancer left    COPD (chronic obstructive pulmonary disease)     Diabetes mellitus     Elevated cholesterol     GERD (gastroesophageal reflux disease)     History of seasonal allergies     History of transfusion     Hyperlipidemia     Hypertension     Kidney infection     Kidney stone     Lung nodule     Migraines     Sleep apnea     Vitamin D deficiency        Past Surgical History:   Procedure Laterality Date    APPENDECTOMY      BRONCHOSCOPY  2023    BRONCHOSCOPY N/A 4/29/2024    Procedure: BRONCHOSCOPY WITH ENDOBRONCHIAL ULTRASOUND;  Surgeon: Nicola Monsivais MD;  Location: [facility];  Service: Pulmonary;  Laterality: N/A;  pre Nodule of upper lobe of right lung  Dr. Alves    BRONCHOSCOPY WITH ION ROBOTIC ASSIST N/A 4/29/2024    Procedure: BRONCHOSCOPY WITH ION ROBOT and BRONCHOSCOPY WITH ENDOBRONCHIAL ULTRASOUND;  Surgeon: Nicola Monsivais MD;  Location: [facility] OR;  Service: Robotics - Pulmonary;  Laterality: N/A;  pre  Nodule of upper lobe of right lung  post      BRONCHOSCOPY WITH ION ROBOTIC ASSIST  4/29/2024    Procedure: BRONCHOSCOPY NAVIGATION WITH ENDOBRONCHIAL ULTRASOUND AND ION ROBOT;  Surgeon: Nicola Monsivais MD;  Location: [facility] OR;  Service: Pulmonary;;     COLONOSCOPY  02/28/2023    HEMORRHOIDECTOMY      HERNIA REPAIR  1999    LUNG SURGERY Left 04/19/2023       Family History: His family history includes Alcohol abuse in his brother; Cancer in his brother, father, and paternal grandfather; Diabetes in his mother; Heart disease in his mother.     Social History: He  reports that he quit smoking about 14 months ago. His smoking use included cigarettes. He started smoking about 58 years ago. He has a 114.4 pack-year smoking history. He has never been exposed to tobacco smoke. He has never used smokeless tobacco. He reports that he does not currently use alcohol. He reports that he does not use drugs.    Home Medications:  Cholecalciferol, Fluticasone-Umeclidin-Vilant, albuterol sulfate HFA, cetirizine, divalproex, fish oil, furosemide, glimepiride, hydroCHLOROthiazide, lisinopril, loratadine, metFORMIN, metoprolol succinate XL, pantoprazole, primidone, and simvastatin    Allergies:  He is allergic to iodides.       Vital Signs:   Temp:  [97.8 °F (36.6 °C)] 97.8 °F (36.6 °C)  Heart Rate:  [114] 114  Resp:  [16] 16  BP: (132)/(67) 132/67  ENT Physical Exam  Constitutional  Appearance: patient appears well-developed and well-nourished, patient is cooperative;  Communication/Voice: communication appropriate for developmental age; voice quality is hoarse and rough; is not breathy; with glottal pena; low vocal pitch present; Communication comments: Normal sentence length  Head and Face  Appearance: head appears normal, face appears normal and face appears atraumatic;  Palpation: facial palpation normal;  Salivary: glands normal;  Ear  Hearing: impaired to conversational voice;  Auricles: bilateral auricles normal;  External Mastoids: right external mastoid normal; left external mastoid normal;  Ear Canals: Ear Canal comments: dry, scaly  Tympanic Membranes: bilateral tympanic membranes normal;  Nose  External Nose: nares patent bilaterally; external nose normal;  Internal Nose:  nasal mucosa normal; nasal septal deviation present; Septum comments: Wide low with anterior obstruction   bilateral inferior turbinates normal;  Oral Cavity/Oropharynx  Lips: normal;  Teeth: tooth decay and missing teeth noted;  Gums: gingiva abnormal; gingival recession present;  Tongue: normal;  Oral mucosa: normal; buccal mucosa Buccal Mucosa comments: R mid buccal pedunculated mass 2 cm  Hard palate: normal;  Soft palate: normal;  Tonsils: normal;  Base of Tongue: normal;  Posterior pharyngeal wall: normal;  Neck  Neck: neck normal; neck palpation normal; no neck mass present;  Thyroid: thyroid normal;  Respiratory  Inspection: breathing unlabored; normal breathing rate;  Cardiovascular  Inspection: extremities are warm and well perfused; no peripheral edema present;  Lymphatic  Palpation: lymph nodes normal;  Neurovestibular  Mental Status: alert and oriented;  Psychiatric: mood normal; affect is appropriate;       Laryngoscopy    Date/Time: 6/3/2024 2:20 PM    Performed by: Shant Dimas Jr., MD  Authorized by: Shant Dimas Jr., MD    Consent:     Consent obtained:  Verbal    Consent given by:  Patient    Alternatives discussed:  No treatment  Anesthesia (see MAR for exact dosages):     Anesthesia method:  Topical application    Topical anesthetic:  Tetracaine  Procedure details:     Indications: hoarseness, dysphagia, or aspiration and oncologic surveillance      Medication:  Afrin    Instrument: flexible fiberoptic laryngoscope      Scope location: left nare    Sinus/ Nasopharynx:     Right nasopharynx: patent and inflammation      Left nasopharynx: patent and inflammation      Right eustachian tube: patent      Left eustachian tube: inflammation, patent and inflammation    Oropharynx/ Supraglottis:     Posterior pharyngeal wall: inflamed      Oropharynx: inflammation      Oropharynx: no stenosis and no lesion      Vallecula: inflammation      Vallecula: no lesion      Base of tongue:  lingual tonsillar hypertrophy and inflammation      Base of tongue: no lesion      Epiglottis: inflammation and retroflexed      Epiglottis: no lesions    Larynx/ Hypopharynx:     Arytenoids: inflammation and interarytenoid edema      Hypopharynx: inflammation      Pyriform sinus: inflammation      Pyriform sinus: no lesion and no pooling of secretions      False vocal cords: inflammation      True vocal cords: Patrick's edema      True vocal cords: no immobility    Post-procedure details:     Patient tolerance of procedure:  Tolerated well  Comments:      Moderate inflammation from nasopharynx down the hypopharynx, including supraglottis  Arytenoid and interarytenoid edema  No evidence of nasopharyngeal, oropharyngeal, hypopharyngeal, glottic masses  True vocal folds appear to be mildly edematous without marii lesion  Subglottic area appears normal     Result Review       RESULTS REVIEW    I have reviewed the patients old records in the chart.   I have reviewed the patients old records in the chart.  The following results/records were reviewed:   Referral to Otolaryngology for Oral lesion (04/29/2024)   Progress Notes by Zacarias Ortiz III, MD (05/06/2024 09:30)    Progress Notes by Nicola Monsivais MD (04/29/2024 15:00)    Op Note by Nicola Monsivais MD (04/29/2024 12:58)    Progress Notes by Erma Saucedo, SABAS (05/20/2024 14:00)         Assessment & Plan  Lesion of true vocal cord    Dysphonia    Vocal cord edema    Mass of oral cavity    Fibroma        Diagnosis Plan   1. Lesion of true vocal cord  Flexible laryngoscopy    None identified today      2. Dysphonia  Flexible laryngoscopy    Related to submucosal vocal cord edema      3. Vocal cord edema  Flexible laryngoscopy    Bilateral, symmetric      4. Mass of oral cavity      Right buccal mass      5. Fibroma      Right buccal area          Orders Placed This Encounter   Procedures    Flexible laryngoscopy         Conservative management.  Patient  appears to have no evidence of laryngeal lesion today.  This may have been submucosal edema or mucus seen on true vocal folds.  Concerned about the patient's cancer risk because of his history of smoking and recent lung cancer.  I recommended surveillance at this point in time.  He does have significant dysphonia.  I recommended voice hygiene.  Patient will call if he wishes to consider voice therapy.  I have recommended he control reflux, voice hygiene, nasal saline.  The patient has a mass coming off the right buccal mucosa.  This appears to be benign and pedunculated.  He states that this is not bothering him at this point in time.  I have asked him to consider possible removal.  Will reevaluate at the same time I reevaluate his voice.  Voice hygiene  Reflux precautions  Consider speech therapy  Call for any other changes    My Chart:  Encouraged to enroll in My Chart  Encouraged to review data and findings in My Chart    Patient, Spouse understand(s) and agree(s) with the treatment plan as described.    Return in about 6 months (around 12/3/2024) for Recheck Voice and mass buccal right.        Electronically signed by Shant Dimas Jr, MD 06/03/24 2:24 PM CDT.

## 2024-06-03 NOTE — PATIENT INSTRUCTIONS
VOICE HYGIENE:    Many factors go into manufacturing what we call the voice. More goes on than just breathing out and using the voice box and mouth. Creating the voice requires good lung function, a healthy voice box, and complex muscle coordination. Any malfunction in any of the systems and voice problems can occur.    The most common problem seen in the office is hoarseness. Other types of voice problems can include breathiness, double voice, raspy voice, or simply a change in the pitch. Many things can cause a hoarse or weak voice, including laryngitis, vocal abuse (screaming, cheering, singing too loud for too long), smoking, etc. In order to treat the problem, your physician first must find the cause, then try to correct it to restore your normal voice.    The first part of a voice analysis is a detailed history of the problem, including any habits such as smoking, to delineate possible factors. The second step is a careful examination of the head and neck, including the voice box. The examination is essential, as this eliminates any anatomic problems with the vocal cords and the surrounding structures. More detailed analysis may be used in the form of videotaping the vocal cords with a stroboscopic light, thus providing additional information.     Suggestions for Voice Use and Conservation    Treatment of voice problems depends on the cause of the problems. Fortunately, most problems are easily correctable. The following are a few suggestions you will be asked to carry out, depending on the results of your examination.    >Avoid stress  >Avoid habitually clearing throat as this strains and fatigues the vocal cords  >Avoid yelling, especially for extended periods of time  >Avoid falsetto singing  >Avoid talking when it hurts or if your neck is excessively tired.  >Do exercise regularly to maintain fitness and breathe control. Do eat correctly, to avoid acid reflux  >Drink 6-8 glasses of water daily to keep the  vocal cords flexible, making it easier to speak  >Do take sips of water while speaking as swallowing helps relax the muscles of the throat and neck  >Do sip water, sniff or say the letter “H” forcefully rather than clearing throat  >Do listen to your voice as you speak to avoid trailing off or “tightness” at the end of sentences  >Do use plenty of breath while speaking. If you feel you are running out of breath, stop, breathe then continue.  >Do speak easily rather than pushing, forcing or straining  >Do stop talking and take a voice break, especially if you are fading or fatiguing  >Do increase your awareness of your voice. When does it sound better, worse? >Recording your voice or watching yourself speak in a mirror to watch for movement in the neck and shoulders can be beneficial.    Whispering is not a protective mechanism for the voice. In fact, whispering can make your condition worse. In order to limit further damage to your voice, maintain a normal volume and tone. Only in certain circumstances will this recommendation require modification. Dr. Dimas will tell you if that is the case.    Complete voice rest is used occasionally to treat voice and vocal cord problems. However, this is a rare situation. Dr. Dimas will tell you if this is indicated.    A great deal of voice hygiene is based on common sense. If it hurts to talk, then a problem exists.    Vocal Strengthening Exercises    >Sit in a comfortable and upright position. The chair should have a hard back and hard arm rests.  >Rest your hands on the arms of the chair. Push down with both of your hands at the same time. Do not use too much shoulder movement, simply push with your hands. You should feel a tightening of your abdominal muscles, not your neck muscles.  >After you become comfortable with the pushing motion, begin to make a short sound (such as at/ ah) each time you push down. You can substitute other short sounds such as a vowel sound or  counting.  >Be sure to take a deep breath before each push down and vocalization of the sound.  Summary of exercise sequence:   Take a deep breath in  Push down while saying ah  Do NOT hold your breath  Relax and breathe out  Repeat above sequence 15-20 times every hour or several times each day.    Speech Pathology    Frequently, physicians and speech pathologists treat voice disorders together. These are highly trained individuals who specialize in therapy for the voice box and throat. You may recognize the better as speech, swallowing or voice therapists. Dr. Dimas may elect to refer you for evaluation and treatment, depending on your diagnosis and condition.    Special Testing    You may be referred certain tests to fully evaluate your throat, swallowing or voice. These may include CAT scans, swallowing tests, MRIs, or videostroboscopy. You may not be familiar these tests but the results will discussed them with you at the time of your office visit and answer your questions about them.        >NASAL SALINE:  Use 2 puffs each nostril 4-6 times daily and more frequently if possible.  You can buy saline spray or you can make your own and use an old spray bottle to administer  Use a humidifier at bedside  Recipe for saline:  Water                                 1 quart  Salt (table)                        1 tablespoon  Gylcerin (or Selma Syrup)    1 teaspoon  Sodium bicarbonate           1 teaspoon  Sprays or Estefany pots are recommended    Do not allow to stand for more than 24 hrs. Make new solution. There is no preservative in this solution.    Sinus irrigation and saline application can be enhanced with various over-the-counter products.  A WaterPik can be quite useful to irrigate, especially following sinus surgery.  Navage makes a product that irrigates the nose and some of the sinuses.  NeilMed makes a Sinu-Gator to irrigate the sinuses.  Neomed also has canned saline that we will come out under pressure.   A Estefany pot can also be helpful.  All of these products help keep the nose clear of debris.  Please use as directed on the instructions that come with the particular device.     Call for problems     CONTACT INFORMATION:  The main office phone number is [phone]. For emergencies after hours and on weekends, this number will convert over to our answering service and the on call provider will answer. Please try to keep non emergent phone calls/ questions to office hours 9am-5pm Monday through Friday.     [employer]  As an alternative, you can sign up and use the Epic MyChart system for more direct and quicker access for non emergent questions/ problems.  [facility] [employer] allows you to send messages to your doctor, view your test results, renew your prescriptions, schedule appointments, and more. To sign up, go to [employer] and click on the Sign Up Now link in the New User? box. Enter your [employer] Activation Code exactly as it appears below along with the last four digits of your Social Security Number and your Date of Birth () to complete the sign-up process. If you do not sign up before the expiration date, you must request a new code.    [employer] Activation Code: [email or web address]  Expires: 7/3/2024  9:53 AM    If you have questions, you can email [email or web address] or call [phone] to talk to our [employer] staff. Remember, [employer] is NOT to be used for urgent needs. For medical emergencies, dial 911.

## 2024-06-04 ENCOUNTER — HOSPITAL ENCOUNTER (OUTPATIENT)
Dept: RADIATION ONCOLOGY | Facility: HOSPITAL | Age: 74
Setting detail: RADIATION/ONCOLOGY SERIES
Discharge: HOME | End: 2024-06-04
Payer: MEDICARE

## 2024-06-04 VITALS — SYSTOLIC BLOOD PRESSURE: 136 MMHG | HEART RATE: 72 BPM | OXYGEN SATURATION: 96 % | DIASTOLIC BLOOD PRESSURE: 71 MMHG

## 2024-06-04 LAB

## 2024-06-04 PROCEDURE — 77373 STRTCTC BDY RAD THER TX DLVR: CPT | Performed by: RADIOLOGY

## 2024-06-05 ENCOUNTER — HOSPITAL ENCOUNTER (OUTPATIENT)
Dept: RADIATION ONCOLOGY | Facility: HOSPITAL | Age: 74
Setting detail: RADIATION/ONCOLOGY SERIES
Discharge: HOME | End: 2024-06-05
Payer: MEDICARE

## 2024-06-05 VITALS — DIASTOLIC BLOOD PRESSURE: 59 MMHG | HEART RATE: 102 BPM | OXYGEN SATURATION: 96 % | SYSTOLIC BLOOD PRESSURE: 133 MMHG

## 2024-06-05 LAB
RAD ONC ARIA COURSE ID: NORMAL
RAD ONC ARIA COURSE LAST TREATMENT DATE: NORMAL
RAD ONC ARIA COURSE START DATE: NORMAL
RAD ONC ARIA COURSE TREATMENT ELAPSED DAYS: 8
RAD ONC ARIA FIRST TREATMENT DATE: NORMAL
RAD ONC ARIA PLAN FRACTIONS TREATED TO DATE: 5
RAD ONC ARIA PLAN ID: NORMAL
RAD ONC ARIA PLAN PRESCRIBED DOSE PER FRACTION: 10 GY
RAD ONC ARIA PLAN PRIMARY REFERENCE POINT: NORMAL
RAD ONC ARIA PLAN TOTAL FRACTIONS PRESCRIBED: 5
RAD ONC ARIA PLAN TOTAL PRESCRIBED DOSE: 5000 CGY
RAD ONC ARIA REFERENCE POINT DOSAGE GIVEN TO DATE: 50 GY
RAD ONC ARIA REFERENCE POINT ID: NORMAL
RAD ONC ARIA REFERENCE POINT SESSION DOSAGE GIVEN: 10 GY

## 2024-06-05 PROCEDURE — 77373 STRTCTC BDY RAD THER TX DLVR: CPT | Performed by: RADIOLOGY

## 2024-06-05 PROCEDURE — 77336 RADIATION PHYSICS CONSULT: CPT | Performed by: RADIOLOGY

## 2024-06-10 LAB
MYCOBACTERIUM SPEC CULT: NORMAL
NIGHT BLUE STAIN TISS: NORMAL
NIGHT BLUE STAIN TISS: NORMAL

## 2024-08-14 RX ORDER — FLUTICASONE FUROATE, UMECLIDINIUM BROMIDE AND VILANTEROL TRIFENATATE 100; 62.5; 25 UG/1; UG/1; UG/1
1 POWDER RESPIRATORY (INHALATION)
Qty: 180 EACH | Refills: 3 | Status: SHIPPED | OUTPATIENT
Start: 2024-08-14

## 2024-08-14 NOTE — TELEPHONE ENCOUNTER
Rx Refill Note  Requested Prescriptions     Pending Prescriptions Disp Refills    Fluticasone-Umeclidin-Vilant (Trelegy Ellipta) 100-62.5-25 MCG/ACT inhaler 180 each 3     Sig: Inhale 1 puff Daily.      Last office visit with prescribing clinician: 5/20/2024   Last telemedicine visit with prescribing clinician: Visit date not found   Next office visit with prescribing clinician: 11/25/2024                         Would you like a call back once the refill request has been completed: [] Yes [] No    If the office needs to give you a call back, can they leave a voicemail: [] Yes [] No    Alicia Giles CMA  08/14/24, 16:20 CDT

## 2024-09-03 ENCOUNTER — HOSPITAL ENCOUNTER (OUTPATIENT)
Dept: CT IMAGING | Facility: HOSPITAL | Age: 74
Discharge: HOME | End: 2024-09-03
Payer: MEDICARE

## 2024-09-03 DIAGNOSIS — Z87.891 FORMER SMOKER: ICD-10-CM

## 2024-09-03 DIAGNOSIS — C34.11 MALIGNANT NEOPLASM OF UPPER LOBE OF RIGHT LUNG: ICD-10-CM

## 2024-09-03 DIAGNOSIS — C34.92 SQUAMOUS CELL CARCINOMA OF LEFT LUNG: ICD-10-CM

## 2024-09-03 DIAGNOSIS — Z90.2 S/P LOBECTOMY OF LUNG: ICD-10-CM

## 2024-09-03 DIAGNOSIS — Z92.3 HISTORY OF RADIATION THERAPY: ICD-10-CM

## 2024-09-03 PROCEDURE — 71250 CT THORAX DX C-: CPT

## 2024-09-05 NOTE — PROGRESS NOTES
[facility]  Radiation Oncology Clinic   Zacarias Gonzales MD, FACR  Adam Aston OBREGON  _______________________________________________  [facility]  Department of Radiation Oncology  [address] [place] [id number]  Office: [phone]  Fax: [phone]    DATE: 09/09/2024  PATIENT: Boogie Artis  1950                         MEDICAL RECORD #: [id number]    1. History of primary non-small cell carcinoma of left lung    2. History of primary malignant neoplasm of right lung    3. S/P lobectomy of lung    4. History of radiation therapy    5. Former smoker                                    REASON FOR VISIT:    Chief Complaint   Patient presents with    Lung Cancer     Reason for Follow up Visit:  Boogie Artis is a very pleasant 73 y.o. patient that completed radiation to the lung and returns to the clinic today for inital follow up exam. Reports fatigue, voice change, cough, SOB, and dizziness. Denies appetite change, unexpected weight change, nasuea/vomiting, diarrhea, light-headedness, weakness, and headaches.     History of Present Illness:  Diagnosed with a PET+ neoplasm of the lung, RUL. He completed 5000 cGy in 4 fractions to the right upper lobe of the lung on 09/27/2023.  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 HE completed 5000 cGy  in 5 fractions to the right upper lobe of the lung.     01/16/2023 - CT Chest - [place]:  Spiculated perihilar left upper lobe pulmonary nodule measures 2.7 cm. This is concerning for primary neoplasm. PET/CT or bronch recommended for further evaluation.   0.8 cm noncalcified pulmonary nodule in the right upper lobe. This is at the lower limits of normal for PET/CT detection. Benign granulomatous disease, metastatic disease, or 2nd primary pulmonary malignancy could be considered.   No enlarged mediastinal or hilar lymph nodes    01/17/2023 - Pulmonary Function Tests - [place]:  Moderate  obstructive lung disease    01/26/2023 - Bronchoscopy with biopsy per :  RUL, lower lobe, middle lobe were negative. The left upper lobe, no lesion, lingula no lesion seen.  SAMINA washing:  Negative for malignant cells; reactive bronchial cells, macrophages, and inflammation  SAMINA brushing:  Negative for malignant cells; markedly reactive bronchial cells, macrophages, and inflammation    02/09/2023 - PET Scan:  2.8 cm hypermetabolic central LEFT upper lobe pulmonary nodule. This likely represents a primary lung neoplasm.  0.8 cm hypermetabolic pulmonary nodule in the RIGHT upper lobe. This is highly suspicious for either a contralateral pulmonary metastasis or second primary lung neoplasm.   No hypermetabolic thoracic lymph nodes. No evidence of hypermetabolic metastatic disease in the neck, abdomen, or pelvis.  1.5 cm hypermetabolic nodule in the RIGHT anterior rectum, highly concerning for underlying rectal polyp. Recommend correlation with colonoscopy/sigmoidoscopy.    02/27/2023 - Colonoscopy:  Rectal polyp, excision:  Villous adenoma, negative for high-grade glandular dysplasia or malignancy.  Colon polyp at 20 cm, biopsy:  Fragments of serrated polyp, favor sessile serrated lesion/polyp.  Colon polyp at 10 cm, biopsy:  Fragments of serrated polyp, favor sessile serrated lesion/polyp.    03/08/2023 - CT Chest without contrast:  Pulmonary emphysema with LEFT hilar/upper lobe lung mass measuring approximately 4 cm in greatest dimension. There is mild nodular airspace disease laterally to this lesion which may represent postobstructive atelectasis or infiltrate.   10 mm irregular nodule within the RIGHT lung apex (series 2, image 101). This is concerning for metastatic lesion. Additional 4 mm noncalcified nodule at the RIGHT lung apex (series 2, image 85).   Evidence of prior granulomatous disease.     03/09/2023 - Bronchoscopy/EBUS per :  Left upper lobe, EBUS (ThinPrep and cell block section):    Squamous cell carcinoma.   Bronchial washing (ThinPrep and cell block section):   No malignant cells identified.   Benign bronchial epithelial cells, and alveolar macrophages.     04/19/2023 -  Left upper lobectomy and lymph node excision per Dr.Robert Irwin:   Lymph node, level 9 lymph node biopsy: Benign fibroadipose tissue.   Lymph node, level 10 posterior hilar lymph node biopsy: 1 lymph node, negative for evidence of malignancy.   Lymph node, level 6 lymph node biopsy: 2 lymph nodes, negative for evidence of malignancy.   Lymph node, level 11 interlobar lymph node biopsy: 1 lymph node, negative for evidence of malignancy.   Lymph node, level 10 anterior hilar lymph node biopsy: 2 lymph nodes, negative for evidence of malignancy.   Lymph node, level 12 lobar lymph node biopsy: 1 lymph node, negative for evidence of malignancy.   Lung, left upper lobectomy:   Invasive moderately differentiated squamous cell carcinoma.   Invasive carcinoma measures 2.5 cm in greatest dimension grossly.   Invasive carcinoma extends to within 0.2 cm of the nearest bronchial surgical excision margin.   Squamous metaplasia identified at bronchial surgical excision margin.   Emphysematous changes identified involving the nonneoplastic lung parenchyma.   2 peribronchial lymph nodes, negative for evidence of malignancy.   AJCC STAGE:  pT1c, pN0, pMx     04/25/2023 -  CT Chest without contrast:  Interval placement of large bore chest tube with decrease in previously described large left hydropneumothorax.Residual hydropneumothorax present with air dissecting through anterior chest wall into subcutaneous soft tissues, unchanged.   Interval resolution of previously described debris within left bronchus.   Unchanged right apical 1.0 cm nodule.     07/24/2023 - Appointment with :  Left upper lobe SCC [id number], stage I,Jan 2023  -1/16/23 CT chest ([facility]): Spiculated perihilar left upper lobe pulmonary nodule measures 2.7cm. This  is concerning for primary neoplasm. PET/CT or bronchoscopy is recommended to further evaluate. 0.8cm noncalcified pulmonary nodule in the right upper lobe. This is at the lower limits of normal for Pet/CT detection. Benign granulomatous disease, metastatic disease, or 2nd primary pulmonary malignancy could be considered. No enlarged mediastinal or hilar lymph nodes.  -1/26/23 Lung, left upper lobe, washing: Negative for malignant cells. Reactive bronchial cells, macrophages, and inflammation. Lung, left upper lobe, brushing: Negative for malignant cells. Markedly reactive bronchial cells, macrophages, and inflammation.  -2/9/23 PET scan ([facility]): 2.8 cm hypermetabolic central LEFT upper lobe pulmonary nodule. This likely represents a primary lung neoplasm. 0.8 cm hypermetabolic pulmonary nodule in the RIGHT upper lobe. This is highly suspicious for either a contralateral pulmonary metastasis or second primary lung neoplasm. No hypermetabolic thoracic lymph nodes. No evidence of hypermetabolic metastatic disease in the neck, abdomen, or pelvis. 1.5 cm hypermetabolic nodule in the RIGHT anterior rectum, highly concerning for underlying rectal polyp. Recommend correlation with colonoscopy/sigmoidoscopy.   4/19/23 Lung, left upper lobectomy: Invasive moderately differentiated squamous cell carcinoma. Invasive carcinoma measures 2.5 cm in greatest dimension grossly. Invasive carcinoma extends to within 0.2 cm of the nearest bronchial surgical excision margin. Squamous metaplasia identified at bronchial surgical excision margin. Emphysematous changes identified involving the nonneoplastic lung parenchyma. 2 peribronchial lymph nodes, negative for evidence of malignancy.Lymph node, level 9 lymph node biopsy: Benign fibroadipose tissue. Lymph node, level 10 posterior hilar lymph node biopsy: 1 lymph node, negative for evidence of malignancy. Lymph node, level 6 lymph node biopsy: 2 lymph nodes, negative for evidence of  malignancy. Lymph node, level 11 interlobar lymph node biopsy: 1 lymph node, negative for evidence of malignancy. Lymph node, level 10 anterior hilar lymph node biopsy: 2 lymph nodes, negative for evidence of malignancy. Lymph node, level 12 lobar lymph node biopsy: 1 lymph node, negative for evidence of malignancy. [id number], stage I  Plan:  -Repeat CT chest Nov 2023  RUL subcentimeter nodule (PET+)  -2/9/23 PET scan ([facility]): 2.8 cm hypermetabolic central LEFT upper lobe pulmonary nodule. This likely represents a primary lung neoplasm. 0.8 cm hypermetabolic pulmonary nodule in the RIGHT upper lobe. This is highly suspicious for either a contralateral pulmonary metastasis or second primary lung neoplasm. No hypermetabolic thoracic lymph nodes. No evidence of hypermetabolic metastatic disease in the neck, abdomen, or pelvis. 1.5 cm hypermetabolic nodule in the RIGHT anterior rectum, highly concerning for underlying rectal polyp. Recommend correlation with colonoscopy/sigmoidoscopy.   -Referral Dr Ortiz for consideration SBRT RUL hypermetabolic nodule.  PLAN:  RTC with MD 2 months in [place] office  CBC, CMP, B12, Folate, LDH, Haptoglobin, iron profile, Ferritin, retic  Continue follow-up with Dr.Robert Irwin/CardioThoracic Surgery  Continue to follow up with Dr. Rowe  Iron Sulfate 325 mg p.o. twice daily  Follow Up:  Return in 2 months (on 9/24/2023) for Appointment with Dr. Vaughan in [place].  Refer to Dr Ortiz     08/09/2023 - Appointment with :  will order PET scan, they will call you  Return in 2 weeks for further discussion    08/14/2023 - PET Scan:  Abnormal PET/CT, there is increased size of a hypermetabolic pulmonary nodule in the right upper lobe lung apex that measures 13 mm, compared with 8 mm on the previous PET/CT. This has a maximum SUV of 8.1. This is concerning for active neoplasm.  Interval resection of the left upper lobe with posttreatment changes along the medial margin of the upper  mediastinum, including mild infiltration of the mediastinal fat planes.  There is a subcutaneous nodule with surrounding fatty infiltration over the low back at the level of the upper sacrum which demonstrates hypermetabolism. This could be inflammatory, less likely a metastatic nodule. This measures 21 mm, follow-up ultrasound is recommended to determine if this is solid. Ultrasound-guided biopsy could be performed if indicated.  Interval resolution of the hypermetabolic nodule associated with the right rectum, however there is a new hypermetabolic nodule that maps to the right prostate gland. Correlation with PSA values is recommended.    08/16/2023 - Consult with :  I have recommended to treat the right lung nodule with SBRT, I anticipate a course over 4-5 fractions, final course pending.   He does have a visible insect bite on his low back and I do believe this correlates with the PET findings of activity on the upper sacrum. Will order PSA for prostate gland findings. He has not had one drawn in some time.   Plan:  Plan on 4-5 pinpoint treatments to the lung nodule.   We will call you when to start treatments.   PSA today    08/18/2023 - Documentation per :  I spoke on the phone with this patient's wife Sarah and discussed his PSA of 2.9. This is considered a normal level, however on recent PET scan imaging for a lung nodule workup, a nodule within the prostate was noted.   I will refer him to urology for further management recommendations.    09/12/2023 - 09/27/2023 - Completed radiation course:  Received 5000 cGy in 4 fractions to the right upper lobe of the lung.    11/20/2023 - MRI Pelvis with and without contrast:  No suspicious lesions in the prostate (PI-RADS 3 or greater).     11/29/2023 - Appointment with :  I personally reviewed MRI today.    His MRI is negative for any suspicious lesions.    He has a volume of 34 cc.    I believe his hypermetabolic activity on PET scan was  likely inflammatory.  I do not think he requires a biopsy based on his negative MRI and PSA level.    He will follow-up as needed.     12/20/2023 - CT Chest without contrast:  Decreased size of 6 mm spiculated right upper lobe pulmonary nodule (previously 13 mm). This nodule was hypermetabolic on prior PET/CT and highly suspicious for primary lung neoplasm.  Postoperative change of left upper lobectomy. No definite change/increase in soft tissue thickening along the lobectomy margin and mediastinum. Recommend continued imaging surveillance.  No evidence of disease progression in the chest. No thoracic lymphadenopathy.    12/27/2023 - Appointment with :  Return in 3 months with a CT chest before.     03/19/2024 - CT chest without contrast:  1.1 cm irregular right upper lobe nodule, highly suspicious for malignancy.  5 mm left upper lobe nodule is indeterminate but does raise the suspicion for malignancy.  Stable size of the previously irradiated right apex nodule. Minimally increased adjacent subpleural parenchymal abnormality, likely related to post radiation changes.  Left upper lobectomy with stable postsurgical margins.  No mediastinal lymphadenopathy.    03/27/2024 - Consult with :  Will refer to pulmonology for bronchoscopy considerations of the new right upper lung nodule. He will return in 3 weeks for further recommendations.  I have instructed him to continue to see the other health care providers as per their scheduling.  Plan:  Referral to pulmonology for biopsy of new right lung nodule  Return in 3 weeks    04/16/2024 - Appointment with :  For wheezing, add bronchodilator. 100 mcg trelegy sample given, we reviewed PFT showing severe obstruction.  Likely moderate to severe copd with hx smoking  For lung nodule, will plan ion bronchoscopy to better assess rul nodule  Ask anesthesia to take particular care with remaining teeth  We demonstrated proper technique for use of ellipta  device  Follow Up   Return in about 4 weeks (around 5/14/2024).    04/22/2024 - CT Chest without contrast:  RIGHT upper lobe 17 x 11 mm nodule.  LEFT upper lobe 6 x 5 mm nodule.  No pneumothorax or pleural effusion.    04/29/2024 - Bronchoscopy with biopsy per :  Lung, right upper lobe, Ion fine-needle aspiration, smears (2), ThinPrep preparation (1) and cell block:   Positive for malignant cells, consistent with moderately differentiated squamous cell carcinoma.  Comment: Immunohistochemical stain for p40 is positive in the neoplastic cells, supporting the above diagnostic impression.The control stained appropriately.  Bronchial washings, ThinPrep preparation (1) and cell block:   Positive for malignant cells, consistent with moderately differentiated squamous cell carcinoma.  Lung, right upper lobe, bronchoalveolar lavage, ThinPrep preparation (1):   Positive for malignant cells, consistent with moderately to poorly differentiated squamous cell carcinoma.  AJCC stage: pTX pNX    04/29/2024 - Documentation per :  Dr. Nicola Monsivais notified me that patient cytology was positive in his lung.  He is scheduled to return to our office for treatment recommendations.  Also, it was incidentally noted by anesthesia during his procedure that he has a lesion in the oral cavity that needs additional evaluation and may potentially be another malignancy.    05/06/2024 - Appointment with :  This patient does have biopsy-proven squamous cell carcinoma of the right upper lobe.  We will proceed with SBRT radiotherapy to this  lesion.  He also has a lesion in the left lung which is nondiagnostic and we will continue to follow that lesion with serial radiographs  Plan:  We will call you when to start radiation treatment in approximately 2 weeks  Return in about 2 weeks (around 5/20/2024) for the first Radiation Treatment.    05/28/2024 - 06/05/2024 - Completed radiation course:  Received 5000 cGy in 5 fractions  to the right upper lobe of the lung via Stereotactic Radiation Therapy - SRT.     09/03/2024 - CT chest without contrast:  Lung nodules seen previously have resolved.  There are indeterminate soft tissue nodules within the LEFT bronchus.  Consider PET/CT correlation.      History obtained from  PATIENT and CHART    PAST MEDICAL HISTORY  Past Medical History:   Diagnosis Date    Bleeding ulcer     Cancer     lung cancer left    COPD (chronic obstructive pulmonary disease)     Diabetes mellitus     Elevated cholesterol     GERD (gastroesophageal reflux disease)     History of seasonal allergies     History of transfusion     Hyperlipidemia     Hypertension     Kidney infection     Kidney stone     Lung nodule     Migraines     Sleep apnea     Vitamin D deficiency       PAST SURGICAL HISTORY  Past Surgical History:   Procedure Laterality Date    APPENDECTOMY      BRONCHOSCOPY  2023    BRONCHOSCOPY N/A 4/29/2024    Procedure: BRONCHOSCOPY WITH ENDOBRONCHIAL ULTRASOUND;  Surgeon: Nicola Monsivais MD;  Location:  [facility] OR;  Service: Pulmonary;  Laterality: N/A;  pre Nodule of upper lobe of right lung  Dr. Alves    BRONCHOSCOPY WITH ION ROBOTIC ASSIST N/A 4/29/2024    Procedure: BRONCHOSCOPY WITH ION ROBOT and BRONCHOSCOPY WITH ENDOBRONCHIAL ULTRASOUND;  Surgeon: Nicola Monsivais MD;  Location:  [facility] OR;  Service: Robotics - Pulmonary;  Laterality: N/A;  pre  Nodule of upper lobe of right lung  post      BRONCHOSCOPY WITH ION ROBOTIC ASSIST  4/29/2024    Procedure: BRONCHOSCOPY NAVIGATION WITH ENDOBRONCHIAL ULTRASOUND AND ION ROBOT;  Surgeon: Nicola Monsivais MD;  Location:  [facility] OR;  Service: Pulmonary;;    COLONOSCOPY  02/28/2023    HEMORRHOIDECTOMY      HERNIA REPAIR  1999    LUNG SURGERY Left 04/19/2023      FAMILY HISTORY  family history includes Alcohol abuse in his brother; Cancer in his brother, father, and paternal grandfather; Diabetes in his mother; Heart disease in his mother.    SOCIAL  HISTORY  Social History     Tobacco Use    Smoking status: Former     Current packs/day: 0.00     Average packs/day: 2.0 packs/day for 57.2 years (114.4 ttl pk-yrs)     Types: Cigarettes     Start date:      Quit date: 3/17/2023     Years since quittin.4     Passive exposure: Never    Smokeless tobacco: Never   Vaping Use    Vaping status: Never Used   Substance Use Topics    Alcohol use: Not Currently    Drug use: Never     ALLERGIES  Iodides     MEDICATIONS    Current Outpatient Medications:     albuterol sulfate  (90 Base) MCG/ACT inhaler, Every 6 (Six) Hours., Disp: , Rfl:     cetirizine (zyrTEC) 10 MG tablet, Take 1 tablet by mouth Daily., Disp: , Rfl:     Cholecalciferol 125 MCG (5000 UT) tablet, Take 1 tablet by mouth Daily., Disp: , Rfl:     divalproex (DEPAKOTE ER) 250 MG 24 hr tablet, Take 1 tablet by mouth 2 (Two) Times a Day., Disp: , Rfl:     Fluticasone-Umeclidin-Vilant (Trelegy Ellipta) 100-62.5-25 MCG/ACT inhaler, Inhale 1 puff Daily., Disp: 180 each, Rfl: 3    furosemide (LASIX) 20 MG tablet, TAKE 1 TABLET BY MOUTH ONCE DAILY Oral for 14 Days, Disp: , Rfl:     glimepiride (AMARYL) 4 MG tablet, Take 1 tablet by mouth Daily., Disp: , Rfl:     hydroCHLOROthiazide (HYDRODIURIL) 25 MG tablet, Take 1 tablet by mouth Daily., Disp: , Rfl:     lisinopril (PRINIVIL,ZESTRIL) 10 MG tablet, Take 1 tablet by mouth Daily., Disp: , Rfl:     loratadine (CLARITIN) 10 MG tablet, Take 1 tablet by mouth Daily., Disp: , Rfl:     metFORMIN (GLUCOPHAGE) 500 MG tablet, Take 1 tablet by mouth 2 (Two) Times a Day., Disp: , Rfl:     metoprolol succinate XL (TOPROL-XL) 25 MG 24 hr tablet, Take 1 tablet by mouth Daily., Disp: , Rfl:     Omega-3 Fatty Acids (fish oil) 1000 MG capsule capsule, Take  by mouth Daily With Breakfast., Disp: , Rfl:     pantoprazole (PROTONIX) 40 MG EC tablet, Take 1 tablet by mouth 2 (Two) Times a Day., Disp: , Rfl:     primidone (MYSOLINE) 50 MG tablet, Take 1 tablet by mouth 2 (Two)  "Times a Day., Disp: , Rfl:     simvastatin (ZOCOR) 40 MG tablet, Take 1 tablet by mouth Daily., Disp: , Rfl:     Current outpatient and discharge medications have been reconciled for the patient.  Reviewed by: SABAS Burgess    The following portions of the patient's history were reviewed and updated as appropriate: allergies, current medications, past family history, past medical history, past social history, past surgical history and problem list.    REVIEW OF SYSTEMS  Review of Systems   Constitutional:  Positive for fatigue.   HENT:  Positive for voice change (hoarseness).    Eyes: Negative.    Respiratory:  Positive for cough (dry; non productive), shortness of breath (stable; worse with exeration) and wheezing.    Cardiovascular: Negative.    Gastrointestinal: Negative.    Endocrine: Negative.    Genitourinary: Negative.    Musculoskeletal: Negative.    Skin: Negative.    Allergic/Immunologic: Negative.    Neurological:  Positive for dizziness (r/t blood sugar issues; with position changes).   Hematological: Negative.    Psychiatric/Behavioral: Negative.       PHYSICAL EXAM  VITAL SIGNS:   Vitals:    09/09/24 1316   BP: 129/63   Pulse: 70   SpO2: 95%  Comment: room air   Weight: 78.2 kg (172 lb 4.8 oz)   Height: 172.7 cm (68\")   PainSc: 0-No pain     Physical Exam  Vitals reviewed.   Constitutional:       Appearance: Normal appearance.   HENT:      Head: Normocephalic.      Nose: Nose normal.   Eyes:      Pupils: Pupils are equal, round, and reactive to light.   Cardiovascular:      Rate and Rhythm: Normal rate and regular rhythm.      Pulses: Normal pulses.      Heart sounds: Normal heart sounds.   Pulmonary:      Effort: Pulmonary effort is normal. No respiratory distress.      Breath sounds: Normal breath sounds. No wheezing.   Abdominal:      General: Bowel sounds are normal.      Palpations: There is no mass.   Musculoskeletal:         General: Normal range of motion.      Cervical back: Normal " range of motion and neck supple. No tenderness.   Lymphadenopathy:      Cervical: No cervical adenopathy.   Skin:     General: Skin is warm and dry.      Capillary Refill: Capillary refill takes less than 2 seconds.   Neurological:      General: No focal deficit present.      Mental Status: He is alert and oriented to person, place, and time.      Motor: No weakness.   Psychiatric:         Mood and Affect: Mood normal.         Behavior: Behavior normal.          Performance Status: ECOG (1) Restricted in physically strenuous activity, ambulatory and able to do work of light nature    Clinical Quality Measures  - Pain Documented by Standardized Tool, FPS Boogie Artis reports a pain score of 0.  Given his pain assessment as noted, treatment options were discussed and the following options were decided upon as a follow-up plan to address the patient's pain:  No pain, no plan given .  Pain Medications               divalproex (DEPAKOTE ER) 250 MG 24 hr tablet Take 1 tablet by mouth 2 (Two) Times a Day.    primidone (MYSOLINE) 50 MG tablet Take 1 tablet by mouth 2 (Two) Times a Day.          - Body Mass Index Screening and Follow-Up Plan  BMI is >= 25 and <30. (Overweight) The following options were offered after discussion;: none (medical contraindication)    - Tobacco Use: Screening and Cessation Intervention  Social History    Tobacco Use      Smoking status: Former        Packs/day: 0.00        Years: 2.0 packs/day for 57.2 years (114.4 ttl pk-yrs)        Types: Cigarettes        Start date:         Quit date: 3/17/2023        Years since quittin.4        Passive exposure: Never      Smokeless tobacco: Never    - Advanced Care Planning Advance Care Planning   ACP discussion was held with the patient during this visit. Patient does not have an advance directive, information provided.    - Depression screening - PHQ-9 Total Score: 0    ASSESSMENT AND PLAN  1. History of primary non-small cell carcinoma of  left lung    2. History of primary malignant neoplasm of right lung    3. S/P lobectomy of lung    4. History of radiation therapy    5. Former smoker      No orders of the defined types were placed in this encounter.    RECOMMENDATIONS:     Boogie Artis is status post completion of radiation therapy to the lung and presents to our clinic today for surveillance exam and to review imaging.  Diagnosed with a PET+ neoplasm of the lung, RUL. He completed 5000 cGy in 4 fractions to the right upper lobe of the lung on 09/27/2023. 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 He completed 5000 cGy  in 5 fractions to the right upper lobe of the lung.     CT-scan of the chest on 09/03/2024 revealed lung nodules seen previously have resolved. There are indeterminate soft tissue nodules within the LEFT bronchus. Consider PET/CT correlation.    We discussed the new left bronchus findings on imaging. I will notify pulmonology for review of the imaging and further recommendations. We will continue routine follow-up/surveillance as discussed in 3 months with follow up CT scan before visit and I have instructed him to continue to see the other health care providers as per their scheduling.    Patient Instructions   1) will discuss your scans with pulmonology  2) return in 3 months unless you need to be seen sooner.    Return in about 3 months (around 12/9/2024).    Time Spent: I spent 44 minutes caring for Boogie on this date of service. This time includes time spent by me in the following activities: preparing for the visit, reviewing tests, obtaining and/or reviewing a separately obtained history, performing a medically appropriate examination and/or evaluation, counseling and educating the patient/family/caregiver, ordering medications, tests, or procedures, referring and communicating with other health care professionals, documenting information in the medical record, independently interpreting results and  communicating that information with the patient/family/caregiver, and care coordination.   Adam Clancy, APRN  09/09/2024

## 2024-09-06 ENCOUNTER — HOSPITAL ENCOUNTER (OUTPATIENT)
Dept: RADIATION ONCOLOGY | Facility: HOSPITAL | Age: 74
Setting detail: RADIATION/ONCOLOGY SERIES
End: 2024-09-06
Payer: MEDICARE

## 2024-09-09 ENCOUNTER — OFFICE VISIT (OUTPATIENT)
Age: 74
End: 2024-09-09
Payer: MEDICARE

## 2024-09-09 VITALS
BODY MASS INDEX: 26.11 KG/M2 | OXYGEN SATURATION: 95 % | DIASTOLIC BLOOD PRESSURE: 63 MMHG | HEIGHT: 68 IN | HEART RATE: 70 BPM | WEIGHT: 172.3 LBS | SYSTOLIC BLOOD PRESSURE: 129 MMHG

## 2024-09-09 DIAGNOSIS — Z90.2 S/P LOBECTOMY OF LUNG: ICD-10-CM

## 2024-09-09 DIAGNOSIS — Z85.118 HISTORY OF PRIMARY MALIGNANT NEOPLASM OF RIGHT LUNG: ICD-10-CM

## 2024-09-09 DIAGNOSIS — Z87.891 FORMER SMOKER: ICD-10-CM

## 2024-09-09 DIAGNOSIS — Z85.118 HISTORY OF PRIMARY NON-SMALL CELL CARCINOMA OF LEFT LUNG: Primary | ICD-10-CM

## 2024-09-09 DIAGNOSIS — Z92.3 HISTORY OF RADIATION THERAPY: ICD-10-CM

## 2024-09-09 PROCEDURE — G0463 HOSPITAL OUTPT CLINIC VISIT: HCPCS | Performed by: RADIOLOGY

## 2024-09-09 NOTE — PATIENT INSTRUCTIONS
1) will discuss your scans with pulmonology  2) return in 3 months unless you need to be seen sooner.

## 2024-09-11 ENCOUNTER — DOCUMENTATION (OUTPATIENT)
Age: 74
End: 2024-09-11
Payer: MEDICARE

## 2024-09-11 DIAGNOSIS — C34.82 MALIGNANT NEOPLASM OF OVERLAPPING SITES OF LEFT BRONCHUS AND LUNG: ICD-10-CM

## 2024-09-11 DIAGNOSIS — Z85.118 HISTORY OF PRIMARY MALIGNANT NEOPLASM OF RIGHT LUNG: ICD-10-CM

## 2024-09-11 DIAGNOSIS — C34.92 SQUAMOUS CELL CARCINOMA OF LEFT LUNG: ICD-10-CM

## 2024-09-11 DIAGNOSIS — Z92.3 HISTORY OF RADIATION THERAPY: Primary | ICD-10-CM

## 2024-09-11 DIAGNOSIS — Z85.118 HISTORY OF PRIMARY NON-SMALL CELL CARCINOMA OF LEFT LUNG: ICD-10-CM

## 2024-09-11 DIAGNOSIS — Z87.891 FORMER SMOKER: ICD-10-CM

## 2024-09-11 NOTE — PROGRESS NOTES
I reviewed the recent CT chest imaging with Dr. Ortiz and discussed this with Dr. Monsivais and Erma Saucedo in pulmonology. I will order a PET scan for further evaluation of the left bronchus finding and will get him in for an appointment to discuss further options with Dr. Ortiz.

## 2024-09-16 ENCOUNTER — HOSPITAL ENCOUNTER (OUTPATIENT)
Dept: CT IMAGING | Facility: HOSPITAL | Age: 74
Discharge: HOME | End: 2024-09-16
Payer: MEDICARE

## 2024-09-16 DIAGNOSIS — C34.82 MALIGNANT NEOPLASM OF OVERLAPPING SITES OF LEFT BRONCHUS AND LUNG: ICD-10-CM

## 2024-09-16 DIAGNOSIS — C34.92 SQUAMOUS CELL CARCINOMA OF LEFT LUNG: ICD-10-CM

## 2024-09-16 DIAGNOSIS — Z85.118 HISTORY OF PRIMARY MALIGNANT NEOPLASM OF RIGHT LUNG: ICD-10-CM

## 2024-09-16 DIAGNOSIS — Z92.3 HISTORY OF RADIATION THERAPY: ICD-10-CM

## 2024-09-16 DIAGNOSIS — Z85.118 HISTORY OF PRIMARY NON-SMALL CELL CARCINOMA OF LEFT LUNG: ICD-10-CM

## 2024-09-16 DIAGNOSIS — Z87.891 FORMER SMOKER: ICD-10-CM

## 2024-09-16 PROCEDURE — 78815 PET IMAGE W/CT SKULL-THIGH: CPT

## 2024-09-16 PROCEDURE — A9552 F18 FDG: HCPCS

## 2024-09-16 PROCEDURE — 0 FLUDEOXYGLUCOSE F18 SOLUTION

## 2024-09-16 RX ADMIN — FLUDEOXYGLUCOSE F 18 1 DOSE: 200 INJECTION, SOLUTION INTRAVENOUS at 13:48

## 2024-09-18 DIAGNOSIS — Z87.891 FORMER SMOKER: ICD-10-CM

## 2024-09-18 DIAGNOSIS — C34.11 MALIGNANT NEOPLASM OF UPPER LOBE OF RIGHT LUNG: ICD-10-CM

## 2024-09-18 DIAGNOSIS — C34.92 SQUAMOUS CELL CARCINOMA OF LEFT LUNG: ICD-10-CM

## 2024-09-18 DIAGNOSIS — C34.82 MALIGNANT NEOPLASM OF OVERLAPPING SITES OF LEFT BRONCHUS AND LUNG: ICD-10-CM

## 2024-09-18 DIAGNOSIS — Z92.3 HISTORY OF RADIATION THERAPY: Primary | ICD-10-CM

## 2024-09-19 DIAGNOSIS — C34.92 SQUAMOUS CELL CARCINOMA OF LEFT LUNG: ICD-10-CM

## 2024-09-19 DIAGNOSIS — Z85.118 HISTORY OF PRIMARY MALIGNANT NEOPLASM OF RIGHT LUNG: ICD-10-CM

## 2024-09-19 DIAGNOSIS — Z90.2 S/P LOBECTOMY OF LUNG: ICD-10-CM

## 2024-09-19 DIAGNOSIS — Z92.3 HISTORY OF RADIATION THERAPY: Primary | ICD-10-CM

## 2024-10-10 ENCOUNTER — TELEPHONE (OUTPATIENT)
Age: 74
End: 2024-10-10
Payer: MEDICARE

## 2024-10-10 NOTE — TELEPHONE ENCOUNTER
Sarah Artis (wife) called to change appt.  Pt was scheduled for 10/22/24 to see Dr. Ortiz.    She says he has an appt in [place] on 10- for bronchocopy and is scheduled to follow up with them on 10-.    Our appt changed to accomodate his other appts.

## 2024-10-17 ENCOUNTER — APPOINTMENT (OUTPATIENT)
Dept: GENERAL RADIOLOGY | Facility: HOSPITAL | Age: 74
End: 2024-10-17
Payer: MEDICARE

## 2024-10-17 ENCOUNTER — ANESTHESIA EVENT (OUTPATIENT)
Dept: PERIOP | Facility: HOSPITAL | Age: 74
End: 2024-10-17
Payer: MEDICARE

## 2024-10-17 ENCOUNTER — ANESTHESIA (OUTPATIENT)
Dept: PERIOP | Facility: HOSPITAL | Age: 74
End: 2024-10-17
Payer: MEDICARE

## 2024-10-17 ENCOUNTER — HOSPITAL ENCOUNTER (OUTPATIENT)
Facility: HOSPITAL | Age: 74
Setting detail: HOSPITAL OUTPATIENT SURGERY
Discharge: HOME | End: 2024-10-17
Attending: HOSPITALIST | Admitting: HOSPITALIST
Payer: MEDICARE

## 2024-10-17 VITALS
TEMPERATURE: 97.1 F | WEIGHT: 168.65 LBS | RESPIRATION RATE: 18 BRPM | DIASTOLIC BLOOD PRESSURE: 63 MMHG | HEART RATE: 80 BPM | OXYGEN SATURATION: 94 % | HEIGHT: 68 IN | SYSTOLIC BLOOD PRESSURE: 121 MMHG | BODY MASS INDEX: 25.56 KG/M2

## 2024-10-17 DIAGNOSIS — C34.90 LUNG CANCER: ICD-10-CM

## 2024-10-17 LAB
ANION GAP SERPL CALCULATED.3IONS-SCNC: 10 MMOL/L (ref 5–15)
BUN SERPL-MCNC: 28 MG/DL (ref 8–23)
BUN/CREAT SERPL: 20.7 (ref 7–25)
CALCIUM SPEC-SCNC: 9.7 MG/DL (ref 8.6–10.5)
CHLORIDE SERPL-SCNC: 99 MMOL/L (ref 98–107)
CO2 SERPL-SCNC: 29 MMOL/L (ref 22–29)
CREAT SERPL-MCNC: 1.35 MG/DL (ref 0.76–1.27)
DEPRECATED RDW RBC AUTO: 46.4 FL (ref 37–54)
EGFRCR SERPLBLD CKD-EPI 2021: 55.4 ML/MIN/1.73
ERYTHROCYTE [DISTWIDTH] IN BLOOD BY AUTOMATED COUNT: 13.6 % (ref 12.3–15.4)
GLUCOSE BLDC GLUCOMTR-MCNC: 169 MG/DL (ref 70–130)
GLUCOSE BLDC GLUCOMTR-MCNC: 188 MG/DL (ref 70–130)
GLUCOSE SERPL-MCNC: 165 MG/DL (ref 65–99)
HCT VFR BLD AUTO: 34 % (ref 37.5–51)
HGB BLD-MCNC: 11.3 G/DL (ref 13–17.7)
MCH RBC QN AUTO: 31.5 PG (ref 26.6–33)
MCHC RBC AUTO-ENTMCNC: 33.2 G/DL (ref 31.5–35.7)
MCV RBC AUTO: 94.7 FL (ref 79–97)
PLATELET # BLD AUTO: 312 10*3/MM3 (ref 140–450)
PMV BLD AUTO: 10 FL (ref 6–12)
POTASSIUM SERPL-SCNC: 4.6 MMOL/L (ref 3.5–5.2)
RBC # BLD AUTO: 3.59 10*6/MM3 (ref 4.14–5.8)
SODIUM SERPL-SCNC: 138 MMOL/L (ref 136–145)
WBC NRBC COR # BLD AUTO: 11.17 10*3/MM3 (ref 3.4–10.8)

## 2024-10-17 PROCEDURE — 25010000002 EPINEPHRINE PER 0.1 MG: Performed by: HOSPITALIST

## 2024-10-17 PROCEDURE — 87205 SMEAR GRAM STAIN: CPT | Performed by: HOSPITALIST

## 2024-10-17 PROCEDURE — 87206 SMEAR FLUORESCENT/ACID STAI: CPT | Performed by: HOSPITALIST

## 2024-10-17 PROCEDURE — C1757 CATH, THROMBECTOMY/EMBOLECT: HCPCS | Performed by: HOSPITALIST

## 2024-10-17 PROCEDURE — 87116 MYCOBACTERIA CULTURE: CPT | Performed by: HOSPITALIST

## 2024-10-17 PROCEDURE — 25010000002 PROPOFOL 10 MG/ML EMULSION: Performed by: NURSE ANESTHETIST, CERTIFIED REGISTERED

## 2024-10-17 PROCEDURE — 87102 FUNGUS ISOLATION CULTURE: CPT | Performed by: HOSPITALIST

## 2024-10-17 PROCEDURE — 85027 COMPLETE CBC AUTOMATED: CPT | Performed by: HOSPITALIST

## 2024-10-17 PROCEDURE — 88305 TISSUE EXAM BY PATHOLOGIST: CPT | Performed by: HOSPITALIST

## 2024-10-17 PROCEDURE — 25810000003 LACTATED RINGERS PER 1000 ML: Performed by: ANESTHESIOLOGY

## 2024-10-17 PROCEDURE — 25010000002 LIDOCAINE 2% SOLUTION: Performed by: NURSE ANESTHETIST, CERTIFIED REGISTERED

## 2024-10-17 PROCEDURE — 94640 AIRWAY INHALATION TREATMENT: CPT

## 2024-10-17 PROCEDURE — 82948 REAGENT STRIP/BLOOD GLUCOSE: CPT

## 2024-10-17 PROCEDURE — 87071 CULTURE AEROBIC QUANT OTHER: CPT | Performed by: HOSPITALIST

## 2024-10-17 PROCEDURE — 25010000002 SUGAMMADEX 200 MG/2ML SOLUTION: Performed by: NURSE ANESTHETIST, CERTIFIED REGISTERED

## 2024-10-17 PROCEDURE — 80048 BASIC METABOLIC PNL TOTAL CA: CPT | Performed by: HOSPITALIST

## 2024-10-17 PROCEDURE — 25010000002 ONDANSETRON PER 1 MG: Performed by: NURSE ANESTHETIST, CERTIFIED REGISTERED

## 2024-10-17 PROCEDURE — 71045 X-RAY EXAM CHEST 1 VIEW: CPT

## 2024-10-17 PROCEDURE — 87070 CULTURE OTHR SPECIMN AEROBIC: CPT | Performed by: HOSPITALIST

## 2024-10-17 RX ORDER — FAMOTIDINE 10 MG/ML
20 INJECTION, SOLUTION INTRAVENOUS ONCE
Status: COMPLETED | OUTPATIENT
Start: 2024-10-17 | End: 2024-10-17

## 2024-10-17 RX ORDER — SODIUM CHLORIDE 0.9 % (FLUSH) 0.9 %
3-10 SYRINGE (ML) INJECTION AS NEEDED
Status: DISCONTINUED | OUTPATIENT
Start: 2024-10-17 | End: 2024-10-17 | Stop reason: HOSPADM

## 2024-10-17 RX ORDER — LIDOCAINE HYDROCHLORIDE 20 MG/ML
INJECTION, SOLUTION INFILTRATION; PERINEURAL AS NEEDED
Status: DISCONTINUED | OUTPATIENT
Start: 2024-10-17 | End: 2024-10-17 | Stop reason: SURG

## 2024-10-17 RX ORDER — LIDOCAINE HYDROCHLORIDE 10 MG/ML
0.5 INJECTION, SOLUTION INFILTRATION; PERINEURAL ONCE AS NEEDED
Status: DISCONTINUED | OUTPATIENT
Start: 2024-10-17 | End: 2024-10-17 | Stop reason: HOSPADM

## 2024-10-17 RX ORDER — DIPHENHYDRAMINE HYDROCHLORIDE 50 MG/ML
12.5 INJECTION INTRAMUSCULAR; INTRAVENOUS
Status: DISCONTINUED | OUTPATIENT
Start: 2024-10-17 | End: 2024-10-17 | Stop reason: HOSPADM

## 2024-10-17 RX ORDER — SODIUM CHLORIDE 0.9 % (FLUSH) 0.9 %
3 SYRINGE (ML) INJECTION EVERY 12 HOURS SCHEDULED
Status: DISCONTINUED | OUTPATIENT
Start: 2024-10-17 | End: 2024-10-17 | Stop reason: HOSPADM

## 2024-10-17 RX ORDER — HYDRALAZINE HYDROCHLORIDE 20 MG/ML
5 INJECTION INTRAMUSCULAR; INTRAVENOUS
Status: DISCONTINUED | OUTPATIENT
Start: 2024-10-17 | End: 2024-10-17 | Stop reason: HOSPADM

## 2024-10-17 RX ORDER — ONDANSETRON 2 MG/ML
INJECTION INTRAMUSCULAR; INTRAVENOUS AS NEEDED
Status: DISCONTINUED | OUTPATIENT
Start: 2024-10-17 | End: 2024-10-17 | Stop reason: SURG

## 2024-10-17 RX ORDER — DROPERIDOL 2.5 MG/ML
0.62 INJECTION, SOLUTION INTRAMUSCULAR; INTRAVENOUS
Status: DISCONTINUED | OUTPATIENT
Start: 2024-10-17 | End: 2024-10-17 | Stop reason: HOSPADM

## 2024-10-17 RX ORDER — SODIUM CHLORIDE, SODIUM LACTATE, POTASSIUM CHLORIDE, CALCIUM CHLORIDE 600; 310; 30; 20 MG/100ML; MG/100ML; MG/100ML; MG/100ML
9 INJECTION, SOLUTION INTRAVENOUS CONTINUOUS
Status: DISCONTINUED | OUTPATIENT
Start: 2024-10-17 | End: 2024-10-17 | Stop reason: HOSPADM

## 2024-10-17 RX ORDER — FENTANYL CITRATE 50 UG/ML
25 INJECTION, SOLUTION INTRAMUSCULAR; INTRAVENOUS
Status: DISCONTINUED | OUTPATIENT
Start: 2024-10-17 | End: 2024-10-17 | Stop reason: HOSPADM

## 2024-10-17 RX ORDER — FLUMAZENIL 0.1 MG/ML
0.2 INJECTION INTRAVENOUS AS NEEDED
Status: DISCONTINUED | OUTPATIENT
Start: 2024-10-17 | End: 2024-10-17 | Stop reason: HOSPADM

## 2024-10-17 RX ORDER — ROCURONIUM BROMIDE 10 MG/ML
INJECTION, SOLUTION INTRAVENOUS AS NEEDED
Status: DISCONTINUED | OUTPATIENT
Start: 2024-10-17 | End: 2024-10-17 | Stop reason: SURG

## 2024-10-17 RX ORDER — LABETALOL HYDROCHLORIDE 5 MG/ML
5 INJECTION, SOLUTION INTRAVENOUS
Status: DISCONTINUED | OUTPATIENT
Start: 2024-10-17 | End: 2024-10-17 | Stop reason: HOSPADM

## 2024-10-17 RX ORDER — IPRATROPIUM BROMIDE AND ALBUTEROL SULFATE 2.5; .5 MG/3ML; MG/3ML
3 SOLUTION RESPIRATORY (INHALATION)
Status: DISCONTINUED | OUTPATIENT
Start: 2024-10-17 | End: 2024-10-17 | Stop reason: HOSPADM

## 2024-10-17 RX ORDER — FENTANYL CITRATE 50 UG/ML
50 INJECTION, SOLUTION INTRAMUSCULAR; INTRAVENOUS ONCE AS NEEDED
Status: DISCONTINUED | OUTPATIENT
Start: 2024-10-17 | End: 2024-10-17 | Stop reason: HOSPADM

## 2024-10-17 RX ORDER — ONDANSETRON 2 MG/ML
4 INJECTION INTRAMUSCULAR; INTRAVENOUS ONCE AS NEEDED
Status: COMPLETED | OUTPATIENT
Start: 2024-10-17 | End: 2024-10-17

## 2024-10-17 RX ORDER — EPHEDRINE SULFATE 50 MG/ML
5 INJECTION, SOLUTION INTRAVENOUS ONCE AS NEEDED
Status: DISCONTINUED | OUTPATIENT
Start: 2024-10-17 | End: 2024-10-17 | Stop reason: HOSPADM

## 2024-10-17 RX ORDER — PROPOFOL 10 MG/ML
VIAL (ML) INTRAVENOUS AS NEEDED
Status: DISCONTINUED | OUTPATIENT
Start: 2024-10-17 | End: 2024-10-17 | Stop reason: SURG

## 2024-10-17 RX ORDER — IPRATROPIUM BROMIDE AND ALBUTEROL SULFATE 2.5; .5 MG/3ML; MG/3ML
3 SOLUTION RESPIRATORY (INHALATION) ONCE AS NEEDED
Status: DISCONTINUED | OUTPATIENT
Start: 2024-10-17 | End: 2024-10-17 | Stop reason: HOSPADM

## 2024-10-17 RX ORDER — NALOXONE HCL 0.4 MG/ML
0.2 VIAL (ML) INJECTION AS NEEDED
Status: DISCONTINUED | OUTPATIENT
Start: 2024-10-17 | End: 2024-10-17 | Stop reason: HOSPADM

## 2024-10-17 RX ADMIN — ROCURONIUM BROMIDE 40 MG: 10 INJECTION, SOLUTION INTRAVENOUS at 10:09

## 2024-10-17 RX ADMIN — FAMOTIDINE 20 MG: 10 INJECTION INTRAVENOUS at 08:45

## 2024-10-17 RX ADMIN — LIDOCAINE HYDROCHLORIDE 100 MG: 20 INJECTION, SOLUTION INFILTRATION; PERINEURAL at 10:06

## 2024-10-17 RX ADMIN — SODIUM CHLORIDE, POTASSIUM CHLORIDE, SODIUM LACTATE AND CALCIUM CHLORIDE 9 ML/HR: 600; 310; 30; 20 INJECTION, SOLUTION INTRAVENOUS at 08:47

## 2024-10-17 RX ADMIN — ONDANSETRON 4 MG: 2 INJECTION INTRAMUSCULAR; INTRAVENOUS at 11:41

## 2024-10-17 RX ADMIN — ONDANSETRON 4 MG: 2 INJECTION, SOLUTION INTRAMUSCULAR; INTRAVENOUS at 12:24

## 2024-10-17 RX ADMIN — PROPOFOL 150 MG: 10 INJECTION, EMULSION INTRAVENOUS at 10:09

## 2024-10-17 RX ADMIN — ROCURONIUM BROMIDE 10 MG: 10 INJECTION, SOLUTION INTRAVENOUS at 11:20

## 2024-10-17 RX ADMIN — IPRATROPIUM BROMIDE AND ALBUTEROL SULFATE 3 ML: .5; 3 SOLUTION RESPIRATORY (INHALATION) at 08:33

## 2024-10-17 RX ADMIN — ROCURONIUM BROMIDE 10 MG: 10 INJECTION, SOLUTION INTRAVENOUS at 10:53

## 2024-10-17 RX ADMIN — SUGAMMADEX 200 MG: 100 INJECTION, SOLUTION INTRAVENOUS at 11:41

## 2024-10-17 NOTE — LETTER
October 17, 2024     Patient: Boogie Artis       Date of Visit: 10/17/2024       To Whom It May Concern:    Mr. Artis was seen at [facility] on 10/17/2024.             Sincerely,        Dr. Simon

## 2024-10-17 NOTE — H&P
[facility]  HISTORY AND PHYSICAL   [facility]        Patient Identification:  Name: Boogie Artis  Age: 73 y.o.  Sex: male  :  1950  MRN: [id number]                     Primary Care Physician: Lucho Alves MD    History of Present Illness:   Boogie Artis is a 73 y.o. male who presents today for bronchoscopy.     Past Medical History:  Past Medical History:   Diagnosis Date    Bleeding ulcer     Cancer     lung cancer left    COPD (chronic obstructive pulmonary disease)     Diabetes mellitus     Elevated cholesterol     Essential tremor     GERD (gastroesophageal reflux disease)     History of MRSA infection 2024    Respiratory culture    History of seasonal allergies     History of transfusion     Hyperlipidemia     Hypertension     Kidney infection     Kidney stone     Lung nodule     Migraines     Sleep apnea     cpap    Vitamin D deficiency      Past Surgical History:  Past Surgical History:   Procedure Laterality Date    APPENDECTOMY      BRONCHOSCOPY      BRONCHOSCOPY N/A 2024    Procedure: BRONCHOSCOPY WITH ENDOBRONCHIAL ULTRASOUND;  Surgeon: Nicola Monsivais MD;  Location:  [facility] OR;  Service: Pulmonary;  Laterality: N/A;  pre Nodule of upper lobe of right lung  Dr. Alves    BRONCHOSCOPY WITH ION ROBOTIC ASSIST N/A 2024    Procedure: BRONCHOSCOPY WITH ION ROBOT and BRONCHOSCOPY WITH ENDOBRONCHIAL ULTRASOUND;  Surgeon: Nicola Monsivais MD;  Location:  [facility] OR;  Service: Robotics - Pulmonary;  Laterality: N/A;  pre  Nodule of upper lobe of right lung  post      BRONCHOSCOPY WITH ION ROBOTIC ASSIST  2024    Procedure: BRONCHOSCOPY NAVIGATION WITH ENDOBRONCHIAL ULTRASOUND AND ION ROBOT;  Surgeon: Nicola Monsivais MD;  Location:  [facility] OR;  Service: Pulmonary;;    COLONOSCOPY  2023    HEMORRHOIDECTOMY      HERNIA REPAIR      LUNG SURGERY Left 2023      Home Meds:  Medications Prior to Admission  "  Medication Sig Dispense Refill Last Dose/Taking    albuterol sulfate  (90 Base) MCG/ACT inhaler Every 6 (Six) Hours.   10/17/2024 at  2:00 AM    cetirizine (zyrTEC) 10 MG tablet Take 1 tablet by mouth Daily.   10/16/2024    Cholecalciferol 125 MCG (5000 UT) tablet Take 1 tablet by mouth Daily.   Taking    divalproex (DEPAKOTE ER) 250 MG 24 hr tablet Take 1 tablet by mouth 2 (Two) Times a Day.   10/16/2024 Evening    Fluticasone-Umeclidin-Vilant (Trelegy Ellipta) 100-62.5-25 MCG/ACT inhaler Inhale 1 puff Daily. (Patient taking differently: Inhale 2 puffs Daily.) 180 each 3 10/17/2024 at  6:00 AM    furosemide (LASIX) 20 MG tablet TAKE 1 TABLET BY MOUTH ONCE DAILY Oral for 14 Days   10/16/2024 Morning    glimepiride (AMARYL) 4 MG tablet Take 1 tablet by mouth Daily.   10/16/2024 Morning    hydroCHLOROthiazide (HYDRODIURIL) 25 MG tablet Take 1 tablet by mouth Daily.   Taking    lisinopril (PRINIVIL,ZESTRIL) 10 MG tablet Take 1 tablet by mouth Daily.   10/16/2024    loratadine (CLARITIN) 10 MG tablet Take 1 tablet by mouth Daily.   10/16/2024    metFORMIN (GLUCOPHAGE) 500 MG tablet Take 2 tablets by mouth 2 (Two) Times a Day.   10/16/2024 Evening    metoprolol succinate XL (TOPROL-XL) 25 MG 24 hr tablet Take 1 tablet by mouth Daily.   10/17/2024 Morning    Omega-3 Fatty Acids (fish oil) 1000 MG capsule capsule Take  by mouth Daily With Breakfast.   10/16/2024    pantoprazole (PROTONIX) 40 MG EC tablet Take 1 tablet by mouth 2 (Two) Times a Day.   10/16/2024    primidone (MYSOLINE) 50 MG tablet Take 1 tablet by mouth 2 (Two) Times a Day. For essential tremors   10/17/2024 at  5:30 AM    simvastatin (ZOCOR) 40 MG tablet Take 1 tablet by mouth Every Night.   10/17/2024 at  5:30 AM       Allergies:  Allergies   Allergen Reactions    Iodides Nausea And Vomiting     Contrast dye-\"Upset stomach x 2 days\". MRI ONLY     Immunizations:  Immunization History   Administered Date(s) Administered    COVID-19 (MODERNA) " 1st,2nd,3rd Dose Monovalent 2021, 03/15/2021    COVID-19 (UNSPECIFIED) 10/24/2021    Pneumococcal Conjugate 13-Valent (PCV13) 2017    Pneumococcal Polysaccharide (PPSV23) 2017     Social History:   Social History     Social History Narrative    Not on file     Social History     Tobacco Use    Smoking status: Former     Current packs/day: 0.00     Average packs/day: 2.0 packs/day for 57.2 years (114.4 ttl pk-yrs)     Types: Cigarettes     Start date:      Quit date: 3/17/2023     Years since quittin.5     Passive exposure: Never    Smokeless tobacco: Never   Substance Use Topics    Alcohol use: Not Currently     Family History:  Family History   Problem Relation Age of Onset    Diabetes Mother     Heart disease Mother     Cancer Father     Cancer Brother     Alcohol abuse Brother     Cancer Paternal Grandfather     Malig Hyperthermia Neg Hx         Objective:  Vitals:    10/17/24 0839   Pulse: 76   Resp: 16       Exam:    General: Alert, nontoxic, NAD  HEENT: NC/AT, EOMI, MMM  Neck: Supple, trachea midline  Cardiac: RRR, no murmur, gallops, rubs  Pulmonary: Clear to auscultation bilaterally, no adventitious breath sounds, normal respiratory effort  GI: Soft, non-tender, non-distended, normal bowel sounds  Extremities: Warm, well perfused, no LE edema  Skin: no visible rash  Neuro: CN II - XII grossly intact  Psychiatry: Normal mood and affect      Data Review:    Labs:  Results from last 7 days   Lab Units 10/17/24  0817   WBC 10*3/mm3 11.17*   HEMOGLOBIN g/dL 11.3*   PLATELETS 10*3/mm3 312     Results from last 7 days   Lab Units 10/17/24  0817   SODIUM mmol/L 138   POTASSIUM mmol/L 4.6   CHLORIDE mmol/L 99   CO2 mmol/L 29.0   BUN mg/dL 28*   CREATININE mg/dL 1.35*   GLUCOSE mg/dL 165*   CALCIUM mg/dL 9.7   Estimated Creatinine Clearance: 51.4 mL/min (A) (by C-G formula based on SCr of 1.35 mg/dL (H)).    Results from last 7 days   Lab Units 10/17/24  0817   PLATELETS 10*3/mm3 312              Imaging:  Chest imaging reviewed pertinent to bronchoscopy      Assessment / Plan:    Proceed with bronchoscopy today.     Mata Simon MD  [facility], [facility]  Pulmonary and Critical Care Medicine, Interventional Pulmonology    10/17/2024  09:17 EDT

## 2024-10-17 NOTE — ANESTHESIA PROCEDURE NOTES
Airway  Urgency: elective    Date/Time: 10/17/2024 10:11 AM  Airway not difficult    General Information and Staff    Patient location during procedure: OR  Anesthesiologist: Jeff Hicks MD  CRNA/CAA: Pj Swift CRNA    Indications and Patient Condition  Indications for airway management: airway protection    Preoxygenated: yes  Mask difficulty assessment: 1 - vent by mask    Final Airway Details  Final airway type: endotracheal airway      Successful airway: ETT  Cuffed: yes   Successful intubation technique: direct laryngoscopy  Endotracheal tube insertion site: oral  Blade: Benitez  Blade size: 2  ETT size (mm): 8.5  Cormack-Lehane Classification: grade I - full view of glottis  Placement verified by: chest auscultation and capnometry   Measured from: lips  ETT/EBT  to lips (cm): 22  Number of attempts at approach: 1  Assessment: atraumatic intubation    Additional Comments  Pre 02 100%, SIVI, DL x1, atraumatic intubation, BLBS, Positive ETC02.  A piece of broken tooth noted and removed prior to DL. Advanced decay noted on remaining teeth.

## 2024-10-17 NOTE — ANESTHESIA POSTPROCEDURE EVALUATION
"Patient: Boogie RODRIGES Steff    Procedure Summary       Date: 10/17/24 Room / Location: [facility] OR [address] [place] [address] MAIN OR    Anesthesia Start: 0959 Anesthesia Stop: 1206    Procedure: BRONCHOSCOPY WITH APC & DEBULKING, cryotherapy, balloon dilation/tamponade (Bronchus) Diagnosis:     Surgeons: Mata Simon MD Provider: Jeff Hicks MD    Anesthesia Type: general ASA Status: 3            Anesthesia Type: general    Vitals  Vitals Value Taken Time   /63 10/17/24 1230   Temp 36.2 °C (97.1 °F) 10/17/24 1230   Pulse 81 10/17/24 1243   Resp 18 10/17/24 1230   SpO2 94 % 10/17/24 1243   Vitals shown include unfiled device data.        Post Anesthesia Care and Evaluation    Patient location during evaluation: PACU  Patient participation: complete - patient participated  Level of consciousness: awake and alert  Pain management: adequate    Airway patency: patent  Anesthetic complications: No anesthetic complications  PONV Status: controlled  Cardiovascular status: acceptable and hemodynamically stable  Respiratory status: acceptable  Hydration status: acceptable    Comments: /58   Pulse 81   Temp 36.2 °C (97.1 °F) (Oral)   Resp 18   Ht 172.7 cm (68\")   Wt 76.5 kg (168 lb 10.4 oz)   SpO2 93%   BMI 25.64 kg/m²     "

## 2024-10-17 NOTE — OP NOTE
Bronchoscopy Procedure Note    Procedure:  Bronchoscopy, Diagnostic  Bronchoscopy, Therapeutic  Endobronchial biopsy of left mainstem endobronchial mass  Argon plasma coagulation of left endobronchial mass  Cryobiopsy of left endobronchial mass  Balloon dilation of left upper lobe-EMB 6  Tumor debulking    Pre-Operative Diagnosis: Left endobronchial mass    Post-Operative Diagnosis: Same    Indication: Left endobronchial mass    Anesthesia: General Anesthesia    Procedure Details: Patient was consented for the procedure with all risk and benefit of the procedure explained in detail.  Patient was given the opportunity to ask questions and all concerns were answered.  The bronchocope was inserted into the main airway via the endotracheal tube. An anatomical survey was done of the main airways and the subsegmental bronchus. The findings are reported below.  A bronchoalveolar lavage was performed.     Findings:  Bronchoscope passed through endotracheal tube into the trachea.  Right-sided airways were evaluated and normal in size and caliber without any endobronchial lesions seen.  These airways were evaluated to the first subsegmental level of all 3 lobes.  Next bronchoscope was driven into the left mainstem bronchus where large endobronchial mass was noted occluding 90% of the airway with inability to navigate distally.  Endobronchial biopsies were taken of the left endobronchial mass with significant bleeding noted requiring epinephrine to be sprayed in addition to cold saline.  Next argon plasma coagulation was used to terminate the bleeding in addition for debulking purposes.  Significant bleeding continue to be noted so cryoprobe was used to help terminate bleeding and tumor debulking.  After significant debulking was performed, the caliber of the left mainstem was improved to approximately 70%.  Residual mucosal endobronchial disease was treated with argon plasma coagulation.  Distally the left lower lobe was  patent without any endobronchial lesions.  The left upper lobe was completely occluded.  EMB 6 balloon was used to try to navigate into the orifice of the left upper lobe proper and lingula however unable to be passed.  Balloon was dilated however unable to be passed.  Endobronchial biopsies were obtained of the left upper lobe endobronchial lesion.  Bronchoalveolar lavage was performed in the left lower lobe with 60 cc instilled and 15 cc return. At the end of the procedure, all bleeding was halted.  Patient tolerated procedure well.  Sent to PACU postprocedure.    Estimated Blood Loss:  less than 100 mL           Specimens:    Endobronchial biopsy of left mainstem mass sent for pathology  Bronchoalveolar lavage of left lower lobe sent for cultures                Complications:  None; patient tolerated the procedure well.           Disposition: PACU - hemodynamically stable.      Patient tolerated the procedure well.    Mata Simon MD  10/17/2024  16:20 EDT

## 2024-10-17 NOTE — ANESTHESIA PREPROCEDURE EVALUATION
Anesthesia Evaluation     Patient summary reviewed   NPO Solid Status: > 8 hours  NPO Liquid Status: > 2 hours           Airway   Mallampati: II  TM distance: >3 FB  Neck ROM: full  Dental    (+) poor dentition    Comment: Multiple missing teeth.    Pulmonary    (+) lung cancer, COPD,sleep apnea on CPAP  Cardiovascular   Exercise tolerance: poor (<4 METS)    ECG reviewed  Rhythm: regular    (+) hypertension, hyperlipidemia      Neuro/Psych  (+) headaches  GI/Hepatic/Renal/Endo    (+) GERD, renal disease- stones, diabetes mellitus    Musculoskeletal     Abdominal    Substance History      OB/GYN          Other      history of cancer active    ROS/Med Hx Other: S/P left upper lobectomy.  S/P radiation for Rt upper lobe cancer.    P/W endobronchial lesion of Left main bronchus.                Anesthesia Plan    ASA 3     general     intravenous induction     Anesthetic plan, risks, benefits, and alternatives have been provided, discussed and informed consent has been obtained with: patient.    CODE STATUS:

## 2024-10-18 LAB — NIGHT BLUE STAIN TISS: NORMAL

## 2024-10-19 LAB
BACTERIA SPEC AEROBE CULT: NORMAL
BACTERIA SPEC RESP CULT: NORMAL
GRAM STN SPEC: NORMAL

## 2024-10-24 LAB
CYTO UR: NORMAL
FUNGUS WND CULT: NORMAL
LAB AP CASE REPORT: NORMAL
LAB AP DIAGNOSIS COMMENT: NORMAL
MYCOBACTERIUM SPEC CULT: NORMAL
NIGHT BLUE STAIN TISS: NORMAL
PATH REPORT.ADDENDUM SPEC: NORMAL
PATH REPORT.FINAL DX SPEC: NORMAL
PATH REPORT.GROSS SPEC: NORMAL

## 2024-10-28 ENCOUNTER — HOSPITAL ENCOUNTER (OUTPATIENT)
Dept: RADIATION ONCOLOGY | Facility: HOSPITAL | Age: 74
Setting detail: RADIATION/ONCOLOGY SERIES
End: 2024-10-28
Payer: MEDICARE

## 2024-10-28 NOTE — PROGRESS NOTES
[facility]  Radiation Oncology Clinic   Zacarias Gonzales MD, FACR  Adam Aston OBREGON  _______________________________________________  [facility]  Department of Radiation Oncology  [address] [place] [id number]  Office: [phone]  Fax: [phone]    DATE: 10/29/2024  PATIENT: Boogie Artis  1950                         MEDICAL RECORD #: [id number]    1. Squamous cell carcinoma of left lung    2. Malignant neoplasm of upper lobe of right lung    3. S/P lobectomy of lung    4. History of radiation therapy    5. Former smoker                                           REASON FOR VISIT:    Chief Complaint   Patient presents with    Squamous cell carcinoma of left lung     Reason for Visit: Boogie Artis is a very pleasant 73 y.o. male that has completed radiation therapy and returns to the clinic today to review recent PET Scan and bronchoscopy.  He did have bronchoscopy in [place] to open up the left mainstem bronchus from tumor invasion.    On presentation today he reports appetite change, fatigue, voice change, cough, SOB, wheezing, frequency/urgency with urination, and back pain. Denies unexpected weight change, nasuea/vomiting, diarrhea, light-headedness, weakness, and headaches.     History of Present Illness:  Diagnosed with a PET+ neoplasm of the lung, RUL. He completed 5000 cGy in 4 fractions to the right upper lobe of the lung on 09/27/2023.  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 He completed 5000 cGy  in 5 fractions to the right upper lobe of the lung on 06/05/2024.    01/16/2023 - CT Chest [facility]:  Spiculated perihilar left upper lobe pulmonary nodule measures 2.7 cm. This is concerning for primary neoplasm. PET/CT or bronch recommended for further evaluation.   0.8 cm noncalcified pulmonary nodule in the right upper lobe. This is at the lower limits of normal for PET/CT detection. Benign granulomatous  disease, metastatic disease, or 2nd primary pulmonary malignancy could be considered.   No enlarged mediastinal or hilar lymph nodes    01/17/2023 - Pulmonary Function Tests - [place]:  Moderate obstructive lung disease    01/26/2023 - Bronchoscopy with biopsy per :  RUL, lower lobe, middle lobe were negative. The left upper lobe, no lesion, lingula no lesion seen.  SAMINA washing:  Negative for malignant cells; reactive bronchial cells, macrophages, and inflammation  SAMINA brushing:  Negative for malignant cells; markedly reactive bronchial cells, macrophages, and inflammation    02/09/2023 - PET Scan:  2.8 cm hypermetabolic central LEFT upper lobe pulmonary nodule. This likely represents a primary lung neoplasm.  0.8 cm hypermetabolic pulmonary nodule in the RIGHT upper lobe. This is highly suspicious for either a contralateral pulmonary metastasis or second primary lung neoplasm.   No hypermetabolic thoracic lymph nodes. No evidence of hypermetabolic metastatic disease in the neck, abdomen, or pelvis.  1.5 cm hypermetabolic nodule in the RIGHT anterior rectum, highly concerning for underlying rectal polyp. Recommend correlation with colonoscopy/sigmoidoscopy.    02/27/2023 - Colonoscopy:  Rectal polyp, excision:  Villous adenoma, negative for high-grade glandular dysplasia or malignancy.  Colon polyp at 20 cm, biopsy:  Fragments of serrated polyp, favor sessile serrated lesion/polyp.  Colon polyp at 10 cm, biopsy:  Fragments of serrated polyp, favor sessile serrated lesion/polyp.    03/08/2023 - CT Chest without contrast:  Pulmonary emphysema with LEFT hilar/upper lobe lung mass measuring approximately 4 cm in greatest dimension. There is mild nodular airspace disease laterally to this lesion which may represent postobstructive atelectasis or infiltrate.   10 mm irregular nodule within the RIGHT lung apex (series 2, image 101). This is concerning for metastatic lesion. Additional 4 mm noncalcified nodule  at the RIGHT lung apex (series 2, image 85).   Evidence of prior granulomatous disease.     03/09/2023 - Bronchoscopy/EBUS per :  Left upper lobe, EBUS (ThinPrep and cell block section):   Squamous cell carcinoma.   Bronchial washing (ThinPrep and cell block section):   No malignant cells identified.   Benign bronchial epithelial cells, and alveolar macrophages.     04/19/2023 -  Left upper lobectomy and lymph node excision per Dr.Robert Irwin:   Lymph node, level 9 lymph node biopsy: Benign fibroadipose tissue.   Lymph node, level 10 posterior hilar lymph node biopsy: 1 lymph node, negative for evidence of malignancy.   Lymph node, level 6 lymph node biopsy: 2 lymph nodes, negative for evidence of malignancy.   Lymph node, level 11 interlobar lymph node biopsy: 1 lymph node, negative for evidence of malignancy.   Lymph node, level 10 anterior hilar lymph node biopsy: 2 lymph nodes, negative for evidence of malignancy.   Lymph node, level 12 lobar lymph node biopsy: 1 lymph node, negative for evidence of malignancy.   Lung, left upper lobectomy:   Invasive moderately differentiated squamous cell carcinoma.   Invasive carcinoma measures 2.5 cm in greatest dimension grossly.   Invasive carcinoma extends to within 0.2 cm of the nearest bronchial surgical excision margin.   Squamous metaplasia identified at bronchial surgical excision margin.   Emphysematous changes identified involving the nonneoplastic lung parenchyma.   2 peribronchial lymph nodes, negative for evidence of malignancy.   AJCC STAGE:  pT1c, pN0, pMx     04/25/2023 -  CT Chest without contrast:  Interval placement of large bore chest tube with decrease in previously described large left hydropneumothorax.Residual hydropneumothorax present with air dissecting through anterior chest wall into subcutaneous soft tissues, unchanged.   Interval resolution of previously described debris within left bronchus.   Unchanged right apical 1.0 cm nodule.      07/24/2023 - Appointment with :  Left upper lobe SCC [id number], stage I,Jan 2023  -1/16/23 CT chest ([facility]): Spiculated perihilar left upper lobe pulmonary nodule measures 2.7cm. This is concerning for primary neoplasm. PET/CT or bronchoscopy is recommended to further evaluate. 0.8cm noncalcified pulmonary nodule in the right upper lobe. This is at the lower limits of normal for Pet/CT detection. Benign granulomatous disease, metastatic disease, or 2nd primary pulmonary malignancy could be considered. No enlarged mediastinal or hilar lymph nodes.  -1/26/23 Lung, left upper lobe, washing: Negative for malignant cells. Reactive bronchial cells, macrophages, and inflammation. Lung, left upper lobe, brushing: Negative for malignant cells. Markedly reactive bronchial cells, macrophages, and inflammation.  -2/9/23 PET scan ([facility]): 2.8 cm hypermetabolic central LEFT upper lobe pulmonary nodule. This likely represents a primary lung neoplasm. 0.8 cm hypermetabolic pulmonary nodule in the RIGHT upper lobe. This is highly suspicious for either a contralateral pulmonary metastasis or second primary lung neoplasm. No hypermetabolic thoracic lymph nodes. No evidence of hypermetabolic metastatic disease in the neck, abdomen, or pelvis. 1.5 cm hypermetabolic nodule in the RIGHT anterior rectum, highly concerning for underlying rectal polyp. Recommend correlation with colonoscopy/sigmoidoscopy.   4/19/23 Lung, left upper lobectomy: Invasive moderately differentiated squamous cell carcinoma. Invasive carcinoma measures 2.5 cm in greatest dimension grossly. Invasive carcinoma extends to within 0.2 cm of the nearest bronchial surgical excision margin. Squamous metaplasia identified at bronchial surgical excision margin. Emphysematous changes identified involving the nonneoplastic lung parenchyma. 2 peribronchial lymph nodes, negative for evidence of malignancy.Lymph node, level 9 lymph node biopsy: Benign fibroadipose  tissue. Lymph node, level 10 posterior hilar lymph node biopsy: 1 lymph node, negative for evidence of malignancy. Lymph node, level 6 lymph node biopsy: 2 lymph nodes, negative for evidence of malignancy. Lymph node, level 11 interlobar lymph node biopsy: 1 lymph node, negative for evidence of malignancy. Lymph node, level 10 anterior hilar lymph node biopsy: 2 lymph nodes, negative for evidence of malignancy. Lymph node, level 12 lobar lymph node biopsy: 1 lymph node, negative for evidence of malignancy. [id number], stage I  Plan:  -Repeat CT chest Nov 2023  RUL subcentimeter nodule (PET+)  -2/9/23 PET scan ([facility]): 2.8 cm hypermetabolic central LEFT upper lobe pulmonary nodule. This likely represents a primary lung neoplasm. 0.8 cm hypermetabolic pulmonary nodule in the RIGHT upper lobe. This is highly suspicious for either a contralateral pulmonary metastasis or second primary lung neoplasm. No hypermetabolic thoracic lymph nodes. No evidence of hypermetabolic metastatic disease in the neck, abdomen, or pelvis. 1.5 cm hypermetabolic nodule in the RIGHT anterior rectum, highly concerning for underlying rectal polyp. Recommend correlation with colonoscopy/sigmoidoscopy.   -Referral Dr Ortiz for consideration SBRT RUL hypermetabolic nodule.  PLAN:  RTC with MD 2 months in [place] office  CBC, CMP, B12, Folate, LDH, Haptoglobin, iron profile, Ferritin, retic  Continue follow-up with Dr.Robert Irwin/CardioThoracic Surgery  Continue to follow up with Dr. Rowe  Iron Sulfate 325 mg p.o. twice daily  Follow Up:  Return in 2 months (on 9/24/2023) for Appointment with Dr. Vaughan in [place].  Refer to Dr Ortiz     08/09/2023 - Appointment with :  will order PET scan, they will call you  Return in 2 weeks for further discussion    08/14/2023 - PET Scan:  Abnormal PET/CT, there is increased size of a hypermetabolic pulmonary nodule in the right upper lobe lung apex that measures 13 mm, compared with 8 mm on the  previous PET/CT. This has a maximum SUV of 8.1. This is concerning for active neoplasm.  Interval resection of the left upper lobe with posttreatment changes along the medial margin of the upper mediastinum, including mild infiltration of the mediastinal fat planes.  There is a subcutaneous nodule with surrounding fatty infiltration over the low back at the level of the upper sacrum which demonstrates hypermetabolism. This could be inflammatory, less likely a metastatic nodule. This measures 21 mm, follow-up ultrasound is recommended to determine if this is solid. Ultrasound-guided biopsy could be performed if indicated.  Interval resolution of the hypermetabolic nodule associated with the right rectum, however there is a new hypermetabolic nodule that maps to the right prostate gland. Correlation with PSA values is recommended.    08/16/2023 - Consult with :  I have recommended to treat the right lung nodule with SBRT, I anticipate a course over 4-5 fractions, final course pending.   He does have a visible insect bite on his low back and I do believe this correlates with the PET findings of activity on the upper sacrum. Will order PSA for prostate gland findings. He has not had one drawn in some time.   Plan:  Plan on 4-5 pinpoint treatments to the lung nodule.   We will call you when to start treatments.   PSA today    08/18/2023 - Documentation per :  I spoke on the phone with this patient's wife Sarah and discussed his PSA of 2.9. This is considered a normal level, however on recent PET scan imaging for a lung nodule workup, a nodule within the prostate was noted.   I will refer him to urology for further management recommendations.    09/12/2023 - 09/27/2023 - Completed radiation course:  Received 5000 cGy in 4 fractions to the right upper lobe of the lung.    11/20/2023 - MRI Pelvis with and without contrast:  No suspicious lesions in the prostate (PI-RADS 3 or greater).     11/29/2023 -  Appointment with :  I personally reviewed MRI today.    His MRI is negative for any suspicious lesions.    He has a volume of 34 cc.    I believe his hypermetabolic activity on PET scan was likely inflammatory.  I do not think he requires a biopsy based on his negative MRI and PSA level.    He will follow-up as needed.     12/20/2023 - CT Chest without contrast:  Decreased size of 6 mm spiculated right upper lobe pulmonary nodule (previously 13 mm). This nodule was hypermetabolic on prior PET/CT and highly suspicious for primary lung neoplasm.  Postoperative change of left upper lobectomy. No definite change/increase in soft tissue thickening along the lobectomy margin and mediastinum. Recommend continued imaging surveillance.  No evidence of disease progression in the chest. No thoracic lymphadenopathy.    12/27/2023 - Appointment with :  Return in 3 months with a CT chest before.     03/19/2024 - CT chest without contrast:  1.1 cm irregular right upper lobe nodule, highly suspicious for malignancy.  5 mm left upper lobe nodule is indeterminate but does raise the suspicion for malignancy.  Stable size of the previously irradiated right apex nodule. Minimally increased adjacent subpleural parenchymal abnormality, likely related to post radiation changes.  Left upper lobectomy with stable postsurgical margins.  No mediastinal lymphadenopathy.    03/27/2024 - Consult with :  Will refer to pulmonology for bronchoscopy considerations of the new right upper lung nodule. He will return in 3 weeks for further recommendations.  I have instructed him to continue to see the other health care providers as per their scheduling.  Plan:  Referral to pulmonology for biopsy of new right lung nodule  Return in 3 weeks    04/16/2024 - Appointment with :  For wheezing, add bronchodilator. 100 mcg trelegy sample given, we reviewed PFT showing severe obstruction.  Likely moderate to severe copd with hx  smoking  For lung nodule, will plan ion bronchoscopy to better assess rul nodule  Ask anesthesia to take particular care with remaining teeth  We demonstrated proper technique for use of ellipta device  Follow Up   Return in about 4 weeks (around 5/14/2024).    04/22/2024 - CT Chest without contrast:  RIGHT upper lobe 17 x 11 mm nodule.  LEFT upper lobe 6 x 5 mm nodule.  No pneumothorax or pleural effusion.    04/29/2024 - Bronchoscopy with biopsy per :  Lung, right upper lobe, Ion fine-needle aspiration, smears (2), ThinPrep preparation (1) and cell block:   Positive for malignant cells, consistent with moderately differentiated squamous cell carcinoma.  Comment: Immunohistochemical stain for p40 is positive in the neoplastic cells, supporting the above diagnostic impression.The control stained appropriately.  Bronchial washings, ThinPrep preparation (1) and cell block:   Positive for malignant cells, consistent with moderately differentiated squamous cell carcinoma.  Lung, right upper lobe, bronchoalveolar lavage, ThinPrep preparation (1):   Positive for malignant cells, consistent with moderately to poorly differentiated squamous cell carcinoma.  AJCC stage: pTX pNX    04/29/2024 - Documentation per :  Dr. Nicola Monsivais notified me that patient cytology was positive in his lung.  He is scheduled to return to our office for treatment recommendations.  Also, it was incidentally noted by anesthesia during his procedure that he has a lesion in the oral cavity that needs additional evaluation and may potentially be another malignancy.    05/06/2024 - Appointment with :  This patient does have biopsy-proven squamous cell carcinoma of the right upper lobe.  We will proceed with SBRT radiotherapy to this  lesion.  He also has a lesion in the left lung which is nondiagnostic and we will continue to follow that lesion with serial radiographs  Plan:  We will call you when to start radiation treatment  in approximately 2 weeks  Return in about 2 weeks (around 5/20/2024) for the first Radiation Treatment.    05/28/2024 - 06/05/2024 - Completed radiation course:  Received 5000 cGy in 5 fractions to the right upper lobe of the lung via Stereotactic Radiation Therapy - SRT.     09/03/2024 - CT chest without contrast:  Lung nodules seen previously have resolved.  There are indeterminate soft tissue nodules within the LEFT bronchus.  Consider PET/CT correlation.    09/09/2024 - Appointment with :  We discussed the new left bronchus findings on imaging. I will notify pulmonology for review of the imaging and further recommendations. We will continue routine follow-up/surveillance as discussed in 3 months with follow up CT scan before visit and I have instructed him to continue to see the other health care providers as per their scheduling.  Plan:  will discuss your scans with pulmonology  return in 3 months unless you need to be seen sooner.    09/11/2024 - Documentation per A[facility] Medina - radiation oncology:  I reviewed the recent CT chest imaging with Dr. Ortiz and discussed this with Dr. Monsivais and Erma Saucedo in pulmonology.   I will order a PET scan for further evaluation of the left bronchus finding and will get him in for an appointment to discuss further options with Dr. Ortiz.    09/16/2024 - PET Scan:  LEFT lower lobe pneumonia.  Neoplastic LEFT upper lobe lung nodules adjacent to the LEFT hilum.  Neoplastic nodule within the LEFT bronchus.  Small focus of indeterminate FDG uptake in the posterior midline neck between the C5 and C6 spinous processes.    10/08/2024 - Appointment with Mata Simon MD - [facility]:  Plan for bronchoscopy with APC and tumor debulking, EBUS/TBNA, bal next week.    10/17/2024 - Bronchus, left endobronchial mass, endobronchial biopsy:  Fragments of squamous cell carcinoma.      History obtained from  PATIENT, FAMILY, and CHART    PAST MEDICAL HISTORY  Past  Medical History:   Diagnosis Date    Bleeding ulcer     Cancer     lung cancer left    COPD (chronic obstructive pulmonary disease)     Diabetes mellitus     Elevated cholesterol     Essential tremor     GERD (gastroesophageal reflux disease)     History of MRSA infection 05/01/2024    Respiratory culture    History of seasonal allergies     History of transfusion     Hyperlipidemia     Hypertension     Kidney infection     Kidney stone     Lung nodule     Migraines     Sleep apnea     cpap    Vitamin D deficiency       PAST SURGICAL HISTORY  Past Surgical History:   Procedure Laterality Date    APPENDECTOMY      BRONCHOSCOPY  2023    BRONCHOSCOPY N/A 4/29/2024    Procedure: BRONCHOSCOPY WITH ENDOBRONCHIAL ULTRASOUND;  Surgeon: Nicola Monsivais MD;  Location:  [facility] OR;  Service: Pulmonary;  Laterality: N/A;  pre Nodule of upper lobe of right lung  Dr. Alves    BRONCHOSCOPY N/A 10/17/2024    Procedure: BRONCHOSCOPY WITH APC & DEBULKING, cryotherapy, balloon dilation/tamponade;  Surgeon: Mata Simon MD;  Location:  [facility];  Service: Pulmonary;  Laterality: N/A;    BRONCHOSCOPY WITH ION ROBOTIC ASSIST N/A 4/29/2024    Procedure: BRONCHOSCOPY WITH ION ROBOT and BRONCHOSCOPY WITH ENDOBRONCHIAL ULTRASOUND;  Surgeon: Nicola Monsivais MD;  Location:  [facility];  Service: Robotics - Pulmonary;  Laterality: N/A;  pre  Nodule of upper lobe of right lung  post      BRONCHOSCOPY WITH ION ROBOTIC ASSIST  4/29/2024    Procedure: BRONCHOSCOPY NAVIGATION WITH ENDOBRONCHIAL ULTRASOUND AND ION ROBOT;  Surgeon: Nicola Monsivais MD;  Location:  [facility];  Service: Pulmonary;;    COLONOSCOPY  02/28/2023    HEMORRHOIDECTOMY      HERNIA REPAIR  1999    LUNG SURGERY Left 04/19/2023      FAMILY HISTORY  family history includes Alcohol abuse in his brother; Cancer in his brother, father, and paternal grandfather; Diabetes in his mother; Heart disease in his mother.    SOCIAL HISTORY  Social History      Tobacco Use    Smoking status: Former     Current packs/day: 0.00     Average packs/day: 2.0 packs/day for 57.2 years (114.4 ttl pk-yrs)     Types: Cigarettes     Start date:      Quit date: 3/17/2023     Years since quittin.6     Passive exposure: Never    Smokeless tobacco: Never   Vaping Use    Vaping status: Never Used   Substance Use Topics    Alcohol use: Not Currently    Drug use: Never     ALLERGIES  Iodides     MEDICATIONS    Current Outpatient Medications:     albuterol sulfate  (90 Base) MCG/ACT inhaler, Every 6 (Six) Hours., Disp: , Rfl:     cetirizine (zyrTEC) 10 MG tablet, Take 1 tablet by mouth Daily., Disp: , Rfl:     Cholecalciferol 125 MCG (5000 UT) tablet, Take 1 tablet by mouth Daily., Disp: , Rfl:     divalproex (DEPAKOTE ER) 250 MG 24 hr tablet, Take 1 tablet by mouth 2 (Two) Times a Day., Disp: , Rfl:     Fluticasone-Umeclidin-Vilant (Trelegy Ellipta) 100-62.5-25 MCG/ACT inhaler, Inhale 1 puff Daily., Disp: 180 each, Rfl: 3    furosemide (LASIX) 20 MG tablet, TAKE 1 TABLET BY MOUTH ONCE DAILY Oral for 14 Days, Disp: , Rfl:     glimepiride (AMARYL) 4 MG tablet, Take 1 tablet by mouth Daily., Disp: , Rfl:     hydroCHLOROthiazide (HYDRODIURIL) 25 MG tablet, Take 1 tablet by mouth Daily., Disp: , Rfl:     lisinopril (PRINIVIL,ZESTRIL) 10 MG tablet, Take 1 tablet by mouth Daily., Disp: , Rfl:     loratadine (CLARITIN) 10 MG tablet, Take 1 tablet by mouth Daily., Disp: , Rfl:     metFORMIN (GLUCOPHAGE) 500 MG tablet, Take 2 tablets by mouth 2 (Two) Times a Day., Disp: , Rfl:     metoprolol succinate XL (TOPROL-XL) 25 MG 24 hr tablet, Take 1 tablet by mouth Daily., Disp: , Rfl:     Omega-3 Fatty Acids (fish oil) 1000 MG capsule capsule, Take  by mouth Daily With Breakfast., Disp: , Rfl:     pantoprazole (PROTONIX) 40 MG EC tablet, Take 1 tablet by mouth 2 (Two) Times a Day., Disp: , Rfl:     primidone (MYSOLINE) 50 MG tablet, Take 1 tablet by mouth 2 (Two) Times a Day. For  "essential tremors, Disp: , Rfl:     simvastatin (ZOCOR) 40 MG tablet, Take 1 tablet by mouth Every Night., Disp: , Rfl:     The following portions of the patient's history were reviewed and updated as appropriate: allergies, current medications, past family history, past medical history, past social history, past surgical history and problem list.    Current outpatient and discharge medications have been reconciled for the patient.  Reviewed by: Zacarias Ortiz III, MD    REVIEW OF SYSTEMS  Review of Systems   Constitutional:  Positive for appetite change and fatigue. Negative for activity change, chills, diaphoresis, fever and unexpected weight change.   HENT:  Positive for voice change (hoarseness). Negative for congestion, dental problem, drooling, ear discharge, ear pain, facial swelling, hearing loss, mouth sores, nosebleeds, postnasal drip, rhinorrhea, sinus pressure, sinus pain, sneezing, sore throat, tinnitus and trouble swallowing.    Eyes: Negative.    Respiratory:  Positive for cough (Clear phlegm), shortness of breath and wheezing. Negative for apnea, choking, chest tightness and stridor.    Cardiovascular: Negative.    Gastrointestinal: Negative.         Occasional nausea   Endocrine: Negative.    Genitourinary:  Positive for frequency and urgency. Negative for decreased urine volume, difficulty urinating, dysuria, enuresis, flank pain, genital sores, hematuria, penile discharge, penile pain, penile swelling, scrotal swelling and testicular pain.   Musculoskeletal:  Positive for back pain (Chronic). Negative for arthralgias, gait problem, joint swelling, myalgias, neck pain and neck stiffness.   Skin: Negative.    Allergic/Immunologic: Negative.    Neurological: Negative.    Hematological: Negative.    Psychiatric/Behavioral: Negative.       PHYSICAL EXAM  VITAL SIGNS:   Vitals:    10/29/24 1104   BP: 110/51   Pulse: 74   SpO2: 96%   Weight: 75.8 kg (167 lb)   Height: 172.7 cm (68\")   PainSc: 0-No pain "     Physical Exam  Vitals reviewed.   Constitutional:       Appearance: Normal appearance.   HENT:      Head: Normocephalic.      Nose: Nose normal.   Eyes:      Pupils: Pupils are equal, round, and reactive to light.   Cardiovascular:      Rate and Rhythm: Normal rate and regular rhythm.      Pulses: Normal pulses.      Heart sounds: Normal heart sounds.   Pulmonary:      Effort: Pulmonary effort is normal. No respiratory distress.      Breath sounds: Normal breath sounds. No wheezing.   Abdominal:      General: Bowel sounds are normal.      Palpations: There is no mass.   Musculoskeletal:         General: Normal range of motion.      Cervical back: Normal range of motion and neck supple. No tenderness.   Lymphadenopathy:      Cervical: No cervical adenopathy.   Skin:     General: Skin is warm and dry.      Capillary Refill: Capillary refill takes less than 2 seconds.   Neurological:      General: No focal deficit present.      Mental Status: He is alert and oriented to person, place, and time.      Motor: No weakness.   Psychiatric:         Mood and Affect: Mood normal.         Behavior: Behavior normal.       Performance Status: ECOG (1) Restricted in physically strenuous activity, ambulatory and able to do work of light nature    Clinical Quality Measures  - Pain Documented by Standardized Tool, FPS Boogie D Steff reports a pain score of 0. Given his pain assessment as noted, treatment options were discussed and the following options were decided upon as a follow-up plan to address the patient's pain:  No pain, no plan given .  Pain Medications               divalproex (DEPAKOTE ER) 250 MG 24 hr tablet Take 1 tablet by mouth 2 (Two) Times a Day.    primidone (MYSOLINE) 50 MG tablet Take 1 tablet by mouth 2 (Two) Times a Day. For essential tremors          - Body Mass Index Screening and Follow-Up Plan  BMI is >= 25 and <30. (Overweight) The following options were offered after discussion;: none (medical  contraindication)    - Tobacco Use: Screening and Cessation Intervention  Social History    Tobacco Use      Smoking status: Former        Packs/day: 0.00        Years: 2.0 packs/day for 57.2 years (114.4 ttl pk-yrs)        Types: Cigarettes        Start date:         Quit date: 3/17/2023        Years since quittin.6        Passive exposure: Never      Smokeless tobacco: Never    - Advanced Care Planning Advance Care Planning   ACP discussion was held with the patient during this visit. Patient has an advance directive in EMR which is still valid.     - PHQ-2 Depression Screening:  Little interest or pleasure in doing things? Not at all   Feeling down, depressed, or hopeless? Not at all   PHQ-2 Total Score 0     ASSESSMENT AND PLAN  1. Squamous cell carcinoma of left lung    2. Malignant neoplasm of upper lobe of right lung    3. S/P lobectomy of lung    4. History of radiation therapy    5. Former smoker      Orders Placed This Encounter   Procedures    Ambulatory Referral to ONC Social Work    Ambulatory Referral to Hematology / Oncology     RECOMMENDATIONS:    Boogie Artis is status post completion of radiation therapy to the right lung, and presents to our clinic today for to review recent PET scan and bronchoscopy results of the left lung.    PET Scan completed on 2024 revealed LEFT lower lobe pneumonia. Neoplastic LEFT upper lobe lung nodules adjacent to the LEFT hilum. Neoplastic nodule within the LEFT bronchus. Small focus of indeterminate FDG uptake in the posterior midline neck between the C5 and C6 spinous processes.    Bronchoscopy completed on 10/17/2024 revealed Fragments of squamous cell carcinoma.  He has stage IIIa (T3 N1 M0) squamous cell carcinoma left upper lobe of the lung.    I have reviewed the chart and other physicians records. Following this discussion and in consideration of the diagnostic data/evaluation of the patient, I recommended a course of external beam radiation,  likely delivered concurrently with systemic therapy under the medical oncology service, I anticipate 6600 cGy over 33 treatments, final course pending.     Referral to /[facility] for chemotherapy. Continue ongoing management per primary care physician and other specialists.     Patient Instructions   1)  Plan on 33 radiation treatments M-F for about 30 minutes daily  2)  Referral to chemotherapy doctor, they will call you  3)  Return to Dr. Ortiz for simulation markings in 7-10 days    No follow-ups on file.    Time Spent: I spent 50 minutes caring for Boogie on this date of service. This time includes time spent by me in the following activities: preparing for the visit, reviewing tests, obtaining and/or reviewing a separately obtained history, performing a medically appropriate examination and/or evaluation, counseling and educating the patient/family/caregiver, ordering medications, tests, or procedures, and documenting information in the medical record.   Zacarias Ortiz III, MD  10/29/2024

## 2024-10-29 ENCOUNTER — OFFICE VISIT (OUTPATIENT)
Age: 74
End: 2024-10-29
Payer: MEDICARE

## 2024-10-29 VITALS
HEIGHT: 68 IN | BODY MASS INDEX: 25.31 KG/M2 | SYSTOLIC BLOOD PRESSURE: 110 MMHG | HEART RATE: 74 BPM | OXYGEN SATURATION: 96 % | WEIGHT: 167 LBS | DIASTOLIC BLOOD PRESSURE: 51 MMHG

## 2024-10-29 DIAGNOSIS — Z90.2 S/P LOBECTOMY OF LUNG: ICD-10-CM

## 2024-10-29 DIAGNOSIS — Z92.3 HISTORY OF RADIATION THERAPY: ICD-10-CM

## 2024-10-29 DIAGNOSIS — C34.11 MALIGNANT NEOPLASM OF UPPER LOBE OF RIGHT LUNG: ICD-10-CM

## 2024-10-29 DIAGNOSIS — Z87.891 FORMER SMOKER: ICD-10-CM

## 2024-10-29 DIAGNOSIS — C34.92 SQUAMOUS CELL CARCINOMA OF LEFT LUNG: Primary | ICD-10-CM

## 2024-10-29 PROCEDURE — 77470 SPECIAL RADIATION TREATMENT: CPT | Performed by: RADIOLOGY

## 2024-10-29 PROCEDURE — G0463 HOSPITAL OUTPT CLINIC VISIT: HCPCS | Performed by: RADIOLOGY

## 2024-10-29 PROCEDURE — 77263 THER RADIOLOGY TX PLNG CPLX: CPT | Performed by: RADIOLOGY

## 2024-10-29 NOTE — PATIENT INSTRUCTIONS
1)  Plan on 33 radiation treatments M-F for about 30 minutes daily  2)  Referral to chemotherapy doctor, they will call you  3)  Return to Dr. Ortiz for simulation markings in 7-10 days

## 2024-10-31 ENCOUNTER — TELEPHONE (OUTPATIENT)
Dept: RADIATION ONCOLOGY | Facility: HOSPITAL | Age: 74
End: 2024-10-31
Payer: MEDICARE

## 2024-10-31 LAB
FUNGUS WND CULT: NORMAL
MYCOBACTERIUM SPEC CULT: NORMAL
NIGHT BLUE STAIN TISS: NORMAL

## 2024-10-31 NOTE — TELEPHONE ENCOUNTER
SANDRO called Mr. Artis due to his distress score from his radiation consultation on 10-29-24 for squamous cell carcinoma of left lung. Mrs. Artis answered and said Mr. Artis was at work today and tomorrow. He works as a , two days a week and has twelve-hour shirts. SANDRO will follow up with Mr. Artis when he comes for his SIM on 11-5-24.

## 2024-11-04 ENCOUNTER — HOSPITAL ENCOUNTER (OUTPATIENT)
Dept: RADIATION ONCOLOGY | Facility: HOSPITAL | Age: 74
Setting detail: RADIATION/ONCOLOGY SERIES
End: 2024-11-04
Payer: MEDICARE

## 2024-11-05 ENCOUNTER — TELEPHONE (OUTPATIENT)
Dept: RADIATION ONCOLOGY | Facility: HOSPITAL | Age: 74
End: 2024-11-05
Payer: MEDICARE

## 2024-11-05 ENCOUNTER — HOSPITAL ENCOUNTER (OUTPATIENT)
Dept: RADIATION ONCOLOGY | Facility: HOSPITAL | Age: 74
Setting detail: RADIATION/ONCOLOGY SERIES
Discharge: HOME | End: 2024-11-05
Payer: MEDICARE

## 2024-11-05 PROCEDURE — 77334 RADIATION TREATMENT AID(S): CPT | Performed by: RADIOLOGY

## 2024-11-05 NOTE — TELEPHONE ENCOUNTER
SANDRO called Mr. Artis to discuss his distress score from his radiation consultation, he did not answer and this writer left a voicemail. SANDRO will remain available.

## 2024-11-07 LAB
FUNGUS WND CULT: NORMAL
MYCOBACTERIUM SPEC CULT: NORMAL
NIGHT BLUE STAIN TISS: NORMAL

## 2024-11-13 LAB
RAD ONC ARIA COURSE END DATE: NORMAL
RAD ONC ARIA COURSE ID: NORMAL
RAD ONC ARIA COURSE LAST TREATMENT DATE: NORMAL
RAD ONC ARIA COURSE START DATE: NORMAL
RAD ONC ARIA COURSE TREATMENT ELAPSED DAYS: 8
RAD ONC ARIA FIRST TREATMENT DATE: NORMAL
RAD ONC ARIA PLAN FRACTIONS TREATED TO DATE: 5
RAD ONC ARIA PLAN ID: NORMAL
RAD ONC ARIA PLAN NAME: NORMAL
RAD ONC ARIA PLAN PRESCRIBED DOSE PER FRACTION: 10 GY
RAD ONC ARIA PLAN PRIMARY REFERENCE POINT: NORMAL
RAD ONC ARIA PLAN TOTAL FRACTIONS PRESCRIBED: 5
RAD ONC ARIA PLAN TOTAL PRESCRIBED DOSE: 5000 CGY
RAD ONC ARIA REFERENCE POINT DOSAGE GIVEN TO DATE: 50 GY
RAD ONC ARIA REFERENCE POINT ID: NORMAL

## 2024-11-13 PROCEDURE — 77301 RADIOTHERAPY DOSE PLAN IMRT: CPT | Performed by: RADIOLOGY

## 2024-11-13 PROCEDURE — 77300 RADIATION THERAPY DOSE PLAN: CPT | Performed by: RADIOLOGY

## 2024-11-13 PROCEDURE — 77338 DESIGN MLC DEVICE FOR IMRT: CPT | Performed by: RADIOLOGY

## 2024-11-14 LAB
FUNGUS WND CULT: NORMAL
MYCOBACTERIUM SPEC CULT: NORMAL
NIGHT BLUE STAIN TISS: NORMAL

## 2024-11-19 NOTE — PROGRESS NOTES
Chief Complaint  Stage 3 severe COPD by GOLD classification    Subjective    History of Present Illness {CC  Problem List  Visit Diagnosis   Encounters  Notes  Medications  Labs  Result Review Imaging  Media     Boogie Artis presents to [facility] for:    History of Present Illness  Mr. Artis is here for follow up and management of copd. He has a history of lung cancer and is status post lobectomy in 2023.  He also completed radiation to his right upper lobe in the past as well.  He underwent Ion navigational bronchoscopy with Dr. Monsivais in April 2024 for a new right upper lobe nodule.  Pathology was consistent with squamous cell carcinoma. He was receiving radiation per Dr. Ortiz. He underwent repeat bronchoscopy with tumor debulking around 10- by Dr. Simon in [facility] for endobronchial lesion. He continues on Trelegy and feels like it helps his breathing however his blood sugars have been very high while on Trelegy.  He tells me has an appointment with oncology tomorrow.  He is planning to get more radiation per Dr. Ortiz and possibly chemotherapy pending appointment tomorrow.  He gets short of breath with exertion.  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       Prior to Admission medications    Medication Sig Start Date End Date Taking? Authorizing Provider   albuterol sulfate  (90 Base) MCG/ACT inhaler Every 6 (Six) Hours. 5/13/24   ProviderCallie MD   cetirizine (zyrTEC) 10 MG tablet Take 1 tablet by mouth Daily.    ProviderCallie MD   Cholecalciferol 125 MCG (5000 UT) tablet Take 1 tablet by mouth Daily.    Callie Russell MD   divalproex (DEPAKOTE ER) 250 MG 24 hr tablet Take 1 tablet by mouth 2 (Two) Times a Day.    Callie Russell MD   Fluticasone-Umeclidin-Vilant (Trelegy Ellipta) 100-62.5-25 MCG/ACT inhaler Inhale 1 puff Daily. 8/14/24   Erma Saucedo, APRN  "  furosemide (LASIX) 20 MG tablet TAKE 1 TABLET BY MOUTH ONCE DAILY Oral for 14 Days    Callie Russell MD   glimepiride (AMARYL) 4 MG tablet Take 1 tablet by mouth Daily.    Callie Russell MD   hydroCHLOROthiazide (HYDRODIURIL) 25 MG tablet Take 1 tablet by mouth Daily.    Callie Russell MD   lisinopril (PRINIVIL,ZESTRIL) 10 MG tablet Take 1 tablet by mouth Daily.    Callie Russell MD   loratadine (CLARITIN) 10 MG tablet Take 1 tablet by mouth Daily.    Callie Russell MD   metFORMIN (GLUCOPHAGE) 500 MG tablet Take 2 tablets by mouth 2 (Two) Times a Day.    Callie Russell MD   metoprolol succinate XL (TOPROL-XL) 25 MG 24 hr tablet Take 1 tablet by mouth Daily.    Callie Russell MD   Omega-3 Fatty Acids (fish oil) 1000 MG capsule capsule Take  by mouth Daily With Breakfast.    Callie Russell MD   pantoprazole (PROTONIX) 40 MG EC tablet Take 1 tablet by mouth 2 (Two) Times a Day. 23   Callie Russell MD   primidone (MYSOLINE) 50 MG tablet Take 1 tablet by mouth 2 (Two) Times a Day. For essential tremors 3/14/23   Callie Russell MD   simvastatin (ZOCOR) 40 MG tablet Take 1 tablet by mouth Every Night.    Callie Russell MD       Social History     Socioeconomic History    Marital status:    Tobacco Use    Smoking status: Former     Current packs/day: 0.00     Average packs/day: 2.0 packs/day for 57.2 years (114.4 ttl pk-yrs)     Types: Cigarettes     Start date:      Quit date: 3/17/2023     Years since quittin.6     Passive exposure: Past    Smokeless tobacco: Never   Vaping Use    Vaping status: Never Used   Substance and Sexual Activity    Alcohol use: Not Currently    Drug use: Never    Sexual activity: Defer       Objective   Vital Signs:   /70   Pulse 74   Ht 168.9 cm (66.5\")   Wt 75.3 kg (166 lb)   SpO2 97% Comment: R/A  BMI 26.39 kg/m²     Physical Exam  Constitutional:       General: He is not in acute " distress.  HENT:      Head: Normocephalic.      Nose: Nose normal.      Mouth/Throat:      Mouth: Mucous membranes are moist.   Eyes:      General: No scleral icterus.  Cardiovascular:      Rate and Rhythm: Normal rate.   Pulmonary:      Effort: No respiratory distress.   Abdominal:      General: There is no distension.   Neurological:      Mental Status: He is alert and oriented to person, place, and time.   Psychiatric:         Mood and Affect: Mood normal.         Behavior: Behavior is cooperative.        Result Review :{ Labs  Result Review  Imaging  Med Tab  Media :    PFT Values          4/16/2024    10:15 11/25/2024    10:30   Pre Drug PFT Results   FVC 68 72   FEV1 48 60   FEF 25-75% 24 51   FEV1/FVC 55.73 64         Results for orders placed in visit on 11/25/24    Spirometry    Narrative  Spirometry    Performed by: Alicia Chavez, RRT  Authorized by: Erma Saucedo APRN  Pre Drug % Predicted  FVC: 72%  FEV1: 60%  FEF 25-75%: 51%  FEV1/FVC: 64%      Results for orders placed in visit on 04/16/24    Spirometry    Narrative  Spirometry    Performed by: Matteo Landry CMA  Authorized by: Nicola Monsivais MD  Pre Drug % Predicted  FVC: 68%  FEV1: 48%  FEF 25-75%: 24%  FEV1/FVC: 55.73%    Interpretation  Spirometry  Spirometry shows severe obstruction. There is reduced midflow suggesting small airway/airflow obstruction.  Electronically signed by Nicola Monsivais MD, 4/16/2024, 12:22 CDT    Rest/Exercise Pulse Ox Values          11/25/2024    10:30   Rest/Exercise Pulse Ox Results   Rest room air SAT % 95   Exercise room air SAT % 92                  Assessment and Plan {CC Problem List  Visit Diagnosis  ROS  Review (Popup)  Health Maintenance  Quality  BestPractice  Medications  SmartSets  SnapShot Encounters  Media      Diagnoses and all orders for this visit:    1. Stage 3 severe COPD by GOLD classification (Primary)  Comments:  Stopped Trelegy given hyperglycemia.  Rx Stiolto.   We demonstrated proper use of Respimat device.  Albuterol as needed  Orders:  -     Spirometry  -     tiotropium bromide-olodaterol (Stiolto Respimat) 2.5-2.5 MCG/ACT aerosol solution inhaler; Inhale 2 puffs Daily.  Dispense: 1 each; Refill: 3  -     Walking Oximetry; Future  -     Walking Oximetry    2. History of cigarette smoking  Comments:  Quit smoking in 2023.  Yearly LDCT.  Oncology surveillance scans will suffice    3. History of primary malignant neoplasm of right lung  Comments:  Followed by Dr. Ortiz and Dr. Trent.  Tumor debulking completed October 2024 by Dr. Simon        Plan as above.  Spirometry stable today compared to prior.  Office follow-up in a few months or sooner if needed.    Erma Saucedo, APRN  11/25/2024  11:39 CST    Follow Up {Instructions Charge Capture  Follow-up Communications   Return in about 4 months (around 3/25/2025), or or so with doc k.    Patient was given instructions and counseling regarding his condition or for health maintenance advice. Please see specific information pulled into the AVS if appropriate.

## 2024-11-21 LAB
MYCOBACTERIUM SPEC CULT: NORMAL
NIGHT BLUE STAIN TISS: NORMAL

## 2024-11-21 NOTE — PROGRESS NOTES
[facility] ON[facility] HEMATOLOGY & ONCOLOGY  [address]  [facility] [id number] [phone]    Patient Name: Boogie Artis  Encounter Date: 11/26/2024  YOB: 1950  Patient Number: [id number]    REASON FOR CONSULTATION: Squamous cell of the left upper lobe.     HISTORY OF PRESENT ILLNESS:  Boogie Artis is a 74 yoM whose medical history includes COPD, MRSA infection (respiratory culture, 5/1/24), migraines, bleeding ulcer, transfusion, left upper lobectomy, 4/19/23 for stage I pT1c,pN0M0 squamous cell carcinoma.  On 8/14/23 PET+ RUL lung neoplasm. He completed 5000 cGy in 4 fractions to the right upper lobe of the lung on 09/27/2023. Diagnosed with squamous cell of the right upper lobe via bronchoscopy, 4/29/24. He completed 5000 cGy  in 5 fractions to the right upper lobe of the lung on 06/05/2024. More recenttly, PET Scan, 09/16/2024 revealed LEFT lower lobe pneumonia. Neoplastic LEFT upper lobe lung nodules adjacent to the LEFT hilum. Neoplastic nodule within the LEFT bronchus. Small focus of indeterminate FDG uptake in the posterior midline neck between the C5 and C6 spinous processes. 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  Has been seen by Dr. Ortiz on 10/29/24 who recommended a course of external beam radiation, likely delivered concurrently with systemic therapy under the medical oncology service, anticipate 6600 cGy over 33 treatments.        01/16/2023 - CT Chest [facility]:  Spiculated perihilar left upper lobe pulmonary nodule measures 2.7 cm. This is concerning for primary neoplasm. PET/CT or bronch recommended for further evaluation.   0.8 cm noncalcified pulmonary nodule in the right upper lobe. This is at the lower limits of normal for PET/CT detection. Benign granulomatous disease, metastatic disease,  or 2nd primary pulmonary malignancy could be considered.   No enlarged mediastinal or hilar lymph nodes     01/17/2023 - Pulmonary Function Tests - [place]:  Moderate obstructive lung disease     01/26/2023 - Bronchoscopy with biopsy per :  RUL, lower lobe, middle lobe were negative. The left upper lobe, no lesion, lingula no lesion seen.  SAMINA washing:  Negative for malignant cells; reactive bronchial cells, macrophages, and inflammation  SAMINA brushing:  Negative for malignant cells; markedly reactive bronchial cells, macrophages, and inflammation     02/09/2023 - PET Scan:  2.8 cm hypermetabolic central LEFT upper lobe pulmonary nodule. This likely represents a primary lung neoplasm.  0.8 cm hypermetabolic pulmonary nodule in the RIGHT upper lobe. This is highly suspicious for either a contralateral pulmonary metastasis or second primary lung neoplasm.   No hypermetabolic thoracic lymph nodes. No evidence of hypermetabolic metastatic disease in the neck, abdomen, or pelvis.  1.5 cm hypermetabolic nodule in the RIGHT anterior rectum, highly concerning for underlying rectal polyp. Recommend correlation with colonoscopy/sigmoidoscopy.     02/27/2023 - Colonoscopy:  Rectal polyp, excision:  Villous adenoma, negative for high-grade glandular dysplasia or malignancy.  Colon polyp at 20 cm, biopsy:  Fragments of serrated polyp, favor sessile serrated lesion/polyp.  Colon polyp at 10 cm, biopsy:  Fragments of serrated polyp, favor sessile serrated lesion/polyp.     03/08/2023 - CT Chest without contrast:  Pulmonary emphysema with LEFT hilar/upper lobe lung mass measuring approximately 4 cm in greatest dimension. There is mild nodular airspace disease laterally to this lesion which may represent postobstructive atelectasis or infiltrate.   10 mm irregular nodule within the RIGHT lung apex (series 2, image 101). This is concerning for metastatic lesion. Additional 4 mm noncalcified nodule at the RIGHT lung apex  (series 2, image 85).   Evidence of prior granulomatous disease.      03/09/2023 - Bronchoscopy/EBUS per :  Left upper lobe, EBUS (ThinPrep and cell block section):   Squamous cell carcinoma.   Bronchial washing (ThinPrep and cell block section):   No malignant cells identified.   Benign bronchial epithelial cells, and alveolar macrophages.      04/19/2023 -  Left upper lobectomy and lymph node excision per Dr.Robert Irwin:   Lymph node, level 9 lymph node biopsy: Benign fibroadipose tissue.   Lymph node, level 10 posterior hilar lymph node biopsy: 1 lymph node, negative for evidence of malignancy.   Lymph node, level 6 lymph node biopsy: 2 lymph nodes, negative for evidence of malignancy.   Lymph node, level 11 interlobar lymph node biopsy: 1 lymph node, negative for evidence of malignancy.   Lymph node, level 10 anterior hilar lymph node biopsy: 2 lymph nodes, negative for evidence of malignancy.   Lymph node, level 12 lobar lymph node biopsy: 1 lymph node, negative for evidence of malignancy.   Lung, left upper lobectomy:   Invasive moderately differentiated squamous cell carcinoma.   Invasive carcinoma measures 2.5 cm in greatest dimension grossly.   Invasive carcinoma extends to within 0.2 cm of the nearest bronchial surgical excision margin.   Squamous metaplasia identified at bronchial surgical excision margin.   Emphysematous changes identified involving the nonneoplastic lung parenchyma.   2 peribronchial lymph nodes, negative for evidence of malignancy.   AJCC STAGE:  pT1c, pN0, pMx      04/25/2023 -  CT Chest without contrast:  Interval placement of large bore chest tube with decrease in previously described large left hydropneumothorax.Residual hydropneumothorax present with air dissecting through anterior chest wall into subcutaneous soft tissues, unchanged.   Interval resolution of previously described debris within left bronchus.   Unchanged right apical 1.0 cm nodule.      07/24/2023 -  Appointment with :  Left upper lobe SCC [id number], stage I,Jan 2023  -1/16/23 CT chest ([facility]): Spiculated perihilar left upper lobe pulmonary nodule measures 2.7cm. This is concerning for primary neoplasm. PET/CT or bronchoscopy is recommended to further evaluate. 0.8cm noncalcified pulmonary nodule in the right upper lobe. This is at the lower limits of normal for Pet/CT detection. Benign granulomatous disease, metastatic disease, or 2nd primary pulmonary malignancy could be considered. No enlarged mediastinal or hilar lymph nodes.  -1/26/23 Lung, left upper lobe, washing: Negative for malignant cells. Reactive bronchial cells, macrophages, and inflammation. Lung, left upper lobe, brushing: Negative for malignant cells. Markedly reactive bronchial cells, macrophages, and inflammation.  -2/9/23 PET scan ([facility]): 2.8 cm hypermetabolic central LEFT upper lobe pulmonary nodule. This likely represents a primary lung neoplasm. 0.8 cm hypermetabolic pulmonary nodule in the RIGHT upper lobe. This is highly suspicious for either a contralateral pulmonary metastasis or second primary lung neoplasm. No hypermetabolic thoracic lymph nodes. No evidence of hypermetabolic metastatic disease in the neck, abdomen, or pelvis. 1.5 cm hypermetabolic nodule in the RIGHT anterior rectum, highly concerning for underlying rectal polyp. Recommend correlation with colonoscopy/sigmoidoscopy.   4/19/23 Lung, left upper lobectomy: Invasive moderately differentiated squamous cell carcinoma. Invasive carcinoma measures 2.5 cm in greatest dimension grossly. Invasive carcinoma extends to within 0.2 cm of the nearest bronchial surgical excision margin. Squamous metaplasia identified at bronchial surgical excision margin. Emphysematous changes identified involving the nonneoplastic lung parenchyma. 2 peribronchial lymph nodes, negative for evidence of malignancy.Lymph node, level 9 lymph node biopsy: Benign fibroadipose tissue. Lymph node,  level 10 posterior hilar lymph node biopsy: 1 lymph node, negative for evidence of malignancy. Lymph node, level 6 lymph node biopsy: 2 lymph nodes, negative for evidence of malignancy. Lymph node, level 11 interlobar lymph node biopsy: 1 lymph node, negative for evidence of malignancy. Lymph node, level 10 anterior hilar lymph node biopsy: 2 lymph nodes, negative for evidence of malignancy. Lymph node, level 12 lobar lymph node biopsy: 1 lymph node, negative for evidence of malignancy. [id number], stage I  Plan:  -Repeat CT chest Nov 2023  RUL subcentimeter nodule (PET+)  -2/9/23 PET scan ([facility]): 2.8 cm hypermetabolic central LEFT upper lobe pulmonary nodule. This likely represents a primary lung neoplasm. 0.8 cm hypermetabolic pulmonary nodule in the RIGHT upper lobe. This is highly suspicious for either a contralateral pulmonary metastasis or second primary lung neoplasm. No hypermetabolic thoracic lymph nodes. No evidence of hypermetabolic metastatic disease in the neck, abdomen, or pelvis. 1.5 cm hypermetabolic nodule in the RIGHT anterior rectum, highly concerning for underlying rectal polyp. Recommend correlation with colonoscopy/sigmoidoscopy.   -Referral Dr Ortiz for consideration SBRT RUL hypermetabolic nodule.  PLAN:  RTC with MD 2 months in [place] office  CBC, CMP, B12, Folate, LDH, Haptoglobin, iron profile, Ferritin, retic  Continue follow-up with Dr.Robert Irwin/CardioThoracic Surgery  Continue to follow up with Dr. Rowe  Iron Sulfate 325 mg p.o. twice daily  Follow Up:  Return in 2 months (on 9/24/2023) for Appointment with Dr. Vaughan in [place].  Refer to Dr Ortiz      08/09/2023 - Appointment with :  will order PET scan, they will call you  Return in 2 weeks for further discussion     08/14/2023 - PET Scan:  Abnormal PET/CT, there is increased size of a hypermetabolic pulmonary nodule in the right upper lobe lung apex that measures 13 mm, compared with 8 mm on the previous PET/CT. This  has a maximum SUV of 8.1. This is concerning for active neoplasm.  Interval resection of the left upper lobe with posttreatment changes along the medial margin of the upper mediastinum, including mild infiltration of the mediastinal fat planes.  There is a subcutaneous nodule with surrounding fatty infiltration over the low back at the level of the upper sacrum which demonstrates hypermetabolism. This could be inflammatory, less likely a metastatic nodule. This measures 21 mm, follow-up ultrasound is recommended to determine if this is solid. Ultrasound-guided biopsy could be performed if indicated.  Interval resolution of the hypermetabolic nodule associated with the right rectum, however there is a new hypermetabolic nodule that maps to the right prostate gland. Correlation with PSA values is recommended.     08/16/2023 - Consult with :  I have recommended to treat the right lung nodule with SBRT, I anticipate a course over 4-5 fractions, final course pending.   He does have a visible insect bite on his low back and I do believe this correlates with the PET findings of activity on the upper sacrum. Will order PSA for prostate gland findings. He has not had one drawn in some time.   Plan:  Plan on 4-5 pinpoint treatments to the lung nodule.   We will call you when to start treatments.   PSA today     08/18/2023 - Documentation per :  I spoke on the phone with this patient's wife Sarah and discussed his PSA of 2.9. This is considered a normal level, however on recent PET scan imaging for a lung nodule workup, a nodule within the prostate was noted.   I will refer him to urology for further management recommendations.     09/12/2023 - 09/27/2023 - Completed radiation course:  Received 5000 cGy in 4 fractions to the right upper lobe of the lung.     11/20/2023 - MRI Pelvis with and without contrast:  No suspicious lesions in the prostate (PI-RADS 3 or greater).      11/29/2023 - Appointment with  :  I personally reviewed MRI today.    His MRI is negative for any suspicious lesions.    He has a volume of 34 cc.    I believe his hypermetabolic activity on PET scan was likely inflammatory.  I do not think he requires a biopsy based on his negative MRI and PSA level.    He will follow-up as needed.      12/20/2023 - CT Chest without contrast:  Decreased size of 6 mm spiculated right upper lobe pulmonary nodule (previously 13 mm). This nodule was hypermetabolic on prior PET/CT and highly suspicious for primary lung neoplasm.  Postoperative change of left upper lobectomy. No definite change/increase in soft tissue thickening along the lobectomy margin and mediastinum. Recommend continued imaging surveillance.  No evidence of disease progression in the chest. No thoracic lymphadenopathy.     12/27/2023 - Appointment with :  Return in 3 months with a CT chest before.      03/19/2024 - CT chest without contrast:  1.1 cm irregular right upper lobe nodule, highly suspicious for malignancy.  5 mm left upper lobe nodule is indeterminate but does raise the suspicion for malignancy.  Stable size of the previously irradiated right apex nodule. Minimally increased adjacent subpleural parenchymal abnormality, likely related to post radiation changes.  Left upper lobectomy with stable postsurgical margins.  No mediastinal lymphadenopathy.     03/27/2024 - Consult with :  Will refer to pulmonology for bronchoscopy considerations of the new right upper lung nodule. He will return in 3 weeks for further recommendations.  I have instructed him to continue to see the other health care providers as per their scheduling.  Plan:  Referral to pulmonology for biopsy of new right lung nodule  Return in 3 weeks     04/16/2024 - Appointment with :  For wheezing, add bronchodilator. 100 mcg trelegy sample given, we reviewed PFT showing severe obstruction.  Likely moderate to severe copd with hx smoking  For  lung nodule, will plan ion bronchoscopy to better assess rul nodule  Ask anesthesia to take particular care with remaining teeth  We demonstrated proper technique for use of ellipta device  Follow Up   Return in about 4 weeks (around 5/14/2024).     04/22/2024 - CT Chest without contrast:  RIGHT upper lobe 17 x 11 mm nodule.  LEFT upper lobe 6 x 5 mm nodule.  No pneumothorax or pleural effusion.     04/29/2024 - Bronchoscopy with biopsy per :  Lung, right upper lobe, Ion fine-needle aspiration, smears (2), ThinPrep preparation (1) and cell block:   Positive for malignant cells, consistent with moderately differentiated squamous cell carcinoma.  Comment: Immunohistochemical stain for p40 is positive in the neoplastic cells, supporting the above diagnostic impression.The control stained appropriately.  Bronchial washings, ThinPrep preparation (1) and cell block:   Positive for malignant cells, consistent with moderately differentiated squamous cell carcinoma.  Lung, right upper lobe, bronchoalveolar lavage, ThinPrep preparation (1):   Positive for malignant cells, consistent with moderately to poorly differentiated squamous cell carcinoma.  AJCC stage: pTX pNX     04/29/2024 - Documentation per :  Dr. Nicola Monsivais notified me that patient cytology was positive in his lung.  He is scheduled to return to our office for treatment recommendations.  Also, it was incidentally noted by anesthesia during his procedure that he has a lesion in the oral cavity that needs additional evaluation and may potentially be another malignancy.     05/06/2024 - Appointment with :  This patient does have biopsy-proven squamous cell carcinoma of the right upper lobe.  We will proceed with SBRT radiotherapy to this  lesion.  He also has a lesion in the left lung which is nondiagnostic and we will continue to follow that lesion with serial radiographs  Plan:  We will call you when to start radiation treatment in  approximately 2 weeks  Return in about 2 weeks (around 5/20/2024) for the first Radiation Treatment.     05/28/2024 - 06/05/2024 - Completed radiation course:  Received 5000 cGy in 5 fractions to the right upper lobe of the lung via Stereotactic Radiation Therapy - SRT.      09/03/2024 - CT chest without contrast:  Lung nodules seen previously have resolved.  There are indeterminate soft tissue nodules within the LEFT bronchus.  Consider PET/CT correlation.     09/09/2024 - Appointment with :  We discussed the new left bronchus findings on imaging. I will notify pulmonology for review of the imaging and further recommendations. We will continue routine follow-up/surveillance as discussed in 3 months with follow up CT scan before visit and I have instructed him to continue to see the other health care providers as per their scheduling.  Plan:  will discuss your scans with pulmonology  return in 3 months unless you need to be seen sooner.     09/11/2024 - Documentation per Aston Medina - radiation oncology:  I reviewed the recent CT chest imaging with Dr. Ortiz and discussed this with Dr. Monsivais and Erma Saucedo in pulmonology.   I will order a PET scan for further evaluation of the left bronchus finding and will get him in for an appointment to discuss further options with Dr. Ortiz.     09/16/2024 - PET Scan:  LEFT lower lobe pneumonia.  Neoplastic LEFT upper lobe lung nodules adjacent to the LEFT hilum.  Neoplastic nodule within the LEFT bronchus.  Small focus of indeterminate FDG uptake in the posterior midline neck between the C5 and C6 spinous processes.     10/08/2024 - Appointment with Mata Simon MD - [facility]:  Plan for bronchoscopy with APC and tumor debulking, EBUS/TBNA, bal next week.     10/17/2024 - Bronchus, left endobronchial mass, endobronchial biopsy:  Fragments of squamous cell carcinoma.    I have reviewed the HPI and verified with the patient the accuracy of it. No changes  "to interval history since the information was documented. Timoteo Lombardi MD 11/26/24      LABS    Lab Results - Last 18 Months   Lab Units 10/17/24  0717 04/22/24  1250 07/24/23  1228 07/24/23  1128   HEMOGLOBIN g/dL 11.3* 12.9*  --  10.5*   HEMATOCRIT % 34.0* 39.6 36.4* 34.8*   MCV fL 94.7 95.0  --  83.5   WBC 10*3/mm3 11.17* 13.43*  --  9.29*   RDW % 13.6 13.8  --  15.6*   MPV fL 10.0 10.5  --  11.2*   PLATELETS 10*3/mm3 312 231  --  172   NEUTROS ABS K/uL  --   --   --  5.57   LYMPHS ABS K/uL  --   --   --  2.40   MONOS ABS K/uL  --   --   --  0.93*       Lab Results - Last 18 Months   Lab Units 10/17/24  0717 04/22/24  1250 11/20/23  1401   GLUCOSE mg/dL 165* 202*  --    SODIUM mmol/L 138 138  --    POTASSIUM mmol/L 4.6 5.9*  --    CO2 mmol/L 29.0 27.0  --    CHLORIDE mmol/L 99 100  --    ANION GAP mmol/L 10.0 11.0  --    CREATININE mg/dL 1.35* 1.22 1.60*   BUN mg/dL 28* 29*  --    BUN / CREAT RATIO  20.7 23.8  --    CALCIUM mg/dL 9.7 10.9*  --        No results for input(s): \"MSPIKE\", \"KAPPALAMB\", \"IGLFLC\", \"URICACID\", \"FREEKAPPAL\", \"CEA\", \"LDH\", \"REFLABREPO\" in the last 36423 hours.    Lab Results - Last 18 Months   Lab Units 07/24/23  1155   IRON ug/dL 59   TIBC ug/dL 488*   IRON SATURATION % 12*   FERRITIN ng/mL 26.0*   FOLATE ng/mL 15.0         PAST MEDICAL HISTORY:  ALLERGIES:  Allergies   Allergen Reactions    Iodides Nausea And Vomiting     Contrast dye-\"Upset stomach x 2 days\". MRI ONLY     CURRENT MEDICATIONS:  Outpatient Encounter Medications as of 11/26/2024   Medication Sig Dispense Refill    albuterol sulfate  (90 Base) MCG/ACT inhaler Every 6 (Six) Hours.      cetirizine (zyrTEC) 10 MG tablet Take 1 tablet by mouth Daily.      Cholecalciferol 125 MCG (5000 UT) tablet Take 1 tablet by mouth Daily.      divalproex (DEPAKOTE ER) 250 MG 24 hr tablet Take 1 tablet by mouth 2 (Two) Times a Day.      furosemide (LASIX) 20 MG tablet TAKE 1 TABLET BY MOUTH ONCE DAILY Oral for 14 Days      " glimepiride (AMARYL) 4 MG tablet Take 1 tablet by mouth Daily.      hydroCHLOROthiazide (HYDRODIURIL) 25 MG tablet Take 1 tablet by mouth Daily.      lisinopril (PRINIVIL,ZESTRIL) 10 MG tablet Take 1 tablet by mouth Daily.      loratadine (CLARITIN) 10 MG tablet Take 1 tablet by mouth Daily.      metFORMIN (GLUCOPHAGE) 500 MG tablet Take 2 tablets by mouth 2 (Two) Times a Day.      metoprolol succinate XL (TOPROL-XL) 25 MG 24 hr tablet Take 1 tablet by mouth Daily.      Omega-3 Fatty Acids (fish oil) 1000 MG capsule capsule Take  by mouth Daily With Breakfast.      pantoprazole (PROTONIX) 40 MG EC tablet Take 1 tablet by mouth 2 (Two) Times a Day.      primidone (MYSOLINE) 50 MG tablet Take 1 tablet by mouth 2 (Two) Times a Day. For essential tremors      simvastatin (ZOCOR) 40 MG tablet Take 1 tablet by mouth Every Night.      tiotropium bromide-olodaterol (Stiolto Respimat) 2.5-2.5 MCG/ACT aerosol solution inhaler Inhale 2 puffs Daily. 1 each 3    [DISCONTINUED] Fluticasone-Umeclidin-Vilant (Trelegy Ellipta) 100-62.5-25 MCG/ACT inhaler Inhale 1 puff Daily. 180 each 3     No facility-administered encounter medications on file as of 11/26/2024.     ADULT ILLNESSES:  Patient Active Problem List   Diagnosis Code    Squamous cell carcinoma of left lung C34.92    S/P lobectomy of lung Z90.2    Former smoker Z87.891    History of radiation therapy Z92.3    Nodule of upper lobe of right lung R91.1    Malignant neoplasm of upper lobe of right lung C34.11    Mass of left lung R91.8    History of primary malignant neoplasm of right lung Z85.118     SURGERIES:  Past Surgical History:   Procedure Laterality Date    APPENDECTOMY      BRONCHOSCOPY  2023    BRONCHOSCOPY N/A 4/29/2024    Procedure: BRONCHOSCOPY WITH ENDOBRONCHIAL ULTRASOUND;  Surgeon: Nicola Monsivais MD;  Location: [facility];  Service: Pulmonary;  Laterality: N/A;  pre Nodule of upper lobe of right lung  Dr. Alves    BRONCHOSCOPY N/A 10/17/2024    Procedure:  BRONCHOSCOPY WITH APC & DEBULKING, cryotherapy, balloon dilation/tamponade;  Surgeon: Mata Simon MD;  Location:  [facility];  Service: Pulmonary;  Laterality: N/A;    BRONCHOSCOPY WITH ION ROBOTIC ASSIST N/A 4/29/2024    Procedure: BRONCHOSCOPY WITH ION ROBOT and BRONCHOSCOPY WITH ENDOBRONCHIAL ULTRASOUND;  Surgeon: Nicola Monsivais MD;  Location:  [facility];  Service: Robotics - Pulmonary;  Laterality: N/A;  pre  Nodule of upper lobe of right lung  post      BRONCHOSCOPY WITH ION ROBOTIC ASSIST  4/29/2024    Procedure: BRONCHOSCOPY NAVIGATION WITH ENDOBRONCHIAL ULTRASOUND AND ION ROBOT;  Surgeon: Nicola Monsivais MD;  Location:  [facility];  Service: Pulmonary;;    COLONOSCOPY  02/28/2023    HEMORRHOIDECTOMY      HERNIA REPAIR  1999    LUNG SURGERY Left 04/19/2023     HEALTH MAINTENANCE ITEMS:  Health Maintenance Due   Topic Date Due    LIPID PANEL  Never done    BMI FOLLOWUP  Never done    COLORECTAL CANCER SCREENING  Never done    DIABETIC EYE EXAM  Never done    TDAP/TD VACCINES (1 - Tdap) Never done    ZOSTER VACCINE (1 of 2) Never done    HEPATITIS C SCREENING  Never done    ANNUAL WELLNESS VISIT  Never done    HEMOGLOBIN A1C  Never done    COVID-19 Vaccine (4 - 2024-25 season) 09/01/2024       <no information>  Last Completed Colonoscopy       This patient has no relevant Health Maintenance data.          Immunization History   Administered Date(s) Administered    Arexvy (RSV, Adults 60+ yrs) 11/11/2024    COVID-19 (MODERNA) 1st,2nd,3rd Dose Monovalent 02/09/2021, 03/15/2021    COVID-19 (UNSPECIFIED) 10/24/2021    Fluzone High-Dose 65+YRS 11/11/2024    Pneumococcal Conjugate 13-Valent (PCV13) 11/06/2017    Pneumococcal Polysaccharide (PPSV23) 12/13/2017     Last Completed Mammogram       This patient has no relevant Health Maintenance data.              FAMILY HISTORY:  Family History   Problem Relation Age of Onset    Diabetes Mother     Heart disease Mother     Cancer Father      "Cancer Brother     Alcohol abuse Brother     Cancer Paternal Grandfather     Malig Hyperthermia Neg Hx      SOCIAL HISTORY:  Social History     Socioeconomic History    Marital status:    Tobacco Use    Smoking status: Former     Current packs/day: 0.00     Average packs/day: 2.0 packs/day for 57.2 years (114.4 ttl pk-yrs)     Types: Cigarettes     Start date:      Quit date: 3/17/2023     Years since quittin.6     Passive exposure: Past    Smokeless tobacco: Never   Vaping Use    Vaping status: Never Used   Substance and Sexual Activity    Alcohol use: Not Currently    Drug use: Never    Sexual activity: Defer       REVIEW OF SYSTEMS:  Review of Systems   Constitutional:  Positive for activity change, appetite change (Low to fair), fatigue (Chronic) and unexpected weight loss (35 pounds in the past 1.5-yrs).        Manages his personal ADLs, light chores, runs errands and is still driving.  Is up and about at least half the time.  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     Eyes: Negative.    Respiratory:  Positive for cough (With cream/clear-colored phlegm), shortness of breath (Baseline exertional dyspnea with some SOB with routine activity) and wheezing.         [id number] to 2 packs/day.  Quit in     On home O2 and inhalers   Cardiovascular: Negative.    Gastrointestinal: Negative.    Endocrine: Negative.    Genitourinary: Negative.    Musculoskeletal:  Positive for arthralgias (Chronic) and back pain (Chronic).   Skin: Negative.    Allergic/Immunologic: Negative.    Neurological:  Positive for tremors (Chronic).   Hematological: Negative.    Psychiatric/Behavioral: Negative.         /64   Pulse 75   Temp 97.5 °F (36.4 °C) (Temporal)   Resp 18   Ht 168.9 cm (66.5\")   Wt 74.8 kg (164 lb 14.4 oz)   SpO2 95%   BMI 26.22 kg/m²  Body surface area is 1.85 meters squared.  Pain Score    24 1122   PainSc:   2   PainLoc: Chest "     Physical Exam  Vitals and nursing note reviewed.   Constitutional:       Comments: Pleasant, cooperative, heavyset, modestly kept elderly male.  Ambulatory.  ECOG 1.  He is accompanied by his wife Sarah and a daughter, Linette.   HENT:      Head: Normocephalic and atraumatic.   Eyes:      General: No scleral icterus.     Extraocular Movements: Extraocular movements intact.      Pupils: Pupils are equal, round, and reactive to light.   Cardiovascular:      Rate and Rhythm: Normal rate.   Pulmonary:      Effort: Pulmonary effort is normal.      Comments: Distant breath sounds bilaterally  Abdominal:      Palpations: Abdomen is soft.      Tenderness: There is no abdominal tenderness.   Musculoskeletal:         General: Normal range of motion.      Cervical back: Normal range of motion.   Lymphadenopathy:      Cervical: No cervical adenopathy.   Skin:     General: Skin is warm.   Neurological:      General: No focal deficit present.      Mental Status: He is alert and oriented to person, place, and time.   Psychiatric:         Mood and Affect: Mood normal.         Behavior: Behavior normal.         Thought Content: Thought content normal.           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  -- Original tumor stage: TNM at least IIIA ([id number])  -- Original tumor burden:  -9/16/24- PET scan-LEFT lower lobe pneumonia.Neoplastic LEFT upper lobe lung nodules adjacent to the LEFT hilum. Neoplastic nodule within the LEFT bronchus.Small focus of indeterminate FDG uptake in the posterior midline neck between the C5 and C6 spinous processes  -10/17/24- Bronchoscopy-revealed Fragments of squamous cell carcinoma.      --Complications of tumor: Asthenia, cough, dyspnea, weight loss  -- Tumor status: Untreated     2.   Left upper lobectomy, 4/19/23 for stage I pT1c,pN0M0 squamous cell carcinoma.    3.   On 8/14/23 PET+ RUL lung neoplasm.   - Completed 5000 cGy in 4 fractions to the right upper lobe of the lung  on 09/27/2023.   4.   Squamous cell of the right upper lobe via bronchoscopy, 4/29/24.   - Completed 5000 cGy  in 5 fractions to the right upper lobe of the lung on 06/05/2024.     5.   Normocytic anemia.  Likely contribution from malignancy/anemia of chronic disease  --Hgb 11.3; MCV 94.7, 10/17/24 (prior: Hgb 77.7-12.9)  6.   COPD   7.   Ex heavy cigarette smoker       Plan:  Carefully reviewed available diagnostic information as outlined above.   Note imaging including PET scan 9/16/2024, as well as bronchoscopy/biopsy findings, 10/17/24 (above). 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  Draw CBC w diff, iron, fe sat, ferritin, B12, folate, CMP, CEA  3.   Schedule brain MRI at [facility]  4.   Teaching sheets for Taxol and carboplatin. Potential toxicities of same discussed (to included but not limited to: Myelosuppression, alopecia, neuropathy, arthralgias/myalgias, nausea/vomiting, hypersensitivity reactions, diarrhea, mucositis, risks for infection, abnormal liver enzymes, asthenia, fever, low blood pressure, bleeding, renal insufficiency, edema, bradycardia, anaphylaxis, arrhythmias, thromboembolism, myocardial infarction, pulmonary toxicity, gastrointestinal (GI) obstruction/perforation, paralytic ileus, ischemic colitis, pancreatitis, severe skin reactions; electrolyte disorders, ototoxicity, nephrotoxicity, vision loss). Questions answered. He will agree to a trial of therapy.  The patient and his family acknowledge that given his history of recurrent lung malignancy, increased risk of recurrence in spite of (definitive) therapy remains elevated.  5.  Review NCCN guidelines non-small cell lung cancer, stage III (unresectable).  Chemotherapy regimens used.  Radiation therapy (concurrent regimens usually radiation therapy): Paclitaxel 45 to 50 mg/m² weekly; carboplatin AUC, concurrent thoracic RT +/- additional 2 cycles every 21 days of paclitaxel 200 mg/m² and carboplatin AUC 6-followed by consolidation  therapy for unresectable stage III NSCLC, PS 0-1 and no disease progression after 2 or more cycles of definitive chemoradiation: Durvalumab 10 mg/kg IV every 2 weeks for up to 12 months (category 1).     6.  Anticipate chemotherapy beginning -we will coordinate with radiation therapy--week 1 to week 6:               Taxol 45 mg/m2 per administration guidelines weekly x6 weeks with radiation.             Carboplatin AUC 2 per administration guidelines weekly x6 weeks with radiation.      --  Premedicate with:             Aloxi 0.25 mg IV before chemotherapy            Decadron 10 mg IV before chemo            Pepcid 20 mg IV             Benadryl 50 mg IV      -- CMP, Mg++ and CBC with differential weekly with Procrit 40,000 units subcutaneous every week if Hgb less than 10 and Hct less than 30; Zarxio 480 mcg subcutaneously daily x3 if ANC less than 1 -[facility]     -- eRx:              Zofran 8 mg p.o. 3 times daily as needed, #30 - Rx                         7.  Follow-up with radiation oncology Re: Definitive concurrent radiation  8.  Appoint to general surgery Re: Mediport placement.   9.  Continue currently identified medications.   10.  Return to the office in 5-6 weeks with pre-office CBC with differential, CMP, and CEA.   11.  Importance of Smoking Cessation discussed with patient and informed patient additional information will be on today's [facility]'s Flyer - Plan to Be Tobacco Free handout provided to patient            I spent ~94 minutes caring for Boogie on this date of service. This time includes time spent by me in the following activities: preparing for the visit, reviewing tests, performing a medically appropriate examination and/or evaluation, counseling and educating the patient/family/caregiver, ordering medications, tests, or procedures and documenting information in the medical record

## 2024-11-25 ENCOUNTER — OFFICE VISIT (OUTPATIENT)
Dept: PULMONOLOGY | Facility: CLINIC | Age: 74
End: 2024-11-25
Payer: MEDICARE

## 2024-11-25 VITALS
WEIGHT: 166 LBS | HEIGHT: 67 IN | SYSTOLIC BLOOD PRESSURE: 128 MMHG | BODY MASS INDEX: 26.06 KG/M2 | DIASTOLIC BLOOD PRESSURE: 70 MMHG | HEART RATE: 74 BPM | OXYGEN SATURATION: 97 %

## 2024-11-25 DIAGNOSIS — J44.9 STAGE 3 SEVERE COPD BY GOLD CLASSIFICATION: Primary | Chronic | ICD-10-CM

## 2024-11-25 DIAGNOSIS — Z87.891 HISTORY OF CIGARETTE SMOKING: Chronic | ICD-10-CM

## 2024-11-25 DIAGNOSIS — Z85.118 HISTORY OF PRIMARY MALIGNANT NEOPLASM OF RIGHT LUNG: Chronic | ICD-10-CM

## 2024-11-25 RX ORDER — TIOTROPIUM BROMIDE AND OLODATEROL 3.124; 2.736 UG/1; UG/1
2 SPRAY, METERED RESPIRATORY (INHALATION) DAILY
Qty: 1 EACH | Refills: 3 | Status: SHIPPED | OUTPATIENT
Start: 2024-11-25

## 2024-11-25 NOTE — PROCEDURES
Spirometry    Performed by: Alicia Chavez, RRT  Authorized by: Erma Saucedo APRN     Pre Drug % Predicted    FVC: 72%   FEV1: 60%   FEF 25-75%: 51%   FEV1/FVC: 64%    Interpretation   Spirometry   Spirometry shows moderate obstruction. There is reduced midflow suggesting small airway/airflow obstruction.   Review of FVL curve   Patient's effort is normal.

## 2024-11-25 NOTE — PROCEDURES
Walking Oximetry    Performed by: Maddy Frederick MA  Authorized by: Erma Saucedo, SABAS    Supplemental Oxygen: None  Rest room air SAT %:  95  Exercise room air SAT %:  92

## 2024-11-26 ENCOUNTER — CONSULT (OUTPATIENT)
Dept: ONCOLOGY | Facility: CLINIC | Age: 74
End: 2024-11-26
Payer: MEDICARE

## 2024-11-26 ENCOUNTER — TELEPHONE (OUTPATIENT)
Dept: SURGERY | Facility: CLINIC | Age: 74
End: 2024-11-26
Payer: MEDICARE

## 2024-11-26 ENCOUNTER — LAB (OUTPATIENT)
Dept: LAB | Facility: HOSPITAL | Age: 74
End: 2024-11-26
Payer: MEDICARE

## 2024-11-26 VITALS
OXYGEN SATURATION: 95 % | RESPIRATION RATE: 18 BRPM | TEMPERATURE: 97.5 F | HEIGHT: 67 IN | SYSTOLIC BLOOD PRESSURE: 126 MMHG | HEART RATE: 75 BPM | DIASTOLIC BLOOD PRESSURE: 64 MMHG | WEIGHT: 164.9 LBS | BODY MASS INDEX: 25.88 KG/M2

## 2024-11-26 DIAGNOSIS — C34.92 SQUAMOUS CELL CARCINOMA OF LEFT LUNG: ICD-10-CM

## 2024-11-26 DIAGNOSIS — C34.92 SQUAMOUS CELL CARCINOMA OF LEFT LUNG: Primary | ICD-10-CM

## 2024-11-26 LAB
ALBUMIN SERPL-MCNC: 4.1 G/DL (ref 3.5–5.2)
ALBUMIN/GLOB SERPL: 1.1 G/DL
ALP SERPL-CCNC: 88 U/L (ref 39–117)
ALT SERPL W P-5'-P-CCNC: 11 U/L (ref 1–41)
ANION GAP SERPL CALCULATED.3IONS-SCNC: 13 MMOL/L (ref 5–15)
AST SERPL-CCNC: 10 U/L (ref 1–40)
BASOPHILS # BLD AUTO: 0.04 10*3/MM3 (ref 0–0.2)
BASOPHILS NFR BLD AUTO: 0.4 % (ref 0–1.5)
BILIRUB SERPL-MCNC: 0.2 MG/DL (ref 0–1.2)
BUN SERPL-MCNC: 19 MG/DL (ref 8–23)
BUN/CREAT SERPL: 17.6 (ref 7–25)
CALCIUM SPEC-SCNC: 9.6 MG/DL (ref 8.6–10.5)
CEA SERPL-MCNC: 3.46 NG/ML
CHLORIDE SERPL-SCNC: 100 MMOL/L (ref 98–107)
CO2 SERPL-SCNC: 26 MMOL/L (ref 22–29)
CREAT SERPL-MCNC: 1.08 MG/DL (ref 0.76–1.27)
DEPRECATED RDW RBC AUTO: 49.7 FL (ref 37–54)
EGFRCR SERPLBLD CKD-EPI 2021: 72 ML/MIN/1.73
EOSINOPHIL # BLD AUTO: 0.26 10*3/MM3 (ref 0–0.4)
EOSINOPHIL NFR BLD AUTO: 2.8 % (ref 0.3–6.2)
ERYTHROCYTE [DISTWIDTH] IN BLOOD BY AUTOMATED COUNT: 14.2 % (ref 12.3–15.4)
FERRITIN SERPL-MCNC: 166.4 NG/ML (ref 30–400)
FOLATE SERPL-MCNC: 8.5 NG/ML (ref 4.78–24.2)
GLOBULIN UR ELPH-MCNC: 3.6 GM/DL
GLUCOSE SERPL-MCNC: 94 MG/DL (ref 65–99)
HCT VFR BLD AUTO: 36.5 % (ref 37.5–51)
HGB BLD-MCNC: 11.3 G/DL (ref 13–17.7)
IMM GRANULOCYTES # BLD AUTO: 0.04 10*3/MM3 (ref 0–0.05)
IMM GRANULOCYTES NFR BLD AUTO: 0.4 % (ref 0–0.5)
IRON 24H UR-MRATE: 51 MCG/DL (ref 59–158)
IRON SATN MFR SERPL: 12 % (ref 20–50)
LYMPHOCYTES # BLD AUTO: 1.88 10*3/MM3 (ref 0.7–3.1)
LYMPHOCYTES NFR BLD AUTO: 20.3 % (ref 19.6–45.3)
MAGNESIUM SERPL-MCNC: 2.1 MG/DL (ref 1.6–2.4)
MCH RBC QN AUTO: 29.2 PG (ref 26.6–33)
MCHC RBC AUTO-ENTMCNC: 31 G/DL (ref 31.5–35.7)
MCV RBC AUTO: 94.3 FL (ref 79–97)
MONOCYTES # BLD AUTO: 0.69 10*3/MM3 (ref 0.1–0.9)
MONOCYTES NFR BLD AUTO: 7.5 % (ref 5–12)
MYCOBACTERIUM SPEC CULT: NORMAL
NEUTROPHILS NFR BLD AUTO: 6.34 10*3/MM3 (ref 1.7–7)
NEUTROPHILS NFR BLD AUTO: 68.6 % (ref 42.7–76)
NIGHT BLUE STAIN TISS: NORMAL
NRBC BLD AUTO-RTO: 0 /100 WBC (ref 0–0.2)
PLATELET # BLD AUTO: 305 10*3/MM3 (ref 140–450)
PMV BLD AUTO: 9.8 FL (ref 6–12)
POTASSIUM SERPL-SCNC: 5.2 MMOL/L (ref 3.5–5.2)
PROT SERPL-MCNC: 7.7 G/DL (ref 6–8.5)
RBC # BLD AUTO: 3.87 10*6/MM3 (ref 4.14–5.8)
SODIUM SERPL-SCNC: 139 MMOL/L (ref 136–145)
TIBC SERPL-MCNC: 423 MCG/DL (ref 298–536)
TRANSFERRIN SERPL-MCNC: 284 MG/DL (ref 200–360)
VIT B12 BLD-MCNC: 229 PG/ML (ref 211–946)
WBC NRBC COR # BLD AUTO: 9.25 10*3/MM3 (ref 3.4–10.8)

## 2024-11-26 PROCEDURE — 80053 COMPREHEN METABOLIC PANEL: CPT

## 2024-11-26 PROCEDURE — 36415 COLL VENOUS BLD VENIPUNCTURE: CPT

## 2024-11-26 PROCEDURE — 84466 ASSAY OF TRANSFERRIN: CPT

## 2024-11-26 PROCEDURE — 82607 VITAMIN B-12: CPT

## 2024-11-26 PROCEDURE — 83735 ASSAY OF MAGNESIUM: CPT

## 2024-11-26 PROCEDURE — 82728 ASSAY OF FERRITIN: CPT

## 2024-11-26 PROCEDURE — 82378 CARCINOEMBRYONIC ANTIGEN: CPT

## 2024-11-26 PROCEDURE — 83540 ASSAY OF IRON: CPT

## 2024-11-26 PROCEDURE — 85025 COMPLETE CBC W/AUTO DIFF WBC: CPT

## 2024-11-26 PROCEDURE — 82746 ASSAY OF FOLIC ACID SERUM: CPT

## 2024-11-26 RX ORDER — ONDANSETRON 8 MG/1
8 TABLET, FILM COATED ORAL EVERY 8 HOURS PRN
Qty: 30 TABLET | Refills: 3 | Status: SHIPPED | OUTPATIENT
Start: 2024-11-26

## 2024-11-26 NOTE — TELEPHONE ENCOUNTER
Attempted to contact PT regarding scheduling for their referral. I left them a voicemail with our office number and requested they call us back at their earliest convenience to get scheduled.    CBC  11/26

## 2024-11-27 ENCOUNTER — TELEPHONE (OUTPATIENT)
Dept: ONCOLOGY | Facility: CLINIC | Age: 74
End: 2024-11-27
Payer: MEDICARE

## 2024-11-27 DIAGNOSIS — E53.8 LOW FOLATE: Primary | ICD-10-CM

## 2024-11-27 RX ORDER — CYANOCOBALAMIN/FOLIC AC/VIT B6 1-2.5-25MG
1 TABLET ORAL DAILY
Qty: 30 TABLET | Refills: 0 | Status: SHIPPED | OUTPATIENT
Start: 2024-11-27

## 2024-11-27 NOTE — TELEPHONE ENCOUNTER
----- Message from Timoteo Lombardi sent at 11/26/2024  9:26 PM CST -----  Pls call in [facility] qd # 30  ----- Message -----  From: Lab, Background User  Sent: 11/26/2024  12:40 PM CST  To: Timoteo Lombardi MD

## 2024-12-02 ENCOUNTER — PRE-ADMISSION TESTING (OUTPATIENT)
Dept: PREADMISSION TESTING | Facility: HOSPITAL | Age: 74
End: 2024-12-02
Payer: MEDICARE

## 2024-12-02 ENCOUNTER — OFFICE VISIT (OUTPATIENT)
Dept: SURGERY | Facility: CLINIC | Age: 74
End: 2024-12-02
Payer: MEDICARE

## 2024-12-02 ENCOUNTER — PATIENT ROUNDING (BHMG ONLY) (OUTPATIENT)
Dept: SURGERY | Facility: CLINIC | Age: 74
End: 2024-12-02
Payer: MEDICARE

## 2024-12-02 ENCOUNTER — CLINICAL SUPPORT (OUTPATIENT)
Dept: ONCOLOGY | Facility: CLINIC | Age: 74
End: 2024-12-02
Payer: MEDICARE

## 2024-12-02 VITALS
BODY MASS INDEX: 26.36 KG/M2 | SYSTOLIC BLOOD PRESSURE: 115 MMHG | DIASTOLIC BLOOD PRESSURE: 76 MMHG | HEIGHT: 66 IN | WEIGHT: 164 LBS

## 2024-12-02 VITALS
HEART RATE: 84 BPM | WEIGHT: 167.33 LBS | OXYGEN SATURATION: 94 % | RESPIRATION RATE: 16 BRPM | BODY MASS INDEX: 24.78 KG/M2 | HEIGHT: 69 IN | SYSTOLIC BLOOD PRESSURE: 137 MMHG | DIASTOLIC BLOOD PRESSURE: 55 MMHG

## 2024-12-02 DIAGNOSIS — C34.11 MALIGNANT NEOPLASM OF UPPER LOBE OF RIGHT LUNG: Primary | ICD-10-CM

## 2024-12-02 DIAGNOSIS — C34.80 MALIGNANT NEOPLASM OF OVERLAPPING SITES OF LUNG, UNSPECIFIED LATERALITY: ICD-10-CM

## 2024-12-02 DIAGNOSIS — C34.80 MALIGNANT NEOPLASM OF OVERLAPPING SITES OF LUNG, UNSPECIFIED LATERALITY: Primary | ICD-10-CM

## 2024-12-02 LAB
ALBUMIN SERPL-MCNC: 3.9 G/DL (ref 3.5–5.2)
ALBUMIN/GLOB SERPL: 1.1 G/DL
ALP SERPL-CCNC: 79 U/L (ref 39–117)
ALT SERPL W P-5'-P-CCNC: 12 U/L (ref 1–41)
ANION GAP SERPL CALCULATED.3IONS-SCNC: 12 MMOL/L (ref 5–15)
AST SERPL-CCNC: 11 U/L (ref 1–40)
BASOPHILS # BLD AUTO: 0.06 10*3/MM3 (ref 0–0.2)
BASOPHILS NFR BLD AUTO: 0.7 % (ref 0–1.5)
BILIRUB SERPL-MCNC: <0.2 MG/DL (ref 0–1.2)
BUN SERPL-MCNC: 24 MG/DL (ref 8–23)
BUN/CREAT SERPL: 21.1 (ref 7–25)
CALCIUM SPEC-SCNC: 9.9 MG/DL (ref 8.6–10.5)
CHLORIDE SERPL-SCNC: 98 MMOL/L (ref 98–107)
CO2 SERPL-SCNC: 28 MMOL/L (ref 22–29)
CREAT SERPL-MCNC: 1.14 MG/DL (ref 0.76–1.27)
DEPRECATED RDW RBC AUTO: 49.1 FL (ref 37–54)
EGFRCR SERPLBLD CKD-EPI 2021: 67.5 ML/MIN/1.73
EOSINOPHIL # BLD AUTO: 0.24 10*3/MM3 (ref 0–0.4)
EOSINOPHIL NFR BLD AUTO: 2.6 % (ref 0.3–6.2)
ERYTHROCYTE [DISTWIDTH] IN BLOOD BY AUTOMATED COUNT: 14.2 % (ref 12.3–15.4)
GLOBULIN UR ELPH-MCNC: 3.5 GM/DL
GLUCOSE SERPL-MCNC: 158 MG/DL (ref 65–99)
HCT VFR BLD AUTO: 35.8 % (ref 37.5–51)
HGB BLD-MCNC: 10.9 G/DL (ref 13–17.7)
IMM GRANULOCYTES # BLD AUTO: 0.06 10*3/MM3 (ref 0–0.05)
IMM GRANULOCYTES NFR BLD AUTO: 0.7 % (ref 0–0.5)
LYMPHOCYTES # BLD AUTO: 1.81 10*3/MM3 (ref 0.7–3.1)
LYMPHOCYTES NFR BLD AUTO: 19.7 % (ref 19.6–45.3)
MCH RBC QN AUTO: 28.8 PG (ref 26.6–33)
MCHC RBC AUTO-ENTMCNC: 30.4 G/DL (ref 31.5–35.7)
MCV RBC AUTO: 94.5 FL (ref 79–97)
MONOCYTES # BLD AUTO: 0.76 10*3/MM3 (ref 0.1–0.9)
MONOCYTES NFR BLD AUTO: 8.3 % (ref 5–12)
NEUTROPHILS NFR BLD AUTO: 6.25 10*3/MM3 (ref 1.7–7)
NEUTROPHILS NFR BLD AUTO: 68 % (ref 42.7–76)
NRBC BLD AUTO-RTO: 0 /100 WBC (ref 0–0.2)
PLATELET # BLD AUTO: 307 10*3/MM3 (ref 140–450)
PMV BLD AUTO: 10.1 FL (ref 6–12)
POTASSIUM SERPL-SCNC: 4.9 MMOL/L (ref 3.5–5.2)
PROT SERPL-MCNC: 7.4 G/DL (ref 6–8.5)
RBC # BLD AUTO: 3.79 10*6/MM3 (ref 4.14–5.8)
SODIUM SERPL-SCNC: 138 MMOL/L (ref 136–145)
WBC NRBC COR # BLD AUTO: 9.18 10*3/MM3 (ref 3.4–10.8)

## 2024-12-02 PROCEDURE — 80053 COMPREHEN METABOLIC PANEL: CPT

## 2024-12-02 PROCEDURE — 87081 CULTURE SCREEN ONLY: CPT

## 2024-12-02 PROCEDURE — 85025 COMPLETE CBC W/AUTO DIFF WBC: CPT

## 2024-12-02 PROCEDURE — 93005 ELECTROCARDIOGRAM TRACING: CPT

## 2024-12-02 PROCEDURE — 36415 COLL VENOUS BLD VENIPUNCTURE: CPT

## 2024-12-02 PROCEDURE — 1159F MED LIST DOCD IN RCRD: CPT | Performed by: STUDENT IN AN ORGANIZED HEALTH CARE EDUCATION/TRAINING PROGRAM

## 2024-12-02 PROCEDURE — 1160F RVW MEDS BY RX/DR IN RCRD: CPT | Performed by: STUDENT IN AN ORGANIZED HEALTH CARE EDUCATION/TRAINING PROGRAM

## 2024-12-02 RX ORDER — HEPARIN SODIUM 5000 [USP'U]/ML
5000 INJECTION, SOLUTION INTRAVENOUS; SUBCUTANEOUS EVERY 8 HOURS SCHEDULED
Status: CANCELLED | OUTPATIENT
Start: 2024-12-02

## 2024-12-02 NOTE — PROGRESS NOTES
Subjective     PATIENT NAME:  Boogie Artsi  YOB: 1950  PATIENTS AGE:  74 y.o.  PATIENTS SEX:  male  DATE OF SERVICE:  12/02/2024  PROVIDER:  ALPHONSE ONC PAD NURSE      ____________________PATIENT EDUCATION____________________    PATIENT EDUCATION:  Today I met with the patient to discuss the chemotherapy regimen recommended for treatment of his disease lung cancer.  The patient was given explanation of treatment premed side effects including office policy that prohibits patients to drive if sedating medications are administered, MD explanation given regarding benefits, side effects, toxicities and goals of treatment.  The patient received a Chemotherapy/Biotherapy Plan Summary including diagnosis and specific treatment plan.    SIDE EFFECTS:  Common side effects were discussed with the patient and his wife.  Discussion included hair loss/discoloration, anemia/fatigue, infection/chills/fever, appetite, bleeding risk/precautions, constipation, diarrhea, mouth sores, taste alteration, loss of appetite,nausea/vomiting, peripheral neuropathy, skin/nail changes, rash, muscle aches/weakness, photosensitivity, weight gain/loss, hearing loss, dizziness, menopausal symptoms, menstrrual irregularity, sterility, high blood pressure, heart damage, liver damage, lung damage, kidney damage, DVT/PE risk, fluid retention, pleural/pericardial effusion, somnolence, electrolyte/LFT imbalance, vein exercises and/or the possible need for vascular access/port placement.  The patient was advice that although uncommon, leakage of an infused medication from the vein or venous access device (port) may lead to skin breakdown and/or other tissue damage.  The patient was advised that he/she may have pain, bleeding, and/or bruising from the insertion of a needle in their vein or venous access device (port).  The patient was further advised that, in spite of proper technique, infection with redness and irritation may rarely  occur at the site where the needle was inserted.  The patient was advised that if complications occur, additional medical treatment is available.  Concurrent chemotherapy and radiation weekly for 6.    Discussion also included side effects specific to drugs in the treatment plan, specifically  Carboplatin and Paclitaxel.        A total of 50 minutes were spent with the patient, with 100% of time spent in education and counseling.

## 2024-12-02 NOTE — DISCHARGE INSTRUCTIONS
Preparing for Surgery  Follow these instructions before the procedure:  Several days or weeks before your procedure      Ask your health care provider about:  Changing or stopping your regular medicines. This is especially important if you are taking diabetes medicines or blood thinners.  Taking medicines such as aspirin and ibuprofen. These medicines can thin your blood. Do not take these medicines unless your health care provider tells you to take them.  Taking over-the-counter medicines, vitamins, herbs, and supplements.    Contact your surgeon if you:  Develop a fever of more than 100.4°F (38°C) or other feelings of illness during the 48 hours before your surgery.  Have symptoms that get worse.  Have questions or concerns about your surgery.  If you are going home the same day of your surgery you will need to arrange for a responsible adult, age 18 years old or older, to drive you home from the hospital and stay with you for 24 hours. Verification of the  will be made prior to any procedure requiring sedation. You may not go home in a taxi or any form of public transportation by yourself.     Day before your procedure  Medication(s) you need to stop the day before your surgery: LISINOPRIL    24 hours before your procedure DO NOT drink alcoholic beverages or smoke.  24 hours before your procedure STOP taking Erectile Dysfunction medication (i.e.,Cialis, Viagra)   You may be asked to shower with a germ-killing soap.  Day of your procedure   You may take the following medication(s) the morning of surgery with a sip of water: DEPAKOTE, METOPROLOL, PROTONIX      8 hours before your scheduled arrival time, STOP all food, any dairy products, and full liquids. This includes hard candy, chewing gum or mints. This is extremely important to prevent serious complications.     Up to 2 hours before your scheduled arrival time, you may have clear liquids no cream, powder, or pulp of any kind. Safe options are water,  black coffee, plain tea, soda, Gatorade/Powerade, clear broth, apple juice.    2 hours before your scheduled arrival time, STOP drinking clear liquids.    You may need to take another shower with a germ-killing soap before you leave home in the morning. Do not use perfumes, colognes, or body lotions.  Wear comfortable loose-fitting clothing.  Remove all jewelry including body piercing and rings, dark colored nail polish, and make up prior to arrival at the hospital. Leave all valuables at home.   Bring your hearing aids if you rely on them.  Do not wear contact lenses. If you wear eyeglasses remember to bring a case to store them in while you are in surgery.  Do not use denture adhesives since you will be asked to remove them during your surgery.    You do not need to bring your home medications into the hospital.   Bring your sleep apnea device with you on the day of your surgery (if this applies to you).  If you have an Inspire implant for sleep apnea, please bring the remote with you on the day of surgery.  If you wear portable oxygen, bring it with you.   If you are staying overnight, you may bring a bag of items you may need such as slippers, robe and a change of clothes for your discharge. You may want to leave these items in the car until you are ready for them since your family will take your belongings when you leave the pre-operative area.  Arrive at the hospital as scheduled by the office. You will be asked to arrive 2 hours prior to your surgery time in order to prepare for your procedure.  When you arrive at the hospital  Go to the registration desk located at the main entrance of the hospital.  After registration is completed, you will be given a beeper and a sticker sheet. Take the stickers to Outpatient Surgery and place in the tray at the end of the desk to notify the staff that you have arrived and registered.   Return to the lobby to wait. You are not always called back according to the time of  arrival but rather the time your doctor will be ready.  When your beeper lights up and vibrates proceed through the double doors, under the stairs, and a member of the Outpatient Surgery staff will escort you to your preoperative room.     How to Use Chlorhexidine Before Surgery  Chlorhexidine gluconate (CHG) is a germ-killing (antiseptic) solution that is used to clean the skin. It can get rid of the bacteria that normally live on the skin and can keep them away for about 24 hours. To clean your skin with CHG, you may be given:  A CHG solution to use in the shower or as part of a sponge bath.  A prepackaged cloth that contains CHG.  Cleaning your skin with CHG may help lower the risk for infection:  While you are staying in the intensive care unit of the hospital.  If you have a vascular access, such as a central line, to provide short-term or long-term access to your veins.  If you have a catheter to drain urine from your bladder.  If you are on a ventilator. A ventilator is a machine that helps you breathe by moving air in and out of your lungs.  After surgery.  What are the risks?  Risks of using CHG include:  A skin reaction.  Hearing loss, if CHG gets in your ears and you have a perforated eardrum.  Eye injury, if CHG gets in your eyes and is not rinsed out.  The CHG product catching fire.  Make sure that you avoid smoking and flames after applying CHG to your skin.  Do not use CHG:  If you have a chlorhexidine allergy or have previously reacted to chlorhexidine.  On babies younger than 2 months of age.  How to use CHG solution  Use CHG only as told by your health care provider, and follow the instructions on the label.  Use the full amount of CHG as directed. Usually, this is one bottle.  During a shower    Follow these steps when using CHG solution during a shower (unless your health care provider gives you different instructions):  Start the shower.  Use your normal soap and shampoo to wash your face and  hair.  Turn off the shower or move out of the shower stream.  Pour the CHG onto a clean washcloth. Do not use any type of brush or rough-edged sponge.  Starting at your neck, lather your body down to your toes. Make sure you follow these instructions:  If you will be having surgery, pay special attention to the part of your body where you will be having surgery. Scrub this area for at least 1 minute.  Do not use CHG on your head or face. If the solution gets into your ears or eyes, rinse them well with water.  Avoid your genital area.  Avoid any areas of skin that have broken skin, cuts, or scrapes.  Scrub your back and under your arms. Make sure to wash skin folds.  Let the lather sit on your skin for 1-2 minutes or as long as told by your health care provider.  Thoroughly rinse your entire body in the shower. Make sure that all body creases and crevices are rinsed well.  Dry off with a clean towel. Do not put any substances on your body afterward--such as powder, lotion, or perfume--unless you are told to do so by your health care provider. Only use lotions that are recommended by the .  Put on clean clothes or pajamas.  If it is the night before your surgery, sleep in clean sheets.     During a sponge bath  Follow these steps when using CHG solution during a sponge bath (unless your health care provider gives you different instructions):  Use your normal soap and shampoo to wash your face and hair.  Pour the CHG onto a clean washcloth.  Starting at your neck, lather your body down to your toes. Make sure you follow these instructions:  If you will be having surgery, pay special attention to the part of your body where you will be having surgery. Scrub this area for at least 1 minute.  Do not use CHG on your head or face. If the solution gets into your ears or eyes, rinse them well with water.  Avoid your genital area.  Avoid any areas of skin that have broken skin, cuts, or scrapes.  Scrub your back  and under your arms. Make sure to wash skin folds.  Let the lather sit on your skin for 1-2 minutes or as long as told by your health care provider.  Using a different clean, wet washcloth, thoroughly rinse your entire body. Make sure that all body creases and crevices are rinsed well.  Dry off with a clean towel. Do not put any substances on your body afterward--such as powder, lotion, or perfume--unless you are told to do so by your health care provider. Only use lotions that are recommended by the .  Put on clean clothes or pajamas.  If it is the night before your surgery, sleep in clean sheets.  How to use CHG prepackaged cloths  Only use CHG cloths as told by your health care provider, and follow the instructions on the label.  Use the CHG cloth on clean, dry skin.  Do not use the CHG cloth on your head or face unless your health care provider tells you to.  When washing with the CHG cloth:  Avoid your genital area.  Avoid any areas of skin that have broken skin, cuts, or scrapes.  Before surgery    Follow these steps when using a CHG cloth to clean before surgery (unless your health care provider gives you different instructions):  Using the CHG cloth, vigorously scrub the part of your body where you will be having surgery. Scrub using a back-and-forth motion for 3 minutes. The area on your body should be completely wet with CHG when you are done scrubbing.  Do not rinse. Discard the cloth and let the area air-dry. Do not put any substances on the area afterward, such as powder, lotion, or perfume.  Put on clean clothes or pajamas.  If it is the night before your surgery, sleep in clean sheets.     For general bathing  Follow these steps when using CHG cloths for general bathing (unless your health care provider gives you different instructions).  Use a separate CHG cloth for each area of your body. Make sure you wash between any folds of skin and between your fingers and toes. Wash your body in  the following order, switching to a new cloth after each step:  The front of your neck, shoulders, and chest.  Both of your arms, under your arms, and your hands.  Your stomach and groin area, avoiding the genitals.  Your right leg and foot.  Your left leg and foot.  The back of your neck, your back, and your buttocks.  Do not rinse. Discard the cloth and let the area air-dry. Do not put any substances on your body afterward--such as powder, lotion, or perfume--unless you are told to do so by your health care provider. Only use lotions that are recommended by the .  Put on clean clothes or pajamas.  Contact a health care provider if:  Your skin gets irritated after scrubbing.  You have questions about using your solution or cloth.  You swallow any chlorhexidine. Call your local poison control center (1-673.656.7093 in the [place]  Get help right away if:  Your eyes itch badly, or they become very red or swollen.  Your skin itches badly and is red or swollen.  Your hearing changes.  You have trouble seeing.  You have swelling or tingling in your mouth or throat.  You have trouble breathing.  These symptoms may represent a serious problem that is an emergency. Do not wait to see if the symptoms will go away. Get medical help right away. Call your local emergency services (369 in the [place] Do not drive yourself to the hospital.  Summary  Chlorhexidine gluconate (CHG) is a germ-killing (antiseptic) solution that is used to clean the skin. Cleaning your skin with CHG may help to lower your risk for infection.  You may be given CHG to use for bathing. It may be in a bottle or in a prepackaged cloth to use on your skin. Carefully follow your health care provider's instructions and the instructions on the product label.  Do not use CHG if you have a chlorhexidine allergy.  Contact your health care provider if your skin gets irritated after scrubbing.  This information is not intended to replace advice given to  you by your health care provider. Make sure you discuss any questions you have with your health care provider.  Document Revised: 04/17/2023 Document Reviewed: 02/28/2022  Elsevier Patient Education © 2023 [employer]

## 2024-12-02 NOTE — PROGRESS NOTES
Patient rounding sent through [employer].     [facility]  12/02/2024

## 2024-12-02 NOTE — PROGRESS NOTES
Office New Patient History and Physical:     Referring Provider: Timoteo Lombardi MD    Chief Complaint   Patient presents with    Skin Cancer       Subjective .     History of present illness:  Boogie Artis is a 74 y.o. male who presents for port placement for lung cancer. He start chemotherapy next Monday. BMI is 26. He is not on blood thinners. He is a current cigarette smoker. No prior ports.     Review of Systems    Review of Systems - General ROS: negative  ENT ROS: negative  Respiratory ROS: no cough, shortness of breath, or wheezing  Cardiovascular ROS: no chest pain or dyspnea on exertion  Gastrointestinal ROS: no abdominal pain, change in bowel habits, or black or bloody stools  Genito-Urinary ROS: no dysuria, trouble voiding, or hematuria  Dermatological ROS: negative   Breast ROS: negative for breast lumps  Hematological and Lymphatic ROS: positive for lung cancer  Musculoskeletal ROS: negative   Neurological ROS: no TIA or stroke symptoms    Psychological ROS: negative  Endocrine ROS: negative    History  Past Medical History:   Diagnosis Date    Bleeding ulcer     Cancer     lung cancer left    COPD (chronic obstructive pulmonary disease)     Diabetes mellitus     Elevated cholesterol     Essential tremor     GERD (gastroesophageal reflux disease)     History of MRSA infection 05/01/2024    Respiratory culture    History of seasonal allergies     History of transfusion     Hyperlipidemia     Hypertension     Kidney infection     Kidney stone     Lung nodule     Migraines     Sleep apnea     cpap    Vitamin D deficiency    ,   Past Surgical History:   Procedure Laterality Date    APPENDECTOMY      BRONCHOSCOPY  2023    BRONCHOSCOPY N/A 4/29/2024    Procedure: BRONCHOSCOPY WITH ENDOBRONCHIAL ULTRASOUND;  Surgeon: Nicola Monsivais MD;  Location: [facility] OR;  Service: Pulmonary;  Laterality: N/A;  pre Nodule of upper lobe of right lung  Dr. Alves    BRONCHOSCOPY N/A 10/17/2024    Procedure:  BRONCHOSCOPY WITH APC & DEBULKING, cryotherapy, balloon dilation/tamponade;  Surgeon: Mata Simon MD;  Location:  [facility];  Service: Pulmonary;  Laterality: N/A;    BRONCHOSCOPY WITH ION ROBOTIC ASSIST N/A 2024    Procedure: BRONCHOSCOPY WITH ION ROBOT and BRONCHOSCOPY WITH ENDOBRONCHIAL ULTRASOUND;  Surgeon: Nicola Monsivais MD;  Location:  [facility] OR;  Service: Robotics - Pulmonary;  Laterality: N/A;  pre  Nodule of upper lobe of right lung  post      BRONCHOSCOPY WITH ION ROBOTIC ASSIST  2024    Procedure: BRONCHOSCOPY NAVIGATION WITH ENDOBRONCHIAL ULTRASOUND AND ION ROBOT;  Surgeon: Nicola Monsivais MD;  Location:  [facility] OR;  Service: Pulmonary;;    COLONOSCOPY  2023    HEMORRHOIDECTOMY      HERNIA REPAIR      LUNG SURGERY Left 2023   ,   Family History   Problem Relation Age of Onset    Diabetes Mother     Heart disease Mother     Cancer Father     Cancer Brother     Alcohol abuse Brother     Cancer Paternal Grandfather     Malig Hyperthermia Neg Hx    ,   Social History     Tobacco Use    Smoking status: Former     Current packs/day: 0.00     Average packs/day: 2.0 packs/day for 57.2 years (114.4 ttl pk-yrs)     Types: Cigarettes     Start date:      Quit date: 3/17/2023     Years since quittin.7     Passive exposure: Past    Smokeless tobacco: Never   Vaping Use    Vaping status: Never Used   Substance Use Topics    Alcohol use: Not Currently    Drug use: Never   , (Not in a hospital admission)   and Allergies:  Iodides    Current Outpatient Medications:     albuterol sulfate  (90 Base) MCG/ACT inhaler, Every 6 (Six) Hours., Disp: , Rfl:     cetirizine (zyrTEC) 10 MG tablet, Take 1 tablet by mouth Daily., Disp: , Rfl:     Cholecalciferol 125 MCG (5000 UT) tablet, Take 1 tablet by mouth Daily., Disp: , Rfl:     divalproex (DEPAKOTE ER) 250 MG 24 hr tablet, Take 1 tablet by mouth 2 (Two) Times a Day., Disp: , Rfl:     folic acid-vit B6-vit B12  "(Folbee) 2.5-25-1 MG tablet tablet, Take 1 tablet by mouth Daily., Disp: 30 tablet, Rfl: 0    furosemide (LASIX) 20 MG tablet, TAKE 1 TABLET BY MOUTH ONCE DAILY Oral for 14 Days, Disp: , Rfl:     glimepiride (AMARYL) 4 MG tablet, Take 1 tablet by mouth Daily., Disp: , Rfl:     hydroCHLOROthiazide (HYDRODIURIL) 25 MG tablet, Take 1 tablet by mouth Daily., Disp: , Rfl:     lisinopril (PRINIVIL,ZESTRIL) 10 MG tablet, Take 1 tablet by mouth Daily., Disp: , Rfl:     loratadine (CLARITIN) 10 MG tablet, Take 1 tablet by mouth Daily., Disp: , Rfl:     metFORMIN (GLUCOPHAGE) 500 MG tablet, Take 2 tablets by mouth 2 (Two) Times a Day., Disp: , Rfl:     metoprolol succinate XL (TOPROL-XL) 25 MG 24 hr tablet, Take 1 tablet by mouth Daily., Disp: , Rfl:     Omega-3 Fatty Acids (fish oil) 1000 MG capsule capsule, Take  by mouth Daily With Breakfast., Disp: , Rfl:     ondansetron (ZOFRAN) 8 MG tablet, Take 1 tablet by mouth Every 8 (Eight) Hours As Needed for Nausea or Vomiting., Disp: 30 tablet, Rfl: 3    pantoprazole (PROTONIX) 40 MG EC tablet, Take 1 tablet by mouth 2 (Two) Times a Day., Disp: , Rfl:     primidone (MYSOLINE) 50 MG tablet, Take 1 tablet by mouth 2 (Two) Times a Day. For essential tremors, Disp: , Rfl:     simvastatin (ZOCOR) 40 MG tablet, Take 1 tablet by mouth Every Night., Disp: , Rfl:     tiotropium bromide-olodaterol (Stiolto Respimat) 2.5-2.5 MCG/ACT aerosol solution inhaler, Inhale 2 puffs Daily., Disp: 1 each, Rfl: 3    Objective     Vital Signs   /76 (BP Location: Right arm, Patient Position: Sitting, Cuff Size: Large Adult)   Ht 168.9 cm (66.5\")   Wt 74.4 kg (164 lb)   BMI 26.08 kg/m²      Physical Exam:  General appearance - alert, well appearing, and in no distress  Mental status - alert, oriented to person, place, and time  Eyes - pupils equal and reactive, extraocular eye movements intact  Neck - supple, no significant adenopathy  Neurological - alert, oriented, normal speech, no focal " findings or movement disorder noted    Results Review:     The following data was reviewed by: Lorrie Hanson MD on 12/02/2024:  Progress Notes by Timoteo Lombardi MD (11/26/2024 11:00)   CT Chest Without Contrast Diagnostic (04/22/2024 14:09)   CT Chest Without Contrast Diagnostic (09/03/2024 12:41)     Assessment & Plan       Diagnoses and all orders for this visit:    1. Malignant neoplasm of overlapping sites of lung, unspecified laterality (Primary)  -     Case Request; Standing  -     MRSA Screen Culture (Outpatient) - Swab, Nares; Future  -     heparin (porcine) 5000 UNIT/ML injection 5,000 Units  -     CBC & Differential; Future  -     Comprehensive Metabolic Panel; Future  -     ceFAZolin (ANCEF) 2,000 mg in sodium chloride 0.9 % 100 mL IVPB    Other orders  -     Follow Anesthesia Guidelines / Protocol; Future  -     Follow Anesthesia Guidelines / Protocol; Standing  -     Verify / Perform Chlorhexidine Skin Prep; Standing  -     Provide NPO Instructions to Patient; Future  -     Chlorhexidine Skin Prep; Future  -     Notify physician (specify); Standing  -     Instructions on coughing, deep breathing, and incentive spirometry.; Standing  -     Oxygen Therapy-; Standing  -     Place Sequential Compression Device; Standing  -     Maintain Sequential Compression Device; Standing         Boogie Artis is a 74 y.o. male with a need for port placement for lung cancer. After a discussion of risks (including bleeding, port infection with need for removal, damage to surrounding structures including the arteries, pneumothorax and possible port malfunction) and benefits, the patient wishes to proceed with single lumen port placement with fluoroscopy. The patient is currently scheduled for this procedure on 12/5/24. PLACE ON LEFT.    This is a life threatening diagnosis. I have reviewed the above imaging and notes.  He is at increased risk of perioperative complications 2/2 his nicotine dependence and  active cancer diagnosis.     I also discussed with the patient post-operative pain management including multimodal pain control utilizing Tylenol, ibuprofen, and tramadol for breakthrough pain. I will plan to given the patient 5 tabs of 50mg Ultram post-operatively for break through pain.     BMI is within normal parameters. No other follow-up for BMI required.      Lorrie Hanson MD  12/02/24  13:28 CST

## 2024-12-02 NOTE — H&P (VIEW-ONLY)
Office New Patient History and Physical:     Referring Provider: Timoteo Lombardi MD    Chief Complaint   Patient presents with    Skin Cancer       Subjective .     History of present illness:  Boogie Artis is a 74 y.o. male who presents for port placement for lung cancer. He start chemotherapy next Monday. BMI is 26. He is not on blood thinners. He is a current cigarette smoker. No prior ports.     Review of Systems    Review of Systems - General ROS: negative  ENT ROS: negative  Respiratory ROS: no cough, shortness of breath, or wheezing  Cardiovascular ROS: no chest pain or dyspnea on exertion  Gastrointestinal ROS: no abdominal pain, change in bowel habits, or black or bloody stools  Genito-Urinary ROS: no dysuria, trouble voiding, or hematuria  Dermatological ROS: negative   Breast ROS: negative for breast lumps  Hematological and Lymphatic ROS: positive for lung cancer  Musculoskeletal ROS: negative   Neurological ROS: no TIA or stroke symptoms    Psychological ROS: negative  Endocrine ROS: negative    History  Past Medical History:   Diagnosis Date    Bleeding ulcer     Cancer     lung cancer left    COPD (chronic obstructive pulmonary disease)     Diabetes mellitus     Elevated cholesterol     Essential tremor     GERD (gastroesophageal reflux disease)     History of MRSA infection 05/01/2024    Respiratory culture    History of seasonal allergies     History of transfusion     Hyperlipidemia     Hypertension     Kidney infection     Kidney stone     Lung nodule     Migraines     Sleep apnea     cpap    Vitamin D deficiency    ,   Past Surgical History:   Procedure Laterality Date    APPENDECTOMY      BRONCHOSCOPY  2023    BRONCHOSCOPY N/A 4/29/2024    Procedure: BRONCHOSCOPY WITH ENDOBRONCHIAL ULTRASOUND;  Surgeon: Nicola Monsivais MD;  Location: [facility] OR;  Service: Pulmonary;  Laterality: N/A;  pre Nodule of upper lobe of right lung  Dr. Alves    BRONCHOSCOPY N/A 10/17/2024    Procedure:  BRONCHOSCOPY WITH APC & DEBULKING, cryotherapy, balloon dilation/tamponade;  Surgeon: Mata Simon MD;  Location:  [facility];  Service: Pulmonary;  Laterality: N/A;    BRONCHOSCOPY WITH ION ROBOTIC ASSIST N/A 2024    Procedure: BRONCHOSCOPY WITH ION ROBOT and BRONCHOSCOPY WITH ENDOBRONCHIAL ULTRASOUND;  Surgeon: Nicola Monsivais MD;  Location:  [facility] OR;  Service: Robotics - Pulmonary;  Laterality: N/A;  pre  Nodule of upper lobe of right lung  post      BRONCHOSCOPY WITH ION ROBOTIC ASSIST  2024    Procedure: BRONCHOSCOPY NAVIGATION WITH ENDOBRONCHIAL ULTRASOUND AND ION ROBOT;  Surgeon: Nicola Monsivais MD;  Location:  [facility] OR;  Service: Pulmonary;;    COLONOSCOPY  2023    HEMORRHOIDECTOMY      HERNIA REPAIR      LUNG SURGERY Left 2023   ,   Family History   Problem Relation Age of Onset    Diabetes Mother     Heart disease Mother     Cancer Father     Cancer Brother     Alcohol abuse Brother     Cancer Paternal Grandfather     Malig Hyperthermia Neg Hx    ,   Social History     Tobacco Use    Smoking status: Former     Current packs/day: 0.00     Average packs/day: 2.0 packs/day for 57.2 years (114.4 ttl pk-yrs)     Types: Cigarettes     Start date:      Quit date: 3/17/2023     Years since quittin.7     Passive exposure: Past    Smokeless tobacco: Never   Vaping Use    Vaping status: Never Used   Substance Use Topics    Alcohol use: Not Currently    Drug use: Never   , (Not in a hospital admission)   and Allergies:  Iodides    Current Outpatient Medications:     albuterol sulfate  (90 Base) MCG/ACT inhaler, Every 6 (Six) Hours., Disp: , Rfl:     cetirizine (zyrTEC) 10 MG tablet, Take 1 tablet by mouth Daily., Disp: , Rfl:     Cholecalciferol 125 MCG (5000 UT) tablet, Take 1 tablet by mouth Daily., Disp: , Rfl:     divalproex (DEPAKOTE ER) 250 MG 24 hr tablet, Take 1 tablet by mouth 2 (Two) Times a Day., Disp: , Rfl:     folic acid-vit B6-vit B12  "(Folbee) 2.5-25-1 MG tablet tablet, Take 1 tablet by mouth Daily., Disp: 30 tablet, Rfl: 0    furosemide (LASIX) 20 MG tablet, TAKE 1 TABLET BY MOUTH ONCE DAILY Oral for 14 Days, Disp: , Rfl:     glimepiride (AMARYL) 4 MG tablet, Take 1 tablet by mouth Daily., Disp: , Rfl:     hydroCHLOROthiazide (HYDRODIURIL) 25 MG tablet, Take 1 tablet by mouth Daily., Disp: , Rfl:     lisinopril (PRINIVIL,ZESTRIL) 10 MG tablet, Take 1 tablet by mouth Daily., Disp: , Rfl:     loratadine (CLARITIN) 10 MG tablet, Take 1 tablet by mouth Daily., Disp: , Rfl:     metFORMIN (GLUCOPHAGE) 500 MG tablet, Take 2 tablets by mouth 2 (Two) Times a Day., Disp: , Rfl:     metoprolol succinate XL (TOPROL-XL) 25 MG 24 hr tablet, Take 1 tablet by mouth Daily., Disp: , Rfl:     Omega-3 Fatty Acids (fish oil) 1000 MG capsule capsule, Take  by mouth Daily With Breakfast., Disp: , Rfl:     ondansetron (ZOFRAN) 8 MG tablet, Take 1 tablet by mouth Every 8 (Eight) Hours As Needed for Nausea or Vomiting., Disp: 30 tablet, Rfl: 3    pantoprazole (PROTONIX) 40 MG EC tablet, Take 1 tablet by mouth 2 (Two) Times a Day., Disp: , Rfl:     primidone (MYSOLINE) 50 MG tablet, Take 1 tablet by mouth 2 (Two) Times a Day. For essential tremors, Disp: , Rfl:     simvastatin (ZOCOR) 40 MG tablet, Take 1 tablet by mouth Every Night., Disp: , Rfl:     tiotropium bromide-olodaterol (Stiolto Respimat) 2.5-2.5 MCG/ACT aerosol solution inhaler, Inhale 2 puffs Daily., Disp: 1 each, Rfl: 3    Objective     Vital Signs   /76 (BP Location: Right arm, Patient Position: Sitting, Cuff Size: Large Adult)   Ht 168.9 cm (66.5\")   Wt 74.4 kg (164 lb)   BMI 26.08 kg/m²      Physical Exam:  General appearance - alert, well appearing, and in no distress  Mental status - alert, oriented to person, place, and time  Eyes - pupils equal and reactive, extraocular eye movements intact  Neck - supple, no significant adenopathy  Neurological - alert, oriented, normal speech, no focal " findings or movement disorder noted    Results Review:     The following data was reviewed by: Lorrie Hanson MD on 12/02/2024:  Progress Notes by Timoteo Lombardi MD (11/26/2024 11:00)   CT Chest Without Contrast Diagnostic (04/22/2024 14:09)   CT Chest Without Contrast Diagnostic (09/03/2024 12:41)     Assessment & Plan       Diagnoses and all orders for this visit:    1. Malignant neoplasm of overlapping sites of lung, unspecified laterality (Primary)  -     Case Request; Standing  -     MRSA Screen Culture (Outpatient) - Swab, Nares; Future  -     heparin (porcine) 5000 UNIT/ML injection 5,000 Units  -     CBC & Differential; Future  -     Comprehensive Metabolic Panel; Future  -     ceFAZolin (ANCEF) 2,000 mg in sodium chloride 0.9 % 100 mL IVPB    Other orders  -     Follow Anesthesia Guidelines / Protocol; Future  -     Follow Anesthesia Guidelines / Protocol; Standing  -     Verify / Perform Chlorhexidine Skin Prep; Standing  -     Provide NPO Instructions to Patient; Future  -     Chlorhexidine Skin Prep; Future  -     Notify physician (specify); Standing  -     Instructions on coughing, deep breathing, and incentive spirometry.; Standing  -     Oxygen Therapy-; Standing  -     Place Sequential Compression Device; Standing  -     Maintain Sequential Compression Device; Standing         Boogie Artis is a 74 y.o. male with a need for port placement for lung cancer. After a discussion of risks (including bleeding, port infection with need for removal, damage to surrounding structures including the arteries, pneumothorax and possible port malfunction) and benefits, the patient wishes to proceed with single lumen port placement with fluoroscopy. The patient is currently scheduled for this procedure on 12/5/24. PLACE ON LEFT.    This is a life threatening diagnosis. I have reviewed the above imaging and notes.  He is at increased risk of perioperative complications 2/2 his nicotine dependence and  active cancer diagnosis.     I also discussed with the patient post-operative pain management including multimodal pain control utilizing Tylenol, ibuprofen, and tramadol for breakthrough pain. I will plan to given the patient 5 tabs of 50mg Ultram post-operatively for break through pain.     BMI is within normal parameters. No other follow-up for BMI required.      Lorrie Hanson MD  12/02/24  13:28 CST

## 2024-12-03 DIAGNOSIS — J44.9 STAGE 3 SEVERE COPD BY GOLD CLASSIFICATION: Primary | ICD-10-CM

## 2024-12-03 DIAGNOSIS — C34.11 MALIGNANT NEOPLASM OF UPPER LOBE OF RIGHT LUNG: Primary | ICD-10-CM

## 2024-12-03 DIAGNOSIS — C34.92 SQUAMOUS CELL CARCINOMA OF LEFT LUNG: ICD-10-CM

## 2024-12-03 LAB — MRSA SPEC QL CULT: NORMAL

## 2024-12-03 RX ORDER — FLUTICASONE FUROATE, UMECLIDINIUM BROMIDE AND VILANTEROL TRIFENATATE 100; 62.5; 25 UG/1; UG/1; UG/1
1 POWDER RESPIRATORY (INHALATION)
Qty: 180 EACH | Refills: 3 | Status: SHIPPED | OUTPATIENT
Start: 2024-12-03 | End: 2024-12-06 | Stop reason: SDUPTHER

## 2024-12-03 RX ORDER — HYDROCORTISONE SODIUM SUCCINATE 100 MG/2ML
100 INJECTION INTRAMUSCULAR; INTRAVENOUS AS NEEDED
Status: CANCELLED | OUTPATIENT
Start: 2024-12-09

## 2024-12-03 RX ORDER — DIPHENHYDRAMINE HYDROCHLORIDE 50 MG/ML
50 INJECTION INTRAMUSCULAR; INTRAVENOUS ONCE
Status: CANCELLED
Start: 2024-12-09

## 2024-12-03 RX ORDER — FAMOTIDINE 10 MG/ML
20 INJECTION, SOLUTION INTRAVENOUS AS NEEDED
Status: CANCELLED | OUTPATIENT
Start: 2024-12-09

## 2024-12-03 RX ORDER — DIPHENHYDRAMINE HYDROCHLORIDE 50 MG/ML
50 INJECTION INTRAMUSCULAR; INTRAVENOUS AS NEEDED
Status: CANCELLED | OUTPATIENT
Start: 2024-12-09

## 2024-12-03 RX ORDER — FAMOTIDINE 10 MG/ML
20 INJECTION, SOLUTION INTRAVENOUS ONCE
Status: CANCELLED | OUTPATIENT
Start: 2024-12-09

## 2024-12-03 RX ORDER — PALONOSETRON 0.05 MG/ML
0.25 INJECTION, SOLUTION INTRAVENOUS ONCE
Status: CANCELLED | OUTPATIENT
Start: 2024-12-09

## 2024-12-03 RX ORDER — SODIUM CHLORIDE 9 MG/ML
20 INJECTION, SOLUTION INTRAVENOUS ONCE
Status: CANCELLED | OUTPATIENT
Start: 2024-12-09

## 2024-12-03 NOTE — TELEPHONE ENCOUNTER
"Per patient's wife, she checked on the price of the Stiolto and their copay is going to be $300 for 3 months. She said they can't afford it and would like to go back on the Trelegy. He has been taking it for the last week and his PCP has \"doubled his Metformin\".  Please advise.  "

## 2024-12-03 NOTE — TELEPHONE ENCOUNTER
Caller: WILLIAM PEREZ    Relationship to Patient: Emergency Contact    Pharmacy: [facility] [address] [place] [address].[facility] [address] [facility] [phone] [facility] [phone]  [phone]     Phone Number: [phone]     Reason for Call: THE PT WOULD LIKE TO SPEAK WITH SOMEONE REGARDING HIS tiotropium bromide-olodaterol (Stiolto Respimat) 2.5-2.5 MCG/ACT aerosol solution inhaler PLEASE ADVISE    When was the patient last seen: 11/25/24

## 2024-12-03 NOTE — TELEPHONE ENCOUNTER
Message relayed to the patient that the Trelegy has been sent to [facility].

## 2024-12-04 ENCOUNTER — TELEPHONE (OUTPATIENT)
Dept: SURGERY | Facility: CLINIC | Age: 74
End: 2024-12-04
Payer: MEDICARE

## 2024-12-04 ENCOUNTER — TELEPHONE (OUTPATIENT)
Dept: PULMONOLOGY | Facility: CLINIC | Age: 74
End: 2024-12-04
Payer: MEDICARE

## 2024-12-04 DIAGNOSIS — J44.9 STAGE 3 SEVERE COPD BY GOLD CLASSIFICATION: Primary | ICD-10-CM

## 2024-12-04 NOTE — TELEPHONE ENCOUNTER
Called Boogie to confirm his surgery date 12/05 with an arrival time of 7:30 am.   Reminded him not to eat or drink after midnight.   Let him know to come through the main entrance of the hospital and check in at main registration.

## 2024-12-04 NOTE — TELEPHONE ENCOUNTER
Caller: Boogie Artis    Relationship to patient: Self    Best call back number: [phone]      Patient is needing: pt wife called in stating that the medication that was sent over is too high in cost the medication is 500. Pt wife is asking if there is samples the pt can have until the dr. Finds another medication that is affordable. Please call back and advise.

## 2024-12-04 NOTE — TELEPHONE ENCOUNTER
Caller: WILLIAM PEREZ    Relationship: Emergency Contact    Best call back number: [phone]     What is the best time to reach you: ANYTIME TODAY    Who are you requesting to speak with (clinical staff, provider,  specific staff member): CLINICAL    Do you know the name of the person who called: PADDY    What was the call regarding: INHALER'S FOR PATIENT. WIFE STATES THAT THE TRELEGY IS GOING TO BE $700 AND THE OTHER ONE WAS $500 PER MONTH. SHE IS WANTING TO KNOW IF THERE IS ANY OTHER OPTIONS OR SAMPLES UNTIL JANUARY. PATIENT ONLY HAS 5 DAYS OF MEDICATION LEFT.   SHE IS ALSO GOING TO CHECK WITH [facility]. PLEASE CALL BACK TO ADVISE

## 2024-12-04 NOTE — TELEPHONE ENCOUNTER
Thank you. We can set one aside for him once we get samples. I agree to check with pcp office to see if they have samples. Otherwise, I have no way of knowing how much certain inhalers would cost him. I would suggest asking his pharmacy which inhalers would be most affordable and we can decide which is the most appropriate from there.

## 2024-12-04 NOTE — TELEPHONE ENCOUNTER
Per patient's wife:  What was the call regarding: INHALER'S FOR PATIENT. WIFE STATES THAT THE TRELEGY IS GOING TO BE $700 AND THE OTHER ONE WAS $500 PER MONTH. SHE IS WANTING TO KNOW IF THERE IS ANY OTHER OPTIONS OR SAMPLES UNTIL JANUARY. PATIENT ONLY HAS 5 DAYS OF MEDICATION LEFT.   SHE IS ALSO GOING TO CHECK WITH [facility]. PLEASE CALL BACK TO ADVISE

## 2024-12-04 NOTE — TELEPHONE ENCOUNTER
Patient found out the reason his inhalers are so expensive is because he is in the donut hole until after the first of the year.  Please advise.

## 2024-12-05 ENCOUNTER — ANESTHESIA EVENT (OUTPATIENT)
Dept: PERIOP | Facility: HOSPITAL | Age: 74
End: 2024-12-05
Payer: MEDICARE

## 2024-12-05 ENCOUNTER — HOSPITAL ENCOUNTER (OUTPATIENT)
Facility: HOSPITAL | Age: 74
Setting detail: HOSPITAL OUTPATIENT SURGERY
Discharge: HOME | End: 2024-12-05
Attending: STUDENT IN AN ORGANIZED HEALTH CARE EDUCATION/TRAINING PROGRAM | Admitting: STUDENT IN AN ORGANIZED HEALTH CARE EDUCATION/TRAINING PROGRAM
Payer: MEDICARE

## 2024-12-05 ENCOUNTER — APPOINTMENT (OUTPATIENT)
Dept: GENERAL RADIOLOGY | Facility: HOSPITAL | Age: 74
End: 2024-12-05
Payer: MEDICARE

## 2024-12-05 ENCOUNTER — ANESTHESIA (OUTPATIENT)
Dept: PERIOP | Facility: HOSPITAL | Age: 74
End: 2024-12-05
Payer: MEDICARE

## 2024-12-05 VITALS
DIASTOLIC BLOOD PRESSURE: 54 MMHG | HEART RATE: 60 BPM | OXYGEN SATURATION: 89 % | RESPIRATION RATE: 16 BRPM | TEMPERATURE: 97.1 F | SYSTOLIC BLOOD PRESSURE: 107 MMHG

## 2024-12-05 DIAGNOSIS — C34.92 SQUAMOUS CELL CARCINOMA OF LEFT LUNG: Primary | ICD-10-CM

## 2024-12-05 DIAGNOSIS — C34.80 MALIGNANT NEOPLASM OF OVERLAPPING SITES OF LUNG, UNSPECIFIED LATERALITY: ICD-10-CM

## 2024-12-05 LAB
GLUCOSE BLDC GLUCOMTR-MCNC: 143 MG/DL (ref 70–130)
QT INTERVAL: 362 MS
QTC INTERVAL: 409 MS

## 2024-12-05 PROCEDURE — 25010000002 CEFAZOLIN PER 500 MG: Performed by: STUDENT IN AN ORGANIZED HEALTH CARE EDUCATION/TRAINING PROGRAM

## 2024-12-05 PROCEDURE — 77001 FLUOROGUIDE FOR VEIN DEVICE: CPT | Performed by: STUDENT IN AN ORGANIZED HEALTH CARE EDUCATION/TRAINING PROGRAM

## 2024-12-05 PROCEDURE — 76000 FLUOROSCOPY <1 HR PHYS/QHP: CPT

## 2024-12-05 PROCEDURE — 36561 INSERT TUNNELED CV CATH: CPT | Performed by: STUDENT IN AN ORGANIZED HEALTH CARE EDUCATION/TRAINING PROGRAM

## 2024-12-05 PROCEDURE — 25010000002 FENTANYL CITRATE (PF) 50 MCG/ML SOLUTION: Performed by: NURSE ANESTHETIST, CERTIFIED REGISTERED

## 2024-12-05 PROCEDURE — 25010000002 PROPOFOL 1000 MG/100ML EMULSION: Performed by: NURSE ANESTHETIST, CERTIFIED REGISTERED

## 2024-12-05 PROCEDURE — 25010000002 LIDOCAINE 1% - EPINEPHRINE 1:100000 1 %-1:100000 SOLUTION: Performed by: STUDENT IN AN ORGANIZED HEALTH CARE EDUCATION/TRAINING PROGRAM

## 2024-12-05 PROCEDURE — 25010000002 HEPARIN (PORCINE) PER 1000 UNITS: Performed by: STUDENT IN AN ORGANIZED HEALTH CARE EDUCATION/TRAINING PROGRAM

## 2024-12-05 PROCEDURE — 25010000002 HEPARIN LOCK FLUSH PER 10 UNITS: Performed by: STUDENT IN AN ORGANIZED HEALTH CARE EDUCATION/TRAINING PROGRAM

## 2024-12-05 PROCEDURE — 25810000003 SODIUM CHLORIDE PER 500 ML: Performed by: STUDENT IN AN ORGANIZED HEALTH CARE EDUCATION/TRAINING PROGRAM

## 2024-12-05 PROCEDURE — C1788 PORT, INDWELLING, IMP: HCPCS | Performed by: STUDENT IN AN ORGANIZED HEALTH CARE EDUCATION/TRAINING PROGRAM

## 2024-12-05 PROCEDURE — 25010000002 ONDANSETRON PER 1 MG: Performed by: NURSE ANESTHETIST, CERTIFIED REGISTERED

## 2024-12-05 PROCEDURE — 82948 REAGENT STRIP/BLOOD GLUCOSE: CPT

## 2024-12-05 DEVICE — PRT INTRO VASC/INTERV VORTEX FILL/HL DETACH/POLYURET/CATH 8F: Type: IMPLANTABLE DEVICE | Site: CHEST | Status: FUNCTIONAL

## 2024-12-05 RX ORDER — IBUPROFEN 600 MG/1
600 TABLET, FILM COATED ORAL EVERY 6 HOURS PRN
Status: DISCONTINUED | OUTPATIENT
Start: 2024-12-05 | End: 2024-12-05 | Stop reason: HOSPADM

## 2024-12-05 RX ORDER — SODIUM CHLORIDE 9 MG/ML
INJECTION, SOLUTION INTRAVENOUS AS NEEDED
Status: DISCONTINUED | OUTPATIENT
Start: 2024-12-05 | End: 2024-12-05 | Stop reason: HOSPADM

## 2024-12-05 RX ORDER — SODIUM CHLORIDE 9 MG/ML
40 INJECTION, SOLUTION INTRAVENOUS AS NEEDED
Status: DISCONTINUED | OUTPATIENT
Start: 2024-12-05 | End: 2024-12-05 | Stop reason: HOSPADM

## 2024-12-05 RX ORDER — LABETALOL HYDROCHLORIDE 5 MG/ML
5 INJECTION, SOLUTION INTRAVENOUS
Status: DISCONTINUED | OUTPATIENT
Start: 2024-12-05 | End: 2024-12-05 | Stop reason: HOSPADM

## 2024-12-05 RX ORDER — SODIUM CHLORIDE 0.9 % (FLUSH) 0.9 %
3 SYRINGE (ML) INJECTION EVERY 12 HOURS SCHEDULED
Status: DISCONTINUED | OUTPATIENT
Start: 2024-12-05 | End: 2024-12-05 | Stop reason: HOSPADM

## 2024-12-05 RX ORDER — SODIUM CHLORIDE, SODIUM LACTATE, POTASSIUM CHLORIDE, CALCIUM CHLORIDE 600; 310; 30; 20 MG/100ML; MG/100ML; MG/100ML; MG/100ML
100 INJECTION, SOLUTION INTRAVENOUS CONTINUOUS
Status: DISCONTINUED | OUTPATIENT
Start: 2024-12-05 | End: 2024-12-05 | Stop reason: HOSPADM

## 2024-12-05 RX ORDER — SODIUM CHLORIDE 0.9 % (FLUSH) 0.9 %
3 SYRINGE (ML) INJECTION AS NEEDED
Status: DISCONTINUED | OUTPATIENT
Start: 2024-12-05 | End: 2024-12-05 | Stop reason: HOSPADM

## 2024-12-05 RX ORDER — SODIUM CHLORIDE 0.9 % (FLUSH) 0.9 %
3-10 SYRINGE (ML) INJECTION AS NEEDED
Status: DISCONTINUED | OUTPATIENT
Start: 2024-12-05 | End: 2024-12-05 | Stop reason: HOSPADM

## 2024-12-05 RX ORDER — FENTANYL CITRATE 50 UG/ML
INJECTION, SOLUTION INTRAMUSCULAR; INTRAVENOUS AS NEEDED
Status: DISCONTINUED | OUTPATIENT
Start: 2024-12-05 | End: 2024-12-05 | Stop reason: SURG

## 2024-12-05 RX ORDER — MAGNESIUM HYDROXIDE 1200 MG/15ML
LIQUID ORAL AS NEEDED
Status: DISCONTINUED | OUTPATIENT
Start: 2024-12-05 | End: 2024-12-05 | Stop reason: HOSPADM

## 2024-12-05 RX ORDER — ONDANSETRON 4 MG/1
4 TABLET, FILM COATED ORAL EVERY 8 HOURS PRN
Qty: 15 TABLET | Refills: 0 | Status: SHIPPED | OUTPATIENT
Start: 2024-12-05 | End: 2025-12-05

## 2024-12-05 RX ORDER — HEPARIN SODIUM 5000 [USP'U]/ML
5000 INJECTION, SOLUTION INTRAVENOUS; SUBCUTANEOUS EVERY 8 HOURS SCHEDULED
Status: DISCONTINUED | OUTPATIENT
Start: 2024-12-05 | End: 2024-12-05 | Stop reason: HOSPADM

## 2024-12-05 RX ORDER — HYDROCODONE BITARTRATE AND ACETAMINOPHEN 5; 325 MG/1; MG/1
1 TABLET ORAL EVERY 4 HOURS PRN
Status: DISCONTINUED | OUTPATIENT
Start: 2024-12-05 | End: 2024-12-05 | Stop reason: HOSPADM

## 2024-12-05 RX ORDER — SODIUM CHLORIDE, SODIUM LACTATE, POTASSIUM CHLORIDE, CALCIUM CHLORIDE 600; 310; 30; 20 MG/100ML; MG/100ML; MG/100ML; MG/100ML
1000 INJECTION, SOLUTION INTRAVENOUS CONTINUOUS
Status: DISCONTINUED | OUTPATIENT
Start: 2024-12-05 | End: 2024-12-05 | Stop reason: HOSPADM

## 2024-12-05 RX ORDER — PROPOFOL 10 MG/ML
INJECTION, EMULSION INTRAVENOUS AS NEEDED
Status: DISCONTINUED | OUTPATIENT
Start: 2024-12-05 | End: 2024-12-05 | Stop reason: SURG

## 2024-12-05 RX ORDER — ACETAMINOPHEN 325 MG/1
975 TABLET ORAL EVERY 8 HOURS
Start: 2024-12-05 | End: 2025-12-05

## 2024-12-05 RX ORDER — TRAMADOL HYDROCHLORIDE 50 MG/1
50 TABLET ORAL EVERY 8 HOURS PRN
Qty: 5 TABLET | Refills: 0 | Status: SHIPPED | OUTPATIENT
Start: 2024-12-05 | End: 2024-12-08

## 2024-12-05 RX ORDER — ONDANSETRON 2 MG/ML
4 INJECTION INTRAMUSCULAR; INTRAVENOUS ONCE AS NEEDED
Status: DISCONTINUED | OUTPATIENT
Start: 2024-12-05 | End: 2024-12-05 | Stop reason: HOSPADM

## 2024-12-05 RX ORDER — LIDOCAINE HYDROCHLORIDE 10 MG/ML
0.5 INJECTION, SOLUTION EPIDURAL; INFILTRATION; INTRACAUDAL; PERINEURAL ONCE AS NEEDED
Status: DISCONTINUED | OUTPATIENT
Start: 2024-12-05 | End: 2024-12-05 | Stop reason: HOSPADM

## 2024-12-05 RX ORDER — FENTANYL CITRATE 50 UG/ML
50 INJECTION, SOLUTION INTRAMUSCULAR; INTRAVENOUS
Status: DISCONTINUED | OUTPATIENT
Start: 2024-12-05 | End: 2024-12-05 | Stop reason: HOSPADM

## 2024-12-05 RX ORDER — HEPARIN SODIUM (PORCINE) LOCK FLUSH IV SOLN 100 UNIT/ML 100 UNIT/ML
SOLUTION INTRAVENOUS AS NEEDED
Status: DISCONTINUED | OUTPATIENT
Start: 2024-12-05 | End: 2024-12-05 | Stop reason: HOSPADM

## 2024-12-05 RX ORDER — NALOXONE HCL 0.4 MG/ML
0.4 VIAL (ML) INJECTION AS NEEDED
Status: DISCONTINUED | OUTPATIENT
Start: 2024-12-05 | End: 2024-12-05 | Stop reason: HOSPADM

## 2024-12-05 RX ORDER — MIDAZOLAM HYDROCHLORIDE 2 MG/2ML
0.5 INJECTION, SOLUTION INTRAMUSCULAR; INTRAVENOUS
Status: DISCONTINUED | OUTPATIENT
Start: 2024-12-05 | End: 2024-12-05 | Stop reason: HOSPADM

## 2024-12-05 RX ORDER — HYDROCODONE BITARTRATE AND ACETAMINOPHEN 10; 325 MG/1; MG/1
1 TABLET ORAL EVERY 4 HOURS PRN
Status: DISCONTINUED | OUTPATIENT
Start: 2024-12-05 | End: 2024-12-05 | Stop reason: HOSPADM

## 2024-12-05 RX ORDER — FLUMAZENIL 0.1 MG/ML
0.2 INJECTION INTRAVENOUS AS NEEDED
Status: DISCONTINUED | OUTPATIENT
Start: 2024-12-05 | End: 2024-12-05 | Stop reason: HOSPADM

## 2024-12-05 RX ORDER — ONDANSETRON 2 MG/ML
INJECTION INTRAMUSCULAR; INTRAVENOUS AS NEEDED
Status: DISCONTINUED | OUTPATIENT
Start: 2024-12-05 | End: 2024-12-05 | Stop reason: SURG

## 2024-12-05 RX ORDER — LIDOCAINE HYDROCHLORIDE AND EPINEPHRINE 10; 10 MG/ML; UG/ML
INJECTION, SOLUTION INFILTRATION; PERINEURAL AS NEEDED
Status: DISCONTINUED | OUTPATIENT
Start: 2024-12-05 | End: 2024-12-05 | Stop reason: HOSPADM

## 2024-12-05 RX ADMIN — ONDANSETRON 4 MG: 2 INJECTION INTRAMUSCULAR; INTRAVENOUS at 10:44

## 2024-12-05 RX ADMIN — PROPOFOL 20 MG: 10 INJECTION, EMULSION INTRAVENOUS at 10:31

## 2024-12-05 RX ADMIN — PROPOFOL 20 MG: 10 INJECTION, EMULSION INTRAVENOUS at 10:39

## 2024-12-05 RX ADMIN — HEPARIN SODIUM 5000 UNITS: 5000 INJECTION, SOLUTION INTRAVENOUS; SUBCUTANEOUS at 10:17

## 2024-12-05 RX ADMIN — CEFAZOLIN 2 G: 2 INJECTION, POWDER, FOR SOLUTION INTRAMUSCULAR; INTRAVENOUS at 10:27

## 2024-12-05 RX ADMIN — FENTANYL CITRATE 50 MCG: 50 INJECTION, SOLUTION INTRAMUSCULAR; INTRAVENOUS at 10:21

## 2024-12-05 RX ADMIN — FENTANYL CITRATE 50 MCG: 50 INJECTION, SOLUTION INTRAMUSCULAR; INTRAVENOUS at 10:30

## 2024-12-05 NOTE — ANESTHESIA PREPROCEDURE EVALUATION
Anesthesia Evaluation     no history of anesthetic complications:   NPO Solid Status: > 8 hours  NPO Liquid Status: > 8 hours           Airway   Mallampati: I  No difficulty expected  Dental    (+) poor dentition        Pulmonary    (+) a smoker Former, lung cancer, COPD,sleep apnea    ROS comment: Pt reports h/o bronch, moo resection at [facility], prolonged hospitalization after an arterial injury  Dental injury from anesthesia  Cardiovascular   Exercise tolerance: poor (<4 METS)    (+) hypertension, hyperlipidemia      Neuro/Psych  (+) seizures, headaches    ROS Comment:  Headache, short term memory loss, felt to be seizures and on depakote  GI/Hepatic/Renal/Endo    (+) GERD, renal disease- stones, diabetes mellitus    Musculoskeletal     Abdominal    Substance History      OB/GYN          Other   blood dyscrasia anemia,   history of cancer active                  Anesthesia Plan    ASA 3     MAC     intravenous induction     Anesthetic plan, risks, benefits, and alternatives have been provided, discussed and informed consent has been obtained with: patient.    CODE STATUS:

## 2024-12-05 NOTE — OP NOTE
Port-A-Cath Placement with Fluoroscopy Operative Report:     Patient: Boogie Artis  MRN: [id number]    YOB: 1950  Age: 74 y.o.  Sex: male  Unit:  [facility] OR Room/Bed: [facility] OR/MAIN OR Location: [facility]      Admitting Physician: MICHAEL HANSON    Primary Care Physician: uLcho Alves MD             INDICATIONS: This is a 74 y.o. male who presents with squamous cell lung cancer as indication for port placement.     DATE OF OPERATION: 12/5/2024     Surgeons and Role:     * Michael Hanson MD - Primary    ANESTHESIA: Monitored Anesthesia Care     PREOPERATIVE DIAGNOSIS: Malignant neoplasm of overlapping sites of lung, unspecified laterality [C34.80]    POSTOPERATIVE DIAGNOSIS: Same    PROCEDURES PERFORMED:  Port-A-Cath placement in the left cephalic vein     PROCEDURE DETAILS:     After patient was placed on the table in a supine position the bilateral chest and neck were prepped with ChloraPrep and draped in the usual fashion.  Preoperative antibiotics were given.  A timeout was performed.    On the left, the skin overlying the deltopectoral groove was infiltrated with 1% lidocaine with epinephrine. An incision was made, and I dissected down to the deltopectoral groove with a combination of cautery and sharp dissection. The cephalic vein was identified. 2-0 silk ties were placed proximally and distally. The distal tie was tied down.  An 11 blade was used to create a venotomy. The yellow pic was used to open the vein and the catheter was inserted. Under live fluoroscopy, the catheter was noted to be in the SVC. The proximal tie was tied down, ensuring not to narrow the lumen of the catheter in the vein. A pocket was created in the subcutaneous tissue for the port.  The catheter was connected to the Port-A-Cath which was buried into the subcutaneous pocket and secured with 0-Vicryl sutures.  The pocket was closed with 3-0 Vicryl and then 4-0 Monocryl.  The port was accessed through the  skin, blood was easily aspirated, and it was flushed with a final heparin flush. Skin glue was placed over the incision.  The patient tolerated procedure well.  There were no complications. No x-ray needed in PACU     Findings: Port-a-cath placed in the left cephalic vein   Estimated Blood Loss:  10mL   Complications: none apparent            Specimens: None     Disposition: PACU - hemodynamically stable.           Condition: stable    Lorrie Hanson MD  12/02/2024

## 2024-12-05 NOTE — DISCHARGE INSTRUCTIONS
Wound:   - you have skin glue on your incisions. Okay to shower tomorrow.   - Leave skin glue in place, it should slowly fall off over 2 weeks   - No swimming/soaking/bathing x 2 weeks to allow incisions to heal.     Activity:   - Activity as tolerated.   - No driving or operating machinery on narcotic pain medication.     Pain medication:   - Take 1000mg of tylenol every 8 hours for 3 days. After three days, take it prn.   - You have a prescription for a narcotic. It will be ultram tabs. Take these only as needed after you have taken the tylenol. If you are taking the ultram, make sure to take a stool softener (colace) with it as it can cause constipation.   - The narcotic may make you nauseated, you will have a prescription for zofran in case of nausea.     Follow up:   - make an appointment to see Henny Witt PA-C  in 2 weeks  - If you have any concerns before then, call me office at [phone]

## 2024-12-05 NOTE — TELEPHONE ENCOUNTER
Tried to call patient, no answer and no voicemail.     Patient assistance for Trelegy on my desk for patient to fill out.

## 2024-12-05 NOTE — ANESTHESIA POSTPROCEDURE EVALUATION
Patient: Boogie Artis    Procedure Summary       Date: 12/05/24 Room / Location:  [facility] /  [facility]    Anesthesia Start: 1019 Anesthesia Stop: 1100    Procedure: Single Lumen Port-a-cath insertion with flouroscopy (Chin to Nipples) Diagnosis:       Malignant neoplasm of overlapping sites of lung, unspecified laterality      (Malignant neoplasm of overlapping sites of lung, unspecified laterality [C34.80])    Surgeons: Lorrie Hanson MD Provider: Mal Erickson CRNA    Anesthesia Type: MAC ASA Status: 3            Anesthesia Type: MAC    Vitals  Vitals Value Taken Time   BP 99/48 12/05/24 1101   Temp     Pulse 64 12/05/24 1102   Resp     SpO2 90 % 12/05/24 1102   Vitals shown include unfiled device data.        Post Anesthesia Care and Evaluation    Patient location during evaluation: PHASE II  Patient participation: complete - patient participated  Level of consciousness: awake  Pain score: 0  Pain management: adequate    Airway patency: patent  Anesthetic complications: No anesthetic complications  PONV Status: none  Cardiovascular status: acceptable  Respiratory status: acceptable  Hydration status: acceptable

## 2024-12-06 ENCOUNTER — NURSE TRIAGE (OUTPATIENT)
Dept: CALL CENTER | Facility: HOSPITAL | Age: 74
End: 2024-12-06
Payer: MEDICARE

## 2024-12-06 ENCOUNTER — HOSPITAL ENCOUNTER (OUTPATIENT)
Dept: MRI IMAGING | Facility: HOSPITAL | Age: 74
Discharge: HOME | End: 2024-12-06
Payer: MEDICARE

## 2024-12-06 ENCOUNTER — TELEPHONE (OUTPATIENT)
Age: 74
End: 2024-12-06
Payer: MEDICARE

## 2024-12-06 DIAGNOSIS — C34.92 SQUAMOUS CELL CARCINOMA OF LEFT LUNG: ICD-10-CM

## 2024-12-06 PROCEDURE — 70553 MRI BRAIN STEM W/O & W/DYE: CPT

## 2024-12-06 PROCEDURE — 25510000001 GADOPICLENOL 0.5 MMOL/ML SOLUTION: Performed by: INTERNAL MEDICINE

## 2024-12-06 PROCEDURE — A9579 GAD-BASE MR CONTRAST NOS,1ML: HCPCS | Performed by: INTERNAL MEDICINE

## 2024-12-06 RX ORDER — FLUTICASONE FUROATE, UMECLIDINIUM BROMIDE AND VILANTEROL TRIFENATATE 100; 62.5; 25 UG/1; UG/1; UG/1
1 POWDER RESPIRATORY (INHALATION)
Qty: 28 EACH | Refills: 0 | Status: SHIPPED | OUTPATIENT
Start: 2024-12-06 | End: 2024-12-06 | Stop reason: SDUPTHER

## 2024-12-06 RX ORDER — FLUTICASONE FUROATE, UMECLIDINIUM BROMIDE AND VILANTEROL TRIFENATATE 100; 62.5; 25 UG/1; UG/1; UG/1
1 POWDER RESPIRATORY (INHALATION)
Qty: 28 EACH | Refills: 0 | COMMUNITY
Start: 2024-12-06

## 2024-12-06 RX ADMIN — GADOPICLENOL 7.5 ML: 485.1 INJECTION INTRAVENOUS at 11:29

## 2024-12-06 NOTE — TELEPHONE ENCOUNTER
Spoke to patient's wife and told he we did get samples of Trelegy in and they can come by and pick them up along with the patient assistance paper work.

## 2024-12-06 NOTE — TELEPHONE ENCOUNTER
Post OP phone call visit:    Type of surgery: Single lumen port-a-cath insertion.       Unable to reach patient. Unable to leave detailed message with call back phone number for any questions or concerns.

## 2024-12-06 NOTE — TELEPHONE ENCOUNTER
"Returning a call from yesterday.  Epic records reviewed.  Transferred to Dr. Monsivais's office.  Reason for Disposition  • [1] Caller requesting NON-URGENT health information AND [2] PCP's office is the best resource    Additional Information  • Negative: [1] Caller is not with the adult (patient) AND [2] reporting urgent symptoms  • Negative: Lab result questions  • Negative: Medication questions  • Negative: Caller can't be reached by phone  • Negative: Caller has already spoken to PCP or another triager  • Negative: RN needs further essential information from caller in order to complete triage  • Negative: Requesting regular office appointment    Answer Assessment - Initial Assessment Questions  1. REASON FOR CALL or QUESTION: \"What is your reason for calling today?\" or \"How can I best help you?\" or \"What question do you have that I can help answer?\"      I missed a call.    Protocols used: Information Only Call - No Triage-ADULT-    "

## 2024-12-09 ENCOUNTER — HOSPITAL ENCOUNTER (OUTPATIENT)
Dept: RADIATION ONCOLOGY | Facility: HOSPITAL | Age: 74
Discharge: HOME | End: 2024-12-09
Payer: MEDICARE

## 2024-12-09 ENCOUNTER — HOSPITAL ENCOUNTER (OUTPATIENT)
Dept: RADIATION ONCOLOGY | Facility: HOSPITAL | Age: 74
Setting detail: RADIATION/ONCOLOGY SERIES
End: 2024-12-09
Payer: MEDICARE

## 2024-12-09 ENCOUNTER — DOCUMENTATION (OUTPATIENT)
Dept: RADIATION ONCOLOGY | Facility: HOSPITAL | Age: 74
End: 2024-12-09
Payer: MEDICARE

## 2024-12-09 ENCOUNTER — INFUSION (OUTPATIENT)
Dept: ONCOLOGY | Facility: HOSPITAL | Age: 74
End: 2024-12-09
Payer: MEDICARE

## 2024-12-09 ENCOUNTER — LAB (OUTPATIENT)
Dept: LAB | Facility: HOSPITAL | Age: 74
End: 2024-12-09
Payer: MEDICARE

## 2024-12-09 VITALS
OXYGEN SATURATION: 95 % | SYSTOLIC BLOOD PRESSURE: 113 MMHG | DIASTOLIC BLOOD PRESSURE: 63 MMHG | RESPIRATION RATE: 20 BRPM | TEMPERATURE: 97.5 F | HEIGHT: 68 IN | BODY MASS INDEX: 24.37 KG/M2 | WEIGHT: 160.8 LBS | HEART RATE: 73 BPM

## 2024-12-09 DIAGNOSIS — C34.92 SQUAMOUS CELL CARCINOMA OF LEFT LUNG: ICD-10-CM

## 2024-12-09 DIAGNOSIS — Z95.828 PORT-A-CATH IN PLACE: Primary | ICD-10-CM

## 2024-12-09 DIAGNOSIS — C34.11 MALIGNANT NEOPLASM OF UPPER LOBE OF RIGHT LUNG: ICD-10-CM

## 2024-12-09 LAB
ALBUMIN SERPL-MCNC: 4.1 G/DL (ref 3.5–5.2)
ALBUMIN/GLOB SERPL: 1.2 G/DL
ALP SERPL-CCNC: 89 U/L (ref 39–117)
ALT SERPL W P-5'-P-CCNC: 8 U/L (ref 1–41)
ANION GAP SERPL CALCULATED.3IONS-SCNC: 11 MMOL/L (ref 5–15)
AST SERPL-CCNC: 11 U/L (ref 1–40)
BASOPHILS # BLD AUTO: 0.05 10*3/MM3 (ref 0–0.2)
BASOPHILS NFR BLD AUTO: 0.5 % (ref 0–1.5)
BILIRUB SERPL-MCNC: <0.2 MG/DL (ref 0–1.2)
BUN SERPL-MCNC: 22 MG/DL (ref 8–23)
BUN/CREAT SERPL: 20 (ref 7–25)
CALCIUM SPEC-SCNC: 9.8 MG/DL (ref 8.6–10.5)
CHLORIDE SERPL-SCNC: 100 MMOL/L (ref 98–107)
CO2 SERPL-SCNC: 27 MMOL/L (ref 22–29)
CREAT SERPL-MCNC: 1.1 MG/DL (ref 0.76–1.27)
DEPRECATED RDW RBC AUTO: 50.1 FL (ref 37–54)
EGFRCR SERPLBLD CKD-EPI 2021: 70.4 ML/MIN/1.73
EOSINOPHIL # BLD AUTO: 0.31 10*3/MM3 (ref 0–0.4)
EOSINOPHIL NFR BLD AUTO: 2.9 % (ref 0.3–6.2)
ERYTHROCYTE [DISTWIDTH] IN BLOOD BY AUTOMATED COUNT: 14.5 % (ref 12.3–15.4)
GLOBULIN UR ELPH-MCNC: 3.5 GM/DL
GLUCOSE SERPL-MCNC: 101 MG/DL (ref 65–99)
HCT VFR BLD AUTO: 36.8 % (ref 37.5–51)
HGB BLD-MCNC: 11.4 G/DL (ref 13–17.7)
IMM GRANULOCYTES # BLD AUTO: 0.04 10*3/MM3 (ref 0–0.05)
IMM GRANULOCYTES NFR BLD AUTO: 0.4 % (ref 0–0.5)
LYMPHOCYTES # BLD AUTO: 1.89 10*3/MM3 (ref 0.7–3.1)
LYMPHOCYTES NFR BLD AUTO: 17.7 % (ref 19.6–45.3)
MAGNESIUM SERPL-MCNC: 1.8 MG/DL (ref 1.6–2.4)
MCH RBC QN AUTO: 29.4 PG (ref 26.6–33)
MCHC RBC AUTO-ENTMCNC: 31 G/DL (ref 31.5–35.7)
MCV RBC AUTO: 94.8 FL (ref 79–97)
MONOCYTES # BLD AUTO: 0.85 10*3/MM3 (ref 0.1–0.9)
MONOCYTES NFR BLD AUTO: 8 % (ref 5–12)
NEUTROPHILS NFR BLD AUTO: 7.51 10*3/MM3 (ref 1.7–7)
NEUTROPHILS NFR BLD AUTO: 70.5 % (ref 42.7–76)
NRBC BLD AUTO-RTO: 0 /100 WBC (ref 0–0.2)
PLATELET # BLD AUTO: 303 10*3/MM3 (ref 140–450)
PMV BLD AUTO: 10.1 FL (ref 6–12)
POTASSIUM SERPL-SCNC: 5 MMOL/L (ref 3.5–5.2)
PROT SERPL-MCNC: 7.6 G/DL (ref 6–8.5)
RAD ONC ARIA COURSE ID: NORMAL
RAD ONC ARIA COURSE INTENT: NORMAL
RAD ONC ARIA COURSE LAST TREATMENT DATE: NORMAL
RAD ONC ARIA COURSE START DATE: NORMAL
RAD ONC ARIA COURSE TREATMENT ELAPSED DAYS: 0
RAD ONC ARIA FIRST TREATMENT DATE: NORMAL
RAD ONC ARIA PLAN FRACTIONS TREATED TO DATE: 1
RAD ONC ARIA PLAN ID: NORMAL
RAD ONC ARIA PLAN PRESCRIBED DOSE PER FRACTION: 1.8 GY
RAD ONC ARIA PLAN PRIMARY REFERENCE POINT: NORMAL
RAD ONC ARIA PLAN TOTAL FRACTIONS PRESCRIBED: 33
RAD ONC ARIA PLAN TOTAL PRESCRIBED DOSE: 5940 CGY
RAD ONC ARIA REFERENCE POINT DOSAGE GIVEN TO DATE: 1.8 GY
RAD ONC ARIA REFERENCE POINT ID: NORMAL
RAD ONC ARIA REFERENCE POINT SESSION DOSAGE GIVEN: 1.8 GY
RBC # BLD AUTO: 3.88 10*6/MM3 (ref 4.14–5.8)
SODIUM SERPL-SCNC: 138 MMOL/L (ref 136–145)
WBC NRBC COR # BLD AUTO: 10.65 10*3/MM3 (ref 3.4–10.8)

## 2024-12-09 PROCEDURE — 77386: CPT | Performed by: RADIOLOGY

## 2024-12-09 PROCEDURE — 25010000002 PALONOSETRON PER 25 MCG: Performed by: INTERNAL MEDICINE

## 2024-12-09 PROCEDURE — 25010000002 PACLITAXEL PER 1 MG: Performed by: INTERNAL MEDICINE

## 2024-12-09 PROCEDURE — 96413 CHEMO IV INFUSION 1 HR: CPT

## 2024-12-09 PROCEDURE — 85025 COMPLETE CBC W/AUTO DIFF WBC: CPT

## 2024-12-09 PROCEDURE — 25810000003 SODIUM CHLORIDE 0.9 % SOLUTION: Performed by: INTERNAL MEDICINE

## 2024-12-09 PROCEDURE — 96417 CHEMO IV INFUS EACH ADDL SEQ: CPT

## 2024-12-09 PROCEDURE — 83735 ASSAY OF MAGNESIUM: CPT

## 2024-12-09 PROCEDURE — 80053 COMPREHEN METABOLIC PANEL: CPT

## 2024-12-09 PROCEDURE — 25010000002 DEXAMETHASONE PER 1 MG: Performed by: INTERNAL MEDICINE

## 2024-12-09 PROCEDURE — 36415 COLL VENOUS BLD VENIPUNCTURE: CPT

## 2024-12-09 PROCEDURE — 96375 TX/PRO/DX INJ NEW DRUG ADDON: CPT

## 2024-12-09 PROCEDURE — 25810000003 SODIUM CHLORIDE 0.9 % SOLUTION 250 ML FLEX CONT: Performed by: INTERNAL MEDICINE

## 2024-12-09 PROCEDURE — 96415 CHEMO IV INFUSION ADDL HR: CPT

## 2024-12-09 PROCEDURE — 25010000002 HEPARIN LOCK FLUSH PER 10 UNITS: Performed by: INTERNAL MEDICINE

## 2024-12-09 PROCEDURE — 25010000002 DIPHENHYDRAMINE PER 50 MG: Performed by: INTERNAL MEDICINE

## 2024-12-09 PROCEDURE — 77014 CHG CT GUIDANCE RADIATION THERAPY FLDS PLACEMENT: CPT | Performed by: RADIOLOGY

## 2024-12-09 PROCEDURE — 25010000002 CARBOPLATIN PER 50 MG: Performed by: INTERNAL MEDICINE

## 2024-12-09 RX ORDER — SODIUM CHLORIDE 9 MG/ML
20 INJECTION, SOLUTION INTRAVENOUS ONCE
Status: COMPLETED | OUTPATIENT
Start: 2024-12-09 | End: 2024-12-09

## 2024-12-09 RX ORDER — HYDROCORTISONE SODIUM SUCCINATE 100 MG/2ML
100 INJECTION INTRAMUSCULAR; INTRAVENOUS AS NEEDED
Status: DISCONTINUED | OUTPATIENT
Start: 2024-12-09 | End: 2024-12-09 | Stop reason: HOSPADM

## 2024-12-09 RX ORDER — SODIUM CHLORIDE 0.9 % (FLUSH) 0.9 %
10 SYRINGE (ML) INJECTION AS NEEDED
Status: DISCONTINUED | OUTPATIENT
Start: 2024-12-09 | End: 2024-12-09 | Stop reason: HOSPADM

## 2024-12-09 RX ORDER — DIPHENHYDRAMINE HYDROCHLORIDE 50 MG/ML
50 INJECTION INTRAMUSCULAR; INTRAVENOUS AS NEEDED
Status: DISCONTINUED | OUTPATIENT
Start: 2024-12-09 | End: 2024-12-09 | Stop reason: HOSPADM

## 2024-12-09 RX ORDER — PALONOSETRON 0.05 MG/ML
0.25 INJECTION, SOLUTION INTRAVENOUS ONCE
Status: COMPLETED | OUTPATIENT
Start: 2024-12-09 | End: 2024-12-09

## 2024-12-09 RX ORDER — FAMOTIDINE 10 MG/ML
20 INJECTION, SOLUTION INTRAVENOUS AS NEEDED
Status: DISCONTINUED | OUTPATIENT
Start: 2024-12-09 | End: 2024-12-09 | Stop reason: HOSPADM

## 2024-12-09 RX ORDER — DEXAMETHASONE SODIUM PHOSPHATE 10 MG/ML
10 INJECTION INTRAMUSCULAR; INTRAVENOUS ONCE
Status: COMPLETED | OUTPATIENT
Start: 2024-12-09 | End: 2024-12-09

## 2024-12-09 RX ORDER — SODIUM CHLORIDE 0.9 % (FLUSH) 0.9 %
10 SYRINGE (ML) INJECTION AS NEEDED
Status: CANCELLED | OUTPATIENT
Start: 2024-12-09

## 2024-12-09 RX ORDER — HEPARIN SODIUM (PORCINE) LOCK FLUSH IV SOLN 100 UNIT/ML 100 UNIT/ML
500 SOLUTION INTRAVENOUS AS NEEDED
Status: CANCELLED | OUTPATIENT
Start: 2024-12-09

## 2024-12-09 RX ORDER — HEPARIN SODIUM (PORCINE) LOCK FLUSH IV SOLN 100 UNIT/ML 100 UNIT/ML
500 SOLUTION INTRAVENOUS AS NEEDED
Status: DISCONTINUED | OUTPATIENT
Start: 2024-12-09 | End: 2024-12-09 | Stop reason: HOSPADM

## 2024-12-09 RX ORDER — FAMOTIDINE 10 MG/ML
20 INJECTION, SOLUTION INTRAVENOUS ONCE
Status: COMPLETED | OUTPATIENT
Start: 2024-12-09 | End: 2024-12-09

## 2024-12-09 RX ORDER — DIPHENHYDRAMINE HYDROCHLORIDE 50 MG/ML
50 INJECTION INTRAMUSCULAR; INTRAVENOUS ONCE
Status: COMPLETED | OUTPATIENT
Start: 2024-12-09 | End: 2024-12-09

## 2024-12-09 RX ADMIN — HEPARIN 500 UNITS: 100 SYRINGE at 12:55

## 2024-12-09 RX ADMIN — SODIUM CHLORIDE 20 ML/HR: 9 INJECTION, SOLUTION INTRAVENOUS at 09:30

## 2024-12-09 RX ADMIN — CARBOPLATIN 170 MG: 10 INJECTION INTRAVENOUS at 12:07

## 2024-12-09 RX ADMIN — PALONOSETRON HYDROCHLORIDE 0.25 MG: 0.25 INJECTION, SOLUTION INTRAVENOUS at 09:50

## 2024-12-09 RX ADMIN — DIPHENHYDRAMINE HYDROCHLORIDE 50 MG: 50 INJECTION, SOLUTION INTRAMUSCULAR; INTRAVENOUS at 09:56

## 2024-12-09 RX ADMIN — DEXAMETHASONE SODIUM PHOSPHATE 10 MG: 10 INJECTION INTRAMUSCULAR; INTRAVENOUS at 09:52

## 2024-12-09 RX ADMIN — Medication 10 ML: at 12:55

## 2024-12-09 RX ADMIN — PACLITAXEL 85 MG: 6 INJECTION, SOLUTION INTRAVENOUS at 10:31

## 2024-12-09 RX ADMIN — FAMOTIDINE 20 MG: 10 INJECTION INTRAVENOUS at 09:56

## 2024-12-09 NOTE — PROGRESS NOTES
SANDRO met with Mr. Artis who is here to start chemo and radiation treatment for squamous cell carcinoma of left lung. SANDRO explained role and source of support. He is 74 years old and lives with his wife. His support system includes his wife and two daughters. Mr. Artis works two, 12-hour shifts as a . He will be taking some time off work but hopes to return once treatment is completed. Mr. Artis states he does have financial concerns due to his medical bills. SANDRO did offer them a financial application for his hospital bills. SANDRO did also speak to them regarding gas/utility assistance.     He is feeling nervous about starting treatment due to possible side effects. He wants to continue to be as independent as possible. Mr. Artis states his support system is wonderful and without them he wouldn't be here. Emotional support provided and encouraged him to express his feelings. Mr. Artis did become tearful. Positive coping strategies discussed. He does not take any medication for anxiety/depression, and he does not see a counselor. SANDRO will remain available.

## 2024-12-10 ENCOUNTER — HOSPITAL ENCOUNTER (OUTPATIENT)
Dept: RADIATION ONCOLOGY | Facility: HOSPITAL | Age: 74
Discharge: HOME | End: 2024-12-10
Payer: MEDICARE

## 2024-12-10 DIAGNOSIS — C34.11 MALIGNANT NEOPLASM OF UPPER LOBE OF RIGHT LUNG: Primary | ICD-10-CM

## 2024-12-10 DIAGNOSIS — C34.92 SQUAMOUS CELL CARCINOMA OF LEFT LUNG: ICD-10-CM

## 2024-12-10 LAB
RAD ONC ARIA COURSE ID: NORMAL
RAD ONC ARIA COURSE INTENT: NORMAL
RAD ONC ARIA COURSE LAST TREATMENT DATE: NORMAL
RAD ONC ARIA COURSE START DATE: NORMAL
RAD ONC ARIA COURSE TREATMENT ELAPSED DAYS: 1
RAD ONC ARIA FIRST TREATMENT DATE: NORMAL
RAD ONC ARIA PLAN FRACTIONS TREATED TO DATE: 2
RAD ONC ARIA PLAN ID: NORMAL
RAD ONC ARIA PLAN PRESCRIBED DOSE PER FRACTION: 1.8 GY
RAD ONC ARIA PLAN PRIMARY REFERENCE POINT: NORMAL
RAD ONC ARIA PLAN TOTAL FRACTIONS PRESCRIBED: 33
RAD ONC ARIA PLAN TOTAL PRESCRIBED DOSE: 5940 CGY
RAD ONC ARIA REFERENCE POINT DOSAGE GIVEN TO DATE: 3.6 GY
RAD ONC ARIA REFERENCE POINT ID: NORMAL
RAD ONC ARIA REFERENCE POINT SESSION DOSAGE GIVEN: 1.8 GY

## 2024-12-10 PROCEDURE — 77386: CPT | Performed by: RADIOLOGY

## 2024-12-10 PROCEDURE — 77014 CHG CT GUIDANCE RADIATION THERAPY FLDS PLACEMENT: CPT | Performed by: RADIOLOGY

## 2024-12-10 RX ORDER — SODIUM CHLORIDE 9 MG/ML
20 INJECTION, SOLUTION INTRAVENOUS ONCE
Status: CANCELLED | OUTPATIENT
Start: 2024-12-16

## 2024-12-10 RX ORDER — HYDROCORTISONE SODIUM SUCCINATE 100 MG/2ML
100 INJECTION INTRAMUSCULAR; INTRAVENOUS AS NEEDED
Status: CANCELLED | OUTPATIENT
Start: 2024-12-16

## 2024-12-10 RX ORDER — FAMOTIDINE 10 MG/ML
20 INJECTION, SOLUTION INTRAVENOUS ONCE
Status: CANCELLED | OUTPATIENT
Start: 2024-12-16

## 2024-12-10 RX ORDER — DIPHENHYDRAMINE HYDROCHLORIDE 50 MG/ML
50 INJECTION INTRAMUSCULAR; INTRAVENOUS AS NEEDED
Status: CANCELLED | OUTPATIENT
Start: 2024-12-16

## 2024-12-10 RX ORDER — DIPHENHYDRAMINE HYDROCHLORIDE 50 MG/ML
50 INJECTION INTRAMUSCULAR; INTRAVENOUS ONCE
Status: CANCELLED
Start: 2024-12-16

## 2024-12-10 RX ORDER — FAMOTIDINE 10 MG/ML
20 INJECTION, SOLUTION INTRAVENOUS AS NEEDED
Status: CANCELLED | OUTPATIENT
Start: 2024-12-16

## 2024-12-10 RX ORDER — PALONOSETRON 0.05 MG/ML
0.25 INJECTION, SOLUTION INTRAVENOUS ONCE
Status: CANCELLED | OUTPATIENT
Start: 2024-12-16

## 2024-12-11 ENCOUNTER — HOSPITAL ENCOUNTER (OUTPATIENT)
Dept: RADIATION ONCOLOGY | Facility: HOSPITAL | Age: 74
Discharge: HOME | End: 2024-12-11

## 2024-12-11 LAB
RAD ONC ARIA COURSE ID: NORMAL
RAD ONC ARIA COURSE INTENT: NORMAL
RAD ONC ARIA COURSE LAST TREATMENT DATE: NORMAL
RAD ONC ARIA COURSE START DATE: NORMAL
RAD ONC ARIA COURSE TREATMENT ELAPSED DAYS: 2
RAD ONC ARIA FIRST TREATMENT DATE: NORMAL
RAD ONC ARIA PLAN FRACTIONS TREATED TO DATE: 3
RAD ONC ARIA PLAN ID: NORMAL
RAD ONC ARIA PLAN PRESCRIBED DOSE PER FRACTION: 1.8 GY
RAD ONC ARIA PLAN PRIMARY REFERENCE POINT: NORMAL
RAD ONC ARIA PLAN TOTAL FRACTIONS PRESCRIBED: 33
RAD ONC ARIA PLAN TOTAL PRESCRIBED DOSE: 5940 CGY
RAD ONC ARIA REFERENCE POINT DOSAGE GIVEN TO DATE: 5.4 GY
RAD ONC ARIA REFERENCE POINT ID: NORMAL
RAD ONC ARIA REFERENCE POINT SESSION DOSAGE GIVEN: 1.8 GY

## 2024-12-11 PROCEDURE — 77014 CHG CT GUIDANCE RADIATION THERAPY FLDS PLACEMENT: CPT | Performed by: RADIOLOGY

## 2024-12-11 PROCEDURE — 77386: CPT | Performed by: RADIOLOGY

## 2024-12-12 ENCOUNTER — HOSPITAL ENCOUNTER (OUTPATIENT)
Dept: RADIATION ONCOLOGY | Facility: HOSPITAL | Age: 74
Setting detail: RADIATION/ONCOLOGY SERIES
Discharge: HOME | End: 2024-12-12
Payer: MEDICARE

## 2024-12-12 LAB
RAD ONC ARIA COURSE ID: NORMAL
RAD ONC ARIA COURSE INTENT: NORMAL
RAD ONC ARIA COURSE LAST TREATMENT DATE: NORMAL
RAD ONC ARIA COURSE START DATE: NORMAL
RAD ONC ARIA COURSE TREATMENT ELAPSED DAYS: 3
RAD ONC ARIA FIRST TREATMENT DATE: NORMAL
RAD ONC ARIA PLAN FRACTIONS TREATED TO DATE: 4
RAD ONC ARIA PLAN ID: NORMAL
RAD ONC ARIA PLAN PRESCRIBED DOSE PER FRACTION: 1.8 GY
RAD ONC ARIA PLAN PRIMARY REFERENCE POINT: NORMAL
RAD ONC ARIA PLAN TOTAL FRACTIONS PRESCRIBED: 33
RAD ONC ARIA PLAN TOTAL PRESCRIBED DOSE: 5940 CGY
RAD ONC ARIA REFERENCE POINT DOSAGE GIVEN TO DATE: 7.2 GY
RAD ONC ARIA REFERENCE POINT ID: NORMAL
RAD ONC ARIA REFERENCE POINT SESSION DOSAGE GIVEN: 1.8 GY

## 2024-12-12 PROCEDURE — 77014 CHG CT GUIDANCE RADIATION THERAPY FLDS PLACEMENT: CPT | Performed by: RADIOLOGY

## 2024-12-12 PROCEDURE — 77386: CPT | Performed by: RADIOLOGY

## 2024-12-13 ENCOUNTER — HOSPITAL ENCOUNTER (OUTPATIENT)
Dept: RADIATION ONCOLOGY | Facility: HOSPITAL | Age: 74
Discharge: HOME | End: 2024-12-13

## 2024-12-13 LAB
RAD ONC ARIA COURSE ID: NORMAL
RAD ONC ARIA COURSE INTENT: NORMAL
RAD ONC ARIA COURSE LAST TREATMENT DATE: NORMAL
RAD ONC ARIA COURSE START DATE: NORMAL
RAD ONC ARIA COURSE TREATMENT ELAPSED DAYS: 4
RAD ONC ARIA FIRST TREATMENT DATE: NORMAL
RAD ONC ARIA PLAN FRACTIONS TREATED TO DATE: 5
RAD ONC ARIA PLAN ID: NORMAL
RAD ONC ARIA PLAN PRESCRIBED DOSE PER FRACTION: 1.8 GY
RAD ONC ARIA PLAN PRIMARY REFERENCE POINT: NORMAL
RAD ONC ARIA PLAN TOTAL FRACTIONS PRESCRIBED: 33
RAD ONC ARIA PLAN TOTAL PRESCRIBED DOSE: 5940 CGY
RAD ONC ARIA REFERENCE POINT DOSAGE GIVEN TO DATE: 9 GY
RAD ONC ARIA REFERENCE POINT ID: NORMAL
RAD ONC ARIA REFERENCE POINT SESSION DOSAGE GIVEN: 1.8 GY

## 2024-12-13 PROCEDURE — 77014 CHG CT GUIDANCE RADIATION THERAPY FLDS PLACEMENT: CPT | Performed by: RADIOLOGY

## 2024-12-13 PROCEDURE — 77336 RADIATION PHYSICS CONSULT: CPT | Performed by: RADIOLOGY

## 2024-12-13 PROCEDURE — 77386: CPT | Performed by: RADIOLOGY

## 2024-12-16 ENCOUNTER — TELEPHONE (OUTPATIENT)
Dept: ONCOLOGY | Facility: CLINIC | Age: 74
End: 2024-12-16
Payer: MEDICARE

## 2024-12-16 ENCOUNTER — INFUSION (OUTPATIENT)
Dept: ONCOLOGY | Facility: HOSPITAL | Age: 74
End: 2024-12-16
Payer: MEDICARE

## 2024-12-16 ENCOUNTER — HOSPITAL ENCOUNTER (OUTPATIENT)
Dept: RADIATION ONCOLOGY | Facility: HOSPITAL | Age: 74
Setting detail: RADIATION/ONCOLOGY SERIES
Discharge: HOME | End: 2024-12-16
Payer: MEDICARE

## 2024-12-16 ENCOUNTER — LAB (OUTPATIENT)
Dept: LAB | Facility: HOSPITAL | Age: 74
End: 2024-12-16
Payer: MEDICARE

## 2024-12-16 VITALS
TEMPERATURE: 97.2 F | RESPIRATION RATE: 18 BRPM | OXYGEN SATURATION: 91 % | DIASTOLIC BLOOD PRESSURE: 55 MMHG | SYSTOLIC BLOOD PRESSURE: 112 MMHG | HEART RATE: 71 BPM

## 2024-12-16 DIAGNOSIS — C34.92 SQUAMOUS CELL CARCINOMA OF LEFT LUNG: ICD-10-CM

## 2024-12-16 DIAGNOSIS — C34.11 MALIGNANT NEOPLASM OF UPPER LOBE OF RIGHT LUNG: Primary | ICD-10-CM

## 2024-12-16 DIAGNOSIS — C34.11 MALIGNANT NEOPLASM OF UPPER LOBE OF RIGHT LUNG: ICD-10-CM

## 2024-12-16 DIAGNOSIS — N28.9 FUNCTION KIDNEY DECREASED: ICD-10-CM

## 2024-12-16 DIAGNOSIS — Z95.828 PORT-A-CATH IN PLACE: Primary | ICD-10-CM

## 2024-12-16 LAB
ALBUMIN SERPL-MCNC: 3.8 G/DL (ref 3.5–5.2)
ALBUMIN/GLOB SERPL: 1.1 G/DL
ALP SERPL-CCNC: 75 U/L (ref 39–117)
ALT SERPL W P-5'-P-CCNC: 9 U/L (ref 1–41)
ANION GAP SERPL CALCULATED.3IONS-SCNC: 12 MMOL/L (ref 5–15)
AST SERPL-CCNC: 12 U/L (ref 1–40)
BASOPHILS # BLD AUTO: 0.07 10*3/MM3 (ref 0–0.2)
BASOPHILS NFR BLD AUTO: 0.8 % (ref 0–1.5)
BILIRUB SERPL-MCNC: <0.2 MG/DL (ref 0–1.2)
BUN SERPL-MCNC: 29 MG/DL (ref 8–23)
BUN/CREAT SERPL: 23 (ref 7–25)
CALCIUM SPEC-SCNC: 9.5 MG/DL (ref 8.6–10.5)
CHLORIDE SERPL-SCNC: 99 MMOL/L (ref 98–107)
CO2 SERPL-SCNC: 26 MMOL/L (ref 22–29)
CREAT SERPL-MCNC: 1.26 MG/DL (ref 0.76–1.27)
DEPRECATED RDW RBC AUTO: 51.8 FL (ref 37–54)
EGFRCR SERPLBLD CKD-EPI 2021: 59.8 ML/MIN/1.73
EOSINOPHIL # BLD AUTO: 0.28 10*3/MM3 (ref 0–0.4)
EOSINOPHIL NFR BLD AUTO: 3.2 % (ref 0.3–6.2)
ERYTHROCYTE [DISTWIDTH] IN BLOOD BY AUTOMATED COUNT: 14.6 % (ref 12.3–15.4)
GLOBULIN UR ELPH-MCNC: 3.5 GM/DL
GLUCOSE SERPL-MCNC: 134 MG/DL (ref 65–99)
HCT VFR BLD AUTO: 35.1 % (ref 37.5–51)
HGB BLD-MCNC: 10.7 G/DL (ref 13–17.7)
IMM GRANULOCYTES # BLD AUTO: 0.06 10*3/MM3 (ref 0–0.05)
IMM GRANULOCYTES NFR BLD AUTO: 0.7 % (ref 0–0.5)
LYMPHOCYTES # BLD AUTO: 1.44 10*3/MM3 (ref 0.7–3.1)
LYMPHOCYTES NFR BLD AUTO: 16.7 % (ref 19.6–45.3)
MAGNESIUM SERPL-MCNC: 1.9 MG/DL (ref 1.6–2.4)
MCH RBC QN AUTO: 29.2 PG (ref 26.6–33)
MCHC RBC AUTO-ENTMCNC: 30.5 G/DL (ref 31.5–35.7)
MCV RBC AUTO: 95.9 FL (ref 79–97)
MONOCYTES # BLD AUTO: 0.65 10*3/MM3 (ref 0.1–0.9)
MONOCYTES NFR BLD AUTO: 7.5 % (ref 5–12)
NEUTROPHILS NFR BLD AUTO: 6.12 10*3/MM3 (ref 1.7–7)
NEUTROPHILS NFR BLD AUTO: 71.1 % (ref 42.7–76)
NRBC BLD AUTO-RTO: 0 /100 WBC (ref 0–0.2)
PLATELET # BLD AUTO: 306 10*3/MM3 (ref 140–450)
PMV BLD AUTO: 10.2 FL (ref 6–12)
POTASSIUM SERPL-SCNC: 4.5 MMOL/L (ref 3.5–5.2)
PROT SERPL-MCNC: 7.3 G/DL (ref 6–8.5)
RAD ONC ARIA COURSE ID: NORMAL
RAD ONC ARIA COURSE INTENT: NORMAL
RAD ONC ARIA COURSE LAST TREATMENT DATE: NORMAL
RAD ONC ARIA COURSE START DATE: NORMAL
RAD ONC ARIA COURSE TREATMENT ELAPSED DAYS: 7
RAD ONC ARIA FIRST TREATMENT DATE: NORMAL
RAD ONC ARIA PLAN FRACTIONS TREATED TO DATE: 6
RAD ONC ARIA PLAN ID: NORMAL
RAD ONC ARIA PLAN PRESCRIBED DOSE PER FRACTION: 1.8 GY
RAD ONC ARIA PLAN PRIMARY REFERENCE POINT: NORMAL
RAD ONC ARIA PLAN TOTAL FRACTIONS PRESCRIBED: 33
RAD ONC ARIA PLAN TOTAL PRESCRIBED DOSE: 5940 CGY
RAD ONC ARIA REFERENCE POINT DOSAGE GIVEN TO DATE: 10.8 GY
RAD ONC ARIA REFERENCE POINT ID: NORMAL
RAD ONC ARIA REFERENCE POINT SESSION DOSAGE GIVEN: 1.8 GY
RBC # BLD AUTO: 3.66 10*6/MM3 (ref 4.14–5.8)
SODIUM SERPL-SCNC: 137 MMOL/L (ref 136–145)
WBC NRBC COR # BLD AUTO: 8.62 10*3/MM3 (ref 3.4–10.8)

## 2024-12-16 PROCEDURE — 25810000003 SODIUM CHLORIDE 0.9 % SOLUTION 250 ML FLEX CONT: Performed by: INTERNAL MEDICINE

## 2024-12-16 PROCEDURE — 85025 COMPLETE CBC W/AUTO DIFF WBC: CPT

## 2024-12-16 PROCEDURE — 77014 CHG CT GUIDANCE RADIATION THERAPY FLDS PLACEMENT: CPT | Performed by: RADIOLOGY

## 2024-12-16 PROCEDURE — 25810000003 SODIUM CHLORIDE 0.9 % SOLUTION: Performed by: INTERNAL MEDICINE

## 2024-12-16 PROCEDURE — 25010000002 DEXAMETHASONE PER 1 MG: Performed by: INTERNAL MEDICINE

## 2024-12-16 PROCEDURE — 25010000002 PACLITAXEL PER 1 MG: Performed by: INTERNAL MEDICINE

## 2024-12-16 PROCEDURE — 77386: CPT | Performed by: RADIOLOGY

## 2024-12-16 PROCEDURE — 96361 HYDRATE IV INFUSION ADD-ON: CPT

## 2024-12-16 PROCEDURE — 25010000002 DIPHENHYDRAMINE PER 50 MG: Performed by: INTERNAL MEDICINE

## 2024-12-16 PROCEDURE — 96375 TX/PRO/DX INJ NEW DRUG ADDON: CPT

## 2024-12-16 PROCEDURE — 80053 COMPREHEN METABOLIC PANEL: CPT

## 2024-12-16 PROCEDURE — 96413 CHEMO IV INFUSION 1 HR: CPT

## 2024-12-16 PROCEDURE — 96417 CHEMO IV INFUS EACH ADDL SEQ: CPT

## 2024-12-16 PROCEDURE — 25010000002 HEPARIN LOCK FLUSH PER 10 UNITS: Performed by: INTERNAL MEDICINE

## 2024-12-16 PROCEDURE — 25010000002 CARBOPLATIN PER 50 MG: Performed by: INTERNAL MEDICINE

## 2024-12-16 PROCEDURE — 83735 ASSAY OF MAGNESIUM: CPT

## 2024-12-16 PROCEDURE — 25010000002 PALONOSETRON PER 25 MCG: Performed by: INTERNAL MEDICINE

## 2024-12-16 PROCEDURE — 36415 COLL VENOUS BLD VENIPUNCTURE: CPT

## 2024-12-16 RX ORDER — FAMOTIDINE 10 MG/ML
20 INJECTION, SOLUTION INTRAVENOUS ONCE
Status: COMPLETED | OUTPATIENT
Start: 2024-12-16 | End: 2024-12-16

## 2024-12-16 RX ORDER — DEXAMETHASONE SODIUM PHOSPHATE 10 MG/ML
10 INJECTION INTRAMUSCULAR; INTRAVENOUS ONCE
Status: COMPLETED | OUTPATIENT
Start: 2024-12-16 | End: 2024-12-16

## 2024-12-16 RX ORDER — DIPHENHYDRAMINE HYDROCHLORIDE 50 MG/ML
50 INJECTION INTRAMUSCULAR; INTRAVENOUS AS NEEDED
Status: DISCONTINUED | OUTPATIENT
Start: 2024-12-16 | End: 2024-12-16 | Stop reason: HOSPADM

## 2024-12-16 RX ORDER — HYDROCORTISONE SODIUM SUCCINATE 100 MG/2ML
100 INJECTION INTRAMUSCULAR; INTRAVENOUS AS NEEDED
Status: DISCONTINUED | OUTPATIENT
Start: 2024-12-16 | End: 2024-12-16 | Stop reason: HOSPADM

## 2024-12-16 RX ORDER — HEPARIN SODIUM (PORCINE) LOCK FLUSH IV SOLN 100 UNIT/ML 100 UNIT/ML
500 SOLUTION INTRAVENOUS AS NEEDED
Status: DISCONTINUED | OUTPATIENT
Start: 2024-12-16 | End: 2024-12-16 | Stop reason: HOSPADM

## 2024-12-16 RX ORDER — LIDOCAINE/PRILOCAINE 2.5 %-2.5%
CREAM (GRAM) TOPICAL
Qty: 30 G | Refills: 1 | Status: SHIPPED | OUTPATIENT
Start: 2024-12-16

## 2024-12-16 RX ORDER — HEPARIN SODIUM (PORCINE) LOCK FLUSH IV SOLN 100 UNIT/ML 100 UNIT/ML
500 SOLUTION INTRAVENOUS AS NEEDED
Status: CANCELLED | OUTPATIENT
Start: 2024-12-16

## 2024-12-16 RX ORDER — SODIUM CHLORIDE 0.9 % (FLUSH) 0.9 %
10 SYRINGE (ML) INJECTION AS NEEDED
Status: DISCONTINUED | OUTPATIENT
Start: 2024-12-16 | End: 2024-12-16 | Stop reason: HOSPADM

## 2024-12-16 RX ORDER — FAMOTIDINE 10 MG/ML
20 INJECTION, SOLUTION INTRAVENOUS AS NEEDED
Status: DISCONTINUED | OUTPATIENT
Start: 2024-12-16 | End: 2024-12-16 | Stop reason: HOSPADM

## 2024-12-16 RX ORDER — SODIUM CHLORIDE 0.9 % (FLUSH) 0.9 %
10 SYRINGE (ML) INJECTION AS NEEDED
Status: CANCELLED | OUTPATIENT
Start: 2024-12-16

## 2024-12-16 RX ORDER — DIPHENHYDRAMINE HYDROCHLORIDE 50 MG/ML
50 INJECTION INTRAMUSCULAR; INTRAVENOUS ONCE
Status: COMPLETED | OUTPATIENT
Start: 2024-12-16 | End: 2024-12-16

## 2024-12-16 RX ORDER — PALONOSETRON 0.05 MG/ML
0.25 INJECTION, SOLUTION INTRAVENOUS ONCE
Status: COMPLETED | OUTPATIENT
Start: 2024-12-16 | End: 2024-12-16

## 2024-12-16 RX ORDER — SODIUM CHLORIDE 9 MG/ML
20 INJECTION, SOLUTION INTRAVENOUS ONCE
Status: DISCONTINUED | OUTPATIENT
Start: 2024-12-16 | End: 2024-12-16 | Stop reason: HOSPADM

## 2024-12-16 RX ADMIN — SODIUM CHLORIDE 1000 ML: 9 INJECTION, SOLUTION INTRAVENOUS at 09:28

## 2024-12-16 RX ADMIN — Medication 10 ML: at 12:56

## 2024-12-16 RX ADMIN — HEPARIN 500 UNITS: 100 SYRINGE at 12:56

## 2024-12-16 RX ADMIN — CARBOPLATIN 160 MG: 10 INJECTION INTRAVENOUS at 11:58

## 2024-12-16 RX ADMIN — FAMOTIDINE 20 MG: 10 INJECTION INTRAVENOUS at 10:01

## 2024-12-16 RX ADMIN — DEXAMETHASONE SODIUM PHOSPHATE 10 MG: 10 INJECTION INTRAMUSCULAR; INTRAVENOUS at 09:53

## 2024-12-16 RX ADMIN — PACLITAXEL 85 MG: 6 INJECTION, SOLUTION INTRAVENOUS at 10:41

## 2024-12-16 RX ADMIN — PALONOSETRON HYDROCHLORIDE 0.25 MG: 0.25 INJECTION, SOLUTION INTRAVENOUS at 09:50

## 2024-12-16 RX ADMIN — DIPHENHYDRAMINE HYDROCHLORIDE 50 MG: 50 INJECTION, SOLUTION INTRAMUSCULAR; INTRAVENOUS at 09:56

## 2024-12-17 ENCOUNTER — HOSPITAL ENCOUNTER (OUTPATIENT)
Dept: RADIATION ONCOLOGY | Facility: HOSPITAL | Age: 74
Setting detail: RADIATION/ONCOLOGY SERIES
Discharge: HOME | End: 2024-12-17
Payer: MEDICARE

## 2024-12-17 ENCOUNTER — LAB (OUTPATIENT)
Dept: LAB | Facility: HOSPITAL | Age: 74
End: 2024-12-17
Payer: MEDICARE

## 2024-12-17 ENCOUNTER — INFUSION (OUTPATIENT)
Dept: ONCOLOGY | Facility: HOSPITAL | Age: 74
End: 2024-12-17
Payer: MEDICARE

## 2024-12-17 VITALS
DIASTOLIC BLOOD PRESSURE: 51 MMHG | WEIGHT: 166 LBS | BODY MASS INDEX: 25.16 KG/M2 | HEART RATE: 70 BPM | HEIGHT: 68 IN | SYSTOLIC BLOOD PRESSURE: 113 MMHG | TEMPERATURE: 97.9 F | RESPIRATION RATE: 18 BRPM | OXYGEN SATURATION: 96 %

## 2024-12-17 DIAGNOSIS — C34.92 SQUAMOUS CELL CARCINOMA OF LEFT LUNG: ICD-10-CM

## 2024-12-17 DIAGNOSIS — N28.9 FUNCTION KIDNEY DECREASED: Primary | ICD-10-CM

## 2024-12-17 DIAGNOSIS — Z95.828 PORT-A-CATH IN PLACE: ICD-10-CM

## 2024-12-17 DIAGNOSIS — C34.11 MALIGNANT NEOPLASM OF UPPER LOBE OF RIGHT LUNG: Primary | ICD-10-CM

## 2024-12-17 DIAGNOSIS — C34.11 MALIGNANT NEOPLASM OF UPPER LOBE OF RIGHT LUNG: ICD-10-CM

## 2024-12-17 LAB
ANION GAP SERPL CALCULATED.3IONS-SCNC: 12 MMOL/L (ref 5–15)
BUN SERPL-MCNC: 18 MG/DL (ref 8–23)
BUN/CREAT SERPL: 23.1 (ref 7–25)
CALCIUM SPEC-SCNC: 8.7 MG/DL (ref 8.6–10.5)
CHLORIDE SERPL-SCNC: 104 MMOL/L (ref 98–107)
CO2 SERPL-SCNC: 24 MMOL/L (ref 22–29)
CREAT SERPL-MCNC: 0.78 MG/DL (ref 0.76–1.27)
EGFRCR SERPLBLD CKD-EPI 2021: 93.6 ML/MIN/1.73
GLUCOSE SERPL-MCNC: 126 MG/DL (ref 65–99)
POTASSIUM SERPL-SCNC: 4 MMOL/L (ref 3.5–5.2)
RAD ONC ARIA COURSE ID: NORMAL
RAD ONC ARIA COURSE INTENT: NORMAL
RAD ONC ARIA COURSE LAST TREATMENT DATE: NORMAL
RAD ONC ARIA COURSE START DATE: NORMAL
RAD ONC ARIA COURSE TREATMENT ELAPSED DAYS: 8
RAD ONC ARIA FIRST TREATMENT DATE: NORMAL
RAD ONC ARIA PLAN FRACTIONS TREATED TO DATE: 7
RAD ONC ARIA PLAN ID: NORMAL
RAD ONC ARIA PLAN PRESCRIBED DOSE PER FRACTION: 1.8 GY
RAD ONC ARIA PLAN PRIMARY REFERENCE POINT: NORMAL
RAD ONC ARIA PLAN TOTAL FRACTIONS PRESCRIBED: 33
RAD ONC ARIA PLAN TOTAL PRESCRIBED DOSE: 5940 CGY
RAD ONC ARIA REFERENCE POINT DOSAGE GIVEN TO DATE: 12.6 GY
RAD ONC ARIA REFERENCE POINT ID: NORMAL
RAD ONC ARIA REFERENCE POINT SESSION DOSAGE GIVEN: 1.8 GY
SODIUM SERPL-SCNC: 140 MMOL/L (ref 136–145)

## 2024-12-17 PROCEDURE — 96360 HYDRATION IV INFUSION INIT: CPT

## 2024-12-17 PROCEDURE — 96361 HYDRATE IV INFUSION ADD-ON: CPT

## 2024-12-17 PROCEDURE — 80048 BASIC METABOLIC PNL TOTAL CA: CPT

## 2024-12-17 PROCEDURE — 77014 CHG CT GUIDANCE RADIATION THERAPY FLDS PLACEMENT: CPT | Performed by: RADIOLOGY

## 2024-12-17 PROCEDURE — 25810000003 SODIUM CHLORIDE 0.9 % SOLUTION: Performed by: INTERNAL MEDICINE

## 2024-12-17 PROCEDURE — 77386: CPT | Performed by: RADIOLOGY

## 2024-12-17 PROCEDURE — 25010000002 HEPARIN LOCK FLUSH PER 10 UNITS: Performed by: INTERNAL MEDICINE

## 2024-12-17 RX ORDER — FAMOTIDINE 10 MG/ML
20 INJECTION, SOLUTION INTRAVENOUS ONCE
Status: CANCELLED | OUTPATIENT
Start: 2024-12-23

## 2024-12-17 RX ORDER — SODIUM CHLORIDE 0.9 % (FLUSH) 0.9 %
10 SYRINGE (ML) INJECTION AS NEEDED
Status: CANCELLED | OUTPATIENT
Start: 2024-12-17

## 2024-12-17 RX ORDER — DIPHENHYDRAMINE HYDROCHLORIDE 50 MG/ML
50 INJECTION INTRAMUSCULAR; INTRAVENOUS AS NEEDED
Status: CANCELLED | OUTPATIENT
Start: 2024-12-23

## 2024-12-17 RX ORDER — HYDROCORTISONE SODIUM SUCCINATE 100 MG/2ML
100 INJECTION INTRAMUSCULAR; INTRAVENOUS AS NEEDED
Status: CANCELLED | OUTPATIENT
Start: 2024-12-23

## 2024-12-17 RX ORDER — SODIUM CHLORIDE 0.9 % (FLUSH) 0.9 %
10 SYRINGE (ML) INJECTION AS NEEDED
Status: DISCONTINUED | OUTPATIENT
Start: 2024-12-17 | End: 2024-12-17 | Stop reason: HOSPADM

## 2024-12-17 RX ORDER — SODIUM CHLORIDE 9 MG/ML
20 INJECTION, SOLUTION INTRAVENOUS ONCE
Status: CANCELLED | OUTPATIENT
Start: 2024-12-23

## 2024-12-17 RX ORDER — HEPARIN SODIUM (PORCINE) LOCK FLUSH IV SOLN 100 UNIT/ML 100 UNIT/ML
500 SOLUTION INTRAVENOUS AS NEEDED
Status: DISCONTINUED | OUTPATIENT
Start: 2024-12-17 | End: 2024-12-17 | Stop reason: HOSPADM

## 2024-12-17 RX ORDER — FAMOTIDINE 10 MG/ML
20 INJECTION, SOLUTION INTRAVENOUS AS NEEDED
Status: CANCELLED | OUTPATIENT
Start: 2024-12-23

## 2024-12-17 RX ORDER — HEPARIN SODIUM (PORCINE) LOCK FLUSH IV SOLN 100 UNIT/ML 100 UNIT/ML
500 SOLUTION INTRAVENOUS AS NEEDED
Status: CANCELLED | OUTPATIENT
Start: 2024-12-17

## 2024-12-17 RX ORDER — PALONOSETRON 0.05 MG/ML
0.25 INJECTION, SOLUTION INTRAVENOUS ONCE
Status: CANCELLED | OUTPATIENT
Start: 2024-12-23

## 2024-12-17 RX ORDER — DIPHENHYDRAMINE HYDROCHLORIDE 50 MG/ML
50 INJECTION INTRAMUSCULAR; INTRAVENOUS ONCE
Status: CANCELLED
Start: 2024-12-23

## 2024-12-17 RX ADMIN — SODIUM CHLORIDE 1000 ML: 9 INJECTION, SOLUTION INTRAVENOUS at 08:57

## 2024-12-17 RX ADMIN — HEPARIN 500 UNITS: 100 SYRINGE at 11:11

## 2024-12-17 RX ADMIN — Medication 10 ML: at 11:11

## 2024-12-18 ENCOUNTER — HOSPITAL ENCOUNTER (OUTPATIENT)
Dept: RADIATION ONCOLOGY | Facility: HOSPITAL | Age: 74
Discharge: HOME | End: 2024-12-18

## 2024-12-18 LAB
RAD ONC ARIA COURSE ID: NORMAL
RAD ONC ARIA COURSE INTENT: NORMAL
RAD ONC ARIA COURSE LAST TREATMENT DATE: NORMAL
RAD ONC ARIA COURSE START DATE: NORMAL
RAD ONC ARIA COURSE TREATMENT ELAPSED DAYS: 9
RAD ONC ARIA FIRST TREATMENT DATE: NORMAL
RAD ONC ARIA PLAN FRACTIONS TREATED TO DATE: 8
RAD ONC ARIA PLAN ID: NORMAL
RAD ONC ARIA PLAN PRESCRIBED DOSE PER FRACTION: 1.8 GY
RAD ONC ARIA PLAN PRIMARY REFERENCE POINT: NORMAL
RAD ONC ARIA PLAN TOTAL FRACTIONS PRESCRIBED: 33
RAD ONC ARIA PLAN TOTAL PRESCRIBED DOSE: 5940 CGY
RAD ONC ARIA REFERENCE POINT DOSAGE GIVEN TO DATE: 14.4 GY
RAD ONC ARIA REFERENCE POINT ID: NORMAL
RAD ONC ARIA REFERENCE POINT SESSION DOSAGE GIVEN: 1.8 GY

## 2024-12-18 PROCEDURE — 77386: CPT | Performed by: RADIOLOGY

## 2024-12-18 PROCEDURE — 77014 CHG CT GUIDANCE RADIATION THERAPY FLDS PLACEMENT: CPT | Performed by: RADIOLOGY

## 2024-12-19 ENCOUNTER — OFFICE VISIT (OUTPATIENT)
Dept: SURGERY | Facility: CLINIC | Age: 74
End: 2024-12-19
Payer: MEDICARE

## 2024-12-19 ENCOUNTER — HOSPITAL ENCOUNTER (OUTPATIENT)
Dept: RADIATION ONCOLOGY | Facility: HOSPITAL | Age: 74
Setting detail: RADIATION/ONCOLOGY SERIES
Discharge: HOME | End: 2024-12-19
Payer: MEDICARE

## 2024-12-19 VITALS
BODY MASS INDEX: 25.16 KG/M2 | HEIGHT: 68 IN | SYSTOLIC BLOOD PRESSURE: 114 MMHG | DIASTOLIC BLOOD PRESSURE: 72 MMHG | WEIGHT: 166 LBS

## 2024-12-19 DIAGNOSIS — C34.11 MALIGNANT NEOPLASM OF UPPER LOBE OF RIGHT LUNG: Primary | ICD-10-CM

## 2024-12-19 DIAGNOSIS — C34.92 SQUAMOUS CELL CARCINOMA OF LEFT LUNG: ICD-10-CM

## 2024-12-19 DIAGNOSIS — Z45.2 ENCOUNTER FOR CARE RELATED TO PORT-A-CATH: Primary | ICD-10-CM

## 2024-12-19 LAB
RAD ONC ARIA COURSE ID: NORMAL
RAD ONC ARIA COURSE INTENT: NORMAL
RAD ONC ARIA COURSE LAST TREATMENT DATE: NORMAL
RAD ONC ARIA COURSE START DATE: NORMAL
RAD ONC ARIA COURSE TREATMENT ELAPSED DAYS: 10
RAD ONC ARIA FIRST TREATMENT DATE: NORMAL
RAD ONC ARIA PLAN FRACTIONS TREATED TO DATE: 9
RAD ONC ARIA PLAN ID: NORMAL
RAD ONC ARIA PLAN PRESCRIBED DOSE PER FRACTION: 1.8 GY
RAD ONC ARIA PLAN PRIMARY REFERENCE POINT: NORMAL
RAD ONC ARIA PLAN TOTAL FRACTIONS PRESCRIBED: 33
RAD ONC ARIA PLAN TOTAL PRESCRIBED DOSE: 5940 CGY
RAD ONC ARIA REFERENCE POINT DOSAGE GIVEN TO DATE: 16.2 GY
RAD ONC ARIA REFERENCE POINT ID: NORMAL
RAD ONC ARIA REFERENCE POINT SESSION DOSAGE GIVEN: 1.8 GY

## 2024-12-19 PROCEDURE — 77386: CPT | Performed by: RADIOLOGY

## 2024-12-19 PROCEDURE — 77014 CHG CT GUIDANCE RADIATION THERAPY FLDS PLACEMENT: CPT | Performed by: RADIOLOGY

## 2024-12-19 RX ORDER — SODIUM CHLORIDE 9 MG/ML
20 INJECTION, SOLUTION INTRAVENOUS ONCE
OUTPATIENT
Start: 2024-12-30

## 2024-12-19 RX ORDER — PALONOSETRON 0.05 MG/ML
0.25 INJECTION, SOLUTION INTRAVENOUS ONCE
OUTPATIENT
Start: 2024-12-30

## 2024-12-19 RX ORDER — HYDROCORTISONE SODIUM SUCCINATE 100 MG/2ML
100 INJECTION INTRAMUSCULAR; INTRAVENOUS AS NEEDED
OUTPATIENT
Start: 2024-12-30

## 2024-12-19 RX ORDER — FAMOTIDINE 10 MG/ML
20 INJECTION, SOLUTION INTRAVENOUS ONCE
OUTPATIENT
Start: 2024-12-30

## 2024-12-19 RX ORDER — DIPHENHYDRAMINE HYDROCHLORIDE 50 MG/ML
50 INJECTION INTRAMUSCULAR; INTRAVENOUS AS NEEDED
OUTPATIENT
Start: 2024-12-30

## 2024-12-19 RX ORDER — FAMOTIDINE 10 MG/ML
20 INJECTION, SOLUTION INTRAVENOUS AS NEEDED
OUTPATIENT
Start: 2024-12-30

## 2024-12-19 RX ORDER — DIPHENHYDRAMINE HYDROCHLORIDE 50 MG/ML
50 INJECTION INTRAMUSCULAR; INTRAVENOUS ONCE
Start: 2024-12-30

## 2024-12-19 NOTE — PROGRESS NOTES
"Patient: Boogie Artis    YOB: 1950    Date: 12/19/2024    Primary Care Provider: Lucho Alves MD    Vital Signs:   Vitals:    12/19/24 0852   BP: 114/72   BP Location: Right arm   Patient Position: Sitting   Cuff Size: Large Adult   Weight: 75.3 kg (166 lb)   Height: 172.7 cm (67.99\")       The patient is tolerating a regular diet and has no complaints s/p port placement with Dr. Hanson on 12/5/24. The patient denies fevers, chills, nausea, vomiting, and excessive pain. Incision is healing well with no signs of infection or wound dehiscence. The port has been accessed without issues.     Results Review:   I reviewed the patient's new clinical results.        Assessment / Plan:    Diagnoses and all orders for this visit:    1. Encounter for care related to Port-a-Cath (Primary)        Boogie Artis is 2 weeks s/p port placement. He is overall doing well. At this time, the patient is able to return to normal activity as tolerated. He voiced understanding of this. I instructed him that if there are any problems with the port, such as the inability to flush or access the port, to give our office a call for an appointment. He is agreeable to the plan.       Follow up:     Return if symptoms worsen or fail to improve.      Electronically signed by Henny Witt PA-C  12/19/24  09:13 CST    "

## 2024-12-20 ENCOUNTER — HOSPITAL ENCOUNTER (OUTPATIENT)
Dept: RADIATION ONCOLOGY | Facility: HOSPITAL | Age: 74
Setting detail: RADIATION/ONCOLOGY SERIES
Discharge: HOME | End: 2024-12-20
Payer: MEDICARE

## 2024-12-20 LAB
RAD ONC ARIA COURSE ID: NORMAL
RAD ONC ARIA COURSE INTENT: NORMAL
RAD ONC ARIA COURSE LAST TREATMENT DATE: NORMAL
RAD ONC ARIA COURSE START DATE: NORMAL
RAD ONC ARIA COURSE TREATMENT ELAPSED DAYS: 11
RAD ONC ARIA FIRST TREATMENT DATE: NORMAL
RAD ONC ARIA PLAN FRACTIONS TREATED TO DATE: 10
RAD ONC ARIA PLAN ID: NORMAL
RAD ONC ARIA PLAN PRESCRIBED DOSE PER FRACTION: 1.8 GY
RAD ONC ARIA PLAN PRIMARY REFERENCE POINT: NORMAL
RAD ONC ARIA PLAN TOTAL FRACTIONS PRESCRIBED: 33
RAD ONC ARIA PLAN TOTAL PRESCRIBED DOSE: 5940 CGY
RAD ONC ARIA REFERENCE POINT DOSAGE GIVEN TO DATE: 18 GY
RAD ONC ARIA REFERENCE POINT ID: NORMAL
RAD ONC ARIA REFERENCE POINT SESSION DOSAGE GIVEN: 1.8 GY

## 2024-12-20 PROCEDURE — 77014 CHG CT GUIDANCE RADIATION THERAPY FLDS PLACEMENT: CPT | Performed by: RADIOLOGY

## 2024-12-20 PROCEDURE — 77336 RADIATION PHYSICS CONSULT: CPT | Performed by: RADIOLOGY

## 2024-12-20 PROCEDURE — 77386: CPT | Performed by: RADIOLOGY

## 2024-12-23 ENCOUNTER — HOSPITAL ENCOUNTER (OUTPATIENT)
Dept: RADIATION ONCOLOGY | Facility: HOSPITAL | Age: 74
Setting detail: RADIATION/ONCOLOGY SERIES
Discharge: HOME | End: 2024-12-23
Payer: MEDICARE

## 2024-12-23 ENCOUNTER — INFUSION (OUTPATIENT)
Dept: ONCOLOGY | Facility: HOSPITAL | Age: 74
End: 2024-12-23
Payer: MEDICARE

## 2024-12-23 ENCOUNTER — LAB (OUTPATIENT)
Dept: LAB | Facility: HOSPITAL | Age: 74
End: 2024-12-23
Payer: MEDICARE

## 2024-12-23 VITALS
TEMPERATURE: 98 F | DIASTOLIC BLOOD PRESSURE: 71 MMHG | OXYGEN SATURATION: 98 % | SYSTOLIC BLOOD PRESSURE: 104 MMHG | BODY MASS INDEX: 25.06 KG/M2 | HEART RATE: 84 BPM | RESPIRATION RATE: 18 BRPM | WEIGHT: 164.8 LBS

## 2024-12-23 DIAGNOSIS — C34.92 SQUAMOUS CELL CARCINOMA OF LEFT LUNG: ICD-10-CM

## 2024-12-23 DIAGNOSIS — C34.11 MALIGNANT NEOPLASM OF UPPER LOBE OF RIGHT LUNG: Primary | ICD-10-CM

## 2024-12-23 DIAGNOSIS — Z95.828 PORT-A-CATH IN PLACE: ICD-10-CM

## 2024-12-23 DIAGNOSIS — C34.11 MALIGNANT NEOPLASM OF UPPER LOBE OF RIGHT LUNG: ICD-10-CM

## 2024-12-23 LAB
ALBUMIN SERPL-MCNC: 3.9 G/DL (ref 3.5–5.2)
ALBUMIN/GLOB SERPL: 1.2 G/DL
ALP SERPL-CCNC: 74 U/L (ref 39–117)
ALT SERPL W P-5'-P-CCNC: 13 U/L (ref 1–41)
ANION GAP SERPL CALCULATED.3IONS-SCNC: 13 MMOL/L (ref 5–15)
AST SERPL-CCNC: 14 U/L (ref 1–40)
BASOPHILS # BLD AUTO: 0.06 10*3/MM3 (ref 0–0.2)
BASOPHILS NFR BLD AUTO: 0.9 % (ref 0–1.5)
BILIRUB SERPL-MCNC: 0.2 MG/DL (ref 0–1.2)
BUN SERPL-MCNC: 19 MG/DL (ref 8–23)
BUN/CREAT SERPL: 19.2 (ref 7–25)
CALCIUM SPEC-SCNC: 9.6 MG/DL (ref 8.6–10.5)
CHLORIDE SERPL-SCNC: 99 MMOL/L (ref 98–107)
CO2 SERPL-SCNC: 25 MMOL/L (ref 22–29)
CREAT SERPL-MCNC: 0.99 MG/DL (ref 0.76–1.27)
DEPRECATED RDW RBC AUTO: 51.6 FL (ref 37–54)
EGFRCR SERPLBLD CKD-EPI 2021: 79.9 ML/MIN/1.73
EOSINOPHIL # BLD AUTO: 0.19 10*3/MM3 (ref 0–0.4)
EOSINOPHIL NFR BLD AUTO: 2.8 % (ref 0.3–6.2)
ERYTHROCYTE [DISTWIDTH] IN BLOOD BY AUTOMATED COUNT: 15.3 % (ref 12.3–15.4)
GLOBULIN UR ELPH-MCNC: 3.3 GM/DL
GLUCOSE SERPL-MCNC: 176 MG/DL (ref 65–99)
HCT VFR BLD AUTO: 36.7 % (ref 37.5–51)
HGB BLD-MCNC: 11.3 G/DL (ref 13–17.7)
IMM GRANULOCYTES # BLD AUTO: 0.03 10*3/MM3 (ref 0–0.05)
IMM GRANULOCYTES NFR BLD AUTO: 0.4 % (ref 0–0.5)
LYMPHOCYTES # BLD AUTO: 1.07 10*3/MM3 (ref 0.7–3.1)
LYMPHOCYTES NFR BLD AUTO: 15.8 % (ref 19.6–45.3)
MAGNESIUM SERPL-MCNC: 1.7 MG/DL (ref 1.6–2.4)
MCH RBC QN AUTO: 28.9 PG (ref 26.6–33)
MCHC RBC AUTO-ENTMCNC: 30.8 G/DL (ref 31.5–35.7)
MCV RBC AUTO: 93.9 FL (ref 79–97)
MONOCYTES # BLD AUTO: 0.49 10*3/MM3 (ref 0.1–0.9)
MONOCYTES NFR BLD AUTO: 7.2 % (ref 5–12)
NEUTROPHILS NFR BLD AUTO: 4.92 10*3/MM3 (ref 1.7–7)
NEUTROPHILS NFR BLD AUTO: 72.9 % (ref 42.7–76)
NRBC BLD AUTO-RTO: 0 /100 WBC (ref 0–0.2)
PLATELET # BLD AUTO: 291 10*3/MM3 (ref 140–450)
PMV BLD AUTO: 9.5 FL (ref 6–12)
POTASSIUM SERPL-SCNC: 4.6 MMOL/L (ref 3.5–5.2)
PROT SERPL-MCNC: 7.2 G/DL (ref 6–8.5)
RAD ONC ARIA COURSE ID: NORMAL
RAD ONC ARIA COURSE INTENT: NORMAL
RAD ONC ARIA COURSE LAST TREATMENT DATE: NORMAL
RAD ONC ARIA COURSE START DATE: NORMAL
RAD ONC ARIA COURSE TREATMENT ELAPSED DAYS: 14
RAD ONC ARIA FIRST TREATMENT DATE: NORMAL
RAD ONC ARIA PLAN FRACTIONS TREATED TO DATE: 11
RAD ONC ARIA PLAN ID: NORMAL
RAD ONC ARIA PLAN PRESCRIBED DOSE PER FRACTION: 1.8 GY
RAD ONC ARIA PLAN PRIMARY REFERENCE POINT: NORMAL
RAD ONC ARIA PLAN TOTAL FRACTIONS PRESCRIBED: 33
RAD ONC ARIA PLAN TOTAL PRESCRIBED DOSE: 5940 CGY
RAD ONC ARIA REFERENCE POINT DOSAGE GIVEN TO DATE: 19.8 GY
RAD ONC ARIA REFERENCE POINT ID: NORMAL
RAD ONC ARIA REFERENCE POINT SESSION DOSAGE GIVEN: 1.8 GY
RBC # BLD AUTO: 3.91 10*6/MM3 (ref 4.14–5.8)
SODIUM SERPL-SCNC: 137 MMOL/L (ref 136–145)
WBC NRBC COR # BLD AUTO: 6.76 10*3/MM3 (ref 3.4–10.8)

## 2024-12-23 PROCEDURE — 83735 ASSAY OF MAGNESIUM: CPT

## 2024-12-23 PROCEDURE — 25010000002 DEXAMETHASONE PER 1 MG: Performed by: INTERNAL MEDICINE

## 2024-12-23 PROCEDURE — 96375 TX/PRO/DX INJ NEW DRUG ADDON: CPT

## 2024-12-23 PROCEDURE — 25010000002 DIPHENHYDRAMINE PER 50 MG: Performed by: INTERNAL MEDICINE

## 2024-12-23 PROCEDURE — 25810000003 SODIUM CHLORIDE 0.9 % SOLUTION 250 ML FLEX CONT: Performed by: INTERNAL MEDICINE

## 2024-12-23 PROCEDURE — 77386: CPT | Performed by: RADIOLOGY

## 2024-12-23 PROCEDURE — 25010000002 CARBOPLATIN PER 50 MG: Performed by: INTERNAL MEDICINE

## 2024-12-23 PROCEDURE — 36415 COLL VENOUS BLD VENIPUNCTURE: CPT

## 2024-12-23 PROCEDURE — 85025 COMPLETE CBC W/AUTO DIFF WBC: CPT

## 2024-12-23 PROCEDURE — 25010000002 PACLITAXEL PER 1 MG: Performed by: INTERNAL MEDICINE

## 2024-12-23 PROCEDURE — 96413 CHEMO IV INFUSION 1 HR: CPT

## 2024-12-23 PROCEDURE — 25010000002 PALONOSETRON PER 25 MCG: Performed by: INTERNAL MEDICINE

## 2024-12-23 PROCEDURE — 25010000002 HEPARIN LOCK FLUSH PER 10 UNITS: Performed by: INTERNAL MEDICINE

## 2024-12-23 PROCEDURE — 80053 COMPREHEN METABOLIC PANEL: CPT

## 2024-12-23 PROCEDURE — 96417 CHEMO IV INFUS EACH ADDL SEQ: CPT

## 2024-12-23 PROCEDURE — 25810000003 SODIUM CHLORIDE 0.9 % SOLUTION: Performed by: INTERNAL MEDICINE

## 2024-12-23 RX ORDER — SODIUM CHLORIDE 0.9 % (FLUSH) 0.9 %
10 SYRINGE (ML) INJECTION AS NEEDED
Status: CANCELLED | OUTPATIENT
Start: 2024-12-23

## 2024-12-23 RX ORDER — DEXAMETHASONE SODIUM PHOSPHATE 10 MG/ML
10 INJECTION INTRAMUSCULAR; INTRAVENOUS ONCE
Status: COMPLETED | OUTPATIENT
Start: 2024-12-23 | End: 2024-12-23

## 2024-12-23 RX ORDER — DIPHENHYDRAMINE HYDROCHLORIDE 50 MG/ML
50 INJECTION INTRAMUSCULAR; INTRAVENOUS ONCE
Status: COMPLETED | OUTPATIENT
Start: 2024-12-23 | End: 2024-12-23

## 2024-12-23 RX ORDER — SODIUM CHLORIDE 0.9 % (FLUSH) 0.9 %
10 SYRINGE (ML) INJECTION AS NEEDED
Status: DISCONTINUED | OUTPATIENT
Start: 2024-12-23 | End: 2024-12-23 | Stop reason: HOSPADM

## 2024-12-23 RX ORDER — HYDROCORTISONE SODIUM SUCCINATE 100 MG/2ML
100 INJECTION INTRAMUSCULAR; INTRAVENOUS AS NEEDED
Status: DISCONTINUED | OUTPATIENT
Start: 2024-12-23 | End: 2024-12-23 | Stop reason: HOSPADM

## 2024-12-23 RX ORDER — PALONOSETRON 0.05 MG/ML
0.25 INJECTION, SOLUTION INTRAVENOUS ONCE
Status: COMPLETED | OUTPATIENT
Start: 2024-12-23 | End: 2024-12-23

## 2024-12-23 RX ORDER — DIPHENHYDRAMINE HYDROCHLORIDE 50 MG/ML
50 INJECTION INTRAMUSCULAR; INTRAVENOUS AS NEEDED
Status: DISCONTINUED | OUTPATIENT
Start: 2024-12-23 | End: 2024-12-23 | Stop reason: HOSPADM

## 2024-12-23 RX ORDER — HEPARIN SODIUM (PORCINE) LOCK FLUSH IV SOLN 100 UNIT/ML 100 UNIT/ML
500 SOLUTION INTRAVENOUS AS NEEDED
Status: DISCONTINUED | OUTPATIENT
Start: 2024-12-23 | End: 2024-12-23 | Stop reason: HOSPADM

## 2024-12-23 RX ORDER — FAMOTIDINE 10 MG/ML
20 INJECTION, SOLUTION INTRAVENOUS AS NEEDED
Status: DISCONTINUED | OUTPATIENT
Start: 2024-12-23 | End: 2024-12-23 | Stop reason: HOSPADM

## 2024-12-23 RX ORDER — FAMOTIDINE 10 MG/ML
20 INJECTION, SOLUTION INTRAVENOUS ONCE
Status: COMPLETED | OUTPATIENT
Start: 2024-12-23 | End: 2024-12-23

## 2024-12-23 RX ORDER — SODIUM CHLORIDE 9 MG/ML
20 INJECTION, SOLUTION INTRAVENOUS ONCE
Status: COMPLETED | OUTPATIENT
Start: 2024-12-23 | End: 2024-12-23

## 2024-12-23 RX ORDER — HEPARIN SODIUM (PORCINE) LOCK FLUSH IV SOLN 100 UNIT/ML 100 UNIT/ML
500 SOLUTION INTRAVENOUS AS NEEDED
Status: CANCELLED | OUTPATIENT
Start: 2024-12-23

## 2024-12-23 RX ADMIN — FAMOTIDINE 20 MG: 10 INJECTION INTRAVENOUS at 08:48

## 2024-12-23 RX ADMIN — CARBOPLATIN 190 MG: 10 INJECTION INTRAVENOUS at 10:05

## 2024-12-23 RX ADMIN — PACLITAXEL 85 MG: 6 INJECTION, SOLUTION INTRAVENOUS at 09:00

## 2024-12-23 RX ADMIN — DEXAMETHASONE SODIUM PHOSPHATE 10 MG: 10 INJECTION INTRAMUSCULAR; INTRAVENOUS at 08:50

## 2024-12-23 RX ADMIN — DIPHENHYDRAMINE HYDROCHLORIDE 50 MG: 50 INJECTION, SOLUTION INTRAMUSCULAR; INTRAVENOUS at 08:52

## 2024-12-23 RX ADMIN — SODIUM CHLORIDE 20 ML/HR: 9 INJECTION, SOLUTION INTRAVENOUS at 08:46

## 2024-12-23 RX ADMIN — HEPARIN 500 UNITS: 100 SYRINGE at 10:51

## 2024-12-23 RX ADMIN — Medication 10 ML: at 10:51

## 2024-12-23 RX ADMIN — PALONOSETRON HYDROCHLORIDE 0.25 MG: 0.25 INJECTION, SOLUTION INTRAVENOUS at 08:46

## 2024-12-24 ENCOUNTER — HOSPITAL ENCOUNTER (OUTPATIENT)
Dept: RADIATION ONCOLOGY | Facility: HOSPITAL | Age: 74
Discharge: HOME | End: 2024-12-24

## 2024-12-24 LAB
RAD ONC ARIA COURSE ID: NORMAL
RAD ONC ARIA COURSE INTENT: NORMAL
RAD ONC ARIA COURSE LAST TREATMENT DATE: NORMAL
RAD ONC ARIA COURSE START DATE: NORMAL
RAD ONC ARIA COURSE TREATMENT ELAPSED DAYS: 15
RAD ONC ARIA FIRST TREATMENT DATE: NORMAL
RAD ONC ARIA PLAN FRACTIONS TREATED TO DATE: 12
RAD ONC ARIA PLAN ID: NORMAL
RAD ONC ARIA PLAN PRESCRIBED DOSE PER FRACTION: 1.8 GY
RAD ONC ARIA PLAN PRIMARY REFERENCE POINT: NORMAL
RAD ONC ARIA PLAN TOTAL FRACTIONS PRESCRIBED: 33
RAD ONC ARIA PLAN TOTAL PRESCRIBED DOSE: 5940 CGY
RAD ONC ARIA REFERENCE POINT DOSAGE GIVEN TO DATE: 21.6 GY
RAD ONC ARIA REFERENCE POINT ID: NORMAL
RAD ONC ARIA REFERENCE POINT SESSION DOSAGE GIVEN: 1.8 GY

## 2024-12-24 PROCEDURE — 77386: CPT | Performed by: RADIOLOGY

## 2024-12-26 ENCOUNTER — HOSPITAL ENCOUNTER (OUTPATIENT)
Dept: RADIATION ONCOLOGY | Facility: HOSPITAL | Age: 74
Setting detail: RADIATION/ONCOLOGY SERIES
Discharge: HOME | End: 2024-12-26
Payer: MEDICARE

## 2024-12-26 LAB
RAD ONC ARIA COURSE ID: NORMAL
RAD ONC ARIA COURSE INTENT: NORMAL
RAD ONC ARIA COURSE LAST TREATMENT DATE: NORMAL
RAD ONC ARIA COURSE START DATE: NORMAL
RAD ONC ARIA COURSE TREATMENT ELAPSED DAYS: 17
RAD ONC ARIA FIRST TREATMENT DATE: NORMAL
RAD ONC ARIA PLAN FRACTIONS TREATED TO DATE: 13
RAD ONC ARIA PLAN ID: NORMAL
RAD ONC ARIA PLAN PRESCRIBED DOSE PER FRACTION: 1.8 GY
RAD ONC ARIA PLAN PRIMARY REFERENCE POINT: NORMAL
RAD ONC ARIA PLAN TOTAL FRACTIONS PRESCRIBED: 33
RAD ONC ARIA PLAN TOTAL PRESCRIBED DOSE: 5940 CGY
RAD ONC ARIA REFERENCE POINT DOSAGE GIVEN TO DATE: 23.4 GY
RAD ONC ARIA REFERENCE POINT ID: NORMAL
RAD ONC ARIA REFERENCE POINT SESSION DOSAGE GIVEN: 1.8 GY

## 2024-12-26 PROCEDURE — 77386: CPT | Performed by: RADIOLOGY

## 2024-12-27 ENCOUNTER — HOSPITAL ENCOUNTER (OUTPATIENT)
Dept: RADIATION ONCOLOGY | Facility: HOSPITAL | Age: 74
Discharge: HOME | End: 2024-12-27

## 2024-12-27 LAB
RAD ONC ARIA COURSE ID: NORMAL
RAD ONC ARIA COURSE INTENT: NORMAL
RAD ONC ARIA COURSE LAST TREATMENT DATE: NORMAL
RAD ONC ARIA COURSE START DATE: NORMAL
RAD ONC ARIA COURSE TREATMENT ELAPSED DAYS: 18
RAD ONC ARIA FIRST TREATMENT DATE: NORMAL
RAD ONC ARIA PLAN FRACTIONS TREATED TO DATE: 14
RAD ONC ARIA PLAN ID: NORMAL
RAD ONC ARIA PLAN PRESCRIBED DOSE PER FRACTION: 1.8 GY
RAD ONC ARIA PLAN PRIMARY REFERENCE POINT: NORMAL
RAD ONC ARIA PLAN TOTAL FRACTIONS PRESCRIBED: 33
RAD ONC ARIA PLAN TOTAL PRESCRIBED DOSE: 5940 CGY
RAD ONC ARIA REFERENCE POINT DOSAGE GIVEN TO DATE: 25.2 GY
RAD ONC ARIA REFERENCE POINT ID: NORMAL
RAD ONC ARIA REFERENCE POINT SESSION DOSAGE GIVEN: 1.8 GY

## 2024-12-27 PROCEDURE — 77386: CPT | Performed by: RADIOLOGY

## 2024-12-30 ENCOUNTER — HOSPITAL ENCOUNTER (OUTPATIENT)
Dept: RADIATION ONCOLOGY | Facility: HOSPITAL | Age: 74
Setting detail: RADIATION/ONCOLOGY SERIES
Discharge: HOME | End: 2024-12-30
Payer: MEDICARE

## 2024-12-30 ENCOUNTER — LAB (OUTPATIENT)
Dept: LAB | Facility: HOSPITAL | Age: 74
End: 2024-12-30
Payer: MEDICARE

## 2024-12-30 ENCOUNTER — INFUSION (OUTPATIENT)
Dept: ONCOLOGY | Facility: HOSPITAL | Age: 74
End: 2024-12-30
Payer: MEDICARE

## 2024-12-30 VITALS
WEIGHT: 162.8 LBS | RESPIRATION RATE: 20 BRPM | SYSTOLIC BLOOD PRESSURE: 112 MMHG | DIASTOLIC BLOOD PRESSURE: 53 MMHG | TEMPERATURE: 97.2 F | HEART RATE: 77 BPM | OXYGEN SATURATION: 97 % | HEIGHT: 68 IN | BODY MASS INDEX: 24.67 KG/M2

## 2024-12-30 DIAGNOSIS — Z95.828 PORT-A-CATH IN PLACE: ICD-10-CM

## 2024-12-30 DIAGNOSIS — C34.92 SQUAMOUS CELL CARCINOMA OF LEFT LUNG: ICD-10-CM

## 2024-12-30 DIAGNOSIS — C34.11 MALIGNANT NEOPLASM OF UPPER LOBE OF RIGHT LUNG: Primary | ICD-10-CM

## 2024-12-30 LAB
ALBUMIN SERPL-MCNC: 4 G/DL (ref 3.5–5.2)
ALBUMIN/GLOB SERPL: 1.2 G/DL
ALP SERPL-CCNC: 81 U/L (ref 39–117)
ALT SERPL W P-5'-P-CCNC: 10 U/L (ref 1–41)
ANION GAP SERPL CALCULATED.3IONS-SCNC: 11 MMOL/L (ref 5–15)
AST SERPL-CCNC: 15 U/L (ref 1–40)
BASOPHILS # BLD AUTO: 0.04 10*3/MM3 (ref 0–0.2)
BASOPHILS NFR BLD AUTO: 0.6 % (ref 0–1.5)
BILIRUB SERPL-MCNC: 0.2 MG/DL (ref 0–1.2)
BUN SERPL-MCNC: 15 MG/DL (ref 8–23)
BUN/CREAT SERPL: 16.7 (ref 7–25)
CALCIUM SPEC-SCNC: 9.6 MG/DL (ref 8.6–10.5)
CEA SERPL-MCNC: 4.42 NG/ML
CHLORIDE SERPL-SCNC: 100 MMOL/L (ref 98–107)
CO2 SERPL-SCNC: 26 MMOL/L (ref 22–29)
CREAT SERPL-MCNC: 0.9 MG/DL (ref 0.76–1.27)
DEPRECATED RDW RBC AUTO: 51.5 FL (ref 37–54)
EGFRCR SERPLBLD CKD-EPI 2021: 89.6 ML/MIN/1.73
EOSINOPHIL # BLD AUTO: 0.1 10*3/MM3 (ref 0–0.4)
EOSINOPHIL NFR BLD AUTO: 1.6 % (ref 0.3–6.2)
ERYTHROCYTE [DISTWIDTH] IN BLOOD BY AUTOMATED COUNT: 15.5 % (ref 12.3–15.4)
GLOBULIN UR ELPH-MCNC: 3.3 GM/DL
GLUCOSE SERPL-MCNC: 87 MG/DL (ref 65–99)
HCT VFR BLD AUTO: 34.7 % (ref 37.5–51)
HGB BLD-MCNC: 10.7 G/DL (ref 13–17.7)
IMM GRANULOCYTES # BLD AUTO: 0.04 10*3/MM3 (ref 0–0.05)
IMM GRANULOCYTES NFR BLD AUTO: 0.6 % (ref 0–0.5)
LYMPHOCYTES # BLD AUTO: 1.01 10*3/MM3 (ref 0.7–3.1)
LYMPHOCYTES NFR BLD AUTO: 15.9 % (ref 19.6–45.3)
MAGNESIUM SERPL-MCNC: 1.8 MG/DL (ref 1.6–2.4)
MCH RBC QN AUTO: 29.1 PG (ref 26.6–33)
MCHC RBC AUTO-ENTMCNC: 30.8 G/DL (ref 31.5–35.7)
MCV RBC AUTO: 94.3 FL (ref 79–97)
MONOCYTES # BLD AUTO: 0.59 10*3/MM3 (ref 0.1–0.9)
MONOCYTES NFR BLD AUTO: 9.3 % (ref 5–12)
NEUTROPHILS NFR BLD AUTO: 4.56 10*3/MM3 (ref 1.7–7)
NEUTROPHILS NFR BLD AUTO: 72 % (ref 42.7–76)
NRBC BLD AUTO-RTO: 0 /100 WBC (ref 0–0.2)
PLATELET # BLD AUTO: 257 10*3/MM3 (ref 140–450)
PMV BLD AUTO: 9.6 FL (ref 6–12)
POTASSIUM SERPL-SCNC: 5.2 MMOL/L (ref 3.5–5.2)
PROT SERPL-MCNC: 7.3 G/DL (ref 6–8.5)
RAD ONC ARIA COURSE ID: NORMAL
RAD ONC ARIA COURSE INTENT: NORMAL
RAD ONC ARIA COURSE LAST TREATMENT DATE: NORMAL
RAD ONC ARIA COURSE START DATE: NORMAL
RAD ONC ARIA COURSE TREATMENT ELAPSED DAYS: 21
RAD ONC ARIA FIRST TREATMENT DATE: NORMAL
RAD ONC ARIA PLAN FRACTIONS TREATED TO DATE: 15
RAD ONC ARIA PLAN ID: NORMAL
RAD ONC ARIA PLAN PRESCRIBED DOSE PER FRACTION: 1.8 GY
RAD ONC ARIA PLAN PRIMARY REFERENCE POINT: NORMAL
RAD ONC ARIA PLAN TOTAL FRACTIONS PRESCRIBED: 33
RAD ONC ARIA PLAN TOTAL PRESCRIBED DOSE: 5940 CGY
RAD ONC ARIA REFERENCE POINT DOSAGE GIVEN TO DATE: 27 GY
RAD ONC ARIA REFERENCE POINT ID: NORMAL
RAD ONC ARIA REFERENCE POINT SESSION DOSAGE GIVEN: 1.8 GY
RBC # BLD AUTO: 3.68 10*6/MM3 (ref 4.14–5.8)
SODIUM SERPL-SCNC: 137 MMOL/L (ref 136–145)
WBC NRBC COR # BLD AUTO: 6.34 10*3/MM3 (ref 3.4–10.8)

## 2024-12-30 PROCEDURE — 80053 COMPREHEN METABOLIC PANEL: CPT

## 2024-12-30 PROCEDURE — 25810000003 SODIUM CHLORIDE 0.9 % SOLUTION: Performed by: INTERNAL MEDICINE

## 2024-12-30 PROCEDURE — 25010000002 HEPARIN LOCK FLUSH PER 10 UNITS: Performed by: INTERNAL MEDICINE

## 2024-12-30 PROCEDURE — 25010000002 CARBOPLATIN PER 50 MG: Performed by: INTERNAL MEDICINE

## 2024-12-30 PROCEDURE — 96417 CHEMO IV INFUS EACH ADDL SEQ: CPT

## 2024-12-30 PROCEDURE — 96413 CHEMO IV INFUSION 1 HR: CPT

## 2024-12-30 PROCEDURE — 82378 CARCINOEMBRYONIC ANTIGEN: CPT

## 2024-12-30 PROCEDURE — 77386: CPT | Performed by: RADIOLOGY

## 2024-12-30 PROCEDURE — 25010000002 PALONOSETRON PER 25 MCG: Performed by: INTERNAL MEDICINE

## 2024-12-30 PROCEDURE — 85025 COMPLETE CBC W/AUTO DIFF WBC: CPT

## 2024-12-30 PROCEDURE — 83735 ASSAY OF MAGNESIUM: CPT

## 2024-12-30 PROCEDURE — 25010000002 DIPHENHYDRAMINE PER 50 MG: Performed by: INTERNAL MEDICINE

## 2024-12-30 PROCEDURE — 25010000002 PACLITAXEL PER 1 MG: Performed by: INTERNAL MEDICINE

## 2024-12-30 PROCEDURE — 36415 COLL VENOUS BLD VENIPUNCTURE: CPT

## 2024-12-30 PROCEDURE — 25010000002 DEXAMETHASONE PER 1 MG: Performed by: INTERNAL MEDICINE

## 2024-12-30 PROCEDURE — 25810000003 SODIUM CHLORIDE 0.9 % SOLUTION 250 ML FLEX CONT: Performed by: INTERNAL MEDICINE

## 2024-12-30 PROCEDURE — 96375 TX/PRO/DX INJ NEW DRUG ADDON: CPT

## 2024-12-30 PROCEDURE — 77336 RADIATION PHYSICS CONSULT: CPT | Performed by: RADIOLOGY

## 2024-12-30 RX ORDER — DIPHENHYDRAMINE HYDROCHLORIDE 50 MG/ML
50 INJECTION INTRAMUSCULAR; INTRAVENOUS AS NEEDED
OUTPATIENT
Start: 2025-01-13

## 2024-12-30 RX ORDER — DEXAMETHASONE SODIUM PHOSPHATE 10 MG/ML
10 INJECTION INTRAMUSCULAR; INTRAVENOUS ONCE
Status: COMPLETED | OUTPATIENT
Start: 2024-12-30 | End: 2024-12-30

## 2024-12-30 RX ORDER — PALONOSETRON 0.05 MG/ML
0.25 INJECTION, SOLUTION INTRAVENOUS ONCE
OUTPATIENT
Start: 2025-01-13

## 2024-12-30 RX ORDER — HYDROCORTISONE SODIUM SUCCINATE 100 MG/2ML
100 INJECTION INTRAMUSCULAR; INTRAVENOUS AS NEEDED
Status: DISCONTINUED | OUTPATIENT
Start: 2024-12-30 | End: 2024-12-30 | Stop reason: HOSPADM

## 2024-12-30 RX ORDER — FAMOTIDINE 10 MG/ML
20 INJECTION, SOLUTION INTRAVENOUS ONCE
OUTPATIENT
Start: 2025-01-13

## 2024-12-30 RX ORDER — DIPHENHYDRAMINE HYDROCHLORIDE 50 MG/ML
50 INJECTION INTRAMUSCULAR; INTRAVENOUS ONCE
Start: 2025-01-13

## 2024-12-30 RX ORDER — DIPHENHYDRAMINE HYDROCHLORIDE 50 MG/ML
50 INJECTION INTRAMUSCULAR; INTRAVENOUS AS NEEDED
Status: DISCONTINUED | OUTPATIENT
Start: 2024-12-30 | End: 2024-12-30 | Stop reason: HOSPADM

## 2024-12-30 RX ORDER — FAMOTIDINE 10 MG/ML
20 INJECTION, SOLUTION INTRAVENOUS ONCE
Status: COMPLETED | OUTPATIENT
Start: 2024-12-30 | End: 2024-12-30

## 2024-12-30 RX ORDER — SODIUM CHLORIDE 0.9 % (FLUSH) 0.9 %
10 SYRINGE (ML) INJECTION AS NEEDED
OUTPATIENT
Start: 2024-12-30

## 2024-12-30 RX ORDER — PALONOSETRON 0.05 MG/ML
0.25 INJECTION, SOLUTION INTRAVENOUS ONCE
Status: COMPLETED | OUTPATIENT
Start: 2024-12-30 | End: 2024-12-30

## 2024-12-30 RX ORDER — HYDROCORTISONE SODIUM SUCCINATE 100 MG/2ML
100 INJECTION INTRAMUSCULAR; INTRAVENOUS AS NEEDED
OUTPATIENT
Start: 2025-01-13

## 2024-12-30 RX ORDER — HEPARIN SODIUM (PORCINE) LOCK FLUSH IV SOLN 100 UNIT/ML 100 UNIT/ML
500 SOLUTION INTRAVENOUS AS NEEDED
Status: DISCONTINUED | OUTPATIENT
Start: 2024-12-30 | End: 2024-12-30 | Stop reason: HOSPADM

## 2024-12-30 RX ORDER — SODIUM CHLORIDE 0.9 % (FLUSH) 0.9 %
10 SYRINGE (ML) INJECTION AS NEEDED
Status: DISCONTINUED | OUTPATIENT
Start: 2024-12-30 | End: 2024-12-30 | Stop reason: HOSPADM

## 2024-12-30 RX ORDER — SODIUM CHLORIDE 9 MG/ML
20 INJECTION, SOLUTION INTRAVENOUS ONCE
Status: COMPLETED | OUTPATIENT
Start: 2024-12-30 | End: 2024-12-30

## 2024-12-30 RX ORDER — FAMOTIDINE 10 MG/ML
20 INJECTION, SOLUTION INTRAVENOUS AS NEEDED
Status: DISCONTINUED | OUTPATIENT
Start: 2024-12-30 | End: 2024-12-30 | Stop reason: HOSPADM

## 2024-12-30 RX ORDER — HEPARIN SODIUM (PORCINE) LOCK FLUSH IV SOLN 100 UNIT/ML 100 UNIT/ML
500 SOLUTION INTRAVENOUS AS NEEDED
OUTPATIENT
Start: 2024-12-30

## 2024-12-30 RX ORDER — DIPHENHYDRAMINE HYDROCHLORIDE 50 MG/ML
50 INJECTION INTRAMUSCULAR; INTRAVENOUS ONCE
Status: COMPLETED | OUTPATIENT
Start: 2024-12-30 | End: 2024-12-30

## 2024-12-30 RX ORDER — FAMOTIDINE 10 MG/ML
20 INJECTION, SOLUTION INTRAVENOUS AS NEEDED
OUTPATIENT
Start: 2025-01-13

## 2024-12-30 RX ORDER — SODIUM CHLORIDE 9 MG/ML
20 INJECTION, SOLUTION INTRAVENOUS ONCE
OUTPATIENT
Start: 2025-01-13

## 2024-12-30 RX ADMIN — FAMOTIDINE 20 MG: 10 INJECTION INTRAVENOUS at 09:24

## 2024-12-30 RX ADMIN — HEPARIN 500 UNITS: 100 SYRINGE at 12:12

## 2024-12-30 RX ADMIN — DEXAMETHASONE SODIUM PHOSPHATE 10 MG: 10 INJECTION INTRAMUSCULAR; INTRAVENOUS at 09:27

## 2024-12-30 RX ADMIN — Medication 10 ML: at 12:12

## 2024-12-30 RX ADMIN — SODIUM CHLORIDE 20 ML/HR: 9 INJECTION, SOLUTION INTRAVENOUS at 09:22

## 2024-12-30 RX ADMIN — PACLITAXEL 85 MG: 6 INJECTION, SOLUTION INTRAVENOUS at 10:04

## 2024-12-30 RX ADMIN — PALONOSETRON HYDROCHLORIDE 0.25 MG: 0.25 INJECTION, SOLUTION INTRAVENOUS at 09:23

## 2024-12-30 RX ADMIN — CARBOPLATIN 200 MG: 10 INJECTION INTRAVENOUS at 11:24

## 2024-12-30 RX ADMIN — DIPHENHYDRAMINE HYDROCHLORIDE 50 MG: 50 INJECTION, SOLUTION INTRAMUSCULAR; INTRAVENOUS at 09:25

## 2025-01-02 ENCOUNTER — HOSPITAL ENCOUNTER (OUTPATIENT)
Dept: RADIATION ONCOLOGY | Facility: HOSPITAL | Age: 75
Discharge: HOME | End: 2025-01-02

## 2025-01-02 ENCOUNTER — HOSPITAL ENCOUNTER (OUTPATIENT)
Dept: RADIATION ONCOLOGY | Facility: HOSPITAL | Age: 75
Setting detail: RADIATION/ONCOLOGY SERIES
End: 2025-01-02
Payer: MEDICARE

## 2025-01-02 DIAGNOSIS — J44.9 STAGE 3 SEVERE COPD BY GOLD CLASSIFICATION: Primary | ICD-10-CM

## 2025-01-02 LAB
RAD ONC ARIA COURSE ID: NORMAL
RAD ONC ARIA COURSE INTENT: NORMAL
RAD ONC ARIA COURSE LAST TREATMENT DATE: NORMAL
RAD ONC ARIA COURSE START DATE: NORMAL
RAD ONC ARIA COURSE TREATMENT ELAPSED DAYS: 24
RAD ONC ARIA FIRST TREATMENT DATE: NORMAL
RAD ONC ARIA PLAN FRACTIONS TREATED TO DATE: 16
RAD ONC ARIA PLAN ID: NORMAL
RAD ONC ARIA PLAN PRESCRIBED DOSE PER FRACTION: 1.8 GY
RAD ONC ARIA PLAN PRIMARY REFERENCE POINT: NORMAL
RAD ONC ARIA PLAN TOTAL FRACTIONS PRESCRIBED: 33
RAD ONC ARIA PLAN TOTAL PRESCRIBED DOSE: 5940 CGY
RAD ONC ARIA REFERENCE POINT DOSAGE GIVEN TO DATE: 28.8 GY
RAD ONC ARIA REFERENCE POINT ID: NORMAL
RAD ONC ARIA REFERENCE POINT SESSION DOSAGE GIVEN: 1.8 GY

## 2025-01-02 PROCEDURE — 77014 CHG CT GUIDANCE RADIATION THERAPY FLDS PLACEMENT: CPT | Performed by: RADIOLOGY

## 2025-01-02 PROCEDURE — 77386: CPT | Performed by: RADIOLOGY

## 2025-01-02 NOTE — TELEPHONE ENCOUNTER
Caller: Boogie Artis    Relationship: Self    Best call back number: [phone]    What is the best time to reach you: ANYTIME    Who are you requesting to speak with (clinical staff, provider,  specific staff member): CLINICAL STAFF / PROVIDER     What was the call regarding: PT CALLED STATING HIS MEDICATION TRELEGY IS COSTING HIM A LOT OF MONEY AND WAS WONDERING IF THE PROVIDER CAN SEND A PRESCRIPTION OVER FOR A GENERIC BRAND. PT STATED HE IS ABOUT TO RUN OUT OF MEDICATION. PLEASE ADVISE.

## 2025-01-02 NOTE — TELEPHONE ENCOUNTER
Spoke to patient's wife and advised her to check with the insurance company to see which inhaler would be cheaper.  She states they did  the patient assistance paper work last month but have not had time to fill it out.  She is going to have the patient bring it by the office tomorrow and will contact the insurance company

## 2025-01-03 ENCOUNTER — HOSPITAL ENCOUNTER (OUTPATIENT)
Dept: RADIATION ONCOLOGY | Facility: HOSPITAL | Age: 75
Discharge: HOME | End: 2025-01-03

## 2025-01-03 LAB
RAD ONC ARIA COURSE ID: NORMAL
RAD ONC ARIA COURSE INTENT: NORMAL
RAD ONC ARIA COURSE LAST TREATMENT DATE: NORMAL
RAD ONC ARIA COURSE START DATE: NORMAL
RAD ONC ARIA COURSE TREATMENT ELAPSED DAYS: 25
RAD ONC ARIA FIRST TREATMENT DATE: NORMAL
RAD ONC ARIA PLAN FRACTIONS TREATED TO DATE: 17
RAD ONC ARIA PLAN ID: NORMAL
RAD ONC ARIA PLAN PRESCRIBED DOSE PER FRACTION: 1.8 GY
RAD ONC ARIA PLAN PRIMARY REFERENCE POINT: NORMAL
RAD ONC ARIA PLAN TOTAL FRACTIONS PRESCRIBED: 33
RAD ONC ARIA PLAN TOTAL PRESCRIBED DOSE: 5940 CGY
RAD ONC ARIA REFERENCE POINT DOSAGE GIVEN TO DATE: 30.6 GY
RAD ONC ARIA REFERENCE POINT ID: NORMAL
RAD ONC ARIA REFERENCE POINT SESSION DOSAGE GIVEN: 1.8 GY

## 2025-01-03 PROCEDURE — 77014 CHG CT GUIDANCE RADIATION THERAPY FLDS PLACEMENT: CPT | Performed by: RADIOLOGY

## 2025-01-03 PROCEDURE — 77386: CPT | Performed by: RADIOLOGY

## 2025-01-03 RX ORDER — TIOTROPIUM BROMIDE AND OLODATEROL 3.124; 2.736 UG/1; UG/1
2 SPRAY, METERED RESPIRATORY (INHALATION)
Qty: 12 G | Refills: 3 | Status: SHIPPED | OUTPATIENT
Start: 2025-01-03

## 2025-01-03 NOTE — TELEPHONE ENCOUNTER
Patient spoke with insurance about a cheaper inhaler.....stiolto is cheaper for him, please call this in to mail order. call pt if you have questions [phone]       Patient is wanting to switch to Stiolto to save money.

## 2025-01-06 DIAGNOSIS — C34.92 SQUAMOUS CELL CARCINOMA OF LEFT LUNG: ICD-10-CM

## 2025-01-06 DIAGNOSIS — C34.11 MALIGNANT NEOPLASM OF UPPER LOBE OF RIGHT LUNG: Primary | ICD-10-CM

## 2025-01-08 ENCOUNTER — HOSPITAL ENCOUNTER (OUTPATIENT)
Dept: RADIATION ONCOLOGY | Facility: HOSPITAL | Age: 75
Discharge: HOME | End: 2025-01-08

## 2025-01-08 LAB
RAD ONC ARIA COURSE ID: NORMAL
RAD ONC ARIA COURSE INTENT: NORMAL
RAD ONC ARIA COURSE LAST TREATMENT DATE: NORMAL
RAD ONC ARIA COURSE START DATE: NORMAL
RAD ONC ARIA COURSE TREATMENT ELAPSED DAYS: 30
RAD ONC ARIA FIRST TREATMENT DATE: NORMAL
RAD ONC ARIA PLAN FRACTIONS TREATED TO DATE: 18
RAD ONC ARIA PLAN ID: NORMAL
RAD ONC ARIA PLAN PRESCRIBED DOSE PER FRACTION: 1.8 GY
RAD ONC ARIA PLAN PRIMARY REFERENCE POINT: NORMAL
RAD ONC ARIA PLAN TOTAL FRACTIONS PRESCRIBED: 33
RAD ONC ARIA PLAN TOTAL PRESCRIBED DOSE: 5940 CGY
RAD ONC ARIA REFERENCE POINT DOSAGE GIVEN TO DATE: 32.4 GY
RAD ONC ARIA REFERENCE POINT ID: NORMAL
RAD ONC ARIA REFERENCE POINT SESSION DOSAGE GIVEN: 1.8 GY

## 2025-01-08 PROCEDURE — 77386: CPT | Performed by: RADIOLOGY

## 2025-01-08 PROCEDURE — 77014 CHG CT GUIDANCE RADIATION THERAPY FLDS PLACEMENT: CPT | Performed by: RADIOLOGY

## 2025-01-09 ENCOUNTER — HOSPITAL ENCOUNTER (OUTPATIENT)
Dept: RADIATION ONCOLOGY | Facility: HOSPITAL | Age: 75
Discharge: HOME | End: 2025-01-09

## 2025-01-09 LAB
RAD ONC ARIA COURSE ID: NORMAL
RAD ONC ARIA COURSE INTENT: NORMAL
RAD ONC ARIA COURSE LAST TREATMENT DATE: NORMAL
RAD ONC ARIA COURSE START DATE: NORMAL
RAD ONC ARIA COURSE TREATMENT ELAPSED DAYS: 31
RAD ONC ARIA FIRST TREATMENT DATE: NORMAL
RAD ONC ARIA PLAN FRACTIONS TREATED TO DATE: 19
RAD ONC ARIA PLAN ID: NORMAL
RAD ONC ARIA PLAN PRESCRIBED DOSE PER FRACTION: 1.8 GY
RAD ONC ARIA PLAN PRIMARY REFERENCE POINT: NORMAL
RAD ONC ARIA PLAN TOTAL FRACTIONS PRESCRIBED: 33
RAD ONC ARIA PLAN TOTAL PRESCRIBED DOSE: 5940 CGY
RAD ONC ARIA REFERENCE POINT DOSAGE GIVEN TO DATE: 34.2 GY
RAD ONC ARIA REFERENCE POINT ID: NORMAL
RAD ONC ARIA REFERENCE POINT SESSION DOSAGE GIVEN: 1.8 GY

## 2025-01-09 PROCEDURE — 77014 CHG CT GUIDANCE RADIATION THERAPY FLDS PLACEMENT: CPT | Performed by: RADIOLOGY

## 2025-01-09 PROCEDURE — 77386: CPT | Performed by: RADIOLOGY

## 2025-01-11 NOTE — PROGRESS NOTES
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  He is currently receiving definitive radiation (~24/33 fx) + concurrent paclitaxel+carboplatin- C1, 12/9/24; C2, 12/16/24; C3, 12/23/24; C4, 12/30/24; C5, 1/6/25.  He is accompanied by his wife Sarah and a daughter, Linette.    I have reviewed the HPI and verified with the patient the accuracy of it. No changes to interval history since the information was documented. Timoteo Lombardi MD 01/17/25      Summary of lung cancer histories/prior interventions:  - Medical history includes COPD, MRSA infection (respiratory culture, 5/1/24), migraines, bleeding ulcer, transfusion, left upper lobectomy, 4/19/23 for stage I pT1c,pN0M0 squamous cell carcinoma.    -  8/14/23 PET+ RUL lung neoplasm. He completed 5000 cGy in 4 fractions to the right upper lobe of the lung on 09/27/2023.   -  4/29/24- Diagnosed with squamous cell of the right upper lobe via bronchoscopy.   -  Completed 5000 cGy  in 5 fractions to the right upper lobe of the lung on 06/05/2024.   -  9/16/24- PET Scan- LEFT lower lobe pneumonia. Neoplastic LEFT upper lobe lung nodules adjacent to the LEFT hilum. Neoplastic nodule within the LEFT bronchus. Small focus of indeterminate FDG uptake in the posterior midline neck between the C5 and C6 spinous processes.   - 10/17/24- Bronchoscopy revealed fragments of squamous cell carcinoma: Stage IIIa (T3 N1 M0) squamous cell carcinoma left upper lobe of the lung.    -  1/29/24- Consult by Dr. Ortiz who recommended a course of external beam radiation, likely delivered concurrently  with systemic therapy under the medical oncology service, anticipate 6600 cGy over 33 treatments.   - 12/6/24- MRI brain- No mets  - Definitive radiation (~24/33 fractions)  -Definitive concurrent paclitaxel+carboplatin- C1, 12/9/24; C2, 12/16/24; C3, 12/23/24; C4, 12/30/24; C5, 1/6/25.      Diagnostic abnormalities:  01/16/2023 - CT Chest - [place]:  Spiculated perihilar left upper lobe pulmonary nodule measures 2.7 cm. This is concerning for primary neoplasm. PET/CT or bronch recommended for further evaluation.   0.8 cm noncalcified pulmonary nodule in the right upper lobe. This is at the lower limits of normal for PET/CT detection. Benign granulomatous disease, metastatic disease, or 2nd primary pulmonary malignancy could be considered.   No enlarged mediastinal or hilar lymph nodes     01/17/2023 - Pulmonary Function Tests - [place]:  Moderate obstructive lung disease     01/26/2023 - Bronchoscopy with biopsy per :  RUL, lower lobe, middle lobe were negative. The left upper lobe, no lesion, lingula no lesion seen.  SAMINA washing:  Negative for malignant cells; reactive bronchial cells, macrophages, and inflammation  SAMINA brushing:  Negative for malignant cells; markedly reactive bronchial cells, macrophages, and inflammation     02/09/2023 - PET Scan:  2.8 cm hypermetabolic central LEFT upper lobe pulmonary nodule. This likely represents a primary lung neoplasm.  0.8 cm hypermetabolic pulmonary nodule in the RIGHT upper lobe. This is highly suspicious for either a contralateral pulmonary metastasis or second primary lung neoplasm.   No hypermetabolic thoracic lymph nodes. No evidence of hypermetabolic metastatic disease in the neck, abdomen, or pelvis.  1.5 cm hypermetabolic nodule in the RIGHT anterior rectum, highly concerning for underlying rectal polyp. Recommend correlation with colonoscopy/sigmoidoscopy.     02/27/2023 - Colonoscopy:  Rectal polyp, excision:  Villous adenoma, negative for  high-grade glandular dysplasia or malignancy.  Colon polyp at 20 cm, biopsy:  Fragments of serrated polyp, favor sessile serrated lesion/polyp.  Colon polyp at 10 cm, biopsy:  Fragments of serrated polyp, favor sessile serrated lesion/polyp.     03/08/2023 - CT Chest without contrast:  Pulmonary emphysema with LEFT hilar/upper lobe lung mass measuring approximately 4 cm in greatest dimension. There is mild nodular airspace disease laterally to this lesion which may represent postobstructive atelectasis or infiltrate.   10 mm irregular nodule within the RIGHT lung apex (series 2, image 101). This is concerning for metastatic lesion. Additional 4 mm noncalcified nodule at the RIGHT lung apex (series 2, image 85).   Evidence of prior granulomatous disease.      03/09/2023 - Bronchoscopy/EBUS per :  Left upper lobe, EBUS (ThinPrep and cell block section):   Squamous cell carcinoma.   Bronchial washing (ThinPrep and cell block section):   No malignant cells identified.   Benign bronchial epithelial cells, and alveolar macrophages.      04/19/2023 -  Left upper lobectomy and lymph node excision per Dr.Robert Irwin:   Lymph node, level 9 lymph node biopsy: Benign fibroadipose tissue.   Lymph node, level 10 posterior hilar lymph node biopsy: 1 lymph node, negative for evidence of malignancy.   Lymph node, level 6 lymph node biopsy: 2 lymph nodes, negative for evidence of malignancy.   Lymph node, level 11 interlobar lymph node biopsy: 1 lymph node, negative for evidence of malignancy.   Lymph node, level 10 anterior hilar lymph node biopsy: 2 lymph nodes, negative for evidence of malignancy.   Lymph node, level 12 lobar lymph node biopsy: 1 lymph node, negative for evidence of malignancy.   Lung, left upper lobectomy:   Invasive moderately differentiated squamous cell carcinoma.   Invasive carcinoma measures 2.5 cm in greatest dimension grossly.   Invasive carcinoma extends to within 0.2 cm of the nearest  bronchial surgical excision margin.   Squamous metaplasia identified at bronchial surgical excision margin.   Emphysematous changes identified involving the nonneoplastic lung parenchyma.   2 peribronchial lymph nodes, negative for evidence of malignancy.   AJCC STAGE:  pT1c, pN0, pMx      04/25/2023 -  CT Chest without contrast:  Interval placement of large bore chest tube with decrease in previously described large left hydropneumothorax.Residual hydropneumothorax present with air dissecting through anterior chest wall into subcutaneous soft tissues, unchanged.   Interval resolution of previously described debris within left bronchus.   Unchanged right apical 1.0 cm nodule.      07/24/2023 - Appointment with :  Left upper lobe SCC [id number], stage I,Jan 2023  -1/16/23 CT chest ([facility]): Spiculated perihilar left upper lobe pulmonary nodule measures 2.7cm. This is concerning for primary neoplasm. PET/CT or bronchoscopy is recommended to further evaluate. 0.8cm noncalcified pulmonary nodule in the right upper lobe. This is at the lower limits of normal for Pet/CT detection. Benign granulomatous disease, metastatic disease, or 2nd primary pulmonary malignancy could be considered. No enlarged mediastinal or hilar lymph nodes.  -1/26/23 Lung, left upper lobe, washing: Negative for malignant cells. Reactive bronchial cells, macrophages, and inflammation. Lung, left upper lobe, brushing: Negative for malignant cells. Markedly reactive bronchial cells, macrophages, and inflammation.  -2/9/23 PET scan ([facility]): 2.8 cm hypermetabolic central LEFT upper lobe pulmonary nodule. This likely represents a primary lung neoplasm. 0.8 cm hypermetabolic pulmonary nodule in the RIGHT upper lobe. This is highly suspicious for either a contralateral pulmonary metastasis or second primary lung neoplasm. No hypermetabolic thoracic lymph nodes. No evidence of hypermetabolic metastatic disease in the neck, abdomen, or pelvis. 1.5 cm  hypermetabolic nodule in the RIGHT anterior rectum, highly concerning for underlying rectal polyp. Recommend correlation with colonoscopy/sigmoidoscopy.   4/19/23 Lung, left upper lobectomy: Invasive moderately differentiated squamous cell carcinoma. Invasive carcinoma measures 2.5 cm in greatest dimension grossly. Invasive carcinoma extends to within 0.2 cm of the nearest bronchial surgical excision margin. Squamous metaplasia identified at bronchial surgical excision margin. Emphysematous changes identified involving the nonneoplastic lung parenchyma. 2 peribronchial lymph nodes, negative for evidence of malignancy.Lymph node, level 9 lymph node biopsy: Benign fibroadipose tissue. Lymph node, level 10 posterior hilar lymph node biopsy: 1 lymph node, negative for evidence of malignancy. Lymph node, level 6 lymph node biopsy: 2 lymph nodes, negative for evidence of malignancy. Lymph node, level 11 interlobar lymph node biopsy: 1 lymph node, negative for evidence of malignancy. Lymph node, level 10 anterior hilar lymph node biopsy: 2 lymph nodes, negative for evidence of malignancy. Lymph node, level 12 lobar lymph node biopsy: 1 lymph node, negative for evidence of malignancy. [id number], stage I  Plan:  -Repeat CT chest Nov 2023  RUL subcentimeter nodule (PET+)  -2/9/23 PET scan ([facility]): 2.8 cm hypermetabolic central LEFT upper lobe pulmonary nodule. This likely represents a primary lung neoplasm. 0.8 cm hypermetabolic pulmonary nodule in the RIGHT upper lobe. This is highly suspicious for either a contralateral pulmonary metastasis or second primary lung neoplasm. No hypermetabolic thoracic lymph nodes. No evidence of hypermetabolic metastatic disease in the neck, abdomen, or pelvis. 1.5 cm hypermetabolic nodule in the RIGHT anterior rectum, highly concerning for underlying rectal polyp. Recommend correlation with colonoscopy/sigmoidoscopy.   -Referral Dr Ortiz for consideration SBRT RUL hypermetabolic  nodule.  PLAN:  RTC with MD 2 months in [place] office  CBC, CMP, B12, Folate, LDH, Haptoglobin, iron profile, Ferritin, retic  Continue follow-up with Dr.Robert Irwin/CardioThoracic Surgery  Continue to follow up with Dr. Rowe  Iron Sulfate 325 mg p.o. twice daily  Follow Up:  Return in 2 months (on 9/24/2023) for Appointment with Dr. Vaughan in [place].  Refer to Dr Ortiz      08/09/2023 - Appointment with :  will order PET scan, they will call you  Return in 2 weeks for further discussion     08/14/2023 - PET Scan:  Abnormal PET/CT, there is increased size of a hypermetabolic pulmonary nodule in the right upper lobe lung apex that measures 13 mm, compared with 8 mm on the previous PET/CT. This has a maximum SUV of 8.1. This is concerning for active neoplasm.  Interval resection of the left upper lobe with posttreatment changes along the medial margin of the upper mediastinum, including mild infiltration of the mediastinal fat planes.  There is a subcutaneous nodule with surrounding fatty infiltration over the low back at the level of the upper sacrum which demonstrates hypermetabolism. This could be inflammatory, less likely a metastatic nodule. This measures 21 mm, follow-up ultrasound is recommended to determine if this is solid. Ultrasound-guided biopsy could be performed if indicated.  Interval resolution of the hypermetabolic nodule associated with the right rectum, however there is a new hypermetabolic nodule that maps to the right prostate gland. Correlation with PSA values is recommended.     08/16/2023 - Consult with :  I have recommended to treat the right lung nodule with SBRT, I anticipate a course over 4-5 fractions, final course pending.   He does have a visible insect bite on his low back and I do believe this correlates with the PET findings of activity on the upper sacrum. Will order PSA for prostate gland findings. He has not had one drawn in some time.   Plan:  Plan on 4-5  pinpoint treatments to the lung nodule.   We will call you when to start treatments.   PSA today     08/18/2023 - Documentation per :  I spoke on the phone with this patient's wife Sarah and discussed his PSA of 2.9. This is considered a normal level, however on recent PET scan imaging for a lung nodule workup, a nodule within the prostate was noted.   I will refer him to urology for further management recommendations.     09/12/2023 - 09/27/2023 - Completed radiation course:  Received 5000 cGy in 4 fractions to the right upper lobe of the lung.     11/20/2023 - MRI Pelvis with and without contrast:  No suspicious lesions in the prostate (PI-RADS 3 or greater).      11/29/2023 - Appointment with :  I personally reviewed MRI today.    His MRI is negative for any suspicious lesions.    He has a volume of 34 cc.    I believe his hypermetabolic activity on PET scan was likely inflammatory.  I do not think he requires a biopsy based on his negative MRI and PSA level.    He will follow-up as needed.      12/20/2023 - CT Chest without contrast:  Decreased size of 6 mm spiculated right upper lobe pulmonary nodule (previously 13 mm). This nodule was hypermetabolic on prior PET/CT and highly suspicious for primary lung neoplasm.  Postoperative change of left upper lobectomy. No definite change/increase in soft tissue thickening along the lobectomy margin and mediastinum. Recommend continued imaging surveillance.  No evidence of disease progression in the chest. No thoracic lymphadenopathy.     12/27/2023 - Appointment with :  Return in 3 months with a CT chest before.      03/19/2024 - CT chest without contrast:  1.1 cm irregular right upper lobe nodule, highly suspicious for malignancy.  5 mm left upper lobe nodule is indeterminate but does raise the suspicion for malignancy.  Stable size of the previously irradiated right apex nodule. Minimally increased adjacent subpleural parenchymal  abnormality, likely related to post radiation changes.  Left upper lobectomy with stable postsurgical margins.  No mediastinal lymphadenopathy.     03/27/2024 - Consult with :  Will refer to pulmonology for bronchoscopy considerations of the new right upper lung nodule. He will return in 3 weeks for further recommendations.  I have instructed him to continue to see the other health care providers as per their scheduling.  Plan:  Referral to pulmonology for biopsy of new right lung nodule  Return in 3 weeks     04/16/2024 - Appointment with :  For wheezing, add bronchodilator. 100 mcg trelegy sample given, we reviewed PFT showing severe obstruction.  Likely moderate to severe copd with hx smoking  For lung nodule, will plan ion bronchoscopy to better assess rul nodule  Ask anesthesia to take particular care with remaining teeth  We demonstrated proper technique for use of ellipta device  Follow Up   Return in about 4 weeks (around 5/14/2024).     04/22/2024 - CT Chest without contrast:  RIGHT upper lobe 17 x 11 mm nodule.  LEFT upper lobe 6 x 5 mm nodule.  No pneumothorax or pleural effusion.     04/29/2024 - Bronchoscopy with biopsy per :  Lung, right upper lobe, Ion fine-needle aspiration, smears (2), ThinPrep preparation (1) and cell block:   Positive for malignant cells, consistent with moderately differentiated squamous cell carcinoma.  Comment: Immunohistochemical stain for p40 is positive in the neoplastic cells, supporting the above diagnostic impression.The control stained appropriately.  Bronchial washings, ThinPrep preparation (1) and cell block:   Positive for malignant cells, consistent with moderately differentiated squamous cell carcinoma.  Lung, right upper lobe, bronchoalveolar lavage, ThinPrep preparation (1):   Positive for malignant cells, consistent with moderately to poorly differentiated squamous cell carcinoma.  AJCC stage: pTX pNX     04/29/2024 - Documentation per  :  Dr. Nicola Monsivais notified me that patient cytology was positive in his lung.  He is scheduled to return to our office for treatment recommendations.  Also, it was incidentally noted by anesthesia during his procedure that he has a lesion in the oral cavity that needs additional evaluation and may potentially be another malignancy.     05/06/2024 - Appointment with :  This patient does have biopsy-proven squamous cell carcinoma of the right upper lobe.  We will proceed with SBRT radiotherapy to this  lesion.  He also has a lesion in the left lung which is nondiagnostic and we will continue to follow that lesion with serial radiographs  Plan:  We will call you when to start radiation treatment in approximately 2 weeks  Return in about 2 weeks (around 5/20/2024) for the first Radiation Treatment.     05/28/2024 - 06/05/2024 - Completed radiation course:  Received 5000 cGy in 5 fractions to the right upper lobe of the lung via Stereotactic Radiation Therapy - SRT.      09/03/2024 - CT chest without contrast:  Lung nodules seen previously have resolved.  There are indeterminate soft tissue nodules within the LEFT bronchus.  Consider PET/CT correlation.     09/09/2024 - Appointment with :  We discussed the new left bronchus findings on imaging. I will notify pulmonology for review of the imaging and further recommendations. We will continue routine follow-up/surveillance as discussed in 3 months with follow up CT scan before visit and I have instructed him to continue to see the other health care providers as per their scheduling.  Plan:  will discuss your scans with pulmonology  return in 3 months unless you need to be seen sooner.     09/11/2024 - Documentation per Aston Medina - radiation oncology:  I reviewed the recent CT chest imaging with Dr. Ortiz and discussed this with Dr. Monsivais and Erma Saucedo in pulmonology.   I will order a PET scan for further evaluation of the left  bronchus finding and will get him in for an appointment to discuss further options with Dr. Ortiz.     09/16/2024 - PET Scan:  LEFT lower lobe pneumonia.  Neoplastic LEFT upper lobe lung nodules adjacent to the LEFT hilum.  Neoplastic nodule within the LEFT bronchus.  Small focus of indeterminate FDG uptake in the posterior midline neck between the C5 and C6 spinous processes.     10/08/2024 - Appointment with Mata Simon MD - [facility]:  Plan for bronchoscopy with APC and tumor debulking, EBUS/TBNA, bal next week.     10/17/2024 - Bronchus, left endobronchial mass, endobronchial biopsy:  Fragments of squamous cell carcinoma.        LABS    Lab Results - Last 18 Months   Lab Units 01/13/25  0755 12/30/24  0821 12/23/24  0747 12/16/24  0808 12/09/24  0809 12/02/24  1509   HEMOGLOBIN g/dL 11.3* 10.7* 11.3* 10.7* 11.4* 10.9*   HEMATOCRIT % 35.5* 34.7* 36.7* 35.1* 36.8* 35.8*   MCV fL 93.9 94.3 93.9 95.9 94.8 94.5   WBC 10*3/mm3 5.84 6.34 6.76 8.62 10.65 9.18   RDW % 17.3* 15.5* 15.3 14.6 14.5 14.2   MPV fL 9.2 9.6 9.5 10.2 10.1 10.1   PLATELETS 10*3/mm3 157 257 291 306 303 307   IMM GRAN % % 0.2 0.6* 0.4 0.7* 0.4 0.7*   NEUTROS ABS 10*3/mm3 4.14 4.56 4.92 6.12 7.51* 6.25   LYMPHS ABS 10*3/mm3 1.06 1.01 1.07 1.44 1.89 1.81   MONOS ABS 10*3/mm3 0.50 0.59 0.49 0.65 0.85 0.76   EOS ABS 10*3/mm3 0.09 0.10 0.19 0.28 0.31 0.24   BASOS ABS 10*3/mm3 0.04 0.04 0.06 0.07 0.05 0.06   IMMATURE GRANS (ABS) 10*3/mm3 0.01 0.04 0.03 0.06* 0.04 0.06*   NRBC /100 WBC 0.0 0.0 0.0 0.0 0.0 0.0       Lab Results - Last 18 Months   Lab Units 01/13/25  0755 12/30/24  0821 12/23/24  0747 12/17/24  1110 12/16/24  0808 12/09/24  0809 12/02/24  1509   GLUCOSE mg/dL 53* 87 176* 126* 134* 101* 158*   SODIUM mmol/L 139 137 137 140 137 138 138   POTASSIUM mmol/L 4.5 5.2 4.6 4.0 4.5 5.0 4.9   CO2 mmol/L 27.0 26.0 25.0 24.0 26.0 27.0 28.0   CHLORIDE mmol/L 100 100 99 104 99 100 98   ANION GAP mmol/L 12.0 11.0 13.0 12.0 12.0 11.0 12.0  "  CREATININE mg/dL 1.01 0.90 0.99 0.78 1.26 1.10 1.14   BUN mg/dL 18 15 19 18 29* 22 24*   BUN / CREAT RATIO  17.8 16.7 19.2 23.1 23.0 20.0 21.1   CALCIUM mg/dL 9.8 9.6 9.6 8.7 9.5 9.8 9.9   ALK PHOS U/L 76 81 74  --  75 89 79   TOTAL PROTEIN g/dL 7.3 7.3 7.2  --  7.3 7.6 7.4   ALT (SGPT) U/L 8 10 13  --  9 8 12   AST (SGOT) U/L 10 15 14  --  12 11 11   BILIRUBIN mg/dL 0.2 0.2 0.2  --  <0.2 <0.2 <0.2   ALBUMIN g/dL 4.1 4.0 3.9  --  3.8 4.1 3.9   GLOBULIN gm/dL 3.2 3.3 3.3  --  3.5 3.5 3.5       Lab Results - Last 18 Months   Lab Units 12/30/24  0821 11/26/24  1223   CEA ng/mL 4.42 3.46       Lab Results - Last 18 Months   Lab Units 11/26/24  1223 07/24/23  1155   IRON mcg/dL 51* 59   TIBC mcg/dL 423 488*   IRON SATURATION %  --  12*   IRON SATURATION (TSAT) % 12*  --    FERRITIN ng/mL 166.40 26.0*   FOLATE ng/mL 8.50 15.0         PAST MEDICAL HISTORY:  ALLERGIES:  Allergies   Allergen Reactions    Iodides Nausea And Vomiting     Contrast dye-\"Upset stomach x 2 days\". MRI ONLY     CURRENT MEDICATIONS:  Outpatient Encounter Medications as of 1/17/2025   Medication Sig Dispense Refill    acetaminophen (Tylenol) 325 MG tablet Take 3 tablets by mouth Every 8 (Eight) Hours. Take every 8 hours for 3 days then take prn as needed.      albuterol sulfate  (90 Base) MCG/ACT inhaler Every 6 (Six) Hours.      cetirizine (zyrTEC) 10 MG tablet Take 1 tablet by mouth Daily.      Cholecalciferol 125 MCG (5000 UT) tablet Take 1 tablet by mouth Daily.      divalproex (DEPAKOTE ER) 250 MG 24 hr tablet Take 1 tablet by mouth 2 (Two) Times a Day.      folic acid-vit B6-vit B12 (Folbee) 2.5-25-1 MG tablet tablet Take 1 tablet by mouth Daily. 30 tablet 0    furosemide (LASIX) 20 MG tablet TAKE 1 TABLET BY MOUTH ONCE DAILY Oral for 14 Days      glimepiride (AMARYL) 4 MG tablet Take 1 tablet by mouth Daily.      hydroCHLOROthiazide (HYDRODIURIL) 25 MG tablet Take 1 tablet by mouth Daily.      lidocaine-prilocaine (EMLA) 2.5-2.5 % " cream Apply to port site 30 minutes before it is to be accessed and cover with occlusive dressing. 30 g 1    lisinopril (PRINIVIL,ZESTRIL) 10 MG tablet Take 1 tablet by mouth Daily.      loratadine (CLARITIN) 10 MG tablet Take 1 tablet by mouth Daily.      metFORMIN (GLUCOPHAGE) 1000 MG tablet Take 1 tablet by mouth 2 (Two) Times a Day With Meals.      metoprolol succinate XL (TOPROL-XL) 25 MG 24 hr tablet Take 1 tablet by mouth Daily.      Omega-3 Fatty Acids (fish oil) 1000 MG capsule capsule Take  by mouth Daily With Breakfast.      ondansetron (Zofran) 4 MG tablet Take 1 tablet by mouth Every 8 (Eight) Hours As Needed for Nausea or Vomiting. 15 tablet 0    ondansetron (ZOFRAN) 8 MG tablet Take 1 tablet by mouth Every 8 (Eight) Hours As Needed for Nausea or Vomiting. 30 tablet 3    pantoprazole (PROTONIX) 40 MG EC tablet Take 1 tablet by mouth 2 (Two) Times a Day.      primidone (MYSOLINE) 50 MG tablet Take 1 tablet by mouth 2 (Two) Times a Day. For essential tremors      simvastatin (ZOCOR) 40 MG tablet Take 1 tablet by mouth Every Night.      tiotropium bromide-olodaterol (Stiolto Respimat) 2.5-2.5 MCG/ACT aerosol solution inhaler Inhale 2 puffs Daily. 12 g 3    [DISCONTINUED] Fluticasone-Umeclidin-Vilant (Trelegy Ellipta) 100-62.5-25 MCG/ACT inhaler Inhale 1 puff Daily. (Patient not taking: Reported on 1/17/2025) 28 each 0     No facility-administered encounter medications on file as of 1/17/2025.     ADULT ILLNESSES:  Patient Active Problem List   Diagnosis Code    Squamous cell carcinoma of left lung C34.92    S/P lobectomy of lung Z90.2    Former smoker Z87.891    History of radiation therapy Z92.3    Nodule of upper lobe of right lung R91.1    Malignant neoplasm of upper lobe of right lung C34.11    Mass of left lung R91.8    History of primary malignant neoplasm of right lung Z85.118    Malignant neoplasm of overlapping sites of lung C34.80    Port-A-Cath in place Z95.828    Function kidney decreased  N28.9    Hypomagnesemia E83.42     SURGERIES:  Past Surgical History:   Procedure Laterality Date    APPENDECTOMY      BRONCHOSCOPY  2023    BRONCHOSCOPY N/A 4/29/2024    Procedure: BRONCHOSCOPY WITH ENDOBRONCHIAL ULTRASOUND;  Surgeon: Nicola Monsivais MD;  Location:  [facility];  Service: Pulmonary;  Laterality: N/A;  pre Nodule of upper lobe of right lung  Dr. Alves    BRONCHOSCOPY N/A 10/17/2024    Procedure: BRONCHOSCOPY WITH APC & DEBULKING, cryotherapy, balloon dilation/tamponade;  Surgeon: Mata Simon MD;  Location:  [facility];  Service: Pulmonary;  Laterality: N/A;    BRONCHOSCOPY WITH ION ROBOTIC ASSIST N/A 4/29/2024    Procedure: BRONCHOSCOPY WITH ION ROBOT and BRONCHOSCOPY WITH ENDOBRONCHIAL ULTRASOUND;  Surgeon: Nicola Monsivais MD;  Location:  [facility];  Service: Robotics - Pulmonary;  Laterality: N/A;  pre  Nodule of upper lobe of right lung  post      BRONCHOSCOPY WITH ION ROBOTIC ASSIST  4/29/2024    Procedure: BRONCHOSCOPY NAVIGATION WITH ENDOBRONCHIAL ULTRASOUND AND ION ROBOT;  Surgeon: Nicola Monsivais MD;  Location:  [facility];  Service: Pulmonary;;    COLONOSCOPY  02/28/2023    HEMORRHOIDECTOMY      HERNIA REPAIR  1999    LUNG SURGERY Left 04/19/2023    VENOUS ACCESS DEVICE (PORT) INSERTION N/A 12/5/2024    Procedure: Single Lumen Port-a-cath insertion with flouroscopy;  Surgeon: Lorrie Hanson MD;  Location:  [facility];  Service: General;  Laterality: N/A;     HEALTH MAINTENANCE ITEMS:  Health Maintenance Due   Topic Date Due    LIPID PANEL  Never done    COLORECTAL CANCER SCREENING  Never done    TDAP/TD VACCINES (1 - Tdap) Never done    ZOSTER VACCINE (1 of 2) Never done    AAA SCREEN ONCE  Never done    HEPATITIS C SCREENING  Never done    ANNUAL WELLNESS VISIT  Never done    COVID-19 Vaccine (4 - 2024-25 season) 09/01/2024       <no information>  Last Completed Colonoscopy       This patient has no relevant Health Maintenance data.          Immunization  "History   Administered Date(s) Administered    Arexvy (RSV, Adults 60+ yrs) 2024    COVID-19 (MODERNA) 1st,2nd,3rd Dose Monovalent 2021, 03/15/2021    COVID-19 (UNSPECIFIED) 10/24/2021    Fluzone High-Dose 65+YRS 2024    Pneumococcal Conjugate 13-Valent (PCV13) 2017    Pneumococcal Polysaccharide (PPSV23) 2017     Last Completed Mammogram       This patient has no relevant Health Maintenance data.              FAMILY HISTORY:  Family History   Problem Relation Age of Onset    Diabetes Mother     Heart disease Mother     Cancer Father     Cancer Brother     Alcohol abuse Brother     Cancer Paternal Grandfather     Malig Hyperthermia Neg Hx      SOCIAL HISTORY:  Social History     Socioeconomic History    Marital status:    Tobacco Use    Smoking status: Former     Current packs/day: 0.00     Average packs/day: 2.0 packs/day for 57.2 years (114.4 ttl pk-yrs)     Types: Cigarettes     Start date:      Quit date: 3/17/2023     Years since quittin.8     Passive exposure: Past    Smokeless tobacco: Never   Vaping Use    Vaping status: Never Used   Substance and Sexual Activity    Alcohol use: Not Currently    Drug use: Never    Sexual activity: Defer       REVIEW OF SYSTEMS:  Review of Systems   Constitutional:  Positive for activity change, appetite change (Low to fair), fatigue (Chronic) and unexpected weight loss (35 pounds in the past 1.5-yrs).        Manages his personal ADLs, light chores, runs errands and is still driving.  Is up and about at least half the time.  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  No mouth sores.  No nausea. No vomiting.  No skin changes. No diarrhea.  \"Does not bother me\"   HENT: Negative.     Eyes: Negative.    Respiratory:  Positive for cough (With cream/clear-colored phlegm), shortness of breath (Baseline exertional dyspnea with some SOB with routine activity) and " "wheezing.         [id number]-ml to 2 packs/day.  Quit in 2023    On home O2 and inhalers    Port is \"sore\" after he pulled on his washing machine at home   Cardiovascular: Negative.    Gastrointestinal: Negative.    Endocrine: Negative.    Genitourinary: Negative.    Musculoskeletal:  Positive for arthralgias (Chronic) and back pain (Chronic).   Skin: Negative.    Allergic/Immunologic: Negative.    Neurological:  Positive for tremors (Chronic).   Hematological: Negative.    Psychiatric/Behavioral: Negative.         /78   Pulse 72   Temp 97.2 °F (36.2 °C) (Temporal)   Resp 18   Ht 172.7 cm (68\")   Wt 72 kg (158 lb 11.2 oz)   SpO2 95%   BMI 24.13 kg/m²  Body surface area is 1.85 meters squared.  Pain Score    01/17/25 1028   PainSc:   2   PainLoc: Chest  Comment: port       Physical Exam  Vitals and nursing note reviewed.   Constitutional:       Comments: Pleasant, cooperative, heavyset, modestly kept elderly male.  Ambulatory.  ECOG 1.     Has lost 6 lb since the last visit   HENT:      Head: Normocephalic and atraumatic.   Eyes:      General: No scleral icterus.     Extraocular Movements: Extraocular movements intact.      Pupils: Pupils are equal, round, and reactive to light.   Cardiovascular:      Rate and Rhythm: Normal rate.   Pulmonary:      Effort: Pulmonary effort is normal.      Comments:   Port in left upper chest is well seated.  However, there is some subtle surrounding fullness and prominent tenderness surrounding the site without overt hyperemia.    Distant breath sounds bilaterally  Abdominal:      Palpations: Abdomen is soft.      Tenderness: There is no abdominal tenderness.   Musculoskeletal:         General: Normal range of motion.      Cervical back: Normal range of motion.   Lymphadenopathy:      Cervical: No cervical adenopathy.   Skin:     General: Skin is warm.   Neurological:      General: No focal deficit present.      Mental Status: He is alert and oriented to person, place, " and time.   Psychiatric:         Mood and Affect: Mood normal.         Behavior: Behavior normal.         Thought Content: Thought content normal.           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  -- Original tumor stage: TNM at least IIIA ([id number])  -- Original tumor burden:  -9/16/24- PET scan-LEFT lower lobe pneumonia.Neoplastic LEFT upper lobe lung nodules adjacent to the LEFT hilum. Neoplastic nodule within the LEFT bronchus.Small focus of indeterminate FDG uptake in the posterior midline neck between the C5 and C6 spinous processes  -10/17/24- Bronchoscopy-revealed Fragments of squamous cell carcinoma.      --Complications of tumor: Asthenia, cough, dyspnea, weight loss  -- Tumor status:   -Definitive radiation + concurrent paclitaxel+carboplatin- C1, 12/9/24; C2, 12/16/24; C3, 12/23/24; C4, 12/30/24; C5, 1/6/25.      2.   Left upper lobectomy, 4/19/23 for stage I pT1c,pN0M0 squamous cell carcinoma.    3.   On 8/14/23 PET+ RUL lung neoplasm.   - Completed 5000 cGy in 4 fractions to the right upper lobe of the lung on 09/27/2023.   4.   Squamous cell of the right upper lobe via bronchoscopy, 4/29/24.   - Completed 5000 cGy  in 5 fractions to the right upper lobe of the lung on 06/05/2024.     5.   Normocytic anemia.  Contributions from malignancy/anemia of chronic disease+ chemo  --Hgb 10.7; MCV 94.3, 12/30/24 (prior: Hgb 10.7-12.9)  --11/26/24- ferritin 166, Fe 51, fe sat 12%, B12 229, folate 8.5  6.   COPD   7.   Ex heavy cigarette smoker  8.   Port tenderness.  Needs port study and surgical evaluation before it can be used again       Plan:  Review labs, 11/26/24-12/30/24 - Normal CMP, CEA 4.4 (prior: 3.46), ferritin 166, Fe 51, fe sat 12%, B12 229, folate 8.5, Hgb 10.7 (prior: Hgb 10.7-11.3) otherwise normal CBC  Chemo tolerance:  Doing well  Pending port study and referral back to general surgery Re: Port tenderness  Note MRI brain, 12/6/24 (above). STEVE  Prior careful review of  available diagnostic information as outlined above.   Note imaging including PET scan 9/16/2024, as well as bronchoscopy/biopsy findings, 10/17/24 (above). 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  Paclitaxel and carboplatin. Potential toxicities of same discussed (to included but not limited to: Myelosuppression, alopecia, neuropathy, arthralgias/myalgias, nausea/vomiting, hypersensitivity reactions, diarrhea, mucositis, risks for infection, abnormal liver enzymes, asthenia, fever, low blood pressure, bleeding, renal insufficiency, edema, bradycardia, anaphylaxis, arrhythmias, thromboembolism, myocardial infarction, pulmonary toxicity, gastrointestinal (GI) obstruction/perforation, paralytic ileus, ischemic colitis, pancreatitis, severe skin reactions; electrolyte disorders, ototoxicity, nephrotoxicity, vision loss). Questions answered. He will agree to a trial of therapy.  The patient and his family acknowledge that given his history of recurrent lung malignancy, increased risk of recurrence in spite of (definitive) therapy remains elevated.  Review NCCN guidelines non-small cell lung cancer, stage III (unresectable).  Chemotherapy regimens used.  Radiation therapy (concurrent regimens usually radiation therapy): Paclitaxel 45 to 50 mg/m² weekly; carboplatin AUC, concurrent thoracic RT +/- additional 2 cycles every 21 days of paclitaxel 200 mg/m² and carboplatin AUC 6-followed by consolidation therapy for unresectable stage III NSCLC, PS 0-1 and no disease progression after 2 or more cycles of definitive chemoradiation: Durvalumab 10 mg/kg IV every 2 weeks for up to 12 months (category 1).    Chemotherapy --week 1 to week 6:               Taxol/paclitaxel 45 mg/m2 per administration guidelines weekly x6 weeks with radiation.             Carboplatin AUC 2 per administration guidelines weekly x6 weeks with radiation.      --  Premedicate with:             Aloxi 0.25 mg IV before chemotherapy             Decadron 10 mg IV before chemo            Pepcid 20 mg IV             Benadryl 50 mg IV      -- CMP, Mg++ and CBC with differential weekly with Procrit 40,000 units subcutaneous every week if Hgb less than 10 and Hct less than 30; Zarxio 480 mcg subcutaneously daily x3 if ANC less than 1 -[facility]     -- eRx:              Zofran 8 mg p.o. 3 times daily as needed, #30 - Rx                         8.  Continue currently identified medications.   9.  Return to the office in 5-6 weeks with pre-office CBC with differential, CMP, and CEA. Will schedule maintenance/consolidation durvalumab at that visit  10.  Importance of Smoking Cessation discussed with patient and informed patient additional information will be on today's [facility]'s Flyer - Plan to Be Tobacco Free handout provided to patient            I spent ~65 minutes caring for Boogie on this date of service. This time includes time spent by me in the following activities: preparing for the visit, reviewing tests, performing a medically appropriate examination and/or evaluation, counseling and educating the patient/family/caregiver, ordering medications, tests, or procedures and documenting information in the medical record

## 2025-01-13 ENCOUNTER — LAB (OUTPATIENT)
Dept: LAB | Facility: HOSPITAL | Age: 75
End: 2025-01-13
Payer: MEDICARE

## 2025-01-13 ENCOUNTER — INFUSION (OUTPATIENT)
Dept: ONCOLOGY | Facility: HOSPITAL | Age: 75
End: 2025-01-13
Payer: MEDICARE

## 2025-01-13 ENCOUNTER — HOSPITAL ENCOUNTER (OUTPATIENT)
Dept: RADIATION ONCOLOGY | Facility: HOSPITAL | Age: 75
Discharge: HOME | End: 2025-01-13
Payer: MEDICARE

## 2025-01-13 VITALS
BODY MASS INDEX: 24.4 KG/M2 | WEIGHT: 161 LBS | SYSTOLIC BLOOD PRESSURE: 123 MMHG | TEMPERATURE: 97.8 F | OXYGEN SATURATION: 93 % | RESPIRATION RATE: 18 BRPM | HEIGHT: 68 IN | DIASTOLIC BLOOD PRESSURE: 52 MMHG | HEART RATE: 76 BPM

## 2025-01-13 DIAGNOSIS — C34.92 SQUAMOUS CELL CARCINOMA OF LEFT LUNG: ICD-10-CM

## 2025-01-13 DIAGNOSIS — C34.11 MALIGNANT NEOPLASM OF UPPER LOBE OF RIGHT LUNG: Primary | ICD-10-CM

## 2025-01-13 DIAGNOSIS — C34.11 MALIGNANT NEOPLASM OF UPPER LOBE OF RIGHT LUNG: ICD-10-CM

## 2025-01-13 PROBLEM — E83.42 HYPOMAGNESEMIA: Status: ACTIVE | Noted: 2025-01-13

## 2025-01-13 LAB
ALBUMIN SERPL-MCNC: 4.1 G/DL (ref 3.5–5.2)
ALBUMIN/GLOB SERPL: 1.3 G/DL
ALP SERPL-CCNC: 76 U/L (ref 39–117)
ALT SERPL W P-5'-P-CCNC: 8 U/L (ref 1–41)
ANION GAP SERPL CALCULATED.3IONS-SCNC: 12 MMOL/L (ref 5–15)
AST SERPL-CCNC: 10 U/L (ref 1–40)
BASOPHILS # BLD AUTO: 0.04 10*3/MM3 (ref 0–0.2)
BASOPHILS NFR BLD AUTO: 0.7 % (ref 0–1.5)
BILIRUB SERPL-MCNC: 0.2 MG/DL (ref 0–1.2)
BUN SERPL-MCNC: 18 MG/DL (ref 8–23)
BUN/CREAT SERPL: 17.8 (ref 7–25)
CALCIUM SPEC-SCNC: 9.8 MG/DL (ref 8.6–10.5)
CHLORIDE SERPL-SCNC: 100 MMOL/L (ref 98–107)
CO2 SERPL-SCNC: 27 MMOL/L (ref 22–29)
CREAT SERPL-MCNC: 1.01 MG/DL (ref 0.76–1.27)
DEPRECATED RDW RBC AUTO: 58.6 FL (ref 37–54)
EGFRCR SERPLBLD CKD-EPI 2021: 78 ML/MIN/1.73
EOSINOPHIL # BLD AUTO: 0.09 10*3/MM3 (ref 0–0.4)
EOSINOPHIL NFR BLD AUTO: 1.5 % (ref 0.3–6.2)
ERYTHROCYTE [DISTWIDTH] IN BLOOD BY AUTOMATED COUNT: 17.3 % (ref 12.3–15.4)
GLOBULIN UR ELPH-MCNC: 3.2 GM/DL
GLUCOSE SERPL-MCNC: 53 MG/DL (ref 65–99)
HCT VFR BLD AUTO: 35.5 % (ref 37.5–51)
HGB BLD-MCNC: 11.3 G/DL (ref 13–17.7)
IMM GRANULOCYTES # BLD AUTO: 0.01 10*3/MM3 (ref 0–0.05)
IMM GRANULOCYTES NFR BLD AUTO: 0.2 % (ref 0–0.5)
LYMPHOCYTES # BLD AUTO: 1.06 10*3/MM3 (ref 0.7–3.1)
LYMPHOCYTES NFR BLD AUTO: 18.2 % (ref 19.6–45.3)
MAGNESIUM SERPL-MCNC: 1.5 MG/DL (ref 1.6–2.4)
MCH RBC QN AUTO: 29.9 PG (ref 26.6–33)
MCHC RBC AUTO-ENTMCNC: 31.8 G/DL (ref 31.5–35.7)
MCV RBC AUTO: 93.9 FL (ref 79–97)
MONOCYTES # BLD AUTO: 0.5 10*3/MM3 (ref 0.1–0.9)
MONOCYTES NFR BLD AUTO: 8.6 % (ref 5–12)
NEUTROPHILS NFR BLD AUTO: 4.14 10*3/MM3 (ref 1.7–7)
NEUTROPHILS NFR BLD AUTO: 70.8 % (ref 42.7–76)
NRBC BLD AUTO-RTO: 0 /100 WBC (ref 0–0.2)
PLATELET # BLD AUTO: 157 10*3/MM3 (ref 140–450)
PMV BLD AUTO: 9.2 FL (ref 6–12)
POTASSIUM SERPL-SCNC: 4.5 MMOL/L (ref 3.5–5.2)
PROT SERPL-MCNC: 7.3 G/DL (ref 6–8.5)
RAD ONC ARIA COURSE ID: NORMAL
RAD ONC ARIA COURSE INTENT: NORMAL
RAD ONC ARIA COURSE LAST TREATMENT DATE: NORMAL
RAD ONC ARIA COURSE START DATE: NORMAL
RAD ONC ARIA COURSE TREATMENT ELAPSED DAYS: 35
RAD ONC ARIA FIRST TREATMENT DATE: NORMAL
RAD ONC ARIA PLAN FRACTIONS TREATED TO DATE: 20
RAD ONC ARIA PLAN ID: NORMAL
RAD ONC ARIA PLAN PRESCRIBED DOSE PER FRACTION: 1.8 GY
RAD ONC ARIA PLAN PRIMARY REFERENCE POINT: NORMAL
RAD ONC ARIA PLAN TOTAL FRACTIONS PRESCRIBED: 33
RAD ONC ARIA PLAN TOTAL PRESCRIBED DOSE: 5940 CGY
RAD ONC ARIA REFERENCE POINT DOSAGE GIVEN TO DATE: 36 GY
RAD ONC ARIA REFERENCE POINT ID: NORMAL
RAD ONC ARIA REFERENCE POINT SESSION DOSAGE GIVEN: 1.8 GY
RBC # BLD AUTO: 3.78 10*6/MM3 (ref 4.14–5.8)
SODIUM SERPL-SCNC: 139 MMOL/L (ref 136–145)
WBC NRBC COR # BLD AUTO: 5.84 10*3/MM3 (ref 3.4–10.8)

## 2025-01-13 PROCEDURE — 25010000002 DIPHENHYDRAMINE PER 50 MG: Performed by: INTERNAL MEDICINE

## 2025-01-13 PROCEDURE — 25010000002 CARBOPLATIN PER 50 MG: Performed by: INTERNAL MEDICINE

## 2025-01-13 PROCEDURE — 77014 CHG CT GUIDANCE RADIATION THERAPY FLDS PLACEMENT: CPT | Performed by: RADIOLOGY

## 2025-01-13 PROCEDURE — 25010000002 PALONOSETRON PER 25 MCG: Performed by: INTERNAL MEDICINE

## 2025-01-13 PROCEDURE — 83735 ASSAY OF MAGNESIUM: CPT

## 2025-01-13 PROCEDURE — 96413 CHEMO IV INFUSION 1 HR: CPT

## 2025-01-13 PROCEDURE — 25810000003 SODIUM CHLORIDE 0.9 % SOLUTION 250 ML FLEX CONT: Performed by: INTERNAL MEDICINE

## 2025-01-13 PROCEDURE — 25010000002 PACLITAXEL PER 1 MG: Performed by: INTERNAL MEDICINE

## 2025-01-13 PROCEDURE — 96375 TX/PRO/DX INJ NEW DRUG ADDON: CPT

## 2025-01-13 PROCEDURE — 25010000002 DEXAMETHASONE PER 1 MG: Performed by: INTERNAL MEDICINE

## 2025-01-13 PROCEDURE — 77336 RADIATION PHYSICS CONSULT: CPT | Performed by: RADIOLOGY

## 2025-01-13 PROCEDURE — 77386: CPT | Performed by: RADIOLOGY

## 2025-01-13 PROCEDURE — 36415 COLL VENOUS BLD VENIPUNCTURE: CPT

## 2025-01-13 PROCEDURE — 85025 COMPLETE CBC W/AUTO DIFF WBC: CPT

## 2025-01-13 PROCEDURE — 96417 CHEMO IV INFUS EACH ADDL SEQ: CPT

## 2025-01-13 PROCEDURE — 25810000003 SODIUM CHLORIDE 0.9 % SOLUTION: Performed by: INTERNAL MEDICINE

## 2025-01-13 PROCEDURE — 80053 COMPREHEN METABOLIC PANEL: CPT

## 2025-01-13 RX ORDER — MAGNESIUM SULFATE HEPTAHYDRATE 40 MG/ML
2 INJECTION, SOLUTION INTRAVENOUS ONCE
Status: CANCELLED | OUTPATIENT
Start: 2025-01-15

## 2025-01-13 RX ORDER — DEXAMETHASONE SODIUM PHOSPHATE 10 MG/ML
10 INJECTION INTRAMUSCULAR; INTRAVENOUS ONCE
Status: COMPLETED | OUTPATIENT
Start: 2025-01-13 | End: 2025-01-13

## 2025-01-13 RX ORDER — DIPHENHYDRAMINE HYDROCHLORIDE 50 MG/ML
50 INJECTION INTRAMUSCULAR; INTRAVENOUS ONCE
Status: COMPLETED | OUTPATIENT
Start: 2025-01-13 | End: 2025-01-13

## 2025-01-13 RX ORDER — SODIUM CHLORIDE 9 MG/ML
20 INJECTION, SOLUTION INTRAVENOUS ONCE
Status: CANCELLED | OUTPATIENT
Start: 2025-01-15

## 2025-01-13 RX ORDER — HYDROCORTISONE SODIUM SUCCINATE 100 MG/2ML
100 INJECTION INTRAMUSCULAR; INTRAVENOUS AS NEEDED
Status: DISCONTINUED | OUTPATIENT
Start: 2025-01-13 | End: 2025-01-13 | Stop reason: HOSPADM

## 2025-01-13 RX ORDER — DIPHENHYDRAMINE HYDROCHLORIDE 50 MG/ML
50 INJECTION INTRAMUSCULAR; INTRAVENOUS AS NEEDED
Status: DISCONTINUED | OUTPATIENT
Start: 2025-01-13 | End: 2025-01-13 | Stop reason: HOSPADM

## 2025-01-13 RX ORDER — FAMOTIDINE 10 MG/ML
20 INJECTION, SOLUTION INTRAVENOUS ONCE
Status: COMPLETED | OUTPATIENT
Start: 2025-01-13 | End: 2025-01-13

## 2025-01-13 RX ORDER — PALONOSETRON 0.05 MG/ML
0.25 INJECTION, SOLUTION INTRAVENOUS ONCE
Status: COMPLETED | OUTPATIENT
Start: 2025-01-13 | End: 2025-01-13

## 2025-01-13 RX ORDER — FAMOTIDINE 10 MG/ML
20 INJECTION, SOLUTION INTRAVENOUS AS NEEDED
Status: DISCONTINUED | OUTPATIENT
Start: 2025-01-13 | End: 2025-01-13 | Stop reason: HOSPADM

## 2025-01-13 RX ORDER — SODIUM CHLORIDE 9 MG/ML
20 INJECTION, SOLUTION INTRAVENOUS ONCE
Status: COMPLETED | OUTPATIENT
Start: 2025-01-13 | End: 2025-01-13

## 2025-01-13 RX ADMIN — CARBOPLATIN 180 MG: 10 INJECTION INTRAVENOUS at 11:21

## 2025-01-13 RX ADMIN — DEXAMETHASONE SODIUM PHOSPHATE 10 MG: 10 INJECTION INTRAMUSCULAR; INTRAVENOUS at 09:41

## 2025-01-13 RX ADMIN — PACLITAXEL 85 MG: 6 INJECTION, SOLUTION INTRAVENOUS at 10:09

## 2025-01-13 RX ADMIN — DIPHENHYDRAMINE HYDROCHLORIDE 50 MG: 50 INJECTION, SOLUTION INTRAMUSCULAR; INTRAVENOUS at 09:39

## 2025-01-13 RX ADMIN — SODIUM CHLORIDE 20 ML/HR: 9 INJECTION, SOLUTION INTRAVENOUS at 09:38

## 2025-01-13 RX ADMIN — PALONOSETRON HYDROCHLORIDE 0.25 MG: 0.25 INJECTION, SOLUTION INTRAVENOUS at 09:39

## 2025-01-13 RX ADMIN — FAMOTIDINE 20 MG: 10 INJECTION INTRAVENOUS at 09:42

## 2025-01-13 NOTE — PROGRESS NOTES
Per Dr. Lombardi/Carline: Ok to give peripheral if pharmacy agrees ok this way.  Agree port study needs to be done.      Per Yaniv Crowley, Pharmacist, okay to give peripherally if good blood return.

## 2025-01-13 NOTE — PROGRESS NOTES
Message to Hem/Onc: Boogie Artis, [birth year], Mg 1.5, glucose 53 (gave orange juice for this) otherwise labs look good;  however, he fell on the snow and twisted as he fell and said his actual port is very sore to touch, even though he landed on his rear end.   Port site looks okay, but I didn't know if we might need to administer chemo peripherally and have port study done.  He only has this treatment and then one more to be completed.  Please advise.  Thanks.

## 2025-01-14 ENCOUNTER — HOSPITAL ENCOUNTER (OUTPATIENT)
Dept: RADIATION ONCOLOGY | Facility: HOSPITAL | Age: 75
Discharge: HOME | End: 2025-01-14

## 2025-01-14 LAB
RAD ONC ARIA COURSE ID: NORMAL
RAD ONC ARIA COURSE INTENT: NORMAL
RAD ONC ARIA COURSE LAST TREATMENT DATE: NORMAL
RAD ONC ARIA COURSE START DATE: NORMAL
RAD ONC ARIA COURSE TREATMENT ELAPSED DAYS: 36
RAD ONC ARIA FIRST TREATMENT DATE: NORMAL
RAD ONC ARIA PLAN FRACTIONS TREATED TO DATE: 21
RAD ONC ARIA PLAN ID: NORMAL
RAD ONC ARIA PLAN PRESCRIBED DOSE PER FRACTION: 1.8 GY
RAD ONC ARIA PLAN PRIMARY REFERENCE POINT: NORMAL
RAD ONC ARIA PLAN TOTAL FRACTIONS PRESCRIBED: 33
RAD ONC ARIA PLAN TOTAL PRESCRIBED DOSE: 5940 CGY
RAD ONC ARIA REFERENCE POINT DOSAGE GIVEN TO DATE: 37.8 GY
RAD ONC ARIA REFERENCE POINT ID: NORMAL
RAD ONC ARIA REFERENCE POINT SESSION DOSAGE GIVEN: 1.8 GY

## 2025-01-14 PROCEDURE — 77427 RADIATION TX MANAGEMENT X5: CPT | Performed by: RADIOLOGY

## 2025-01-14 PROCEDURE — 77386: CPT | Performed by: RADIOLOGY

## 2025-01-14 PROCEDURE — 77014 CHG CT GUIDANCE RADIATION THERAPY FLDS PLACEMENT: CPT | Performed by: RADIOLOGY

## 2025-01-15 ENCOUNTER — HOSPITAL ENCOUNTER (OUTPATIENT)
Dept: RADIATION ONCOLOGY | Facility: HOSPITAL | Age: 75
Discharge: HOME | End: 2025-01-15

## 2025-01-15 ENCOUNTER — INFUSION (OUTPATIENT)
Dept: ONCOLOGY | Facility: HOSPITAL | Age: 75
End: 2025-01-15
Payer: MEDICARE

## 2025-01-15 VITALS
RESPIRATION RATE: 16 BRPM | DIASTOLIC BLOOD PRESSURE: 61 MMHG | WEIGHT: 160.2 LBS | TEMPERATURE: 96.6 F | BODY MASS INDEX: 24.28 KG/M2 | HEIGHT: 68 IN | HEART RATE: 71 BPM | SYSTOLIC BLOOD PRESSURE: 115 MMHG | OXYGEN SATURATION: 96 %

## 2025-01-15 DIAGNOSIS — T82.598A OTHER MECHANICAL COMPLICATION OF OTHER CARDIOVASCULAR DEVICE, INITIAL ENCOUNTER: Primary | ICD-10-CM

## 2025-01-15 DIAGNOSIS — E83.42 HYPOMAGNESEMIA: Primary | ICD-10-CM

## 2025-01-15 LAB
RAD ONC ARIA COURSE ID: NORMAL
RAD ONC ARIA COURSE INTENT: NORMAL
RAD ONC ARIA COURSE LAST TREATMENT DATE: NORMAL
RAD ONC ARIA COURSE START DATE: NORMAL
RAD ONC ARIA COURSE TREATMENT ELAPSED DAYS: 37
RAD ONC ARIA FIRST TREATMENT DATE: NORMAL
RAD ONC ARIA PLAN FRACTIONS TREATED TO DATE: 22
RAD ONC ARIA PLAN ID: NORMAL
RAD ONC ARIA PLAN PRESCRIBED DOSE PER FRACTION: 1.8 GY
RAD ONC ARIA PLAN PRIMARY REFERENCE POINT: NORMAL
RAD ONC ARIA PLAN TOTAL FRACTIONS PRESCRIBED: 33
RAD ONC ARIA PLAN TOTAL PRESCRIBED DOSE: 5940 CGY
RAD ONC ARIA REFERENCE POINT DOSAGE GIVEN TO DATE: 39.6 GY
RAD ONC ARIA REFERENCE POINT ID: NORMAL
RAD ONC ARIA REFERENCE POINT SESSION DOSAGE GIVEN: 1.8 GY

## 2025-01-15 PROCEDURE — 77014 CHG CT GUIDANCE RADIATION THERAPY FLDS PLACEMENT: CPT | Performed by: RADIOLOGY

## 2025-01-15 PROCEDURE — 96365 THER/PROPH/DIAG IV INF INIT: CPT

## 2025-01-15 PROCEDURE — 77386: CPT | Performed by: RADIOLOGY

## 2025-01-15 PROCEDURE — 25010000002 MAGNESIUM SULFATE 2 GM/50ML SOLUTION: Performed by: INTERNAL MEDICINE

## 2025-01-15 PROCEDURE — 96366 THER/PROPH/DIAG IV INF ADDON: CPT

## 2025-01-15 PROCEDURE — 25810000003 SODIUM CHLORIDE 0.9 % SOLUTION: Performed by: INTERNAL MEDICINE

## 2025-01-15 RX ORDER — SODIUM CHLORIDE 9 MG/ML
20 INJECTION, SOLUTION INTRAVENOUS ONCE
Status: COMPLETED | OUTPATIENT
Start: 2025-01-15 | End: 2025-01-15

## 2025-01-15 RX ORDER — MAGNESIUM SULFATE HEPTAHYDRATE 40 MG/ML
2 INJECTION, SOLUTION INTRAVENOUS ONCE
Status: COMPLETED | OUTPATIENT
Start: 2025-01-15 | End: 2025-01-15

## 2025-01-15 RX ADMIN — MAGNESIUM SULFATE HEPTAHYDRATE 2 G: 2 INJECTION, SOLUTION INTRAVENOUS at 11:35

## 2025-01-15 RX ADMIN — SODIUM CHLORIDE 20 ML/HR: 9 INJECTION, SOLUTION INTRAVENOUS at 11:34

## 2025-01-15 NOTE — PROGRESS NOTES
S/w Dr. Lombardi: angelito Artis  50   He was to had a port study done and he said none was scheduled.   he fell and hit the port and was unable to move the left arm and had swelling in the port area.  Today,  His pain is 3 when not touched and a 8 when touched.  It still has some swelling and he says still hurts when he raises his arm.   Please advise. ISMAEL Gordon    Per Dr. Lombardi/kim:  will place a port study and to see surgeon who placed the port in. ISMAEL Gordon

## 2025-01-16 ENCOUNTER — HOSPITAL ENCOUNTER (OUTPATIENT)
Dept: RADIATION ONCOLOGY | Facility: HOSPITAL | Age: 75
Discharge: HOME | End: 2025-01-16

## 2025-01-16 LAB
RAD ONC ARIA COURSE ID: NORMAL
RAD ONC ARIA COURSE INTENT: NORMAL
RAD ONC ARIA COURSE LAST TREATMENT DATE: NORMAL
RAD ONC ARIA COURSE START DATE: NORMAL
RAD ONC ARIA COURSE TREATMENT ELAPSED DAYS: 38
RAD ONC ARIA FIRST TREATMENT DATE: NORMAL
RAD ONC ARIA PLAN FRACTIONS TREATED TO DATE: 23
RAD ONC ARIA PLAN ID: NORMAL
RAD ONC ARIA PLAN PRESCRIBED DOSE PER FRACTION: 1.8 GY
RAD ONC ARIA PLAN PRIMARY REFERENCE POINT: NORMAL
RAD ONC ARIA PLAN TOTAL FRACTIONS PRESCRIBED: 33
RAD ONC ARIA PLAN TOTAL PRESCRIBED DOSE: 5940 CGY
RAD ONC ARIA REFERENCE POINT DOSAGE GIVEN TO DATE: 41.4 GY
RAD ONC ARIA REFERENCE POINT ID: NORMAL
RAD ONC ARIA REFERENCE POINT SESSION DOSAGE GIVEN: 1.8 GY

## 2025-01-16 PROCEDURE — 77386: CPT | Performed by: RADIOLOGY

## 2025-01-16 PROCEDURE — 77014 CHG CT GUIDANCE RADIATION THERAPY FLDS PLACEMENT: CPT | Performed by: RADIOLOGY

## 2025-01-17 ENCOUNTER — OFFICE VISIT (OUTPATIENT)
Dept: ONCOLOGY | Facility: CLINIC | Age: 75
End: 2025-01-17
Payer: MEDICARE

## 2025-01-17 ENCOUNTER — HOSPITAL ENCOUNTER (OUTPATIENT)
Dept: RADIATION ONCOLOGY | Facility: HOSPITAL | Age: 75
Discharge: HOME | End: 2025-01-17

## 2025-01-17 VITALS
SYSTOLIC BLOOD PRESSURE: 137 MMHG | HEART RATE: 72 BPM | BODY MASS INDEX: 24.05 KG/M2 | RESPIRATION RATE: 18 BRPM | OXYGEN SATURATION: 95 % | DIASTOLIC BLOOD PRESSURE: 78 MMHG | HEIGHT: 68 IN | WEIGHT: 158.7 LBS | TEMPERATURE: 97.2 F

## 2025-01-17 DIAGNOSIS — C34.92 SQUAMOUS CELL CARCINOMA OF LEFT LUNG: Primary | ICD-10-CM

## 2025-01-17 LAB
RAD ONC ARIA COURSE ID: NORMAL
RAD ONC ARIA COURSE INTENT: NORMAL
RAD ONC ARIA COURSE LAST TREATMENT DATE: NORMAL
RAD ONC ARIA COURSE START DATE: NORMAL
RAD ONC ARIA COURSE TREATMENT ELAPSED DAYS: 39
RAD ONC ARIA FIRST TREATMENT DATE: NORMAL
RAD ONC ARIA PLAN FRACTIONS TREATED TO DATE: 24
RAD ONC ARIA PLAN ID: NORMAL
RAD ONC ARIA PLAN PRESCRIBED DOSE PER FRACTION: 1.8 GY
RAD ONC ARIA PLAN PRIMARY REFERENCE POINT: NORMAL
RAD ONC ARIA PLAN TOTAL FRACTIONS PRESCRIBED: 33
RAD ONC ARIA PLAN TOTAL PRESCRIBED DOSE: 5940 CGY
RAD ONC ARIA REFERENCE POINT DOSAGE GIVEN TO DATE: 43.2 GY
RAD ONC ARIA REFERENCE POINT ID: NORMAL
RAD ONC ARIA REFERENCE POINT SESSION DOSAGE GIVEN: 1.8 GY

## 2025-01-17 PROCEDURE — 77386: CPT | Performed by: RADIOLOGY

## 2025-01-17 PROCEDURE — 77014 CHG CT GUIDANCE RADIATION THERAPY FLDS PLACEMENT: CPT | Performed by: RADIOLOGY

## 2025-01-20 ENCOUNTER — TELEPHONE (OUTPATIENT)
Dept: ONCOLOGY | Facility: CLINIC | Age: 75
End: 2025-01-20
Payer: MEDICARE

## 2025-01-20 ENCOUNTER — INFUSION (OUTPATIENT)
Dept: ONCOLOGY | Facility: HOSPITAL | Age: 75
End: 2025-01-20
Payer: MEDICARE

## 2025-01-20 ENCOUNTER — LAB (OUTPATIENT)
Dept: LAB | Facility: HOSPITAL | Age: 75
End: 2025-01-20
Payer: MEDICARE

## 2025-01-20 ENCOUNTER — HOSPITAL ENCOUNTER (OUTPATIENT)
Dept: RADIATION ONCOLOGY | Facility: HOSPITAL | Age: 75
Setting detail: RADIATION/ONCOLOGY SERIES
Discharge: HOME | End: 2025-01-20
Payer: MEDICARE

## 2025-01-20 ENCOUNTER — OFFICE VISIT (OUTPATIENT)
Dept: SURGERY | Facility: CLINIC | Age: 75
End: 2025-01-20
Payer: MEDICARE

## 2025-01-20 VITALS
HEART RATE: 80 BPM | HEIGHT: 68 IN | BODY MASS INDEX: 24.55 KG/M2 | SYSTOLIC BLOOD PRESSURE: 135 MMHG | DIASTOLIC BLOOD PRESSURE: 63 MMHG | TEMPERATURE: 97 F | RESPIRATION RATE: 20 BRPM | WEIGHT: 162 LBS | OXYGEN SATURATION: 92 %

## 2025-01-20 VITALS
BODY MASS INDEX: 24.55 KG/M2 | DIASTOLIC BLOOD PRESSURE: 70 MMHG | HEIGHT: 68 IN | HEART RATE: 101 BPM | OXYGEN SATURATION: 96 % | WEIGHT: 162 LBS | SYSTOLIC BLOOD PRESSURE: 136 MMHG

## 2025-01-20 DIAGNOSIS — Z45.2 ENCOUNTER FOR CARE RELATED TO PORT-A-CATH: Primary | ICD-10-CM

## 2025-01-20 DIAGNOSIS — C34.11 MALIGNANT NEOPLASM OF UPPER LOBE OF RIGHT LUNG: Primary | ICD-10-CM

## 2025-01-20 DIAGNOSIS — E83.42 HYPOMAGNESEMIA: Primary | ICD-10-CM

## 2025-01-20 DIAGNOSIS — C34.92 SQUAMOUS CELL CARCINOMA OF LEFT LUNG: ICD-10-CM

## 2025-01-20 LAB
ALBUMIN SERPL-MCNC: 4 G/DL (ref 3.5–5.2)
ALBUMIN/GLOB SERPL: 1.3 G/DL
ALP SERPL-CCNC: 72 U/L (ref 39–117)
ALT SERPL W P-5'-P-CCNC: 8 U/L (ref 1–41)
ANION GAP SERPL CALCULATED.3IONS-SCNC: 11 MMOL/L (ref 5–15)
AST SERPL-CCNC: 9 U/L (ref 1–40)
BASOPHILS # BLD AUTO: 0.04 10*3/MM3 (ref 0–0.2)
BASOPHILS NFR BLD AUTO: 0.9 % (ref 0–1.5)
BILIRUB SERPL-MCNC: <0.2 MG/DL (ref 0–1.2)
BUN SERPL-MCNC: 16 MG/DL (ref 8–23)
BUN/CREAT SERPL: 16.2 (ref 7–25)
CALCIUM SPEC-SCNC: 9.6 MG/DL (ref 8.6–10.5)
CHLORIDE SERPL-SCNC: 100 MMOL/L (ref 98–107)
CO2 SERPL-SCNC: 27 MMOL/L (ref 22–29)
CREAT SERPL-MCNC: 0.99 MG/DL (ref 0.76–1.27)
DEPRECATED RDW RBC AUTO: 55.7 FL (ref 37–54)
EGFRCR SERPLBLD CKD-EPI 2021: 79.9 ML/MIN/1.73
EOSINOPHIL # BLD AUTO: 0.1 10*3/MM3 (ref 0–0.4)
EOSINOPHIL NFR BLD AUTO: 2.2 % (ref 0.3–6.2)
ERYTHROCYTE [DISTWIDTH] IN BLOOD BY AUTOMATED COUNT: 16.8 % (ref 12.3–15.4)
GLOBULIN UR ELPH-MCNC: 3.1 GM/DL
GLUCOSE SERPL-MCNC: 141 MG/DL (ref 65–99)
HCT VFR BLD AUTO: 34.3 % (ref 37.5–51)
HGB BLD-MCNC: 10.9 G/DL (ref 13–17.7)
IMM GRANULOCYTES # BLD AUTO: 0.02 10*3/MM3 (ref 0–0.05)
IMM GRANULOCYTES NFR BLD AUTO: 0.4 % (ref 0–0.5)
LYMPHOCYTES # BLD AUTO: 0.83 10*3/MM3 (ref 0.7–3.1)
LYMPHOCYTES NFR BLD AUTO: 18.2 % (ref 19.6–45.3)
MAGNESIUM SERPL-MCNC: 1.5 MG/DL (ref 1.6–2.4)
MCH RBC QN AUTO: 29.7 PG (ref 26.6–33)
MCHC RBC AUTO-ENTMCNC: 31.8 G/DL (ref 31.5–35.7)
MCV RBC AUTO: 93.5 FL (ref 79–97)
MONOCYTES # BLD AUTO: 0.35 10*3/MM3 (ref 0.1–0.9)
MONOCYTES NFR BLD AUTO: 7.7 % (ref 5–12)
NEUTROPHILS NFR BLD AUTO: 3.21 10*3/MM3 (ref 1.7–7)
NEUTROPHILS NFR BLD AUTO: 70.6 % (ref 42.7–76)
NRBC BLD AUTO-RTO: 0 /100 WBC (ref 0–0.2)
PLATELET # BLD AUTO: 190 10*3/MM3 (ref 140–450)
PMV BLD AUTO: 9.2 FL (ref 6–12)
POTASSIUM SERPL-SCNC: 4.5 MMOL/L (ref 3.5–5.2)
PROT SERPL-MCNC: 7.1 G/DL (ref 6–8.5)
RAD ONC ARIA COURSE ID: NORMAL
RAD ONC ARIA COURSE INTENT: NORMAL
RAD ONC ARIA COURSE LAST TREATMENT DATE: NORMAL
RAD ONC ARIA COURSE START DATE: NORMAL
RAD ONC ARIA COURSE TREATMENT ELAPSED DAYS: 42
RAD ONC ARIA FIRST TREATMENT DATE: NORMAL
RAD ONC ARIA PLAN FRACTIONS TREATED TO DATE: 25
RAD ONC ARIA PLAN ID: NORMAL
RAD ONC ARIA PLAN PRESCRIBED DOSE PER FRACTION: 1.8 GY
RAD ONC ARIA PLAN PRIMARY REFERENCE POINT: NORMAL
RAD ONC ARIA PLAN TOTAL FRACTIONS PRESCRIBED: 33
RAD ONC ARIA PLAN TOTAL PRESCRIBED DOSE: 5940 CGY
RAD ONC ARIA REFERENCE POINT DOSAGE GIVEN TO DATE: 45 GY
RAD ONC ARIA REFERENCE POINT ID: NORMAL
RAD ONC ARIA REFERENCE POINT SESSION DOSAGE GIVEN: 1.8 GY
RBC # BLD AUTO: 3.67 10*6/MM3 (ref 4.14–5.8)
SODIUM SERPL-SCNC: 138 MMOL/L (ref 136–145)
WBC NRBC COR # BLD AUTO: 4.55 10*3/MM3 (ref 3.4–10.8)

## 2025-01-20 PROCEDURE — 77014 CHG CT GUIDANCE RADIATION THERAPY FLDS PLACEMENT: CPT | Performed by: RADIOLOGY

## 2025-01-20 PROCEDURE — 1159F MED LIST DOCD IN RCRD: CPT

## 2025-01-20 PROCEDURE — 36415 COLL VENOUS BLD VENIPUNCTURE: CPT

## 2025-01-20 PROCEDURE — 83735 ASSAY OF MAGNESIUM: CPT

## 2025-01-20 PROCEDURE — 80053 COMPREHEN METABOLIC PANEL: CPT

## 2025-01-20 PROCEDURE — 77386: CPT | Performed by: RADIOLOGY

## 2025-01-20 PROCEDURE — 1160F RVW MEDS BY RX/DR IN RCRD: CPT

## 2025-01-20 PROCEDURE — G0463 HOSPITAL OUTPT CLINIC VISIT: HCPCS

## 2025-01-20 PROCEDURE — 85025 COMPLETE CBC W/AUTO DIFF WBC: CPT

## 2025-01-20 PROCEDURE — 99213 OFFICE O/P EST LOW 20 MIN: CPT

## 2025-01-20 PROCEDURE — 77336 RADIATION PHYSICS CONSULT: CPT | Performed by: RADIOLOGY

## 2025-01-20 RX ORDER — DIPHENHYDRAMINE HYDROCHLORIDE 50 MG/ML
50 INJECTION INTRAMUSCULAR; INTRAVENOUS ONCE
Status: CANCELLED
Start: 2025-01-20

## 2025-01-20 RX ORDER — PALONOSETRON 0.05 MG/ML
0.25 INJECTION, SOLUTION INTRAVENOUS ONCE
Status: CANCELLED | OUTPATIENT
Start: 2025-01-20

## 2025-01-20 RX ORDER — DEXAMETHASONE SODIUM PHOSPHATE 10 MG/ML
10 INJECTION INTRAMUSCULAR; INTRAVENOUS ONCE
Status: DISCONTINUED | OUTPATIENT
Start: 2025-01-20 | End: 2025-01-20 | Stop reason: HOSPADM

## 2025-01-20 RX ORDER — HYDROCORTISONE SODIUM SUCCINATE 100 MG/2ML
100 INJECTION INTRAMUSCULAR; INTRAVENOUS AS NEEDED
Status: CANCELLED | OUTPATIENT
Start: 2025-01-20

## 2025-01-20 RX ORDER — SODIUM CHLORIDE 9 MG/ML
20 INJECTION, SOLUTION INTRAVENOUS ONCE
Status: DISCONTINUED | OUTPATIENT
Start: 2025-01-20 | End: 2025-01-20 | Stop reason: HOSPADM

## 2025-01-20 RX ORDER — SODIUM CHLORIDE 9 MG/ML
20 INJECTION, SOLUTION INTRAVENOUS ONCE
Status: CANCELLED | OUTPATIENT
Start: 2025-01-20

## 2025-01-20 RX ORDER — DIPHENHYDRAMINE HYDROCHLORIDE 50 MG/ML
50 INJECTION INTRAMUSCULAR; INTRAVENOUS ONCE
Status: DISCONTINUED | OUTPATIENT
Start: 2025-01-20 | End: 2025-01-20 | Stop reason: HOSPADM

## 2025-01-20 RX ORDER — FAMOTIDINE 10 MG/ML
20 INJECTION, SOLUTION INTRAVENOUS ONCE
Status: DISCONTINUED | OUTPATIENT
Start: 2025-01-20 | End: 2025-01-20 | Stop reason: HOSPADM

## 2025-01-20 RX ORDER — FAMOTIDINE 10 MG/ML
20 INJECTION, SOLUTION INTRAVENOUS ONCE
Status: CANCELLED | OUTPATIENT
Start: 2025-01-20

## 2025-01-20 RX ORDER — MAGNESIUM OXIDE 400 MG/1
400 TABLET ORAL 2 TIMES DAILY
Qty: 8 TABLET | Refills: 0 | Status: SHIPPED | OUTPATIENT
Start: 2025-01-20 | End: 2025-01-24

## 2025-01-20 RX ORDER — DIPHENHYDRAMINE HYDROCHLORIDE 50 MG/ML
50 INJECTION INTRAMUSCULAR; INTRAVENOUS AS NEEDED
Status: CANCELLED | OUTPATIENT
Start: 2025-01-20

## 2025-01-20 RX ORDER — DIPHENHYDRAMINE HYDROCHLORIDE 50 MG/ML
50 INJECTION INTRAMUSCULAR; INTRAVENOUS AS NEEDED
Status: DISCONTINUED | OUTPATIENT
Start: 2025-01-20 | End: 2025-01-20 | Stop reason: HOSPADM

## 2025-01-20 RX ORDER — HYDROCORTISONE SODIUM SUCCINATE 100 MG/2ML
100 INJECTION INTRAMUSCULAR; INTRAVENOUS AS NEEDED
Status: DISCONTINUED | OUTPATIENT
Start: 2025-01-20 | End: 2025-01-20 | Stop reason: HOSPADM

## 2025-01-20 RX ORDER — FAMOTIDINE 10 MG/ML
20 INJECTION, SOLUTION INTRAVENOUS AS NEEDED
Status: CANCELLED | OUTPATIENT
Start: 2025-01-20

## 2025-01-20 RX ORDER — PALONOSETRON 0.05 MG/ML
0.25 INJECTION, SOLUTION INTRAVENOUS ONCE
Status: DISCONTINUED | OUTPATIENT
Start: 2025-01-20 | End: 2025-01-20 | Stop reason: HOSPADM

## 2025-01-20 RX ORDER — FAMOTIDINE 10 MG/ML
20 INJECTION, SOLUTION INTRAVENOUS AS NEEDED
Status: DISCONTINUED | OUTPATIENT
Start: 2025-01-20 | End: 2025-01-20 | Stop reason: HOSPADM

## 2025-01-20 NOTE — PROGRESS NOTES
Reported Magnesium 1.5. Patient received magnesium infusion and stated it gave him horrible diarrhea and he does not want another magnesium infusion.   Per Gogo GUEVARA, Dr WILLETT will call in oral mag ox and hold treatment this week. Patient aware.

## 2025-01-20 NOTE — TELEPHONE ENCOUNTER
----- Message from Timoteo Lombardi sent at 1/20/2025  8:53 AM CST -----  Elysium 1.5-2 g IV magnesium please  ----- Message -----  From: Lab, Background User  Sent: 1/20/2025   8:16 AM CST  To: Timoteo Lombardi MD

## 2025-01-20 NOTE — TELEPHONE ENCOUNTER
Patient was being seen downstairs. He refused a Mag IV infusion. Mag Ox script was sent to [facility] [place].

## 2025-01-20 NOTE — PROGRESS NOTES
"Patient: Boogie Artis    YOB: 1950    Date: 01/20/2025    Primary Care Provider: Lucho Alves MD    Vital Signs:   Vitals:    01/20/25 0955   BP: 136/70   Pulse: 101   SpO2: 96%   Weight: 73.5 kg (162 lb)   Height: 172.7 cm (68\")       The patient is tolerating a regular diet and has no complaints s/p port placement on 12/5/24. The patient denies fevers, chills, nausea, vomiting, and excessive pain. Incision is healing well with no signs of infection or wound dehiscence. He states that about 2 weeks ago he fell and has had soreness and pain around his port to the point that they have not accessed it since that time. He is tender to palpation around the port. Dr. Lombardi has ordered a port study, but it has not been done at this time.     Assessment / Plan:    Diagnoses and all orders for this visit:    1. Encounter for care related to Port-a-Cath (Primary)        Boogie Artis is 6 weeks s/p port placement. He is overall doing well.  I discussed with him that upon physical exam, there is an area of tissue swelling inferior to the port.  I also discussed with him that the only way to know if the port is working properly is to assess with the port study.  He is supposed to have treatment on Monday. I advised calling the radiology department to schedule the port study prior to his anticipated treatment.  I discussed the need to call our office if the port study shows any malfunction so that we can get him scheduled for port replacement if needed.  Otherwise he will follow-up on an as-needed basis.  He voiced understanding of this. I instructed him that if there are any problems with the port, such as the inability to flush or access the port, to give our office a call for an appointment. He is agreeable to the plan.       Follow up:     Return if symptoms worsen or fail to improve.      Electronically signed by Henny Witt PA-C  01/20/25  10:46 CST                   "

## 2025-01-21 ENCOUNTER — HOSPITAL ENCOUNTER (OUTPATIENT)
Dept: RADIATION ONCOLOGY | Facility: HOSPITAL | Age: 75
Setting detail: RADIATION/ONCOLOGY SERIES
Discharge: HOME | End: 2025-01-21
Payer: MEDICARE

## 2025-01-21 LAB
RAD ONC ARIA COURSE ID: NORMAL
RAD ONC ARIA COURSE INTENT: NORMAL
RAD ONC ARIA COURSE LAST TREATMENT DATE: NORMAL
RAD ONC ARIA COURSE START DATE: NORMAL
RAD ONC ARIA COURSE TREATMENT ELAPSED DAYS: 43
RAD ONC ARIA FIRST TREATMENT DATE: NORMAL
RAD ONC ARIA PLAN FRACTIONS TREATED TO DATE: 26
RAD ONC ARIA PLAN ID: NORMAL
RAD ONC ARIA PLAN PRESCRIBED DOSE PER FRACTION: 1.8 GY
RAD ONC ARIA PLAN PRIMARY REFERENCE POINT: NORMAL
RAD ONC ARIA PLAN TOTAL FRACTIONS PRESCRIBED: 33
RAD ONC ARIA PLAN TOTAL PRESCRIBED DOSE: 5940 CGY
RAD ONC ARIA REFERENCE POINT DOSAGE GIVEN TO DATE: 46.8 GY
RAD ONC ARIA REFERENCE POINT ID: NORMAL
RAD ONC ARIA REFERENCE POINT SESSION DOSAGE GIVEN: 1.8 GY

## 2025-01-21 PROCEDURE — 77386: CPT | Performed by: RADIOLOGY

## 2025-01-21 PROCEDURE — 77014 CHG CT GUIDANCE RADIATION THERAPY FLDS PLACEMENT: CPT | Performed by: RADIOLOGY

## 2025-01-22 ENCOUNTER — APPOINTMENT (OUTPATIENT)
Dept: RADIATION ONCOLOGY | Facility: HOSPITAL | Age: 75
End: 2025-01-22
Payer: MEDICARE

## 2025-01-22 ENCOUNTER — HOSPITAL ENCOUNTER (OUTPATIENT)
Dept: RADIATION ONCOLOGY | Facility: HOSPITAL | Age: 75
Setting detail: RADIATION/ONCOLOGY SERIES
Discharge: HOME | End: 2025-01-22
Payer: MEDICARE

## 2025-01-22 LAB
RAD ONC ARIA COURSE ID: NORMAL
RAD ONC ARIA COURSE INTENT: NORMAL
RAD ONC ARIA COURSE LAST TREATMENT DATE: NORMAL
RAD ONC ARIA COURSE START DATE: NORMAL
RAD ONC ARIA COURSE TREATMENT ELAPSED DAYS: 44
RAD ONC ARIA FIRST TREATMENT DATE: NORMAL
RAD ONC ARIA PLAN FRACTIONS TREATED TO DATE: 27
RAD ONC ARIA PLAN ID: NORMAL
RAD ONC ARIA PLAN PRESCRIBED DOSE PER FRACTION: 1.8 GY
RAD ONC ARIA PLAN PRIMARY REFERENCE POINT: NORMAL
RAD ONC ARIA PLAN TOTAL FRACTIONS PRESCRIBED: 33
RAD ONC ARIA PLAN TOTAL PRESCRIBED DOSE: 5940 CGY
RAD ONC ARIA REFERENCE POINT DOSAGE GIVEN TO DATE: 48.6 GY
RAD ONC ARIA REFERENCE POINT ID: NORMAL
RAD ONC ARIA REFERENCE POINT SESSION DOSAGE GIVEN: 1.8 GY

## 2025-01-22 PROCEDURE — 77014 CHG CT GUIDANCE RADIATION THERAPY FLDS PLACEMENT: CPT | Performed by: RADIOLOGY

## 2025-01-22 PROCEDURE — 77386: CPT | Performed by: RADIOLOGY

## 2025-01-23 ENCOUNTER — TELEPHONE (OUTPATIENT)
Dept: INTERVENTIONAL RADIOLOGY/VASCULAR | Facility: HOSPITAL | Age: 75
End: 2025-01-23

## 2025-01-23 ENCOUNTER — HOSPITAL ENCOUNTER (OUTPATIENT)
Dept: RADIATION ONCOLOGY | Facility: HOSPITAL | Age: 75
Discharge: HOME | End: 2025-01-23

## 2025-01-23 LAB
RAD ONC ARIA COURSE ID: NORMAL
RAD ONC ARIA COURSE INTENT: NORMAL
RAD ONC ARIA COURSE LAST TREATMENT DATE: NORMAL
RAD ONC ARIA COURSE START DATE: NORMAL
RAD ONC ARIA COURSE TREATMENT ELAPSED DAYS: 45
RAD ONC ARIA FIRST TREATMENT DATE: NORMAL
RAD ONC ARIA PLAN FRACTIONS TREATED TO DATE: 28
RAD ONC ARIA PLAN ID: NORMAL
RAD ONC ARIA PLAN PRESCRIBED DOSE PER FRACTION: 1.8 GY
RAD ONC ARIA PLAN PRIMARY REFERENCE POINT: NORMAL
RAD ONC ARIA PLAN TOTAL FRACTIONS PRESCRIBED: 33
RAD ONC ARIA PLAN TOTAL PRESCRIBED DOSE: 5940 CGY
RAD ONC ARIA REFERENCE POINT DOSAGE GIVEN TO DATE: 50.4 GY
RAD ONC ARIA REFERENCE POINT ID: NORMAL
RAD ONC ARIA REFERENCE POINT SESSION DOSAGE GIVEN: 1.8 GY

## 2025-01-23 PROCEDURE — 77014 CHG CT GUIDANCE RADIATION THERAPY FLDS PLACEMENT: CPT | Performed by: RADIOLOGY

## 2025-01-23 PROCEDURE — 77386: CPT | Performed by: RADIOLOGY

## 2025-01-23 NOTE — TELEPHONE ENCOUNTER
Called patient to confirm appointment and went over instructions for the procedure.  Patient' spouse, Sarah, confirmed and voiced understanding.

## 2025-01-24 ENCOUNTER — HOSPITAL ENCOUNTER (OUTPATIENT)
Dept: RADIATION ONCOLOGY | Facility: HOSPITAL | Age: 75
Setting detail: RADIATION/ONCOLOGY SERIES
Discharge: HOME | End: 2025-01-24
Payer: MEDICARE

## 2025-01-24 ENCOUNTER — HOSPITAL ENCOUNTER (OUTPATIENT)
Dept: INTERVENTIONAL RADIOLOGY/VASCULAR | Facility: HOSPITAL | Age: 75
Discharge: HOME | End: 2025-01-24
Payer: MEDICARE

## 2025-01-24 VITALS — SYSTOLIC BLOOD PRESSURE: 119 MMHG | HEART RATE: 75 BPM | OXYGEN SATURATION: 97 % | DIASTOLIC BLOOD PRESSURE: 70 MMHG

## 2025-01-24 DIAGNOSIS — T82.598A OTHER MECHANICAL COMPLICATION OF OTHER CARDIOVASCULAR DEVICE, INITIAL ENCOUNTER: ICD-10-CM

## 2025-01-24 LAB
RAD ONC ARIA COURSE ID: NORMAL
RAD ONC ARIA COURSE INTENT: NORMAL
RAD ONC ARIA COURSE LAST TREATMENT DATE: NORMAL
RAD ONC ARIA COURSE START DATE: NORMAL
RAD ONC ARIA COURSE TREATMENT ELAPSED DAYS: 46
RAD ONC ARIA FIRST TREATMENT DATE: NORMAL
RAD ONC ARIA PLAN FRACTIONS TREATED TO DATE: 29
RAD ONC ARIA PLAN ID: NORMAL
RAD ONC ARIA PLAN PRESCRIBED DOSE PER FRACTION: 1.8 GY
RAD ONC ARIA PLAN PRIMARY REFERENCE POINT: NORMAL
RAD ONC ARIA PLAN TOTAL FRACTIONS PRESCRIBED: 33
RAD ONC ARIA PLAN TOTAL PRESCRIBED DOSE: 5940 CGY
RAD ONC ARIA REFERENCE POINT DOSAGE GIVEN TO DATE: 52.2 GY
RAD ONC ARIA REFERENCE POINT ID: NORMAL
RAD ONC ARIA REFERENCE POINT SESSION DOSAGE GIVEN: 1.8 GY

## 2025-01-24 PROCEDURE — 36598 INJ W/FLUOR EVAL CV DEVICE: CPT

## 2025-01-24 PROCEDURE — 25510000001 IOPAMIDOL 61 % SOLUTION: Performed by: RADIOLOGY

## 2025-01-24 PROCEDURE — 77386: CPT | Performed by: RADIOLOGY

## 2025-01-24 PROCEDURE — 77014 CHG CT GUIDANCE RADIATION THERAPY FLDS PLACEMENT: CPT | Performed by: RADIOLOGY

## 2025-01-24 PROCEDURE — 25010000002 HEPARIN LOCK FLUSH PER 10 UNITS: Performed by: RADIOLOGY

## 2025-01-24 RX ORDER — HEPARIN SODIUM (PORCINE) LOCK FLUSH IV SOLN 100 UNIT/ML 100 UNIT/ML
500 SOLUTION INTRAVENOUS ONCE
Status: COMPLETED | OUTPATIENT
Start: 2025-01-24 | End: 2025-01-24

## 2025-01-24 RX ORDER — IOPAMIDOL 612 MG/ML
INJECTION, SOLUTION INTRAVASCULAR AS NEEDED
Status: COMPLETED | OUTPATIENT
Start: 2025-01-24 | End: 2025-01-24

## 2025-01-24 RX ADMIN — IOPAMIDOL 6 ML: 612 INJECTION, SOLUTION INTRAVENOUS at 10:03

## 2025-01-24 RX ADMIN — HEPARIN 500 UNITS: 100 SYRINGE at 10:11

## 2025-01-27 ENCOUNTER — HOSPITAL ENCOUNTER (OUTPATIENT)
Dept: RADIATION ONCOLOGY | Facility: HOSPITAL | Age: 75
Setting detail: RADIATION/ONCOLOGY SERIES
Discharge: HOME | End: 2025-01-27
Payer: MEDICARE

## 2025-01-27 ENCOUNTER — LAB (OUTPATIENT)
Dept: LAB | Facility: HOSPITAL | Age: 75
End: 2025-01-27
Payer: MEDICARE

## 2025-01-27 ENCOUNTER — INFUSION (OUTPATIENT)
Dept: ONCOLOGY | Facility: HOSPITAL | Age: 75
End: 2025-01-27
Payer: MEDICARE

## 2025-01-27 VITALS
HEIGHT: 68 IN | OXYGEN SATURATION: 95 % | SYSTOLIC BLOOD PRESSURE: 118 MMHG | WEIGHT: 161.2 LBS | HEART RATE: 80 BPM | RESPIRATION RATE: 16 BRPM | TEMPERATURE: 98.2 F | BODY MASS INDEX: 24.43 KG/M2 | DIASTOLIC BLOOD PRESSURE: 56 MMHG

## 2025-01-27 DIAGNOSIS — Z95.828 PORT-A-CATH IN PLACE: ICD-10-CM

## 2025-01-27 DIAGNOSIS — C34.92 SQUAMOUS CELL CARCINOMA OF LEFT LUNG: ICD-10-CM

## 2025-01-27 DIAGNOSIS — C34.11 MALIGNANT NEOPLASM OF UPPER LOBE OF RIGHT LUNG: Primary | ICD-10-CM

## 2025-01-27 LAB
ALBUMIN SERPL-MCNC: 3.9 G/DL (ref 3.5–5.2)
ALBUMIN/GLOB SERPL: 1.1 G/DL
ALP SERPL-CCNC: 79 U/L (ref 39–117)
ALT SERPL W P-5'-P-CCNC: 6 U/L (ref 1–41)
ANION GAP SERPL CALCULATED.3IONS-SCNC: 13 MMOL/L (ref 5–15)
AST SERPL-CCNC: 8 U/L (ref 1–40)
BASOPHILS # BLD AUTO: 0.02 10*3/MM3 (ref 0–0.2)
BASOPHILS NFR BLD AUTO: 0.4 % (ref 0–1.5)
BILIRUB SERPL-MCNC: <0.2 MG/DL (ref 0–1.2)
BUN SERPL-MCNC: 17 MG/DL (ref 8–23)
BUN/CREAT SERPL: 17.2 (ref 7–25)
CALCIUM SPEC-SCNC: 9.8 MG/DL (ref 8.6–10.5)
CHLORIDE SERPL-SCNC: 98 MMOL/L (ref 98–107)
CO2 SERPL-SCNC: 27 MMOL/L (ref 22–29)
CREAT SERPL-MCNC: 0.99 MG/DL (ref 0.76–1.27)
DEPRECATED RDW RBC AUTO: 60.4 FL (ref 37–54)
EGFRCR SERPLBLD CKD-EPI 2021: 79.9 ML/MIN/1.73
EOSINOPHIL # BLD AUTO: 0.06 10*3/MM3 (ref 0–0.4)
EOSINOPHIL NFR BLD AUTO: 1.2 % (ref 0.3–6.2)
ERYTHROCYTE [DISTWIDTH] IN BLOOD BY AUTOMATED COUNT: 17.7 % (ref 12.3–15.4)
GLOBULIN UR ELPH-MCNC: 3.4 GM/DL
GLUCOSE SERPL-MCNC: 134 MG/DL (ref 65–99)
HCT VFR BLD AUTO: 34.8 % (ref 37.5–51)
HGB BLD-MCNC: 11 G/DL (ref 13–17.7)
IMM GRANULOCYTES # BLD AUTO: 0.02 10*3/MM3 (ref 0–0.05)
IMM GRANULOCYTES NFR BLD AUTO: 0.4 % (ref 0–0.5)
LYMPHOCYTES # BLD AUTO: 0.84 10*3/MM3 (ref 0.7–3.1)
LYMPHOCYTES NFR BLD AUTO: 16.4 % (ref 19.6–45.3)
MAGNESIUM SERPL-MCNC: 1.6 MG/DL (ref 1.6–2.4)
MCH RBC QN AUTO: 30.1 PG (ref 26.6–33)
MCHC RBC AUTO-ENTMCNC: 31.6 G/DL (ref 31.5–35.7)
MCV RBC AUTO: 95.3 FL (ref 79–97)
MONOCYTES # BLD AUTO: 0.62 10*3/MM3 (ref 0.1–0.9)
MONOCYTES NFR BLD AUTO: 12.1 % (ref 5–12)
NEUTROPHILS NFR BLD AUTO: 3.55 10*3/MM3 (ref 1.7–7)
NEUTROPHILS NFR BLD AUTO: 69.5 % (ref 42.7–76)
NRBC BLD AUTO-RTO: 0 /100 WBC (ref 0–0.2)
PLATELET # BLD AUTO: 292 10*3/MM3 (ref 140–450)
PMV BLD AUTO: 9.3 FL (ref 6–12)
POTASSIUM SERPL-SCNC: 5 MMOL/L (ref 3.5–5.2)
PROT SERPL-MCNC: 7.3 G/DL (ref 6–8.5)
RAD ONC ARIA COURSE ID: NORMAL
RAD ONC ARIA COURSE INTENT: NORMAL
RAD ONC ARIA COURSE LAST TREATMENT DATE: NORMAL
RAD ONC ARIA COURSE START DATE: NORMAL
RAD ONC ARIA COURSE TREATMENT ELAPSED DAYS: 49
RAD ONC ARIA FIRST TREATMENT DATE: NORMAL
RAD ONC ARIA PLAN FRACTIONS TREATED TO DATE: 30
RAD ONC ARIA PLAN ID: NORMAL
RAD ONC ARIA PLAN PRESCRIBED DOSE PER FRACTION: 1.8 GY
RAD ONC ARIA PLAN PRIMARY REFERENCE POINT: NORMAL
RAD ONC ARIA PLAN TOTAL FRACTIONS PRESCRIBED: 33
RAD ONC ARIA PLAN TOTAL PRESCRIBED DOSE: 5940 CGY
RAD ONC ARIA REFERENCE POINT DOSAGE GIVEN TO DATE: 54 GY
RAD ONC ARIA REFERENCE POINT ID: NORMAL
RAD ONC ARIA REFERENCE POINT SESSION DOSAGE GIVEN: 1.8 GY
RBC # BLD AUTO: 3.65 10*6/MM3 (ref 4.14–5.8)
SODIUM SERPL-SCNC: 138 MMOL/L (ref 136–145)
WBC NRBC COR # BLD AUTO: 5.11 10*3/MM3 (ref 3.4–10.8)

## 2025-01-27 PROCEDURE — 25010000002 DEXAMETHASONE PER 1 MG: Performed by: INTERNAL MEDICINE

## 2025-01-27 PROCEDURE — 25010000002 PACLITAXEL PER 1 MG: Performed by: INTERNAL MEDICINE

## 2025-01-27 PROCEDURE — 25010000002 PALONOSETRON PER 25 MCG: Performed by: INTERNAL MEDICINE

## 2025-01-27 PROCEDURE — 25810000003 SODIUM CHLORIDE 0.9 % SOLUTION 250 ML FLEX CONT: Performed by: INTERNAL MEDICINE

## 2025-01-27 PROCEDURE — 83735 ASSAY OF MAGNESIUM: CPT

## 2025-01-27 PROCEDURE — 77336 RADIATION PHYSICS CONSULT: CPT | Performed by: RADIOLOGY

## 2025-01-27 PROCEDURE — 80053 COMPREHEN METABOLIC PANEL: CPT

## 2025-01-27 PROCEDURE — 36415 COLL VENOUS BLD VENIPUNCTURE: CPT

## 2025-01-27 PROCEDURE — 25010000002 DIPHENHYDRAMINE PER 50 MG: Performed by: INTERNAL MEDICINE

## 2025-01-27 PROCEDURE — 25010000002 CARBOPLATIN PER 50 MG: Performed by: INTERNAL MEDICINE

## 2025-01-27 PROCEDURE — 25010000002 HEPARIN LOCK FLUSH PER 10 UNITS: Performed by: INTERNAL MEDICINE

## 2025-01-27 PROCEDURE — 96417 CHEMO IV INFUS EACH ADDL SEQ: CPT

## 2025-01-27 PROCEDURE — 85025 COMPLETE CBC W/AUTO DIFF WBC: CPT

## 2025-01-27 PROCEDURE — 25810000003 SODIUM CHLORIDE 0.9 % SOLUTION: Performed by: INTERNAL MEDICINE

## 2025-01-27 PROCEDURE — 96413 CHEMO IV INFUSION 1 HR: CPT

## 2025-01-27 PROCEDURE — 96375 TX/PRO/DX INJ NEW DRUG ADDON: CPT

## 2025-01-27 PROCEDURE — 77386: CPT | Performed by: RADIOLOGY

## 2025-01-27 RX ORDER — HYDROCORTISONE SODIUM SUCCINATE 100 MG/2ML
100 INJECTION INTRAMUSCULAR; INTRAVENOUS AS NEEDED
Status: DISCONTINUED | OUTPATIENT
Start: 2025-01-27 | End: 2025-01-27 | Stop reason: HOSPADM

## 2025-01-27 RX ORDER — PALONOSETRON 0.05 MG/ML
0.25 INJECTION, SOLUTION INTRAVENOUS ONCE
Status: COMPLETED | OUTPATIENT
Start: 2025-01-27 | End: 2025-01-27

## 2025-01-27 RX ORDER — DIPHENHYDRAMINE HYDROCHLORIDE 50 MG/ML
50 INJECTION INTRAMUSCULAR; INTRAVENOUS AS NEEDED
Status: DISCONTINUED | OUTPATIENT
Start: 2025-01-27 | End: 2025-01-27 | Stop reason: HOSPADM

## 2025-01-27 RX ORDER — FAMOTIDINE 10 MG/ML
20 INJECTION, SOLUTION INTRAVENOUS ONCE
Status: COMPLETED | OUTPATIENT
Start: 2025-01-27 | End: 2025-01-27

## 2025-01-27 RX ORDER — SODIUM CHLORIDE 0.9 % (FLUSH) 0.9 %
10 SYRINGE (ML) INJECTION AS NEEDED
OUTPATIENT
Start: 2025-01-27

## 2025-01-27 RX ORDER — FAMOTIDINE 10 MG/ML
20 INJECTION, SOLUTION INTRAVENOUS AS NEEDED
Status: DISCONTINUED | OUTPATIENT
Start: 2025-01-27 | End: 2025-01-27 | Stop reason: HOSPADM

## 2025-01-27 RX ORDER — SODIUM CHLORIDE 9 MG/ML
20 INJECTION, SOLUTION INTRAVENOUS ONCE
Status: CANCELLED | OUTPATIENT
Start: 2025-01-27

## 2025-01-27 RX ORDER — HEPARIN SODIUM (PORCINE) LOCK FLUSH IV SOLN 100 UNIT/ML 100 UNIT/ML
500 SOLUTION INTRAVENOUS AS NEEDED
Status: DISCONTINUED | OUTPATIENT
Start: 2025-01-27 | End: 2025-01-27 | Stop reason: HOSPADM

## 2025-01-27 RX ORDER — SODIUM CHLORIDE 9 MG/ML
20 INJECTION, SOLUTION INTRAVENOUS ONCE
Status: COMPLETED | OUTPATIENT
Start: 2025-01-27 | End: 2025-01-27

## 2025-01-27 RX ORDER — FAMOTIDINE 10 MG/ML
20 INJECTION, SOLUTION INTRAVENOUS ONCE
Status: CANCELLED | OUTPATIENT
Start: 2025-01-27

## 2025-01-27 RX ORDER — HEPARIN SODIUM (PORCINE) LOCK FLUSH IV SOLN 100 UNIT/ML 100 UNIT/ML
500 SOLUTION INTRAVENOUS AS NEEDED
OUTPATIENT
Start: 2025-01-27

## 2025-01-27 RX ORDER — DIPHENHYDRAMINE HYDROCHLORIDE 50 MG/ML
50 INJECTION INTRAMUSCULAR; INTRAVENOUS ONCE
Status: COMPLETED | OUTPATIENT
Start: 2025-01-27 | End: 2025-01-27

## 2025-01-27 RX ORDER — DIPHENHYDRAMINE HYDROCHLORIDE 50 MG/ML
50 INJECTION INTRAMUSCULAR; INTRAVENOUS AS NEEDED
Status: CANCELLED | OUTPATIENT
Start: 2025-01-27

## 2025-01-27 RX ORDER — HYDROCORTISONE SODIUM SUCCINATE 100 MG/2ML
100 INJECTION INTRAMUSCULAR; INTRAVENOUS AS NEEDED
Status: CANCELLED | OUTPATIENT
Start: 2025-01-27

## 2025-01-27 RX ORDER — DEXAMETHASONE SODIUM PHOSPHATE 10 MG/ML
10 INJECTION INTRAMUSCULAR; INTRAVENOUS ONCE
Status: COMPLETED | OUTPATIENT
Start: 2025-01-27 | End: 2025-01-27

## 2025-01-27 RX ORDER — FAMOTIDINE 10 MG/ML
20 INJECTION, SOLUTION INTRAVENOUS AS NEEDED
Status: CANCELLED | OUTPATIENT
Start: 2025-01-27

## 2025-01-27 RX ORDER — SODIUM CHLORIDE 0.9 % (FLUSH) 0.9 %
10 SYRINGE (ML) INJECTION AS NEEDED
Status: DISCONTINUED | OUTPATIENT
Start: 2025-01-27 | End: 2025-01-27 | Stop reason: HOSPADM

## 2025-01-27 RX ORDER — PALONOSETRON 0.05 MG/ML
0.25 INJECTION, SOLUTION INTRAVENOUS ONCE
Status: CANCELLED | OUTPATIENT
Start: 2025-01-27

## 2025-01-27 RX ORDER — DIPHENHYDRAMINE HYDROCHLORIDE 50 MG/ML
50 INJECTION INTRAMUSCULAR; INTRAVENOUS ONCE
Status: CANCELLED | OUTPATIENT
Start: 2025-01-27

## 2025-01-27 RX ADMIN — DEXAMETHASONE SODIUM PHOSPHATE 10 MG: 10 INJECTION INTRAMUSCULAR; INTRAVENOUS at 10:26

## 2025-01-27 RX ADMIN — Medication 10 ML: at 13:09

## 2025-01-27 RX ADMIN — PACLITAXEL 85 MG: 6 INJECTION, SOLUTION INTRAVENOUS at 10:54

## 2025-01-27 RX ADMIN — FAMOTIDINE 20 MG: 10 INJECTION INTRAVENOUS at 10:20

## 2025-01-27 RX ADMIN — DIPHENHYDRAMINE HYDROCHLORIDE 50 MG: 50 INJECTION, SOLUTION INTRAMUSCULAR; INTRAVENOUS at 10:22

## 2025-01-27 RX ADMIN — SODIUM CHLORIDE 20 ML/HR: 9 INJECTION, SOLUTION INTRAVENOUS at 09:45

## 2025-01-27 RX ADMIN — Medication 500 UNITS: at 13:10

## 2025-01-27 RX ADMIN — PALONOSETRON HYDROCHLORIDE 0.25 MG: 0.25 INJECTION, SOLUTION INTRAVENOUS at 10:16

## 2025-01-27 RX ADMIN — CARBOPLATIN 190 MG: 10 INJECTION, SOLUTION INTRAVENOUS at 12:08

## 2025-01-28 ENCOUNTER — HOSPITAL ENCOUNTER (OUTPATIENT)
Dept: RADIATION ONCOLOGY | Facility: HOSPITAL | Age: 75
Discharge: HOME | End: 2025-01-28

## 2025-01-28 LAB
RAD ONC ARIA COURSE ID: NORMAL
RAD ONC ARIA COURSE INTENT: NORMAL
RAD ONC ARIA COURSE LAST TREATMENT DATE: NORMAL
RAD ONC ARIA COURSE START DATE: NORMAL
RAD ONC ARIA COURSE TREATMENT ELAPSED DAYS: 50
RAD ONC ARIA FIRST TREATMENT DATE: NORMAL
RAD ONC ARIA PLAN FRACTIONS TREATED TO DATE: 31
RAD ONC ARIA PLAN ID: NORMAL
RAD ONC ARIA PLAN PRESCRIBED DOSE PER FRACTION: 1.8 GY
RAD ONC ARIA PLAN PRIMARY REFERENCE POINT: NORMAL
RAD ONC ARIA PLAN TOTAL FRACTIONS PRESCRIBED: 33
RAD ONC ARIA PLAN TOTAL PRESCRIBED DOSE: 5940 CGY
RAD ONC ARIA REFERENCE POINT DOSAGE GIVEN TO DATE: 55.8 GY
RAD ONC ARIA REFERENCE POINT ID: NORMAL
RAD ONC ARIA REFERENCE POINT SESSION DOSAGE GIVEN: 1.8 GY

## 2025-01-28 PROCEDURE — 77386: CPT | Performed by: RADIOLOGY

## 2025-01-29 ENCOUNTER — HOSPITAL ENCOUNTER (OUTPATIENT)
Dept: RADIATION ONCOLOGY | Facility: HOSPITAL | Age: 75
Setting detail: RADIATION/ONCOLOGY SERIES
Discharge: HOME | End: 2025-01-29
Payer: MEDICARE

## 2025-01-29 LAB
RAD ONC ARIA COURSE ID: NORMAL
RAD ONC ARIA COURSE INTENT: NORMAL
RAD ONC ARIA COURSE LAST TREATMENT DATE: NORMAL
RAD ONC ARIA COURSE START DATE: NORMAL
RAD ONC ARIA COURSE TREATMENT ELAPSED DAYS: 51
RAD ONC ARIA FIRST TREATMENT DATE: NORMAL
RAD ONC ARIA PLAN FRACTIONS TREATED TO DATE: 32
RAD ONC ARIA PLAN ID: NORMAL
RAD ONC ARIA PLAN PRESCRIBED DOSE PER FRACTION: 1.8 GY
RAD ONC ARIA PLAN PRIMARY REFERENCE POINT: NORMAL
RAD ONC ARIA PLAN TOTAL FRACTIONS PRESCRIBED: 33
RAD ONC ARIA PLAN TOTAL PRESCRIBED DOSE: 5940 CGY
RAD ONC ARIA REFERENCE POINT DOSAGE GIVEN TO DATE: 57.6 GY
RAD ONC ARIA REFERENCE POINT ID: NORMAL
RAD ONC ARIA REFERENCE POINT SESSION DOSAGE GIVEN: 1.8 GY

## 2025-01-29 PROCEDURE — 77386: CPT | Performed by: RADIOLOGY

## 2025-01-30 ENCOUNTER — HOSPITAL ENCOUNTER (OUTPATIENT)
Dept: RADIATION ONCOLOGY | Facility: HOSPITAL | Age: 75
Discharge: HOME | End: 2025-01-30

## 2025-01-30 LAB
RAD ONC ARIA COURSE END DATE: NORMAL
RAD ONC ARIA COURSE ID: NORMAL
RAD ONC ARIA COURSE ID: NORMAL
RAD ONC ARIA COURSE INTENT: NORMAL
RAD ONC ARIA COURSE INTENT: NORMAL
RAD ONC ARIA COURSE LAST TREATMENT DATE: NORMAL
RAD ONC ARIA COURSE LAST TREATMENT DATE: NORMAL
RAD ONC ARIA COURSE START DATE: NORMAL
RAD ONC ARIA COURSE START DATE: NORMAL
RAD ONC ARIA COURSE TREATMENT ELAPSED DAYS: 52
RAD ONC ARIA COURSE TREATMENT ELAPSED DAYS: 52
RAD ONC ARIA FIRST TREATMENT DATE: NORMAL
RAD ONC ARIA FIRST TREATMENT DATE: NORMAL
RAD ONC ARIA PLAN FRACTIONS TREATED TO DATE: 33
RAD ONC ARIA PLAN FRACTIONS TREATED TO DATE: 33
RAD ONC ARIA PLAN ID: NORMAL
RAD ONC ARIA PLAN ID: NORMAL
RAD ONC ARIA PLAN NAME: NORMAL
RAD ONC ARIA PLAN PRESCRIBED DOSE PER FRACTION: 1.8 GY
RAD ONC ARIA PLAN PRESCRIBED DOSE PER FRACTION: 1.8 GY
RAD ONC ARIA PLAN PRIMARY REFERENCE POINT: NORMAL
RAD ONC ARIA PLAN PRIMARY REFERENCE POINT: NORMAL
RAD ONC ARIA PLAN TOTAL FRACTIONS PRESCRIBED: 33
RAD ONC ARIA PLAN TOTAL FRACTIONS PRESCRIBED: 33
RAD ONC ARIA PLAN TOTAL PRESCRIBED DOSE: 5940 CGY
RAD ONC ARIA PLAN TOTAL PRESCRIBED DOSE: 5940 CGY
RAD ONC ARIA REFERENCE POINT DOSAGE GIVEN TO DATE: 59.4 GY
RAD ONC ARIA REFERENCE POINT DOSAGE GIVEN TO DATE: 59.4 GY
RAD ONC ARIA REFERENCE POINT ID: NORMAL
RAD ONC ARIA REFERENCE POINT ID: NORMAL
RAD ONC ARIA REFERENCE POINT SESSION DOSAGE GIVEN: 1.8 GY

## 2025-01-30 PROCEDURE — 77336 RADIATION PHYSICS CONSULT: CPT | Performed by: RADIOLOGY

## 2025-01-30 PROCEDURE — 77386: CPT | Performed by: RADIOLOGY

## 2025-02-03 ENCOUNTER — TELEPHONE (OUTPATIENT)
Dept: ONCOLOGY | Facility: CLINIC | Age: 75
End: 2025-02-03
Payer: MEDICARE

## 2025-02-03 NOTE — TELEPHONE ENCOUNTER
----- Message from Timoteo Lombardi sent at 2/3/2025  2:50 PM CST -----  Ok to use catheter  ----- Message -----  From: [employer] [place] [facility]  Sent: 2/3/2025   1:44 PM CST  To: Timoteo Lombardi MD

## 2025-02-03 NOTE — TELEPHONE ENCOUNTER
Port Study 02/03/25:The patient's left subclavian port catheter reportedly cannot be  aspirated or be flushed with saline. After the port catheter was accessed by nursing staff using aseptic technique, 6 cc of [id number]-[id number]  was flushed through the catheter during fluoroscopy with digital recording. The images demonstrate patency of the catheter, with  satisfactory position, the tip is at the mid SVC. No fibrin sheath is identified at the tip of the catheter.

## 2025-02-17 ENCOUNTER — LAB (OUTPATIENT)
Dept: LAB | Facility: HOSPITAL | Age: 75
End: 2025-02-17
Payer: MEDICARE

## 2025-02-17 DIAGNOSIS — C34.92 SQUAMOUS CELL CARCINOMA OF LEFT LUNG: ICD-10-CM

## 2025-02-17 LAB
ALBUMIN SERPL-MCNC: 4 G/DL (ref 3.5–5.2)
ALBUMIN/GLOB SERPL: 1.2 G/DL
ALP SERPL-CCNC: 78 U/L (ref 39–117)
ALT SERPL W P-5'-P-CCNC: 8 U/L (ref 1–41)
ANION GAP SERPL CALCULATED.3IONS-SCNC: 12 MMOL/L (ref 5–15)
AST SERPL-CCNC: 9 U/L (ref 1–40)
BASOPHILS # BLD AUTO: 0.07 10*3/MM3 (ref 0–0.2)
BASOPHILS NFR BLD AUTO: 0.8 % (ref 0–1.5)
BILIRUB SERPL-MCNC: 0.2 MG/DL (ref 0–1.2)
BUN SERPL-MCNC: 18 MG/DL (ref 8–23)
BUN/CREAT SERPL: 18.4 (ref 7–25)
CALCIUM SPEC-SCNC: 9.5 MG/DL (ref 8.6–10.5)
CHLORIDE SERPL-SCNC: 96 MMOL/L (ref 98–107)
CO2 SERPL-SCNC: 27 MMOL/L (ref 22–29)
CREAT SERPL-MCNC: 0.98 MG/DL (ref 0.76–1.27)
DEPRECATED RDW RBC AUTO: 63.3 FL (ref 37–54)
EGFRCR SERPLBLD CKD-EPI 2021: 80.9 ML/MIN/1.73
EOSINOPHIL # BLD AUTO: 0.12 10*3/MM3 (ref 0–0.4)
EOSINOPHIL NFR BLD AUTO: 1.3 % (ref 0.3–6.2)
ERYTHROCYTE [DISTWIDTH] IN BLOOD BY AUTOMATED COUNT: 18.5 % (ref 12.3–15.4)
GLOBULIN UR ELPH-MCNC: 3.4 GM/DL
GLUCOSE SERPL-MCNC: 124 MG/DL (ref 65–99)
HCT VFR BLD AUTO: 33.4 % (ref 37.5–51)
HGB BLD-MCNC: 10.6 G/DL (ref 13–17.7)
IMM GRANULOCYTES # BLD AUTO: 0.21 10*3/MM3 (ref 0–0.05)
IMM GRANULOCYTES NFR BLD AUTO: 2.3 % (ref 0–0.5)
LYMPHOCYTES # BLD AUTO: 0.89 10*3/MM3 (ref 0.7–3.1)
LYMPHOCYTES NFR BLD AUTO: 9.7 % (ref 19.6–45.3)
MAGNESIUM SERPL-MCNC: 1.5 MG/DL (ref 1.6–2.4)
MCH RBC QN AUTO: 29.9 PG (ref 26.6–33)
MCHC RBC AUTO-ENTMCNC: 31.7 G/DL (ref 31.5–35.7)
MCV RBC AUTO: 94.4 FL (ref 79–97)
MONOCYTES # BLD AUTO: 0.99 10*3/MM3 (ref 0.1–0.9)
MONOCYTES NFR BLD AUTO: 10.8 % (ref 5–12)
NEUTROPHILS NFR BLD AUTO: 6.9 10*3/MM3 (ref 1.7–7)
NEUTROPHILS NFR BLD AUTO: 75.1 % (ref 42.7–76)
NRBC BLD AUTO-RTO: 0 /100 WBC (ref 0–0.2)
PLATELET # BLD AUTO: 217 10*3/MM3 (ref 140–450)
PMV BLD AUTO: 9.6 FL (ref 6–12)
POTASSIUM SERPL-SCNC: 4.9 MMOL/L (ref 3.5–5.2)
PROT SERPL-MCNC: 7.4 G/DL (ref 6–8.5)
RBC # BLD AUTO: 3.54 10*6/MM3 (ref 4.14–5.8)
SODIUM SERPL-SCNC: 135 MMOL/L (ref 136–145)
WBC NRBC COR # BLD AUTO: 9.18 10*3/MM3 (ref 3.4–10.8)

## 2025-02-17 PROCEDURE — 85025 COMPLETE CBC W/AUTO DIFF WBC: CPT

## 2025-02-17 PROCEDURE — 83735 ASSAY OF MAGNESIUM: CPT

## 2025-02-17 PROCEDURE — 80053 COMPREHEN METABOLIC PANEL: CPT

## 2025-02-17 PROCEDURE — 36415 COLL VENOUS BLD VENIPUNCTURE: CPT

## 2025-02-17 NOTE — PROGRESS NOTES
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  He has received definitive radiation (6000 cGy/33 fx), completed, 1/30/25 + concurrent paclitaxel+carboplatin- C1, 12/9/24; C2, 12/16/24; C3, 12/23/24; C4, 12/30/24; C5, 1/6/25; C6, 1/27/25.  He is accompanied by his wife Sarah and a daughter, Linette.    I have reviewed the HPI and verified with the patient the accuracy of it. No changes to interval history since the information was documented. Timoteo Lombardi MD 02/24/25      Summary of lung cancer histories/prior interventions:  - Medical history includes COPD, MRSA infection (respiratory culture, 5/1/24), migraines, bleeding ulcer, transfusion, left upper lobectomy, 4/19/23 for stage I pT1c,pN0M0 squamous cell carcinoma.    -  8/14/23 PET+ RUL lung neoplasm. He completed 5000 cGy in 4 fractions to the right upper lobe of the lung on 09/27/2023.   -  4/29/24- Diagnosed with squamous cell of the right upper lobe via bronchoscopy.   -  Completed 5000 cGy  in 5 fractions to the right upper lobe of the lung on 06/05/2024.   -  9/16/24- PET Scan- LEFT lower lobe pneumonia. Neoplastic LEFT upper lobe lung nodules adjacent to the LEFT hilum. Neoplastic nodule within the LEFT bronchus. Small focus of indeterminate FDG uptake in the posterior midline neck between the C5 and C6 spinous processes.   - 10/17/24- Bronchoscopy revealed fragments of squamous cell carcinoma: Stage IIIa (T3 N1 M0) squamous cell carcinoma left upper lobe of the lung.    -  1/29/24- Consult by Dr. Ortiz who recommended a course of external beam radiation,  likely delivered concurrently with systemic therapy under the medical oncology service, anticipate 6600 cGy over 33 treatments.   - 12/6/24- MRI brain- No mets  - Definitive radiation (6000 cGy/33 fractions), completed 1/30/25  -Definitive concurrent paclitaxel+carboplatin- C1, 12/9/24; C2, 12/16/24; C3, 12/23/24; C4, 12/30/24; C5, 1/6/25; C6,1/27/25..      Diagnostic abnormalities:  01/16/2023 - CT Chest - [place]:  Spiculated perihilar left upper lobe pulmonary nodule measures 2.7 cm. This is concerning for primary neoplasm. PET/CT or bronch recommended for further evaluation.   0.8 cm noncalcified pulmonary nodule in the right upper lobe. This is at the lower limits of normal for PET/CT detection. Benign granulomatous disease, metastatic disease, or 2nd primary pulmonary malignancy could be considered.   No enlarged mediastinal or hilar lymph nodes     01/17/2023 - Pulmonary Function Tests - [place]:  Moderate obstructive lung disease     01/26/2023 - Bronchoscopy with biopsy per :  RUL, lower lobe, middle lobe were negative. The left upper lobe, no lesion, lingula no lesion seen.  SAMINA washing:  Negative for malignant cells; reactive bronchial cells, macrophages, and inflammation  SAMINA brushing:  Negative for malignant cells; markedly reactive bronchial cells, macrophages, and inflammation     02/09/2023 - PET Scan:  2.8 cm hypermetabolic central LEFT upper lobe pulmonary nodule. This likely represents a primary lung neoplasm.  0.8 cm hypermetabolic pulmonary nodule in the RIGHT upper lobe. This is highly suspicious for either a contralateral pulmonary metastasis or second primary lung neoplasm.   No hypermetabolic thoracic lymph nodes. No evidence of hypermetabolic metastatic disease in the neck, abdomen, or pelvis.  1.5 cm hypermetabolic nodule in the RIGHT anterior rectum, highly concerning for underlying rectal polyp. Recommend correlation with colonoscopy/sigmoidoscopy.     02/27/2023 -  Colonoscopy:  Rectal polyp, excision:  Villous adenoma, negative for high-grade glandular dysplasia or malignancy.  Colon polyp at 20 cm, biopsy:  Fragments of serrated polyp, favor sessile serrated lesion/polyp.  Colon polyp at 10 cm, biopsy:  Fragments of serrated polyp, favor sessile serrated lesion/polyp.     03/08/2023 - CT Chest without contrast:  Pulmonary emphysema with LEFT hilar/upper lobe lung mass measuring approximately 4 cm in greatest dimension. There is mild nodular airspace disease laterally to this lesion which may represent postobstructive atelectasis or infiltrate.   10 mm irregular nodule within the RIGHT lung apex (series 2, image 101). This is concerning for metastatic lesion. Additional 4 mm noncalcified nodule at the RIGHT lung apex (series 2, image 85).   Evidence of prior granulomatous disease.      03/09/2023 - Bronchoscopy/EBUS per :  Left upper lobe, EBUS (ThinPrep and cell block section):   Squamous cell carcinoma.   Bronchial washing (ThinPrep and cell block section):   No malignant cells identified.   Benign bronchial epithelial cells, and alveolar macrophages.      04/19/2023 -  Left upper lobectomy and lymph node excision per Dr.Robert Irwin:   Lymph node, level 9 lymph node biopsy: Benign fibroadipose tissue.   Lymph node, level 10 posterior hilar lymph node biopsy: 1 lymph node, negative for evidence of malignancy.   Lymph node, level 6 lymph node biopsy: 2 lymph nodes, negative for evidence of malignancy.   Lymph node, level 11 interlobar lymph node biopsy: 1 lymph node, negative for evidence of malignancy.   Lymph node, level 10 anterior hilar lymph node biopsy: 2 lymph nodes, negative for evidence of malignancy.   Lymph node, level 12 lobar lymph node biopsy: 1 lymph node, negative for evidence of malignancy.   Lung, left upper lobectomy:   Invasive moderately differentiated squamous cell carcinoma.   Invasive carcinoma measures 2.5 cm in greatest dimension grossly.    Invasive carcinoma extends to within 0.2 cm of the nearest bronchial surgical excision margin.   Squamous metaplasia identified at bronchial surgical excision margin.   Emphysematous changes identified involving the nonneoplastic lung parenchyma.   2 peribronchial lymph nodes, negative for evidence of malignancy.   AJCC STAGE:  pT1c, pN0, pMx      04/25/2023 -  CT Chest without contrast:  Interval placement of large bore chest tube with decrease in previously described large left hydropneumothorax.Residual hydropneumothorax present with air dissecting through anterior chest wall into subcutaneous soft tissues, unchanged.   Interval resolution of previously described debris within left bronchus.   Unchanged right apical 1.0 cm nodule.      07/24/2023 - Appointment with :  Left upper lobe SCC [id number], stage I,Jan 2023  -1/16/23 CT chest ([facility]): Spiculated perihilar left upper lobe pulmonary nodule measures 2.7cm. This is concerning for primary neoplasm. PET/CT or bronchoscopy is recommended to further evaluate. 0.8cm noncalcified pulmonary nodule in the right upper lobe. This is at the lower limits of normal for Pet/CT detection. Benign granulomatous disease, metastatic disease, or 2nd primary pulmonary malignancy could be considered. No enlarged mediastinal or hilar lymph nodes.  -1/26/23 Lung, left upper lobe, washing: Negative for malignant cells. Reactive bronchial cells, macrophages, and inflammation. Lung, left upper lobe, brushing: Negative for malignant cells. Markedly reactive bronchial cells, macrophages, and inflammation.  -2/9/23 [facility] scan ([facility]): 2.8 cm hypermetabolic central LEFT upper lobe pulmonary nodule. This likely represents a primary lung neoplasm. 0.8 cm hypermetabolic pulmonary nodule in the RIGHT upper lobe. This is highly suspicious for either a contralateral pulmonary metastasis or second primary lung neoplasm. No hypermetabolic thoracic lymph nodes. No evidence of hypermetabolic  metastatic disease in the neck, abdomen, or pelvis. 1.5 cm hypermetabolic nodule in the RIGHT anterior rectum, highly concerning for underlying rectal polyp. Recommend correlation with colonoscopy/sigmoidoscopy.   4/19/23 Lung, left upper lobectomy: Invasive moderately differentiated squamous cell carcinoma. Invasive carcinoma measures 2.5 cm in greatest dimension grossly. Invasive carcinoma extends to within 0.2 cm of the nearest bronchial surgical excision margin. Squamous metaplasia identified at bronchial surgical excision margin. Emphysematous changes identified involving the nonneoplastic lung parenchyma. 2 peribronchial lymph nodes, negative for evidence of malignancy.Lymph node, level 9 lymph node biopsy: Benign fibroadipose tissue. Lymph node, level 10 posterior hilar lymph node biopsy: 1 lymph node, negative for evidence of malignancy. Lymph node, level 6 lymph node biopsy: 2 lymph nodes, negative for evidence of malignancy. Lymph node, level 11 interlobar lymph node biopsy: 1 lymph node, negative for evidence of malignancy. Lymph node, level 10 anterior hilar lymph node biopsy: 2 lymph nodes, negative for evidence of malignancy. Lymph node, level 12 lobar lymph node biopsy: 1 lymph node, negative for evidence of malignancy. [id number], stage I  Plan:  -Repeat CT chest Nov 2023  RUL subcentimeter nodule (PET+)  -2/9/23 PET scan ([facility]): 2.8 cm hypermetabolic central LEFT upper lobe pulmonary nodule. This likely represents a primary lung neoplasm. 0.8 cm hypermetabolic pulmonary nodule in the RIGHT upper lobe. This is highly suspicious for either a contralateral pulmonary metastasis or second primary lung neoplasm. No hypermetabolic thoracic lymph nodes. No evidence of hypermetabolic metastatic disease in the neck, abdomen, or pelvis. 1.5 cm hypermetabolic nodule in the RIGHT anterior rectum, highly concerning for underlying rectal polyp. Recommend correlation with colonoscopy/sigmoidoscopy.   -Referral   Angel for consideration SBRT RUL hypermetabolic nodule.  PLAN:  RTC with MD 2 months in [place] office  CBC, CMP, B12, Folate, LDH, Haptoglobin, iron profile, Ferritin, retic  Continue follow-up with Dr.Robert Irwin/CardioThoracic Surgery  Continue to follow up with Dr. Rowe  Iron Sulfate 325 mg p.o. twice daily  Follow Up:  Return in 2 months (on 9/24/2023) for Appointment with Dr. Vaughan in [place].  Refer to Dr Ortiz      08/09/2023 - Appointment with :  will order PET scan, they will call you  Return in 2 weeks for further discussion     08/14/2023 - PET Scan:  Abnormal PET/CT, there is increased size of a hypermetabolic pulmonary nodule in the right upper lobe lung apex that measures 13 mm, compared with 8 mm on the previous PET/CT. This has a maximum SUV of 8.1. This is concerning for active neoplasm.  Interval resection of the left upper lobe with posttreatment changes along the medial margin of the upper mediastinum, including mild infiltration of the mediastinal fat planes.  There is a subcutaneous nodule with surrounding fatty infiltration over the low back at the level of the upper sacrum which demonstrates hypermetabolism. This could be inflammatory, less likely a metastatic nodule. This measures 21 mm, follow-up ultrasound is recommended to determine if this is solid. Ultrasound-guided biopsy could be performed if indicated.  Interval resolution of the hypermetabolic nodule associated with the right rectum, however there is a new hypermetabolic nodule that maps to the right prostate gland. Correlation with PSA values is recommended.     08/16/2023 - Consult with :  I have recommended to treat the right lung nodule with SBRT, I anticipate a course over 4-5 fractions, final course pending.   He does have a visible insect bite on his low back and I do believe this correlates with the PET findings of activity on the upper sacrum. Will order PSA for prostate gland findings. He has not  had one drawn in some time.   Plan:  Plan on 4-5 pinpoint treatments to the lung nodule.   We will call you when to start treatments.   PSA today     08/18/2023 - Documentation per :  I spoke on the phone with this patient's wife Sarah and discussed his PSA of 2.9. This is considered a normal level, however on recent PET scan imaging for a lung nodule workup, a nodule within the prostate was noted.   I will refer him to urology for further management recommendations.     09/12/2023 - 09/27/2023 - Completed radiation course:  Received 5000 cGy in 4 fractions to the right upper lobe of the lung.     11/20/2023 - MRI Pelvis with and without contrast:  No suspicious lesions in the prostate (PI-RADS 3 or greater).      11/29/2023 - Appointment with :  I personally reviewed MRI today.    His MRI is negative for any suspicious lesions.    He has a volume of 34 cc.    I believe his hypermetabolic activity on PET scan was likely inflammatory.  I do not think he requires a biopsy based on his negative MRI and PSA level.    He will follow-up as needed.      12/20/2023 - CT Chest without contrast:  Decreased size of 6 mm spiculated right upper lobe pulmonary nodule (previously 13 mm). This nodule was hypermetabolic on prior PET/CT and highly suspicious for primary lung neoplasm.  Postoperative change of left upper lobectomy. No definite change/increase in soft tissue thickening along the lobectomy margin and mediastinum. Recommend continued imaging surveillance.  No evidence of disease progression in the chest. No thoracic lymphadenopathy.     12/27/2023 - Appointment with :  Return in 3 months with a CT chest before.      03/19/2024 - CT chest without contrast:  1.1 cm irregular right upper lobe nodule, highly suspicious for malignancy.  5 mm left upper lobe nodule is indeterminate but does raise the suspicion for malignancy.  Stable size of the previously irradiated right apex nodule. Minimally  increased adjacent subpleural parenchymal abnormality, likely related to post radiation changes.  Left upper lobectomy with stable postsurgical margins.  No mediastinal lymphadenopathy.     03/27/2024 - Consult with :  Will refer to pulmonology for bronchoscopy considerations of the new right upper lung nodule. He will return in 3 weeks for further recommendations.  I have instructed him to continue to see the other health care providers as per their scheduling.  Plan:  Referral to pulmonology for biopsy of new right lung nodule  Return in 3 weeks     04/16/2024 - Appointment with :  For wheezing, add bronchodilator. 100 mcg trelegy sample given, we reviewed PFT showing severe obstruction.  Likely moderate to severe copd with hx smoking  For lung nodule, will plan ion bronchoscopy to better assess rul nodule  Ask anesthesia to take particular care with remaining teeth  We demonstrated proper technique for use of ellipta device  Follow Up   Return in about 4 weeks (around 5/14/2024).     04/22/2024 - CT Chest without contrast:  RIGHT upper lobe 17 x 11 mm nodule.  LEFT upper lobe 6 x 5 mm nodule.  No pneumothorax or pleural effusion.     04/29/2024 - Bronchoscopy with biopsy per :  Lung, right upper lobe, Ion fine-needle aspiration, smears (2), ThinPrep preparation (1) and cell block:   Positive for malignant cells, consistent with moderately differentiated squamous cell carcinoma.  Comment: Immunohistochemical stain for p40 is positive in the neoplastic cells, supporting the above diagnostic impression.The control stained appropriately.  Bronchial washings, ThinPrep preparation (1) and cell block:   Positive for malignant cells, consistent with moderately differentiated squamous cell carcinoma.  Lung, right upper lobe, bronchoalveolar lavage, ThinPrep preparation (1):   Positive for malignant cells, consistent with moderately to poorly differentiated squamous cell carcinoma.  AJCC stage: pTX  pNX     04/29/2024 - Documentation per :  Dr. Nicola Monsivais notified me that patient cytology was positive in his lung.  He is scheduled to return to our office for treatment recommendations.  Also, it was incidentally noted by anesthesia during his procedure that he has a lesion in the oral cavity that needs additional evaluation and may potentially be another malignancy.     05/06/2024 - Appointment with :  This patient does have biopsy-proven squamous cell carcinoma of the right upper lobe.  We will proceed with SBRT radiotherapy to this  lesion.  He also has a lesion in the left lung which is nondiagnostic and we will continue to follow that lesion with serial radiographs  Plan:  We will call you when to start radiation treatment in approximately 2 weeks  Return in about 2 weeks (around 5/20/2024) for the first Radiation Treatment.     05/28/2024 - 06/05/2024 - Completed radiation course:  Received 5000 cGy in 5 fractions to the right upper lobe of the lung via Stereotactic Radiation Therapy - SRT.      09/03/2024 - CT chest without contrast:  Lung nodules seen previously have resolved.  There are indeterminate soft tissue nodules within the LEFT bronchus.  Consider PET/CT correlation.     09/09/2024 - Appointment with :  We discussed the new left bronchus findings on imaging. I will notify pulmonology for review of the imaging and further recommendations. We will continue routine follow-up/surveillance as discussed in 3 months with follow up CT scan before visit and I have instructed him to continue to see the other health care providers as per their scheduling.  Plan:  will discuss your scans with pulmonology  return in 3 months unless you need to be seen sooner.     09/11/2024 - Documentation per Aston Medina APRCHATA - radiation oncology:  I reviewed the recent CT chest imaging with Dr. Ortiz and discussed this with Dr. Monsivais and Erma Saucedo in pulmonology.   I will order a PET scan  for further evaluation of the left bronchus finding and will get him in for an appointment to discuss further options with Dr. Ortiz.     09/16/2024 - PET Scan:  LEFT lower lobe pneumonia.  Neoplastic LEFT upper lobe lung nodules adjacent to the LEFT hilum.  Neoplastic nodule within the LEFT bronchus.  Small focus of indeterminate FDG uptake in the posterior midline neck between the C5 and C6 spinous processes.     10/08/2024 - Appointment with Mata Simon MD - [facility]:  Plan for bronchoscopy with APC and tumor debulking, EBUS/TBNA, bal next week.     10/17/2024 - Bronchus, left endobronchial mass, endobronchial biopsy:  Fragments of squamous cell carcinoma.        LABS    Lab Results - Last 18 Months   Lab Units 02/17/25  1117 01/27/25  0854 01/20/25  0807 01/13/25  0755 12/30/24  0821 12/23/24  0747   HEMOGLOBIN g/dL 10.6* 11.0* 10.9* 11.3* 10.7* 11.3*   HEMATOCRIT % 33.4* 34.8* 34.3* 35.5* 34.7* 36.7*   MCV fL 94.4 95.3 93.5 93.9 94.3 93.9   WBC 10*3/mm3 9.18 5.11 4.55 5.84 6.34 6.76   RDW % 18.5* 17.7* 16.8* 17.3* 15.5* 15.3   MPV fL 9.6 9.3 9.2 9.2 9.6 9.5   PLATELETS 10*3/mm3 217 292 190 157 257 291   IMM GRAN % % 2.3* 0.4 0.4 0.2 0.6* 0.4   NEUTROS ABS 10*3/mm3 6.90 3.55 3.21 4.14 4.56 4.92   LYMPHS ABS 10*3/mm3 0.89 0.84 0.83 1.06 1.01 1.07   MONOS ABS 10*3/mm3 0.99* 0.62 0.35 0.50 0.59 0.49   EOS ABS 10*3/mm3 0.12 0.06 0.10 0.09 0.10 0.19   BASOS ABS 10*3/mm3 0.07 0.02 0.04 0.04 0.04 0.06   IMMATURE GRANS (ABS) 10*3/mm3 0.21* 0.02 0.02 0.01 0.04 0.03   NRBC /100 WBC 0.0 0.0 0.0 0.0 0.0 0.0       Lab Results - Last 18 Months   Lab Units 02/17/25  1117 01/27/25  0854 01/20/25  0807 01/13/25  0755 12/30/24  0821 12/23/24  0747   GLUCOSE mg/dL 124* 134* 141* 53* 87 176*   SODIUM mmol/L 135* 138 138 139 137 137   POTASSIUM mmol/L 4.9 5.0 4.5 4.5 5.2 4.6   CO2 mmol/L 27.0 27.0 27.0 27.0 26.0 25.0   CHLORIDE mmol/L 96* 98 100 100 100 99   ANION GAP mmol/L 12.0 13.0 11.0 12.0 11.0 13.0   CREATININE mg/dL  "0.98 0.99 0.99 1.01 0.90 0.99   BUN mg/dL 18 17 16 18 15 19   BUN / CREAT RATIO  18.4 17.2 16.2 17.8 16.7 19.2   CALCIUM mg/dL 9.5 9.8 9.6 9.8 9.6 9.6   ALK PHOS U/L 78 79 72 76 81 74   TOTAL PROTEIN g/dL 7.4 7.3 7.1 7.3 7.3 7.2   ALT (SGPT) U/L 8 6 8 8 10 13   AST (SGOT) U/L 9 8 9 10 15 14   BILIRUBIN mg/dL 0.2 <0.2 <0.2 0.2 0.2 0.2   ALBUMIN g/dL 4.0 3.9 4.0 4.1 4.0 3.9   GLOBULIN gm/dL 3.4 3.4 3.1 3.2 3.3 3.3       Lab Results - Last 18 Months   Lab Units 12/30/24  0821 11/26/24  1223   CEA ng/mL 4.42 3.46       Lab Results - Last 18 Months   Lab Units 11/26/24  1223   IRON mcg/dL 51*   TIBC mcg/dL 423   IRON SATURATION (TSAT) % 12*   FERRITIN ng/mL 166.40   FOLATE ng/mL 8.50         PAST MEDICAL HISTORY:  ALLERGIES:  Allergies   Allergen Reactions    Iodides Nausea And Vomiting     Contrast dye-\"Upset stomach x 2 days\". MRI ONLY     CURRENT MEDICATIONS:  Outpatient Encounter Medications as of 2/24/2025   Medication Sig Dispense Refill    acetaminophen (Tylenol) 325 MG tablet Take 3 tablets by mouth Every 8 (Eight) Hours. Take every 8 hours for 3 days then take prn as needed.      albuterol sulfate  (90 Base) MCG/ACT inhaler Every 6 (Six) Hours.      cetirizine (zyrTEC) 10 MG tablet Take 1 tablet by mouth Daily.      Cholecalciferol 125 MCG (5000 UT) tablet Take 1 tablet by mouth Daily.      divalproex (DEPAKOTE ER) 250 MG 24 hr tablet Take 1 tablet by mouth 2 (Two) Times a Day.      folic acid-vit B6-vit B12 (Folbee) 2.5-25-1 MG tablet tablet Take 1 tablet by mouth Daily. 30 tablet 0    furosemide (LASIX) 20 MG tablet TAKE 1 TABLET BY MOUTH ONCE DAILY Oral for 14 Days      glimepiride (AMARYL) 4 MG tablet Take 1 tablet by mouth Daily.      hydroCHLOROthiazide (HYDRODIURIL) 25 MG tablet Take 1 tablet by mouth Daily.      lidocaine-prilocaine (EMLA) 2.5-2.5 % cream Apply to port site 30 minutes before it is to be accessed and cover with occlusive dressing. 30 g 1    lisinopril (PRINIVIL,ZESTRIL) 10 MG " tablet Take 1 tablet by mouth Daily.      loratadine (CLARITIN) 10 MG tablet Take 1 tablet by mouth Daily.      metFORMIN (GLUCOPHAGE) 1000 MG tablet Take 1 tablet by mouth 2 (Two) Times a Day With Meals.      metoprolol succinate XL (TOPROL-XL) 25 MG 24 hr tablet Take 1 tablet by mouth Daily.      Omega-3 Fatty Acids (fish oil) 1000 MG capsule capsule Take  by mouth Daily With Breakfast.      ondansetron (Zofran) 4 MG tablet Take 1 tablet by mouth Every 8 (Eight) Hours As Needed for Nausea or Vomiting. 15 tablet 0    ondansetron (ZOFRAN) 8 MG tablet Take 1 tablet by mouth Every 8 (Eight) Hours As Needed for Nausea or Vomiting. 30 tablet 3    pantoprazole (PROTONIX) 40 MG EC tablet Take 1 tablet by mouth 2 (Two) Times a Day.      primidone (MYSOLINE) 50 MG tablet Take 1 tablet by mouth 2 (Two) Times a Day. For essential tremors      simvastatin (ZOCOR) 40 MG tablet Take 1 tablet by mouth Every Night.      tiotropium bromide-olodaterol (Stiolto Respimat) 2.5-2.5 MCG/ACT aerosol solution inhaler Inhale 2 puffs Daily. 12 g 3     No facility-administered encounter medications on file as of 2/24/2025.     ADULT ILLNESSES:  Patient Active Problem List   Diagnosis Code    Squamous cell carcinoma of left lung C34.92    S/P lobectomy of lung Z90.2    Former smoker Z87.891    History of radiation therapy Z92.3    Nodule of upper lobe of right lung R91.1    Malignant neoplasm of upper lobe of right lung C34.11    Mass of left lung R91.8    History of primary malignant neoplasm of right lung Z85.118    Malignant neoplasm of overlapping sites of lung C34.80    Port-A-Cath in place Z95.828    Function kidney decreased N28.9    Hypomagnesemia E83.42     SURGERIES:  Past Surgical History:   Procedure Laterality Date    APPENDECTOMY      BRONCHOSCOPY  2023    BRONCHOSCOPY N/A 4/29/2024    Procedure: BRONCHOSCOPY WITH ENDOBRONCHIAL ULTRASOUND;  Surgeon: Nicola Monsivais MD;  [facility];  Service: Pulmonary;  Laterality:  N/A;  pre Nodule of upper lobe of right lung  Dr. Alves    BRONCHOSCOPY N/A 10/17/2024    Procedure: BRONCHOSCOPY WITH APC & DEBULKING, cryotherapy, balloon dilation/tamponade;  Surgeon: Mata Simon MD;  Location:  [facility];  Service: Pulmonary;  Laterality: N/A;    BRONCHOSCOPY WITH ION ROBOTIC ASSIST N/A 4/29/2024    Procedure: BRONCHOSCOPY WITH ION ROBOT and BRONCHOSCOPY WITH ENDOBRONCHIAL ULTRASOUND;  Surgeon: Nicola Monsivais MD;  Location:  [facility] OR;  Service: Robotics - Pulmonary;  Laterality: N/A;  pre  Nodule of upper lobe of right lung  post      BRONCHOSCOPY WITH ION ROBOTIC ASSIST  4/29/2024    Procedure: BRONCHOSCOPY NAVIGATION WITH ENDOBRONCHIAL ULTRASOUND AND ION ROBOT;  Surgeon: Nicola Monsivais MD;  Location:  [facility];  Service: Pulmonary;;    COLONOSCOPY  02/28/2023    HEMORRHOIDECTOMY      HERNIA REPAIR  1999    LUNG SURGERY Left 04/19/2023    VENOUS ACCESS DEVICE (PORT) INSERTION N/A 12/5/2024    Procedure: Single Lumen Port-a-cath insertion with flouroscopy;  Surgeon: Lorrie Hanson MD;  Location:  [facility];  Service: General;  Laterality: N/A;     HEALTH MAINTENANCE ITEMS:  Health Maintenance Due   Topic Date Due    LIPID PANEL  Never done    DIABETIC EYE EXAM  Never done    URINE MICROALBUMIN-CREATININE RATIO (uACR)  Never done    TDAP/TD VACCINES (1 - Tdap) Never done    ZOSTER VACCINE (1 of 2) Never done    COLORECTAL CANCER SCREENING  Never done    AAA SCREEN ONCE  Never done    HEPATITIS C SCREENING  Never done    ANNUAL WELLNESS VISIT  Never done    HEMOGLOBIN A1C  Never done    COVID-19 Vaccine (4 - 2024-25 season) 09/01/2024       <no information>  Last Completed Colonoscopy       This patient has no relevant Health Maintenance data.          Immunization History   Administered Date(s) Administered    Arexvy (RSV, Adults 60+ yrs) 11/11/2024    COVID-19 (MODERNA) 1st,2nd,3rd Dose Monovalent 02/09/2021, 03/15/2021    COVID-19 (UNSPECIFIED) 10/24/2021  "   Fluzone High-Dose 65+YRS 2024    Pneumococcal Conjugate 13-Valent (PCV13) 2017    Pneumococcal Polysaccharide (PPSV23) 2017     Last Completed Mammogram       This patient has no relevant Health Maintenance data.              FAMILY HISTORY:  Family History   Problem Relation Age of Onset    Diabetes Mother     Heart disease Mother     Cancer Father     Cancer Brother     Alcohol abuse Brother     Cancer Paternal Grandfather     Malig Hyperthermia Neg Hx      SOCIAL HISTORY:  Social History     Socioeconomic History    Marital status:    Tobacco Use    Smoking status: Former     Current packs/day: 0.00     Average packs/day: 2.0 packs/day for 57.2 years (114.4 ttl pk-yrs)     Types: Cigarettes     Start date:      Quit date: 3/17/2023     Years since quittin.9     Passive exposure: Past    Smokeless tobacco: Never   Vaping Use    Vaping status: Never Used   Substance and Sexual Activity    Alcohol use: Not Currently    Drug use: Never    Sexual activity: Defer       REVIEW OF SYSTEMS:  Review of Systems   Constitutional:  Positive for activity change, appetite change (Low to fair), fatigue (Chronic) and unexpected weight loss (35 pounds in the past 1.5-yrs).        Manages his personal ADLs, light chores, runs errands and is still driving.  Is up and about at least half the time.  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  No mouth sores.  No nausea. No vomiting.  No skin changes. No diarrhea.  \"Did not bother me\"   HENT: Negative.     Eyes: Negative.    Respiratory:  Positive for cough (With cream/clear-colored phlegm, \"now and then\"), shortness of breath (Baseline exertional dyspnea with some SOB with routine activity) and wheezing.         Utvyog-1934-mi to 2 packs/day.  Quit in     On inhalers     Cardiovascular: Negative.    Gastrointestinal: Negative.    Endocrine: Negative.  " "  Genitourinary: Negative.    Musculoskeletal:  Positive for arthralgias (Chronic) and back pain (Chronic).   Skin: Negative.    Allergic/Immunologic: Negative.    Neurological:  Positive for tremors (Chronic).   Hematological: Negative.    Psychiatric/Behavioral: Negative.         /68   Pulse 85   Temp 97.3 °F (36.3 °C) (Temporal)   Resp 18   Ht 172.7 cm (68\")   Wt 73.5 kg (162 lb 1.6 oz)   SpO2 91%   BMI 24.65 kg/m²  Body surface area is 1.87 meters squared.  Pain Score    02/24/25 1113   PainSc: 0-No pain         Physical Exam  Vitals and nursing note reviewed.   Constitutional:       Comments: Pleasant, cooperative, heavyset, modestly kept elderly male.  Ambulatory.  ECOG 1.     Has regained 4 lb (had lost 6 lb at his prior visit) since the last visit   HENT:      Head: Normocephalic and atraumatic.   Eyes:      General: No scleral icterus.     Extraocular Movements: Extraocular movements intact.      Pupils: Pupils are equal, round, and reactive to light.   Cardiovascular:      Rate and Rhythm: Normal rate.   Pulmonary:      Effort: Pulmonary effort is normal.      Comments:   Port in left upper chest is well seated.  The previous subtle surrounding fullness and prominent tenderness surrounding the site have resolved.    Distant breath sounds bilaterally  Abdominal:      Palpations: Abdomen is soft.      Tenderness: There is no abdominal tenderness.   Musculoskeletal:         General: Normal range of motion.      Cervical back: Normal range of motion.   Lymphadenopathy:      Cervical: No cervical adenopathy.   Skin:     General: Skin is warm.   Neurological:      General: No focal deficit present.      Mental Status: He is alert and oriented to person, place, and time.   Psychiatric:         Mood and Affect: Mood normal.         Behavior: Behavior normal.         Thought Content: Thought content normal.           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  -- Original tumor stage: " TNM at least IIIA ([id number])  -- Original tumor burden:  -9/16/24- PET scan-LEFT lower lobe pneumonia.Neoplastic LEFT upper lobe lung nodules adjacent to the LEFT hilum. Neoplastic nodule within the LEFT bronchus.Small focus of indeterminate FDG uptake in the posterior midline neck between the C5 and C6 spinous processes  -10/17/24- Bronchoscopy-revealed Fragments of squamous cell carcinoma.      --Complications of tumor: Asthenia, cough, dyspnea, weight loss  -- Tumor status:   -Definitive radiation (6000 cGy/33 fx completed, 1/30/25) + concurrent paclitaxel+carboplatin- C1, 12/9/24; C2, 12/16/24; C3, 12/23/24; C4, 12/30/24; C5, 1/6/25.      2.   Left upper lobectomy, 4/19/23 for stage I pT1c,pN0M0 squamous cell carcinoma.    3.   On 8/14/23 PET+ RUL lung neoplasm.   - Completed 5000 cGy in 4 fractions to the right upper lobe of the lung on 09/27/2023.   4.   Squamous cell of the right upper lobe via bronchoscopy, 4/29/24.   - Completed 5000 cGy  in 5 fractions to the right upper lobe of the lung on 06/05/2024.     5.   Normocytic anemia.  Contributions from malignancy/anemia of chronic disease+ chemo  --Hgb 10.6; MCV 94.4, 2/17/25 (prior: Hgb 10.7-12.9)  --11/26/24- ferritin 166, Fe 51, fe sat 12%, B12 229, folate 8.5  6.   COPD   7.   Ex heavy cigarette smoker  8.   Port tenderness.  Needs port study and surgical evaluation before it can be used again       Plan:  Review labs, 1/27/25 - 2/17/25 stable anemia, otherwise normal CBC, gluc 124 sodium 135, Mag 1.5 (2 gm IV mag) otherwise normal CMP, CEA p (prior: 3.46-4.4)  Chemo tolerance:  Doing well  Note port study, 2/3/25-The images demonstrate patency of the catheter, with satisfactory position, the tip is at the mid SVC. No fibrin sheath is identified at the tip of the catheter.  Prior careful review of available diagnostic information as outlined above.   Note imaging including PET scan 9/16/2024, as well as bronchoscopy/biopsy findings, 10/17/24 (above).  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  Review NCCN guidelines non-small cell lung cancer, stage III (unresectable).  Chemotherapy regimens used.  Radiation therapy (concurrent regimens usually radiation therapy): Paclitaxel 45 to 50 mg/m² weekly; carboplatin AUC, concurrent thoracic RT +/- additional 2 cycles every 21 days of paclitaxel 200 mg/m² and carboplatin AUC 6-followed by consolidation therapy for unresectable stage III NSCLC, PS 0-1 and no disease progression after 2 or more cycles of definitive chemoradiation: Durvalumab 10 mg/kg IV every 2 weeks for up to 12 months (category 1).    Durvalumab.  Potential toxicities of same discussed (to included but not limited to: Myelosuppression, alopecia, neuropathy, arthralgias/myalgias, nausea/vomiting, hypersensitivity reactions, diarrhea, mucositis, risks for infection, abnormal liver enzymes, asthenia, fever, low blood pressure, bleeding, renal insufficiency, edema, bradycardia, anaphylaxis, arrhythmias, thromboembolism, myocardial infarction, pulmonary toxicity, gastrointestinal (GI) obstruction/perforation, paralytic ileus, ischemic colitis, pancreatitis, severe skin reactions; electrolyte disorders, ototoxicity, nephrotoxicity, vision loss). Questions answered. He agrees to a trial of therapy.     Schedule -C1, 2/3/25; C2, 3/17/25; C3, 3/31/25; C4, 4/14/25; C5, 4/28/25; C6, 5/12/25; C7, etc - durvalumab (plan: every 2 weeks x 24 cycles - 12 months) per administration guidelines - at [facility]                 durvalumab 10 mg/kg (WT = 50 kg; TD = 500 mg)             -Premeds:              Decadron 10 mg IV             Benadryl 25 mg IV             Pepcid 10 mg IV              8.   Upon initiation of durvalumab:  TSH, FT4, CMP, Mg++ and CBC with differential weekly with Procrit 40,000 units subcutaneous every 2 weeks if Hgb less than 10 and Hct less than 30     9.   Schedule CT chest, abdomen/pelvis with po/IV contrast after C6-5/19/25     10.   Continue  currently identified medications.   11.  Return to office in 6 weeks.  Surveillance imaging every 6th cycle of durvalumab (C6; C12; C18, etc). Draw pre-office CBC with differential, CMP, and CEA.   12.  Importance of Smoking Cessation discussed with patient and informed patient additional information will be on today's [facility]'s Flyer - Plan to Be Tobacco Free handout provided to patient            I spent ~43 minutes caring for Boogie on this date of service. This time includes time spent by me in the following activities: preparing for the visit, reviewing tests, performing a medically appropriate examination and/or evaluation, counseling and educating the patient/family/caregiver, ordering medications, tests, or procedures and documenting information in the medical record

## 2025-02-24 ENCOUNTER — OFFICE VISIT (OUTPATIENT)
Dept: ONCOLOGY | Facility: CLINIC | Age: 75
End: 2025-02-24
Payer: MEDICARE

## 2025-02-24 VITALS
DIASTOLIC BLOOD PRESSURE: 68 MMHG | WEIGHT: 162.1 LBS | TEMPERATURE: 97.3 F | HEART RATE: 85 BPM | RESPIRATION RATE: 18 BRPM | HEIGHT: 68 IN | SYSTOLIC BLOOD PRESSURE: 132 MMHG | BODY MASS INDEX: 24.57 KG/M2 | OXYGEN SATURATION: 91 %

## 2025-02-24 DIAGNOSIS — R94.6 ABNORMAL RESULTS OF THYROID FUNCTION STUDIES: ICD-10-CM

## 2025-02-24 DIAGNOSIS — C34.92 SQUAMOUS CELL CARCINOMA OF LEFT LUNG: Primary | ICD-10-CM

## 2025-02-24 PROBLEM — Z79.899 ENCOUNTER FOR LONG-TERM (CURRENT) USE OF HIGH-RISK MEDICATION: Status: ACTIVE | Noted: 2025-02-24

## 2025-02-27 ENCOUNTER — TELEPHONE (OUTPATIENT)
Dept: ONCOLOGY | Facility: CLINIC | Age: 75
End: 2025-02-27

## 2025-02-27 NOTE — TELEPHONE ENCOUNTER
Caller: Boogie Artis    Relationship: Self    Best call back number: [phone]      What was the call regarding: PATIENT NEEDS TO HAVE WORK NOTE STATING HE CAN RETURN TO WORK 3-6-25  FAX # [phone]

## 2025-03-04 ENCOUNTER — LAB (OUTPATIENT)
Dept: LAB | Facility: HOSPITAL | Age: 75
End: 2025-03-04
Payer: MEDICARE

## 2025-03-04 ENCOUNTER — CLINICAL SUPPORT (OUTPATIENT)
Dept: ONCOLOGY | Facility: CLINIC | Age: 75
End: 2025-03-04
Payer: MEDICARE

## 2025-03-04 ENCOUNTER — INFUSION (OUTPATIENT)
Dept: ONCOLOGY | Facility: HOSPITAL | Age: 75
End: 2025-03-04
Payer: MEDICARE

## 2025-03-04 VITALS
WEIGHT: 161 LBS | OXYGEN SATURATION: 92 % | RESPIRATION RATE: 16 BRPM | TEMPERATURE: 97 F | SYSTOLIC BLOOD PRESSURE: 107 MMHG | DIASTOLIC BLOOD PRESSURE: 54 MMHG | BODY MASS INDEX: 24.4 KG/M2 | HEART RATE: 73 BPM | HEIGHT: 68 IN

## 2025-03-04 DIAGNOSIS — C34.92 SQUAMOUS CELL CARCINOMA OF LEFT LUNG: ICD-10-CM

## 2025-03-04 DIAGNOSIS — R94.6 ABNORMAL RESULTS OF THYROID FUNCTION STUDIES: ICD-10-CM

## 2025-03-04 DIAGNOSIS — Z79.899 ENCOUNTER FOR LONG-TERM (CURRENT) USE OF HIGH-RISK MEDICATION: ICD-10-CM

## 2025-03-04 DIAGNOSIS — Z79.899 ENCOUNTER FOR LONG-TERM (CURRENT) USE OF HIGH-RISK MEDICATION: Primary | ICD-10-CM

## 2025-03-04 DIAGNOSIS — Z95.828 PORT-A-CATH IN PLACE: ICD-10-CM

## 2025-03-04 DIAGNOSIS — C34.92 SQUAMOUS CELL CARCINOMA OF LEFT LUNG: Primary | ICD-10-CM

## 2025-03-04 DIAGNOSIS — C34.80 MALIGNANT NEOPLASM OF OVERLAPPING SITES OF LUNG, UNSPECIFIED LATERALITY: Primary | ICD-10-CM

## 2025-03-04 LAB
ALBUMIN SERPL-MCNC: 4.2 G/DL (ref 3.5–5.2)
ALBUMIN/GLOB SERPL: 1.2 G/DL
ALP SERPL-CCNC: 75 U/L (ref 39–117)
ALT SERPL W P-5'-P-CCNC: 7 U/L (ref 1–41)
ANION GAP SERPL CALCULATED.3IONS-SCNC: 17 MMOL/L (ref 5–15)
AST SERPL-CCNC: 9 U/L (ref 1–40)
BASOPHILS # BLD AUTO: 0.04 10*3/MM3 (ref 0–0.2)
BASOPHILS NFR BLD AUTO: 0.5 % (ref 0–1.5)
BILIRUB SERPL-MCNC: 0.2 MG/DL (ref 0–1.2)
BUN SERPL-MCNC: 22 MG/DL (ref 8–23)
BUN/CREAT SERPL: 19.8 (ref 7–25)
CALCIUM SPEC-SCNC: 10 MG/DL (ref 8.6–10.5)
CEA SERPL-MCNC: 3.44 NG/ML
CHLORIDE SERPL-SCNC: 98 MMOL/L (ref 98–107)
CO2 SERPL-SCNC: 25 MMOL/L (ref 22–29)
CREAT SERPL-MCNC: 1.11 MG/DL (ref 0.76–1.27)
DEPRECATED RDW RBC AUTO: 66.6 FL (ref 37–54)
EGFRCR SERPLBLD CKD-EPI 2021: 69.7 ML/MIN/1.73
EOSINOPHIL # BLD AUTO: 0.23 10*3/MM3 (ref 0–0.4)
EOSINOPHIL NFR BLD AUTO: 2.9 % (ref 0.3–6.2)
ERYTHROCYTE [DISTWIDTH] IN BLOOD BY AUTOMATED COUNT: 18.9 % (ref 12.3–15.4)
GLOBULIN UR ELPH-MCNC: 3.6 GM/DL
GLUCOSE SERPL-MCNC: 80 MG/DL (ref 65–99)
HCT VFR BLD AUTO: 36.6 % (ref 37.5–51)
HGB BLD-MCNC: 11.4 G/DL (ref 13–17.7)
IMM GRANULOCYTES # BLD AUTO: 0.05 10*3/MM3 (ref 0–0.05)
IMM GRANULOCYTES NFR BLD AUTO: 0.6 % (ref 0–0.5)
LYMPHOCYTES # BLD AUTO: 0.97 10*3/MM3 (ref 0.7–3.1)
LYMPHOCYTES NFR BLD AUTO: 12.2 % (ref 19.6–45.3)
MAGNESIUM SERPL-MCNC: 1.9 MG/DL (ref 1.6–2.4)
MCH RBC QN AUTO: 30 PG (ref 26.6–33)
MCHC RBC AUTO-ENTMCNC: 31.1 G/DL (ref 31.5–35.7)
MCV RBC AUTO: 96.3 FL (ref 79–97)
MONOCYTES # BLD AUTO: 0.59 10*3/MM3 (ref 0.1–0.9)
MONOCYTES NFR BLD AUTO: 7.4 % (ref 5–12)
NEUTROPHILS NFR BLD AUTO: 6.09 10*3/MM3 (ref 1.7–7)
NEUTROPHILS NFR BLD AUTO: 76.4 % (ref 42.7–76)
NRBC BLD AUTO-RTO: 0 /100 WBC (ref 0–0.2)
PLATELET # BLD AUTO: 174 10*3/MM3 (ref 140–450)
PMV BLD AUTO: 9.3 FL (ref 6–12)
POTASSIUM SERPL-SCNC: 5 MMOL/L (ref 3.5–5.2)
PROT SERPL-MCNC: 7.8 G/DL (ref 6–8.5)
RBC # BLD AUTO: 3.8 10*6/MM3 (ref 4.14–5.8)
SODIUM SERPL-SCNC: 140 MMOL/L (ref 136–145)
T4 FREE SERPL-MCNC: 1.26 NG/DL (ref 0.93–1.7)
TSH SERPL DL<=0.05 MIU/L-ACNC: 2.37 UIU/ML (ref 0.27–4.2)
WBC NRBC COR # BLD AUTO: 7.97 10*3/MM3 (ref 3.4–10.8)

## 2025-03-04 PROCEDURE — 25010000002 DEXAMETHASONE PER 1 MG: Performed by: INTERNAL MEDICINE

## 2025-03-04 PROCEDURE — 80053 COMPREHEN METABOLIC PANEL: CPT

## 2025-03-04 PROCEDURE — 84439 ASSAY OF FREE THYROXINE: CPT

## 2025-03-04 PROCEDURE — 96413 CHEMO IV INFUSION 1 HR: CPT

## 2025-03-04 PROCEDURE — 25810000003 SODIUM CHLORIDE 0.9 % SOLUTION: Performed by: INTERNAL MEDICINE

## 2025-03-04 PROCEDURE — 25810000003 SODIUM CHLORIDE 0.9 % SOLUTION 250 ML FLEX CONT: Performed by: INTERNAL MEDICINE

## 2025-03-04 PROCEDURE — 25010000002 DURVALUMAB 50 MG/ML SOLUTION 2.4 ML VIAL: Performed by: INTERNAL MEDICINE

## 2025-03-04 PROCEDURE — 83735 ASSAY OF MAGNESIUM: CPT

## 2025-03-04 PROCEDURE — 25010000002 HEPARIN LOCK FLUSH PER 10 UNITS: Performed by: INTERNAL MEDICINE

## 2025-03-04 PROCEDURE — 25010000002 DURVALUMAB 50 MG/ML SOLUTION 10 ML VIAL: Performed by: INTERNAL MEDICINE

## 2025-03-04 PROCEDURE — 25010000002 DIPHENHYDRAMINE PER 50 MG: Performed by: INTERNAL MEDICINE

## 2025-03-04 PROCEDURE — 84443 ASSAY THYROID STIM HORMONE: CPT

## 2025-03-04 PROCEDURE — 82378 CARCINOEMBRYONIC ANTIGEN: CPT

## 2025-03-04 PROCEDURE — 96375 TX/PRO/DX INJ NEW DRUG ADDON: CPT

## 2025-03-04 PROCEDURE — 36415 COLL VENOUS BLD VENIPUNCTURE: CPT

## 2025-03-04 PROCEDURE — 85025 COMPLETE CBC W/AUTO DIFF WBC: CPT

## 2025-03-04 RX ORDER — HEPARIN SODIUM (PORCINE) LOCK FLUSH IV SOLN 100 UNIT/ML 100 UNIT/ML
500 SOLUTION INTRAVENOUS AS NEEDED
OUTPATIENT
Start: 2025-03-04

## 2025-03-04 RX ORDER — FAMOTIDINE 10 MG/ML
10 INJECTION, SOLUTION INTRAVENOUS ONCE
Status: COMPLETED | OUTPATIENT
Start: 2025-03-04 | End: 2025-03-04

## 2025-03-04 RX ORDER — HEPARIN SODIUM (PORCINE) LOCK FLUSH IV SOLN 100 UNIT/ML 100 UNIT/ML
500 SOLUTION INTRAVENOUS AS NEEDED
Status: DISCONTINUED | OUTPATIENT
Start: 2025-03-04 | End: 2025-03-04 | Stop reason: HOSPADM

## 2025-03-04 RX ORDER — DIPHENHYDRAMINE HYDROCHLORIDE 50 MG/ML
25 INJECTION INTRAMUSCULAR; INTRAVENOUS ONCE
Status: COMPLETED | OUTPATIENT
Start: 2025-03-04 | End: 2025-03-04

## 2025-03-04 RX ORDER — SODIUM CHLORIDE 9 MG/ML
20 INJECTION, SOLUTION INTRAVENOUS ONCE
Status: CANCELLED | OUTPATIENT
Start: 2025-03-04

## 2025-03-04 RX ORDER — DEXAMETHASONE SODIUM PHOSPHATE 10 MG/ML
10 INJECTION, SOLUTION INTRA-ARTICULAR; INTRALESIONAL; INTRAMUSCULAR; INTRAVENOUS; SOFT TISSUE ONCE
Status: COMPLETED | OUTPATIENT
Start: 2025-03-04 | End: 2025-03-04

## 2025-03-04 RX ORDER — DIPHENHYDRAMINE HYDROCHLORIDE 50 MG/ML
25 INJECTION INTRAMUSCULAR; INTRAVENOUS ONCE
Status: CANCELLED
Start: 2025-03-04 | End: 2025-03-04

## 2025-03-04 RX ORDER — FAMOTIDINE 10 MG/ML
10 INJECTION, SOLUTION INTRAVENOUS ONCE
Status: CANCELLED
Start: 2025-03-04 | End: 2025-03-04

## 2025-03-04 RX ORDER — SODIUM CHLORIDE 0.9 % (FLUSH) 0.9 %
10 SYRINGE (ML) INJECTION AS NEEDED
Status: DISCONTINUED | OUTPATIENT
Start: 2025-03-04 | End: 2025-03-04 | Stop reason: HOSPADM

## 2025-03-04 RX ORDER — SODIUM CHLORIDE 9 MG/ML
20 INJECTION, SOLUTION INTRAVENOUS ONCE
Status: COMPLETED | OUTPATIENT
Start: 2025-03-04 | End: 2025-03-04

## 2025-03-04 RX ORDER — SODIUM CHLORIDE 0.9 % (FLUSH) 0.9 %
10 SYRINGE (ML) INJECTION AS NEEDED
OUTPATIENT
Start: 2025-03-04

## 2025-03-04 RX ADMIN — DIPHENHYDRAMINE HYDROCHLORIDE 25 MG: 50 INJECTION, SOLUTION INTRAMUSCULAR; INTRAVENOUS at 13:09

## 2025-03-04 RX ADMIN — SODIUM CHLORIDE 740 MG: 9 INJECTION, SOLUTION INTRAVENOUS at 13:26

## 2025-03-04 RX ADMIN — Medication 10 ML: at 14:48

## 2025-03-04 RX ADMIN — DEXAMETHASONE SODIUM PHOSPHATE 10 MG: 10 INJECTION INTRAMUSCULAR; INTRAVENOUS at 13:12

## 2025-03-04 RX ADMIN — HEPARIN 500 UNITS: 100 SYRINGE at 14:48

## 2025-03-04 RX ADMIN — SODIUM CHLORIDE 20 ML/HR: 9 INJECTION, SOLUTION INTRAVENOUS at 13:10

## 2025-03-04 RX ADMIN — FAMOTIDINE 10 MG: 10 INJECTION INTRAVENOUS at 13:14

## 2025-03-04 NOTE — PROGRESS NOTES
Subjective     PATIENT NAME:  Boogie Artis  YOB: 1950  PATIENTS AGE:  74 y.o.  PATIENTS SEX:  male  DATE OF SERVICE:  03/04/2025  PROVIDER:  [facility]      ____________________PATIENT EDUCATION____________________    PATIENT EDUCATION:  Today I met with the patient Boogie Artis and his wife, to discuss the therapy regimen Imfinzi, recommended for treatment of his disease Lung Cancer.  The patient was given explanation of treatment premed side effects including office policy that prohibits patients to drive if sedating medications are administered, MD explanation given regarding benefits, side effects, toxicities and goals of treatment.  The patient received a Chemotherapy/Biotherapy Plan Summary including diagnosis and specific treatment plan.    SIDE EFFECTS:  Common side effects were discussed with the patient and/or significant other.  Discussion included hair loss/discoloration, anemia/fatigue, infection/chills/fever, appetite, bleeding risk/precautions, constipation, diarrhea, mouth sores, taste alteration, loss of appetite,nausea/vomiting, peripheral neuropathy, skin/nail changes, rash, muscle aches/weakness, photosensitivity, weight gain/loss, hearing loss, dizziness,sterility, high blood pressure, heart damage, liver damage, lung damage, kidney damage, DVT/PE risk, fluid retention, pleural/pericardial effusion, somnolence, electrolyte/LFT imbalance, vein exercises and/or the possible need for vascular access/port placement.  The patient was advice that although uncommon, leakage of an infused medication from the vein or venous access device (port) may lead to skin breakdown and/or other tissue damage.  The patient was advised that he/she may have pain, bleeding, and/or bruising from the insertion of a needle in their vein or venous access device (port).  The patient was further advised that, in spite of proper technique, infection with redness and irritation may rarely occur  at the site where the needle was inserted.  The patient was advised that if complications occur, additional medical treatment is available.    Discussion also included side effects specific to drugs in the treatment plan, specifically: Imfinzi, rash, itching, blisters, redness, weakness, fatigue, loss of appetite, tiredness, nausea,     Reproductive risks were discussed, including appropriate use of birth control and protection during sexual relations.    A total of  45  minutes were spent with the patient, with 100% of time spent in education and counseling.

## 2025-03-05 NOTE — PROGRESS NOTES
[facility]  Radiation Oncology Clinic   Zacarias Gonzales MD, FACR  Adma Clancy SABAS  _______________________________________________  [facility]  Department of Radiation Oncology  [address] [place] [id number]  Office: [phone]  Fax: [phone]    DATE: 03/10/2025  PATIENT: Boogie Artis  1950                         MEDICAL RECORD #: [id number]    1. Squamous cell carcinoma of left lung    2. History of primary malignant neoplasm of right lung    3. S/P lobectomy of lung    4. History of radiation therapy    5. Former smoker                                           REASON FOR VISIT:    Chief Complaint   Patient presents with    Lung Cancer     Reason for Follow up Visit:  Boogie Artis is a very pleasant 74 y.o. patient that completed radiation to the lung and returns to the clinic today for inital follow up exam.     History of Present Illness:    01/16/2023 - CT Chest - [place]:  Spiculated perihilar left upper lobe pulmonary nodule measures 2.7 cm. This is concerning for primary neoplasm. PET/CT or bronch recommended for further evaluation.   0.8 cm noncalcified pulmonary nodule in the right upper lobe. This is at the lower limits of normal for PET/CT detection. Benign granulomatous disease, metastatic disease, or 2nd primary pulmonary malignancy could be considered.   No enlarged mediastinal or hilar lymph nodes    01/17/2023 - Pulmonary Function Tests - [place]:  Moderate obstructive lung disease    01/26/2023 - Bronchoscopy with biopsy per :  RUL, lower lobe, middle lobe were negative. The left upper lobe, no lesion, lingula no lesion seen.  SAMINA washing:  Negative for malignant cells; reactive bronchial cells, macrophages, and inflammation  SAMINA brushing:  Negative for malignant cells; markedly reactive bronchial cells, macrophages, and inflammation    02/09/2023 - PET Scan:  2.8 cm hypermetabolic central LEFT  upper lobe pulmonary nodule. This likely represents a primary lung neoplasm.  0.8 cm hypermetabolic pulmonary nodule in the RIGHT upper lobe. This is highly suspicious for either a contralateral pulmonary metastasis or second primary lung neoplasm.   No hypermetabolic thoracic lymph nodes. No evidence of hypermetabolic metastatic disease in the neck, abdomen, or pelvis.  1.5 cm hypermetabolic nodule in the RIGHT anterior rectum, highly concerning for underlying rectal polyp. Recommend correlation with colonoscopy/sigmoidoscopy.    02/27/2023 - Colonoscopy:  Rectal polyp, excision:  Villous adenoma, negative for high-grade glandular dysplasia or malignancy.  Colon polyp at 20 cm, biopsy:  Fragments of serrated polyp, favor sessile serrated lesion/polyp.  Colon polyp at 10 cm, biopsy:  Fragments of serrated polyp, favor sessile serrated lesion/polyp.    03/08/2023 - CT Chest without contrast:  Pulmonary emphysema with LEFT hilar/upper lobe lung mass measuring approximately 4 cm in greatest dimension. There is mild nodular airspace disease laterally to this lesion which may represent postobstructive atelectasis or infiltrate.   10 mm irregular nodule within the RIGHT lung apex (series 2, image 101). This is concerning for metastatic lesion. Additional 4 mm noncalcified nodule at the RIGHT lung apex (series 2, image 85).   Evidence of prior granulomatous disease.     03/09/2023 - Bronchoscopy/EBUS per :  Left upper lobe, EBUS (ThinPrep and cell block section):   Squamous cell carcinoma.   Bronchial washing (ThinPrep and cell block section):   No malignant cells identified.   Benign bronchial epithelial cells, and alveolar macrophages.     04/19/2023 -  Left upper lobectomy and lymph node excision per Dr.Robert Irwin:   Lymph node, level 9 lymph node biopsy: Benign fibroadipose tissue.   Lymph node, level 10 posterior hilar lymph node biopsy: 1 lymph node, negative for evidence of malignancy.   Lymph node,  level 6 lymph node biopsy: 2 lymph nodes, negative for evidence of malignancy.   Lymph node, level 11 interlobar lymph node biopsy: 1 lymph node, negative for evidence of malignancy.   Lymph node, level 10 anterior hilar lymph node biopsy: 2 lymph nodes, negative for evidence of malignancy.   Lymph node, level 12 lobar lymph node biopsy: 1 lymph node, negative for evidence of malignancy.   Lung, left upper lobectomy:   Invasive moderately differentiated squamous cell carcinoma.   Invasive carcinoma measures 2.5 cm in greatest dimension grossly.   Invasive carcinoma extends to within 0.2 cm of the nearest bronchial surgical excision margin.   Squamous metaplasia identified at bronchial surgical excision margin.   Emphysematous changes identified involving the nonneoplastic lung parenchyma.   2 peribronchial lymph nodes, negative for evidence of malignancy.   AJCC STAGE:  pT1c, pN0, pMx     04/25/2023 -  CT Chest without contrast:  Interval placement of large bore chest tube with decrease in previously described large left hydropneumothorax.Residual hydropneumothorax present with air dissecting through anterior chest wall into subcutaneous soft tissues, unchanged.   Interval resolution of previously described debris within left bronchus.   Unchanged right apical 1.0 cm nodule.     07/24/2023 - Appointment with :  Left upper lobe SCC [id number], stage I,Jan 2023  -1/16/23 CT chest ([facility]): Spiculated perihilar left upper lobe pulmonary nodule measures 2.7cm. This is concerning for primary neoplasm. PET/CT or bronchoscopy is recommended to further evaluate. 0.8cm noncalcified pulmonary nodule in the right upper lobe. This is at the lower limits of normal for Pet/CT detection. Benign granulomatous disease, metastatic disease, or 2nd primary pulmonary malignancy could be considered. No enlarged mediastinal or hilar lymph nodes.  -1/26/23 Lung, left upper lobe, washing: Negative for malignant cells. Reactive  bronchial cells, macrophages, and inflammation. Lung, left upper lobe, brushing: Negative for malignant cells. Markedly reactive bronchial cells, macrophages, and inflammation.  -2/9/23 PET scan ([facility]): 2.8 cm hypermetabolic central LEFT upper lobe pulmonary nodule. This likely represents a primary lung neoplasm. 0.8 cm hypermetabolic pulmonary nodule in the RIGHT upper lobe. This is highly suspicious for either a contralateral pulmonary metastasis or second primary lung neoplasm. No hypermetabolic thoracic lymph nodes. No evidence of hypermetabolic metastatic disease in the neck, abdomen, or pelvis. 1.5 cm hypermetabolic nodule in the RIGHT anterior rectum, highly concerning for underlying rectal polyp. Recommend correlation with colonoscopy/sigmoidoscopy.   4/19/23 Lung, left upper lobectomy: Invasive moderately differentiated squamous cell carcinoma. Invasive carcinoma measures 2.5 cm in greatest dimension grossly. Invasive carcinoma extends to within 0.2 cm of the nearest bronchial surgical excision margin. Squamous metaplasia identified at bronchial surgical excision margin. Emphysematous changes identified involving the nonneoplastic lung parenchyma. 2 peribronchial lymph nodes, negative for evidence of malignancy.Lymph node, level 9 lymph node biopsy: Benign fibroadipose tissue. Lymph node, level 10 posterior hilar lymph node biopsy: 1 lymph node, negative for evidence of malignancy. Lymph node, level 6 lymph node biopsy: 2 lymph nodes, negative for evidence of malignancy. Lymph node, level 11 interlobar lymph node biopsy: 1 lymph node, negative for evidence of malignancy. Lymph node, level 10 anterior hilar lymph node biopsy: 2 lymph nodes, negative for evidence of malignancy. Lymph node, level 12 lobar lymph node biopsy: 1 lymph node, negative for evidence of malignancy. [id number], stage I  Plan:  -Repeat CT chest Nov 2023  RUL subcentimeter nodule (PET+)  -2/9/23 PET scan ([facility]): 2.8 cm hypermetabolic central  LEFT upper lobe pulmonary nodule. This likely represents a primary lung neoplasm. 0.8 cm hypermetabolic pulmonary nodule in the RIGHT upper lobe. This is highly suspicious for either a contralateral pulmonary metastasis or second primary lung neoplasm. No hypermetabolic thoracic lymph nodes. No evidence of hypermetabolic metastatic disease in the neck, abdomen, or pelvis. 1.5 cm hypermetabolic nodule in the RIGHT anterior rectum, highly concerning for underlying rectal polyp. Recommend correlation with colonoscopy/sigmoidoscopy.   -Referral Dr Ortiz for consideration SBRT RUL hypermetabolic nodule.  PLAN:  RTC with MD 2 months in [place] office  CBC, CMP, B12, Folate, LDH, Haptoglobin, iron profile, Ferritin, retic  Continue follow-up with Dr.Robert Irwin/CardioThoracic Surgery  Continue to follow up with Dr. Rowe  Iron Sulfate 325 mg p.o. twice daily  Follow Up:  Return in 2 months (on 9/24/2023) for Appointment with Dr. Vaughan in [place].  Refer to Dr Ortiz     08/09/2023 - Appointment with :  will order PET scan, they will call you  Return in 2 weeks for further discussion    08/14/2023 - PET Scan:  Abnormal PET/CT, there is increased size of a hypermetabolic pulmonary nodule in the right upper lobe lung apex that measures 13 mm, compared with 8 mm on the previous PET/CT. This has a maximum SUV of 8.1. This is concerning for active neoplasm.  Interval resection of the left upper lobe with posttreatment changes along the medial margin of the upper mediastinum, including mild infiltration of the mediastinal fat planes.  There is a subcutaneous nodule with surrounding fatty infiltration over the low back at the level of the upper sacrum which demonstrates hypermetabolism. This could be inflammatory, less likely a metastatic nodule. This measures 21 mm, follow-up ultrasound is recommended to determine if this is solid. Ultrasound-guided biopsy could be performed if indicated.  Interval resolution of the  hypermetabolic nodule associated with the right rectum, however there is a new hypermetabolic nodule that maps to the right prostate gland. Correlation with PSA values is recommended.    08/16/2023 - Consult with :  I have recommended to treat the right lung nodule with SBRT, I anticipate a course over 4-5 fractions, final course pending.   He does have a visible insect bite on his low back and I do believe this correlates with the PET findings of activity on the upper sacrum. Will order PSA for prostate gland findings. He has not had one drawn in some time.   Plan:  Plan on 4-5 pinpoint treatments to the lung nodule.   We will call you when to start treatments.   PSA today    08/18/2023 - Documentation per :  I spoke on the phone with this patient's wife Sarah and discussed his PSA of 2.9. This is considered a normal level, however on recent PET scan imaging for a lung nodule workup, a nodule within the prostate was noted.   I will refer him to urology for further management recommendations.    09/12/2023 - 09/27/2023 - Completed radiation course:  Received 5000 cGy in 4 fractions to the right upper lobe of the lung.    11/20/2023 - MRI Pelvis with and without contrast:  No suspicious lesions in the prostate (PI-RADS 3 or greater).     11/29/2023 - Appointment with :  I personally reviewed MRI today.    His MRI is negative for any suspicious lesions.    He has a volume of 34 cc.    I believe his hypermetabolic activity on PET scan was likely inflammatory.  I do not think he requires a biopsy based on his negative MRI and PSA level.    He will follow-up as needed.     12/20/2023 - CT Chest without contrast:  Decreased size of 6 mm spiculated right upper lobe pulmonary nodule (previously 13 mm). This nodule was hypermetabolic on prior PET/CT and highly suspicious for primary lung neoplasm.  Postoperative change of left upper lobectomy. No definite change/increase in soft tissue thickening  along the lobectomy margin and mediastinum. Recommend continued imaging surveillance.  No evidence of disease progression in the chest. No thoracic lymphadenopathy.    12/27/2023 - Appointment with :  Return in 3 months with a CT chest before.     03/19/2024 - CT chest without contrast:  1.1 cm irregular right upper lobe nodule, highly suspicious for malignancy.  5 mm left upper lobe nodule is indeterminate but does raise the suspicion for malignancy.  Stable size of the previously irradiated right apex nodule. Minimally increased adjacent subpleural parenchymal abnormality, likely related to post radiation changes.  Left upper lobectomy with stable postsurgical margins.  No mediastinal lymphadenopathy.    03/27/2024 - Consult with :  Will refer to pulmonology for bronchoscopy considerations of the new right upper lung nodule. He will return in 3 weeks for further recommendations.  I have instructed him to continue to see the other health care providers as per their scheduling.  Plan:  Referral to pulmonology for biopsy of new right lung nodule  Return in 3 weeks    04/16/2024 - Appointment with :  For wheezing, add bronchodilator. 100 mcg trelegy sample given, we reviewed PFT showing severe obstruction.  Likely moderate to severe copd with hx smoking  For lung nodule, will plan ion bronchoscopy to better assess rul nodule  Ask anesthesia to take particular care with remaining teeth  We demonstrated proper technique for use of ellipta device  Follow Up   Return in about 4 weeks (around 5/14/2024).    04/22/2024 - CT Chest without contrast:  RIGHT upper lobe 17 x 11 mm nodule.  LEFT upper lobe 6 x 5 mm nodule.  No pneumothorax or pleural effusion.    04/29/2024 - Bronchoscopy with biopsy per :  Lung, right upper lobe, Ion fine-needle aspiration, smears (2), ThinPrep preparation (1) and cell block:   Positive for malignant cells, consistent with moderately differentiated squamous cell  carcinoma.  Comment: Immunohistochemical stain for p40 is positive in the neoplastic cells, supporting the above diagnostic impression.The control stained appropriately.  Bronchial washings, ThinPrep preparation (1) and cell block:   Positive for malignant cells, consistent with moderately differentiated squamous cell carcinoma.  Lung, right upper lobe, bronchoalveolar lavage, ThinPrep preparation (1):   Positive for malignant cells, consistent with moderately to poorly differentiated squamous cell carcinoma.  AJCC stage: pTX pNX    04/29/2024 - Documentation per :  Dr. Nicola Monsivais notified me that patient cytology was positive in his lung.  He is scheduled to return to our office for treatment recommendations.  Also, it was incidentally noted by anesthesia during his procedure that he has a lesion in the oral cavity that needs additional evaluation and may potentially be another malignancy.    05/06/2024 - Appointment with :  This patient does have biopsy-proven squamous cell carcinoma of the right upper lobe.  We will proceed with SBRT radiotherapy to this  lesion.  He also has a lesion in the left lung which is nondiagnostic and we will continue to follow that lesion with serial radiographs  Plan:  We will call you when to start radiation treatment in approximately 2 weeks  Return in about 2 weeks (around 5/20/2024) for the first Radiation Treatment.    05/28/2024 - 06/05/2024 - Completed radiation course:  Received 5000 cGy in 5 fractions to the right upper lobe of the lung via Stereotactic Radiation Therapy - SRT.     09/03/2024 - CT chest without contrast:  Lung nodules seen previously have resolved.  There are indeterminate soft tissue nodules within the LEFT bronchus.  Consider PET/CT correlation.    09/09/2024 - Appointment with :  We discussed the new left bronchus findings on imaging. I will notify pulmonology for review of the imaging and further recommendations. We will  continue routine follow-up/surveillance as discussed in 3 months with follow up CT scan before visit and I have instructed him to continue to see the other health care providers as per their scheduling.  Plan:  will discuss your scans with pulmonology  return in 3 months unless you need to be seen sooner.    09/11/2024 - Documentation per Aston Medina - radiation oncology:  I reviewed the recent CT chest imaging with Dr. Ortiz and discussed this with Dr. Monsivais and Erma Saucedo in pulmonology.   I will order a PET scan for further evaluation of the left bronchus finding and will get him in for an appointment to discuss further options with Dr. Ortiz.    09/16/2024 - PET Scan:  LEFT lower lobe pneumonia.  Neoplastic LEFT upper lobe lung nodules adjacent to the LEFT hilum.  Neoplastic nodule within the LEFT bronchus.  Small focus of indeterminate FDG uptake in the posterior midline neck between the C5 and C6 spinous processes.    10/08/2024 - Appointment with Mata Simon MD - [facility]:  Plan for bronchoscopy with APC and tumor debulking, EBUS/TBNA, bal next week.    10/17/2024 - Bronchus, left endobronchial mass, endobronchial biopsy:  Fragments of squamous cell carcinoma.    10/29/2024 - Appointment with :  He has stage IIIa (T3 N1 M0) squamous cell carcinoma left upper lobe of the lung.  I have reviewed the chart and other physicians records. Following this discussion and in consideration of the diagnostic data/evaluation of the patient, I recommended a course of external beam radiation, likely delivered concurrently with systemic therapy under the medical oncology service, I anticipate 6600 cGy over 33 treatments, final course pending.   Referral to /[facility] for chemotherapy. Continue ongoing management per primary care physician and other specialists.   Plan:  Plan on 33 radiation treatments M-F for about 30 minutes daily  Referral to chemotherapy doctor, they will call  you  Return to Dr. Ortiz for simulation markings in 7-10 days    11/26/2024 - Consult with :  Plan:  Carefully reviewed available diagnostic information as outlined above.   Note imaging including PET scan 9/16/2024, as well as bronchoscopy/biopsy findings, 10/17/24 (above). 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  Draw CBC w diff, iron, fe sat, ferritin, B12, folate, CMP, CEA  Schedule brain MRI at [facility]  Teaching sheets for Taxol and carboplatin. Potential toxicities of same discussed (to included but not limited to: Myelosuppression, alopecia, neuropathy, arthralgias/myalgias, nausea/vomiting, hypersensitivity reactions, diarrhea, mucositis, risks for infection, abnormal liver enzymes, asthenia, fever, low blood pressure, bleeding, renal insufficiency, edema, bradycardia, anaphylaxis, arrhythmias, thromboembolism, myocardial infarction, pulmonary toxicity, gastrointestinal (GI) obstruction/perforation, paralytic ileus, ischemic colitis, pancreatitis, severe skin reactions; electrolyte disorders, ototoxicity, nephrotoxicity, vision loss). Questions answered. He will agree to a trial of therapy.  The patient and his family acknowledge that given his history of recurrent lung malignancy, increased risk of recurrence in spite of (definitive) therapy remains elevated.  Review NCCN guidelines non-small cell lung cancer, stage III (unresectable).  Chemotherapy regimens used.  Radiation therapy (concurrent regimens usually radiation therapy): Paclitaxel 45 to 50 mg/m² weekly; carboplatin AUC, concurrent thoracic RT +/- additional 2 cycles every 21 days of paclitaxel 200 mg/m² and carboplatin AUC 6-followed by consolidation therapy for unresectable stage III NSCLC, PS 0-1 and no disease progression after 2 or more cycles of definitive chemoradiation: Durvalumab 10 mg/kg IV every 2 weeks for up to 12 months (category 1).  Anticipate chemotherapy beginning -we will coordinate with radiation  therapy--week 1 to week 6:  Taxol 45 mg/m2 per administration guidelines weekly x6 weeks with radiation.   Carboplatin AUC 2 per administration guidelines weekly x6 weeks with radiation.   Follow-up with radiation oncology Re: Definitive concurrent radiation  Appoint to general surgery Re: Mediport placement.   Continue currently identified medications.   Return to the office in 5-6 weeks with pre-office CBC with differential, CMP, and CEA.   Importance of Smoking Cessation discussed with patient and informed patient additional information will be on today's AVS. [facility]'s Flyer - Plan to Be Tobacco Free handout provided to patient     12/06/2024 - MRI Brain with and without contrast:  No evidence of intracranial metastasis. No acute signs of the ischemia, hemorrhage, or mass.  Moderate generalized volume loss. Nonenhancing hyperintense FLAIR signal changes favoring chronic small vessel ischemia.    12/09/2024 - 01/30/2025 - Completed radiation course:  Received 5940 cGy in 33 fractions to the left lung.     02/24/2025 - Appointment with :  Plan:  Review labs, 1/27/25 - 2/17/25 stable anemia, otherwise normal CBC, gluc 124 sodium 135, Mag 1.5 (2 gm IV mag) otherwise normal CMP, CEA p (prior: 3.46-4.4)  Chemo tolerance:  Doing well  Note port study, 2/3/25-The images demonstrate patency of the catheter, with satisfactory position, the tip is at the mid SVC. No fibrin sheath is identified at the tip of the catheter.  Prior careful review of available diagnostic information as outlined above.   Note imaging including PET scan 9/16/2024, as well as bronchoscopy/biopsy findings, 10/17/24 (above). 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  Review NCCN guidelines non-small cell lung cancer, stage III (unresectable).  Chemotherapy regimens used.  Radiation therapy (concurrent regimens usually radiation therapy): Paclitaxel 45 to 50 mg/m² weekly; carboplatin AUC, concurrent thoracic RT  +/- additional 2 cycles every 21 days of paclitaxel 200 mg/m² and carboplatin AUC 6-followed by consolidation therapy for unresectable stage III NSCLC, PS 0-1 and no disease progression after 2 or more cycles of definitive chemoradiation: Durvalumab 10 mg/kg IV every 2 weeks for up to 12 months (category 1).  Durvalumab.  Potential toxicities of same discussed (to included but not limited to: Myelosuppression, alopecia, neuropathy, arthralgias/myalgias, nausea/vomiting, hypersensitivity reactions, diarrhea, mucositis, risks for infection, abnormal liver enzymes, asthenia, fever, low blood pressure, bleeding, renal insufficiency, edema, bradycardia, anaphylaxis, arrhythmias, thromboembolism, myocardial infarction, pulmonary toxicity, gastrointestinal (GI) obstruction/perforation, paralytic ileus, ischemic colitis, pancreatitis, severe skin reactions; electrolyte disorders, ototoxicity, nephrotoxicity, vision loss). Questions answered. He agrees to a trial of therapy.   Schedule -C1, 2/3/25; C2, 3/17/25; C3, 3/31/25; C4, 4/14/25; C5, 4/28/25; C6, 5/12/25; C7, etc - durvalumab (plan: every 2 weeks x 24 cycles - 12 months) per administration guidelines - at [facility]   durvalumab 10 mg/kg (WT = 50 kg; TD = 500 mg)   Upon initiation of durvalumab:  TSH, FT4, CMP, Mg++ and CBC with differential weekly with Procrit 40,000 units subcutaneous every 2 weeks if Hgb less than 10 and Hct less than 30  Schedule CT chest, abdomen/pelvis with po/IV contrast after C6-5/19/25  Continue currently identified medications.   Return to office in 6 weeks.  Surveillance imaging every 6th cycle of durvalumab (C6; C12; C18, etc). Draw pre-office CBC with differential, CMP, and CEA.   Importance of Smoking Cessation discussed with patient and informed patient additional information will be on today's AVS. [facility]'s Flyer - Plan to Be Tobacco Free handout provided to patient     06/10/2025 - CT chest with contrast:    06/10/2025 - CT  "Abdomen/Pelvis with contrast:    History obtained from  PATIENT, FAMILY, and CHART    PAST MEDICAL HISTORY  Past Medical History:   Diagnosis Date    Bleeding ulcer     Cancer     lung cancer left    COPD (chronic obstructive pulmonary disease)     Diabetes mellitus     Elevated cholesterol     Essential tremor     GERD (gastroesophageal reflux disease)     History of MRSA infection 05/01/2024    Respiratory culture    History of seasonal allergies     History of transfusion     Hyperlipidemia     Hypertension     Kidney infection     Kidney stone     Lung cancer     Lung nodule     Migraines     Seizure     \"one neurologist said it was a type of seizure, one didn't think so\"    Sleep apnea     cpap    Vitamin D deficiency       PAST SURGICAL HISTORY  Past Surgical History:   Procedure Laterality Date    APPENDECTOMY      BRONCHOSCOPY  2023    BRONCHOSCOPY N/A 4/29/2024    Procedure: BRONCHOSCOPY WITH ENDOBRONCHIAL ULTRASOUND;  Surgeon: Nicola Monsivais MD;  Location:  [facility];  Service: Pulmonary;  Laterality: N/A;  pre Nodule of upper lobe of right lung  Dr. Alves    BRONCHOSCOPY N/A 10/17/2024    Procedure: BRONCHOSCOPY WITH APC & DEBULKING, cryotherapy, balloon dilation/tamponade;  Surgeon: Mata Simon MD;  Location:  [facility];  Service: Pulmonary;  Laterality: N/A;    BRONCHOSCOPY WITH ION ROBOTIC ASSIST N/A 4/29/2024    Procedure: BRONCHOSCOPY WITH ION ROBOT and BRONCHOSCOPY WITH ENDOBRONCHIAL ULTRASOUND;  Surgeon: Nicola Monsivais MD;  Location:  [facility];  Service: Robotics - Pulmonary;  Laterality: N/A;  pre  Nodule of upper lobe of right lung  post      BRONCHOSCOPY WITH ION ROBOTIC ASSIST  4/29/2024    Procedure: BRONCHOSCOPY NAVIGATION WITH ENDOBRONCHIAL ULTRASOUND AND ION ROBOT;  Surgeon: Nicola Monsivais MD;  Location:  [facility];  Service: Pulmonary;;    COLONOSCOPY  02/28/2023    HEMORRHOIDECTOMY      HERNIA REPAIR  1999    LUNG SURGERY Left 04/19/2023    VENOUS " ACCESS DEVICE (PORT) INSERTION N/A 2024    Procedure: Single Lumen Port-a-cath insertion with flouroscopy;  Surgeon: Lorrie Hanson MD;  Location: [facility];  Service: General;  Laterality: N/A;      FAMILY HISTORY  family history includes Alcohol abuse in his brother; Cancer in his brother, father, and paternal grandfather; Diabetes in his mother; Heart disease in his mother.    SOCIAL HISTORY  Social History     Tobacco Use    Smoking status: Former     Current packs/day: 0.00     Average packs/day: 2.0 packs/day for 57.2 years (114.4 ttl pk-yrs)     Types: Cigarettes     Start date:      Quit date: 3/17/2023     Years since quittin.9     Passive exposure: Past    Smokeless tobacco: Never   Vaping Use    Vaping status: Never Used   Substance Use Topics    Alcohol use: Not Currently    Drug use: Never     ALLERGIES  Iodides     MEDICATIONS    Current Outpatient Medications:     acetaminophen (Tylenol) 325 MG tablet, Take 3 tablets by mouth Every 8 (Eight) Hours. Take every 8 hours for 3 days then take prn as needed., Disp: , Rfl:     albuterol sulfate  (90 Base) MCG/ACT inhaler, Every 6 (Six) Hours., Disp: , Rfl:     cetirizine (zyrTEC) 10 MG tablet, Take 1 tablet by mouth Daily., Disp: , Rfl:     Cholecalciferol 125 MCG (5000 UT) tablet, Take 1 tablet by mouth Daily., Disp: , Rfl:     divalproex (DEPAKOTE ER) 250 MG 24 hr tablet, Take 1 tablet by mouth 2 (Two) Times a Day., Disp: , Rfl:     folic acid-vit B6-vit B12 (Folbee) 2.5-25-1 MG tablet tablet, Take 1 tablet by mouth Daily., Disp: 30 tablet, Rfl: 0    furosemide (LASIX) 20 MG tablet, TAKE 1 TABLET BY MOUTH ONCE DAILY Oral for 14 Days, Disp: , Rfl:     glimepiride (AMARYL) 4 MG tablet, Take 1 tablet by mouth Daily., Disp: , Rfl:     hydroCHLOROthiazide (HYDRODIURIL) 25 MG tablet, Take 1 tablet by mouth Daily., Disp: , Rfl:     lidocaine-prilocaine (EMLA) 2.5-2.5 % cream, Apply to port site 30 minutes before it is to be accessed and  cover with occlusive dressing., Disp: 30 g, Rfl: 1    lisinopril (PRINIVIL,ZESTRIL) 10 MG tablet, Take 1 tablet by mouth Daily., Disp: , Rfl:     loratadine (CLARITIN) 10 MG tablet, Take 1 tablet by mouth Daily., Disp: , Rfl:     metFORMIN (GLUCOPHAGE) 1000 MG tablet, Take 1 tablet by mouth 2 (Two) Times a Day With Meals., Disp: , Rfl:     metoprolol succinate XL (TOPROL-XL) 25 MG 24 hr tablet, Take 1 tablet by mouth Daily., Disp: , Rfl:     Omega-3 Fatty Acids (fish oil) 1000 MG capsule capsule, Take  by mouth Daily With Breakfast., Disp: , Rfl:     ondansetron (Zofran) 4 MG tablet, Take 1 tablet by mouth Every 8 (Eight) Hours As Needed for Nausea or Vomiting., Disp: 15 tablet, Rfl: 0    ondansetron (ZOFRAN) 8 MG tablet, Take 1 tablet by mouth Every 8 (Eight) Hours As Needed for Nausea or Vomiting., Disp: 30 tablet, Rfl: 3    pantoprazole (PROTONIX) 40 MG EC tablet, Take 1 tablet by mouth 2 (Two) Times a Day., Disp: , Rfl:     primidone (MYSOLINE) 50 MG tablet, Take 1 tablet by mouth 2 (Two) Times a Day. For essential tremors, Disp: , Rfl:     simvastatin (ZOCOR) 40 MG tablet, Take 1 tablet by mouth Every Night., Disp: , Rfl:     tiotropium bromide-olodaterol (Stiolto Respimat) 2.5-2.5 MCG/ACT aerosol solution inhaler, Inhale 2 puffs Daily., Disp: 12 g, Rfl: 3    Current outpatient and discharge medications have been reconciled for the patient.  Reviewed by: SABAS Burgess    The following portions of the patient's history were reviewed and updated as appropriate: allergies, current medications, past family history, past medical history, past social history, past surgical history and problem list.    REVIEW OF SYSTEMS  Review of Systems   Constitutional:  Positive for appetite change (decrease) and fatigue.   HENT:  Negative for trouble swallowing.    Eyes: Negative.    Respiratory:  Positive for shortness of breath (with exertion).    Cardiovascular: Negative.    Gastrointestinal: Negative.    Endocrine:  "Negative.    Genitourinary: Negative.    Musculoskeletal: Negative.    Skin: Negative.    Allergic/Immunologic: Negative.    Neurological: Negative.    Hematological: Negative.    Psychiatric/Behavioral: Negative.       PHYSICAL EXAM  VITAL SIGNS:   Vitals:    03/10/25 1457   BP: 132/56   Pulse: 74   SpO2: 93%  Comment: room air   Weight: 73.9 kg (163 lb)   Height: 172.7 cm (68\")   PainSc: 0-No pain     Physical Exam  Vitals reviewed.   Constitutional:       Appearance: Normal appearance.   HENT:      Head: Normocephalic.      Nose: Nose normal.   Eyes:      Pupils: Pupils are equal, round, and reactive to light.   Cardiovascular:      Rate and Rhythm: Normal rate and regular rhythm.      Pulses: Normal pulses.      Heart sounds: Normal heart sounds.   Pulmonary:      Effort: Pulmonary effort is normal. No respiratory distress.      Breath sounds: Normal breath sounds. No wheezing.   Abdominal:      General: Bowel sounds are normal.      Palpations: There is no mass.   Musculoskeletal:         General: Normal range of motion.      Cervical back: Normal range of motion and neck supple. No tenderness.   Lymphadenopathy:      Cervical: No cervical adenopathy.   Skin:     General: Skin is warm and dry.      Capillary Refill: Capillary refill takes less than 2 seconds.   Neurological:      General: No focal deficit present.      Mental Status: He is alert and oriented to person, place, and time.      Motor: No weakness.   Psychiatric:         Mood and Affect: Mood normal.         Behavior: Behavior normal.        Performance Status: ECOG (0) Fully active, able to carry on all predisease performance without restriction    Clinical Quality Measures  - Pain Documented by Standardized Tool, FPS Boogie Artis reports a pain score of 0. Given his pain assessment as noted, treatment options were discussed and the following options were decided upon as a follow-up plan to address the patient's pain:  No pain, no plan given " .  Pain Medications              acetaminophen (Tylenol) 325 MG tablet Take 3 tablets by mouth Every 8 (Eight) Hours. Take every 8 hours for 3 days then take prn as needed.    divalproex (DEPAKOTE ER) 250 MG 24 hr tablet Take 1 tablet by mouth 2 (Two) Times a Day.    primidone (MYSOLINE) 50 MG tablet Take 1 tablet by mouth 2 (Two) Times a Day. For essential tremors          - Body Mass Index Screening and Follow-Up Plan  BMI is within normal parameters. No other follow-up for BMI required.    - Tobacco Use: Screening and Cessation Intervention  Social History    Tobacco Use      Smoking status: Former        Packs/day: 0.00        Years: 2.0 packs/day for 57.2 years (114.4 ttl pk-yrs)        Types: Cigarettes        Start date:         Quit date: 3/17/2023        Years since quittin.9        Passive exposure: Past      Smokeless tobacco: Never    - Advanced Care Planning Advance Care Planning   ACP discussion was held with the patient during this visit. Patient does not have an advance directive, information provided.    - PHQ-2 Depression Screening  Little interest or pleasure in doing things? Not at all   Feeling down, depressed, or hopeless? Not at all   PHQ-2 Total Score 0     ASSESSMENT AND PLAN  1. Squamous cell carcinoma of left lung    2. History of primary malignant neoplasm of right lung    3. S/P lobectomy of lung    4. History of radiation therapy    5. Former smoker      No orders of the defined types were placed in this encounter.    RECOMMENDATIONS: Boogie Artis is status post completion of radiation therapy to the lung and presents to our clinic today for surveillance exam and to review imaging.  Diagnosed with a PET+ neoplasm of the lung, RUL. He completed 5000 cGy in 4 fractions to the right upper lobe of the lung on 2023.  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 He completed 5000 cGy  in 5 fractions to the right upper lobe of the lung on 2024.  Diagnosed with  stage IIIa (T3 N1 M0) squamous cell carcinoma left upper lobe of the lung. He completed 5940 cGy in 33 fractions to the left lung on 01/30/2025.    He remains quite fatigued but is otherwise doing well today. He continues to undergo immunotherapy per medical oncology. CT imaging has been scheduled on 06/10/2025. We will continue routine follow-up/surveillance as discussed in 3 months with follow up CT scan before visit and I have instructed him to continue to see the other health care providers as per their scheduling.    Patient Instructions   1) return in 3 months with a CT chest before.     Return in about 3 months (around 6/10/2025).    Time Spent: I spent 40 minutes caring for Boogie on this date of service. This time includes time spent by me in the following activities: preparing for the visit, reviewing tests, obtaining and/or reviewing a separately obtained history, performing a medically appropriate examination and/or evaluation, counseling and educating the patient/family/caregiver, ordering medications, tests, or procedures, referring and communicating with other health care professionals, documenting information in the medical record, independently interpreting results and communicating that information with the patient/family/caregiver, and care coordination.   Adam Clancy, APRN  03/10/2025

## 2025-03-07 ENCOUNTER — HOSPITAL ENCOUNTER (OUTPATIENT)
Dept: RADIATION ONCOLOGY | Facility: HOSPITAL | Age: 75
Setting detail: RADIATION/ONCOLOGY SERIES
End: 2025-03-07
Payer: MEDICARE

## 2025-03-10 ENCOUNTER — OFFICE VISIT (OUTPATIENT)
Age: 75
End: 2025-03-10
Payer: MEDICARE

## 2025-03-10 VITALS
HEART RATE: 74 BPM | HEIGHT: 68 IN | BODY MASS INDEX: 24.71 KG/M2 | DIASTOLIC BLOOD PRESSURE: 56 MMHG | WEIGHT: 163 LBS | OXYGEN SATURATION: 93 % | SYSTOLIC BLOOD PRESSURE: 132 MMHG

## 2025-03-10 DIAGNOSIS — Z87.891 FORMER SMOKER: ICD-10-CM

## 2025-03-10 DIAGNOSIS — C34.92 SQUAMOUS CELL CARCINOMA OF LEFT LUNG: Primary | ICD-10-CM

## 2025-03-10 DIAGNOSIS — Z90.2 S/P LOBECTOMY OF LUNG: ICD-10-CM

## 2025-03-10 DIAGNOSIS — Z92.3 HISTORY OF RADIATION THERAPY: ICD-10-CM

## 2025-03-10 DIAGNOSIS — Z85.118 HISTORY OF PRIMARY MALIGNANT NEOPLASM OF RIGHT LUNG: ICD-10-CM

## 2025-03-10 PROCEDURE — G0463 HOSPITAL OUTPT CLINIC VISIT: HCPCS | Performed by: RADIOLOGY

## 2025-03-13 DIAGNOSIS — Z79.899 ENCOUNTER FOR LONG-TERM (CURRENT) USE OF HIGH-RISK MEDICATION: Primary | ICD-10-CM

## 2025-03-13 DIAGNOSIS — C34.92 SQUAMOUS CELL CARCINOMA OF LEFT LUNG: ICD-10-CM

## 2025-03-13 RX ORDER — SODIUM CHLORIDE 9 MG/ML
20 INJECTION, SOLUTION INTRAVENOUS ONCE
OUTPATIENT
Start: 2025-03-18

## 2025-03-13 RX ORDER — FAMOTIDINE 10 MG/ML
10 INJECTION, SOLUTION INTRAVENOUS ONCE
Start: 2025-03-18 | End: 2025-03-18

## 2025-03-13 RX ORDER — DIPHENHYDRAMINE HYDROCHLORIDE 50 MG/ML
25 INJECTION INTRAMUSCULAR; INTRAVENOUS ONCE
Start: 2025-03-18 | End: 2025-03-18

## 2025-03-18 ENCOUNTER — LAB (OUTPATIENT)
Dept: LAB | Facility: HOSPITAL | Age: 75
End: 2025-03-18
Payer: MEDICARE

## 2025-03-18 ENCOUNTER — INFUSION (OUTPATIENT)
Dept: ONCOLOGY | Facility: HOSPITAL | Age: 75
End: 2025-03-18
Payer: MEDICARE

## 2025-03-18 VITALS
TEMPERATURE: 98.4 F | HEART RATE: 77 BPM | HEIGHT: 68 IN | RESPIRATION RATE: 18 BRPM | DIASTOLIC BLOOD PRESSURE: 55 MMHG | SYSTOLIC BLOOD PRESSURE: 121 MMHG | WEIGHT: 161.6 LBS | OXYGEN SATURATION: 93 % | BODY MASS INDEX: 24.49 KG/M2

## 2025-03-18 DIAGNOSIS — Z79.899 ENCOUNTER FOR LONG-TERM (CURRENT) USE OF HIGH-RISK MEDICATION: ICD-10-CM

## 2025-03-18 DIAGNOSIS — Z95.828 PORT-A-CATH IN PLACE: ICD-10-CM

## 2025-03-18 DIAGNOSIS — C34.92 SQUAMOUS CELL CARCINOMA OF LEFT LUNG: Primary | ICD-10-CM

## 2025-03-18 DIAGNOSIS — C34.92 SQUAMOUS CELL CARCINOMA OF LEFT LUNG: ICD-10-CM

## 2025-03-18 LAB
ALBUMIN SERPL-MCNC: 4 G/DL (ref 3.5–5.2)
ALBUMIN/GLOB SERPL: 1.2 G/DL
ALP SERPL-CCNC: 87 U/L (ref 39–117)
ALT SERPL W P-5'-P-CCNC: 8 U/L (ref 1–41)
ANION GAP SERPL CALCULATED.3IONS-SCNC: 13 MMOL/L (ref 5–15)
AST SERPL-CCNC: 7 U/L (ref 1–40)
BASOPHILS # BLD AUTO: 0.02 10*3/MM3 (ref 0–0.2)
BASOPHILS NFR BLD AUTO: 0.3 % (ref 0–1.5)
BILIRUB SERPL-MCNC: 0.2 MG/DL (ref 0–1.2)
BUN SERPL-MCNC: 17 MG/DL (ref 8–23)
BUN/CREAT SERPL: 15.3 (ref 7–25)
CALCIUM SPEC-SCNC: 9.1 MG/DL (ref 8.6–10.5)
CHLORIDE SERPL-SCNC: 99 MMOL/L (ref 98–107)
CO2 SERPL-SCNC: 26 MMOL/L (ref 22–29)
CREAT SERPL-MCNC: 1.11 MG/DL (ref 0.76–1.27)
DEPRECATED RDW RBC AUTO: 65.8 FL (ref 37–54)
EGFRCR SERPLBLD CKD-EPI 2021: 69.7 ML/MIN/1.73
EOSINOPHIL # BLD AUTO: 0.2 10*3/MM3 (ref 0–0.4)
EOSINOPHIL NFR BLD AUTO: 3.3 % (ref 0.3–6.2)
ERYTHROCYTE [DISTWIDTH] IN BLOOD BY AUTOMATED COUNT: 18.6 % (ref 12.3–15.4)
GLOBULIN UR ELPH-MCNC: 3.3 GM/DL
GLUCOSE SERPL-MCNC: 214 MG/DL (ref 65–99)
HCT VFR BLD AUTO: 33.3 % (ref 37.5–51)
HGB BLD-MCNC: 10.7 G/DL (ref 13–17.7)
IMM GRANULOCYTES # BLD AUTO: 0.05 10*3/MM3 (ref 0–0.05)
IMM GRANULOCYTES NFR BLD AUTO: 0.8 % (ref 0–0.5)
LYMPHOCYTES # BLD AUTO: 0.67 10*3/MM3 (ref 0.7–3.1)
LYMPHOCYTES NFR BLD AUTO: 11.1 % (ref 19.6–45.3)
MAGNESIUM SERPL-MCNC: 1.9 MG/DL (ref 1.6–2.4)
MCH RBC QN AUTO: 31.1 PG (ref 26.6–33)
MCHC RBC AUTO-ENTMCNC: 32.1 G/DL (ref 31.5–35.7)
MCV RBC AUTO: 96.8 FL (ref 79–97)
MONOCYTES # BLD AUTO: 0.56 10*3/MM3 (ref 0.1–0.9)
MONOCYTES NFR BLD AUTO: 9.3 % (ref 5–12)
NEUTROPHILS NFR BLD AUTO: 4.53 10*3/MM3 (ref 1.7–7)
NEUTROPHILS NFR BLD AUTO: 75.2 % (ref 42.7–76)
NRBC BLD AUTO-RTO: 0 /100 WBC (ref 0–0.2)
PLATELET # BLD AUTO: 252 10*3/MM3 (ref 140–450)
PMV BLD AUTO: 9.1 FL (ref 6–12)
POTASSIUM SERPL-SCNC: 4.1 MMOL/L (ref 3.5–5.2)
PROT SERPL-MCNC: 7.3 G/DL (ref 6–8.5)
RBC # BLD AUTO: 3.44 10*6/MM3 (ref 4.14–5.8)
SODIUM SERPL-SCNC: 138 MMOL/L (ref 136–145)
WBC NRBC COR # BLD AUTO: 6.03 10*3/MM3 (ref 3.4–10.8)

## 2025-03-18 PROCEDURE — 25010000002 DEXAMETHASONE PER 1 MG: Performed by: INTERNAL MEDICINE

## 2025-03-18 PROCEDURE — 36415 COLL VENOUS BLD VENIPUNCTURE: CPT

## 2025-03-18 PROCEDURE — 83735 ASSAY OF MAGNESIUM: CPT

## 2025-03-18 PROCEDURE — 25010000002 HEPARIN LOCK FLUSH PER 10 UNITS: Performed by: INTERNAL MEDICINE

## 2025-03-18 PROCEDURE — 96375 TX/PRO/DX INJ NEW DRUG ADDON: CPT

## 2025-03-18 PROCEDURE — 25010000002 DURVALUMAB 50 MG/ML SOLUTION 10 ML VIAL: Performed by: INTERNAL MEDICINE

## 2025-03-18 PROCEDURE — 25010000002 DURVALUMAB 50 MG/ML SOLUTION 2.4 ML VIAL: Performed by: INTERNAL MEDICINE

## 2025-03-18 PROCEDURE — 80053 COMPREHEN METABOLIC PANEL: CPT

## 2025-03-18 PROCEDURE — 96413 CHEMO IV INFUSION 1 HR: CPT

## 2025-03-18 PROCEDURE — 25010000002 DIPHENHYDRAMINE PER 50 MG: Performed by: INTERNAL MEDICINE

## 2025-03-18 PROCEDURE — 25810000003 SODIUM CHLORIDE 0.9 % SOLUTION 250 ML FLEX CONT: Performed by: INTERNAL MEDICINE

## 2025-03-18 PROCEDURE — 85025 COMPLETE CBC W/AUTO DIFF WBC: CPT

## 2025-03-18 PROCEDURE — 25810000003 SODIUM CHLORIDE 0.9 % SOLUTION: Performed by: INTERNAL MEDICINE

## 2025-03-18 RX ORDER — DEXAMETHASONE SODIUM PHOSPHATE 10 MG/ML
10 INJECTION, SOLUTION INTRA-ARTICULAR; INTRALESIONAL; INTRAMUSCULAR; INTRAVENOUS; SOFT TISSUE ONCE
Status: COMPLETED | OUTPATIENT
Start: 2025-03-18 | End: 2025-03-18

## 2025-03-18 RX ORDER — HEPARIN SODIUM (PORCINE) LOCK FLUSH IV SOLN 100 UNIT/ML 100 UNIT/ML
500 SOLUTION INTRAVENOUS AS NEEDED
Status: DISCONTINUED | OUTPATIENT
Start: 2025-03-18 | End: 2025-03-18 | Stop reason: HOSPADM

## 2025-03-18 RX ORDER — SODIUM CHLORIDE 0.9 % (FLUSH) 0.9 %
10 SYRINGE (ML) INJECTION AS NEEDED
OUTPATIENT
Start: 2025-03-18

## 2025-03-18 RX ORDER — HEPARIN SODIUM (PORCINE) LOCK FLUSH IV SOLN 100 UNIT/ML 100 UNIT/ML
500 SOLUTION INTRAVENOUS AS NEEDED
OUTPATIENT
Start: 2025-03-18

## 2025-03-18 RX ORDER — SODIUM CHLORIDE 9 MG/ML
20 INJECTION, SOLUTION INTRAVENOUS ONCE
Status: COMPLETED | OUTPATIENT
Start: 2025-03-18 | End: 2025-03-18

## 2025-03-18 RX ORDER — FAMOTIDINE 10 MG/ML
10 INJECTION, SOLUTION INTRAVENOUS ONCE
Status: COMPLETED | OUTPATIENT
Start: 2025-03-18 | End: 2025-03-18

## 2025-03-18 RX ORDER — SODIUM CHLORIDE 0.9 % (FLUSH) 0.9 %
10 SYRINGE (ML) INJECTION AS NEEDED
Status: DISCONTINUED | OUTPATIENT
Start: 2025-03-18 | End: 2025-03-18 | Stop reason: HOSPADM

## 2025-03-18 RX ORDER — DIPHENHYDRAMINE HYDROCHLORIDE 50 MG/ML
25 INJECTION INTRAMUSCULAR; INTRAVENOUS ONCE
Status: COMPLETED | OUTPATIENT
Start: 2025-03-18 | End: 2025-03-18

## 2025-03-18 RX ADMIN — Medication 500 UNITS: at 12:11

## 2025-03-18 RX ADMIN — SODIUM CHLORIDE 740 MG: 9 INJECTION, SOLUTION INTRAVENOUS at 10:50

## 2025-03-18 RX ADMIN — SODIUM CHLORIDE 20 ML/HR: 9 INJECTION, SOLUTION INTRAVENOUS at 10:29

## 2025-03-18 RX ADMIN — DIPHENHYDRAMINE HYDROCHLORIDE 25 MG: 50 INJECTION, SOLUTION INTRAMUSCULAR; INTRAVENOUS at 10:31

## 2025-03-18 RX ADMIN — Medication 10 ML: at 12:11

## 2025-03-18 RX ADMIN — FAMOTIDINE 10 MG: 10 INJECTION INTRAVENOUS at 10:30

## 2025-03-18 RX ADMIN — DEXAMETHASONE SODIUM PHOSPHATE 10 MG: 10 INJECTION INTRAMUSCULAR; INTRAVENOUS at 10:30

## 2025-03-18 NOTE — PROGRESS NOTES
Message to Hem/Onc: Boogie Artis, [birth year], blood glucose is 214, gets dex as premed, okay for treatment?  He takes oral agents: Amaryl and Metformin. Please advise. Thanks.    Message from Hem/Onc office (Dr. Valdes/Carline) :  Steff del valle for tx- I will fax his cmp to his pcp for review.

## 2025-03-25 ENCOUNTER — OFFICE VISIT (OUTPATIENT)
Dept: PULMONOLOGY | Facility: CLINIC | Age: 75
End: 2025-03-25
Payer: MEDICARE

## 2025-03-25 VITALS
OXYGEN SATURATION: 93 % | BODY MASS INDEX: 24.55 KG/M2 | HEIGHT: 68 IN | SYSTOLIC BLOOD PRESSURE: 120 MMHG | WEIGHT: 162 LBS | DIASTOLIC BLOOD PRESSURE: 70 MMHG | HEART RATE: 82 BPM

## 2025-03-25 DIAGNOSIS — Z79.899 ENCOUNTER FOR LONG-TERM (CURRENT) USE OF HIGH-RISK MEDICATION: Primary | ICD-10-CM

## 2025-03-25 DIAGNOSIS — C34.92 SQUAMOUS CELL CARCINOMA OF LEFT LUNG: ICD-10-CM

## 2025-03-25 DIAGNOSIS — J44.9 STAGE 3 SEVERE COPD BY GOLD CLASSIFICATION: Primary | ICD-10-CM

## 2025-03-25 DIAGNOSIS — Z87.891 HISTORY OF CIGARETTE SMOKING: ICD-10-CM

## 2025-03-25 DIAGNOSIS — Z85.118 HISTORY OF PRIMARY NON-SMALL CELL CARCINOMA OF LEFT LUNG: ICD-10-CM

## 2025-03-25 DIAGNOSIS — C34.11 MALIGNANT NEOPLASM OF UPPER LOBE OF RIGHT LUNG: ICD-10-CM

## 2025-03-25 DIAGNOSIS — Z92.3 HISTORY OF RADIATION THERAPY: ICD-10-CM

## 2025-03-25 PROCEDURE — G2211 COMPLEX E/M VISIT ADD ON: HCPCS | Performed by: INTERNAL MEDICINE

## 2025-03-25 PROCEDURE — 99214 OFFICE O/P EST MOD 30 MIN: CPT | Performed by: INTERNAL MEDICINE

## 2025-03-25 RX ORDER — SODIUM CHLORIDE 9 MG/ML
20 INJECTION, SOLUTION INTRAVENOUS ONCE
OUTPATIENT
Start: 2025-04-01

## 2025-03-25 RX ORDER — FAMOTIDINE 10 MG/ML
10 INJECTION, SOLUTION INTRAVENOUS ONCE
Start: 2025-04-01 | End: 2025-04-01

## 2025-03-25 RX ORDER — DIPHENHYDRAMINE HYDROCHLORIDE 50 MG/ML
25 INJECTION, SOLUTION INTRAMUSCULAR; INTRAVENOUS ONCE
Start: 2025-04-01 | End: 2025-04-01

## 2025-03-25 NOTE — PROGRESS NOTES
Background:  Pt w moo lung ca resected 4/2023, rul lung ca, xrt 9/2023, new rul nodule 3/2024   Chief Complaint  COPD    Subjective    History of Present Illness     Boogie Artis is here for follow up with [facility].  History of Present Illness  He has been treated with durvalumab, with follow up imaging planned in May.  He has noticed some more difficulty breathing, gradually over time since he got the radiation and chemotherapy.  Trelegy has been too expensive.  Stiolto is less expensive but not as effective.  He is able to use the elevator at work.  He is worn out at work.      Tobacco Use: Medium Risk (3/25/2025)    Patient History     Smoking Tobacco Use: Former     Smokeless Tobacco Use: Never     Passive Exposure: Past      Current Outpatient Medications   Medication Instructions    acetaminophen (TYLENOL) 975 mg, Oral, Every 8 Hours, Take every 8 hours for 3 days then take prn as needed.    albuterol sulfate  (90 Base) MCG/ACT inhaler Every 6 Hours Scheduled    cetirizine (zyrTEC) 10 MG tablet 1 tablet, Daily    Cholecalciferol 125 MCG (5000 UT) tablet 1 tablet, Daily    divalproex (DEPAKOTE ER) 250 mg, 2 Times Daily    folic acid-vit B6-vit B12 (Folbee) 2.5-25-1 MG tablet tablet 1 tablet, Oral, Daily    furosemide (LASIX) 20 MG tablet TAKE 1 TABLET BY MOUTH ONCE DAILY Oral for 14 Days    glimepiride (AMARYL) 4 MG tablet 1 tablet, Daily    hydroCHLOROthiazide (HYDRODIURIL) 25 MG tablet 1 tablet, Daily    lidocaine-prilocaine (EMLA) 2.5-2.5 % cream Apply to port site 30 minutes before it is to be accessed and cover with occlusive dressing.    lisinopril (PRINIVIL,ZESTRIL) 10 mg, Daily    loratadine (CLARITIN) 10 MG tablet 1 tablet, Daily    metFORMIN (GLUCOPHAGE) 1,000 mg, 2 Times Daily With Meals    metoprolol succinate XL (TOPROL-XL) 25 mg, Daily    NON FORMULARY CPAP    Omega-3 Fatty Acids (fish oil) 1000 MG capsule capsule Daily With  "Breakfast    ondansetron (ZOFRAN) 8 mg, Oral, Every 8 Hours PRN    ondansetron (ZOFRAN) 4 mg, Oral, Every 8 Hours PRN    pantoprazole (PROTONIX) 40 MG EC tablet 1 tablet, 2 Times Daily    primidone (MYSOLINE) 50 MG tablet 1 tablet, 2 Times Daily    simvastatin (ZOCOR) 40 mg, Nightly    tiotropium bromide-olodaterol (Stiolto Respimat) 2.5-2.5 MCG/ACT aerosol solution inhaler 2 puffs, Inhalation, Daily - RT      Objective     Vital Signs:   /70   Pulse 82   Ht 172.7 cm (68\")   Wt 73.5 kg (162 lb)   SpO2 93% Comment: RA  BMI 24.63 kg/m²   Physical Exam  Constitutional:       General: He is not in acute distress.     Appearance: He is ill-appearing.   Eyes:      Extraocular Movements: Extraocular movements intact.   Pulmonary:      Breath sounds: Wheezing (faint) present.        Result Review  Data Reviewed:         PFT Values          4/16/2024    10:15 11/25/2024    10:30   Pre Drug PFT Results   FVC 68 72   FEV1 48 60   FEF 25-75% 24 51   FEV1/FVC 55.73 64                Assessment and Plan    Diagnoses and all orders for this visit:    1. Stage 3 severe COPD by GOLD classification (Primary)    2. Malignant neoplasm of upper lobe of right lung    3. History of radiation therapy    4. History of primary non-small cell carcinoma of left lung    5. History of cigarette smoking    Will give trials of breztri and also trelegy, 2 weeks of breztri and 4 weeks of 100 mcg trelegy, instead of stiolto.  Could try change to nebs if the above are unaffordable.  Repeat imaging sooner if no improvement with scheduled bronchodilators.  If above fails, try ohtuvayre.  Lung cancer tx could be contributing to the problem    Follow Up   Return in about 3 months (around 6/25/2025) for full PFT.  Patient was given instructions and counseling regarding his condition or for health maintenance advice. Please see specific information pulled into the AVS if appropriate.    Electronically signed by Nicola Monsivais MD, 3/25/2025, " 11:49 CDT

## 2025-04-01 ENCOUNTER — INFUSION (OUTPATIENT)
Dept: ONCOLOGY | Facility: HOSPITAL | Age: 75
End: 2025-04-01
Payer: MEDICARE

## 2025-04-01 ENCOUNTER — LAB (OUTPATIENT)
Dept: LAB | Facility: HOSPITAL | Age: 75
End: 2025-04-01
Payer: MEDICARE

## 2025-04-01 VITALS
OXYGEN SATURATION: 93 % | WEIGHT: 160 LBS | DIASTOLIC BLOOD PRESSURE: 55 MMHG | SYSTOLIC BLOOD PRESSURE: 112 MMHG | RESPIRATION RATE: 16 BRPM | HEART RATE: 83 BPM | TEMPERATURE: 97.3 F | BODY MASS INDEX: 24.25 KG/M2 | HEIGHT: 68 IN

## 2025-04-01 DIAGNOSIS — Z79.899 ENCOUNTER FOR LONG-TERM (CURRENT) USE OF HIGH-RISK MEDICATION: ICD-10-CM

## 2025-04-01 DIAGNOSIS — R94.6 ABNORMAL RESULTS OF THYROID FUNCTION STUDIES: ICD-10-CM

## 2025-04-01 DIAGNOSIS — C34.92 SQUAMOUS CELL CARCINOMA OF LEFT LUNG: ICD-10-CM

## 2025-04-01 DIAGNOSIS — Z95.828 PORT-A-CATH IN PLACE: ICD-10-CM

## 2025-04-01 DIAGNOSIS — Z79.899 ENCOUNTER FOR LONG-TERM (CURRENT) USE OF HIGH-RISK MEDICATION: Primary | ICD-10-CM

## 2025-04-01 DIAGNOSIS — C34.92 SQUAMOUS CELL CARCINOMA OF LEFT LUNG: Primary | ICD-10-CM

## 2025-04-01 LAB
ALBUMIN SERPL-MCNC: 4 G/DL (ref 3.5–5.2)
ALBUMIN/GLOB SERPL: 1.1 G/DL
ALP SERPL-CCNC: 76 U/L (ref 39–117)
ALT SERPL W P-5'-P-CCNC: 7 U/L (ref 1–41)
ANION GAP SERPL CALCULATED.3IONS-SCNC: 13 MMOL/L (ref 5–15)
AST SERPL-CCNC: 8 U/L (ref 1–40)
BASOPHILS # BLD AUTO: 0.04 10*3/MM3 (ref 0–0.2)
BASOPHILS NFR BLD AUTO: 0.5 % (ref 0–1.5)
BILIRUB SERPL-MCNC: 0.2 MG/DL (ref 0–1.2)
BUN SERPL-MCNC: 25 MG/DL (ref 8–23)
BUN/CREAT SERPL: 21.7 (ref 7–25)
CALCIUM SPEC-SCNC: 10 MG/DL (ref 8.6–10.5)
CEA SERPL-MCNC: 3.23 NG/ML
CHLORIDE SERPL-SCNC: 98 MMOL/L (ref 98–107)
CO2 SERPL-SCNC: 27 MMOL/L (ref 22–29)
CREAT SERPL-MCNC: 1.15 MG/DL (ref 0.76–1.27)
DEPRECATED RDW RBC AUTO: 63.8 FL (ref 37–54)
EGFRCR SERPLBLD CKD-EPI 2021: 66.8 ML/MIN/1.73
EOSINOPHIL # BLD AUTO: 0.28 10*3/MM3 (ref 0–0.4)
EOSINOPHIL NFR BLD AUTO: 3.8 % (ref 0.3–6.2)
ERYTHROCYTE [DISTWIDTH] IN BLOOD BY AUTOMATED COUNT: 17.4 % (ref 12.3–15.4)
GLOBULIN UR ELPH-MCNC: 3.5 GM/DL
GLUCOSE SERPL-MCNC: 142 MG/DL (ref 65–99)
HCT VFR BLD AUTO: 33.9 % (ref 37.5–51)
HGB BLD-MCNC: 10.6 G/DL (ref 13–17.7)
IMM GRANULOCYTES # BLD AUTO: 0.1 10*3/MM3 (ref 0–0.05)
IMM GRANULOCYTES NFR BLD AUTO: 1.4 % (ref 0–0.5)
LYMPHOCYTES # BLD AUTO: 0.67 10*3/MM3 (ref 0.7–3.1)
LYMPHOCYTES NFR BLD AUTO: 9.2 % (ref 19.6–45.3)
MAGNESIUM SERPL-MCNC: 1.6 MG/DL (ref 1.6–2.4)
MCH RBC QN AUTO: 30.8 PG (ref 26.6–33)
MCHC RBC AUTO-ENTMCNC: 31.3 G/DL (ref 31.5–35.7)
MCV RBC AUTO: 98.5 FL (ref 79–97)
MONOCYTES # BLD AUTO: 0.74 10*3/MM3 (ref 0.1–0.9)
MONOCYTES NFR BLD AUTO: 10.2 % (ref 5–12)
NEUTROPHILS NFR BLD AUTO: 5.45 10*3/MM3 (ref 1.7–7)
NEUTROPHILS NFR BLD AUTO: 74.9 % (ref 42.7–76)
NRBC BLD AUTO-RTO: 0 /100 WBC (ref 0–0.2)
PLATELET # BLD AUTO: 250 10*3/MM3 (ref 140–450)
PMV BLD AUTO: 9.6 FL (ref 6–12)
POTASSIUM SERPL-SCNC: 4.6 MMOL/L (ref 3.5–5.2)
PROT SERPL-MCNC: 7.5 G/DL (ref 6–8.5)
RBC # BLD AUTO: 3.44 10*6/MM3 (ref 4.14–5.8)
SODIUM SERPL-SCNC: 138 MMOL/L (ref 136–145)
T4 FREE SERPL-MCNC: 1.12 NG/DL (ref 0.93–1.7)
TSH SERPL DL<=0.05 MIU/L-ACNC: 1.89 UIU/ML (ref 0.27–4.2)
WBC NRBC COR # BLD AUTO: 7.28 10*3/MM3 (ref 3.4–10.8)

## 2025-04-01 PROCEDURE — 84439 ASSAY OF FREE THYROXINE: CPT

## 2025-04-01 PROCEDURE — 25010000002 DEXAMETHASONE PER 1 MG: Performed by: INTERNAL MEDICINE

## 2025-04-01 PROCEDURE — 84443 ASSAY THYROID STIM HORMONE: CPT

## 2025-04-01 PROCEDURE — 82378 CARCINOEMBRYONIC ANTIGEN: CPT

## 2025-04-01 PROCEDURE — 25010000002 DURVALUMAB 50 MG/ML SOLUTION 2.4 ML VIAL: Performed by: INTERNAL MEDICINE

## 2025-04-01 PROCEDURE — 85025 COMPLETE CBC W/AUTO DIFF WBC: CPT

## 2025-04-01 PROCEDURE — 96413 CHEMO IV INFUSION 1 HR: CPT

## 2025-04-01 PROCEDURE — 96375 TX/PRO/DX INJ NEW DRUG ADDON: CPT

## 2025-04-01 PROCEDURE — 25010000002 HEPARIN LOCK FLUSH PER 10 UNITS: Performed by: INTERNAL MEDICINE

## 2025-04-01 PROCEDURE — 25010000002 DIPHENHYDRAMINE PER 50 MG: Performed by: INTERNAL MEDICINE

## 2025-04-01 PROCEDURE — 25810000003 SODIUM CHLORIDE 0.9 % SOLUTION 250 ML FLEX CONT: Performed by: INTERNAL MEDICINE

## 2025-04-01 PROCEDURE — 36415 COLL VENOUS BLD VENIPUNCTURE: CPT

## 2025-04-01 PROCEDURE — 25010000002 DURVALUMAB 50 MG/ML SOLUTION 10 ML VIAL: Performed by: INTERNAL MEDICINE

## 2025-04-01 PROCEDURE — 80053 COMPREHEN METABOLIC PANEL: CPT

## 2025-04-01 PROCEDURE — 25010000002 FAMOTIDINE 10 MG/ML SOLUTION: Performed by: INTERNAL MEDICINE

## 2025-04-01 PROCEDURE — 25810000003 SODIUM CHLORIDE 0.9 % SOLUTION: Performed by: INTERNAL MEDICINE

## 2025-04-01 PROCEDURE — 83735 ASSAY OF MAGNESIUM: CPT

## 2025-04-01 RX ORDER — HEPARIN SODIUM (PORCINE) LOCK FLUSH IV SOLN 100 UNIT/ML 100 UNIT/ML
500 SOLUTION INTRAVENOUS AS NEEDED
Status: DISCONTINUED | OUTPATIENT
Start: 2025-04-01 | End: 2025-04-01 | Stop reason: HOSPADM

## 2025-04-01 RX ORDER — SODIUM CHLORIDE 9 MG/ML
20 INJECTION, SOLUTION INTRAVENOUS ONCE
OUTPATIENT
Start: 2025-04-15

## 2025-04-01 RX ORDER — SODIUM CHLORIDE 0.9 % (FLUSH) 0.9 %
10 SYRINGE (ML) INJECTION AS NEEDED
Status: DISCONTINUED | OUTPATIENT
Start: 2025-04-01 | End: 2025-04-01 | Stop reason: HOSPADM

## 2025-04-01 RX ORDER — SODIUM CHLORIDE 9 MG/ML
20 INJECTION, SOLUTION INTRAVENOUS ONCE
Status: COMPLETED | OUTPATIENT
Start: 2025-04-01 | End: 2025-04-01

## 2025-04-01 RX ORDER — FAMOTIDINE 10 MG/ML
10 INJECTION, SOLUTION INTRAVENOUS ONCE
Start: 2025-04-15 | End: 2025-04-15

## 2025-04-01 RX ORDER — DEXAMETHASONE SODIUM PHOSPHATE 10 MG/ML
10 INJECTION, SOLUTION INTRA-ARTICULAR; INTRALESIONAL; INTRAMUSCULAR; INTRAVENOUS; SOFT TISSUE ONCE
Status: COMPLETED | OUTPATIENT
Start: 2025-04-01 | End: 2025-04-01

## 2025-04-01 RX ORDER — DIPHENHYDRAMINE HYDROCHLORIDE 50 MG/ML
25 INJECTION, SOLUTION INTRAMUSCULAR; INTRAVENOUS ONCE
Status: COMPLETED | OUTPATIENT
Start: 2025-04-01 | End: 2025-04-01

## 2025-04-01 RX ORDER — FAMOTIDINE 10 MG/ML
10 INJECTION, SOLUTION INTRAVENOUS ONCE
Status: COMPLETED | OUTPATIENT
Start: 2025-04-01 | End: 2025-04-01

## 2025-04-01 RX ORDER — HEPARIN SODIUM (PORCINE) LOCK FLUSH IV SOLN 100 UNIT/ML 100 UNIT/ML
500 SOLUTION INTRAVENOUS AS NEEDED
OUTPATIENT
Start: 2025-04-01

## 2025-04-01 RX ORDER — SODIUM CHLORIDE 0.9 % (FLUSH) 0.9 %
10 SYRINGE (ML) INJECTION AS NEEDED
OUTPATIENT
Start: 2025-04-01

## 2025-04-01 RX ORDER — DIPHENHYDRAMINE HYDROCHLORIDE 50 MG/ML
25 INJECTION, SOLUTION INTRAMUSCULAR; INTRAVENOUS ONCE
Start: 2025-04-15 | End: 2025-04-15

## 2025-04-01 RX ADMIN — SODIUM CHLORIDE 740 MG: 9 INJECTION, SOLUTION INTRAVENOUS at 10:33

## 2025-04-01 RX ADMIN — Medication 500 UNITS: at 11:53

## 2025-04-01 RX ADMIN — SODIUM CHLORIDE 20 ML/HR: 9 INJECTION, SOLUTION INTRAVENOUS at 10:20

## 2025-04-01 RX ADMIN — DEXAMETHASONE SODIUM PHOSPHATE 10 MG: 10 INJECTION INTRAMUSCULAR; INTRAVENOUS at 10:20

## 2025-04-01 RX ADMIN — FAMOTIDINE 10 MG: 10 INJECTION INTRAVENOUS at 10:20

## 2025-04-01 RX ADMIN — Medication 10 ML: at 11:53

## 2025-04-01 RX ADMIN — DIPHENHYDRAMINE HYDROCHLORIDE 25 MG: 50 INJECTION, SOLUTION INTRAMUSCULAR; INTRAVENOUS at 10:20

## 2025-04-01 NOTE — PROGRESS NOTES
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  He has received definitive radiation (6000 cGy/33 fx), completed, 1/30/25 + concurrent paclitaxel+carboplatin- C1, 12/9/24; C2, 12/16/24; C3, 12/23/24; C4, 12/30/24; C5, 1/13/25; C6, 1/27/25 - then consolidation durvalumab- C1, 3/4/25; C2, 3/18/25; C3, 4/1/25  He is accompanied by his wife Sarah and a daughter, Linette.    I have reviewed the HPI and verified with the patient the accuracy of it. No changes to interval history since the information was documented. Timoteo Lombardi MD 04/07/25      Summary of lung cancer histories/prior interventions:  - Medical history includes COPD, MRSA infection (respiratory culture, 5/1/24), migraines, bleeding ulcer, transfusion, left upper lobectomy, 4/19/23 for stage I pT1c,pN0M0 squamous cell carcinoma.    -  8/14/23 PET+ RUL lung neoplasm. He completed 5000 cGy in 4 fractions to the right upper lobe of the lung on 09/27/2023.   -  4/29/24- Diagnosed with squamous cell of the right upper lobe via bronchoscopy.   -  Completed 5000 cGy  in 5 fractions to the right upper lobe of the lung on 06/05/2024.   -  9/16/24- PET Scan- LEFT lower lobe pneumonia. Neoplastic LEFT upper lobe lung nodules adjacent to the LEFT hilum. Neoplastic nodule within the LEFT bronchus. Small focus of indeterminate FDG uptake in the posterior midline neck between the C5 and C6 spinous processes.   - 10/17/24- Bronchoscopy revealed fragments of squamous cell carcinoma: Stage IIIa (T3 N1 M0) squamous cell carcinoma left upper lobe of the lung.    -  1/29/24- Consult  by Dr. Ortiz who recommended a course of external beam radiation, likely delivered concurrently with systemic therapy under the medical oncology service, anticipate 6600 cGy over 33 treatments.   - 12/6/24- MRI brain- No mets  - Definitive radiation (6000 cGy/33 fractions), completed 1/30/25  -Definitive concurrent paclitaxel+carboplatin- C1, 12/9/24; C2, 12/16/24; C3, 12/23/24; C4, 12/30/24; C5, 1/13/25; C6, 1/27/25  -Consolidation durvalumab- C1, 3/4/25; C2, 3/18/25; C3, 4/1/25     Diagnostic abnormalities:  01/16/2023 - CT Chest - [place]:  Spiculated perihilar left upper lobe pulmonary nodule measures 2.7 cm. This is concerning for primary neoplasm. PET/CT or bronch recommended for further evaluation.   0.8 cm noncalcified pulmonary nodule in the right upper lobe. This is at the lower limits of normal for PET/CT detection. Benign granulomatous disease, metastatic disease, or 2nd primary pulmonary malignancy could be considered.   No enlarged mediastinal or hilar lymph nodes     01/17/2023 - Pulmonary Function Tests - [place]:  Moderate obstructive lung disease     01/26/2023 - Bronchoscopy with biopsy per :  RUL, lower lobe, middle lobe were negative. The left upper lobe, no lesion, lingula no lesion seen.  SAMINA washing:  Negative for malignant cells; reactive bronchial cells, macrophages, and inflammation  SAMINA brushing:  Negative for malignant cells; markedly reactive bronchial cells, macrophages, and inflammation     02/09/2023 - PET Scan:  2.8 cm hypermetabolic central LEFT upper lobe pulmonary nodule. This likely represents a primary lung neoplasm.  0.8 cm hypermetabolic pulmonary nodule in the RIGHT upper lobe. This is highly suspicious for either a contralateral pulmonary metastasis or second primary lung neoplasm.   No hypermetabolic thoracic lymph nodes. No evidence of hypermetabolic metastatic disease in the neck, abdomen, or pelvis.  1.5 cm hypermetabolic nodule in the RIGHT anterior  rectum, highly concerning for underlying rectal polyp. Recommend correlation with colonoscopy/sigmoidoscopy.     02/27/2023 - Colonoscopy:  Rectal polyp, excision:  Villous adenoma, negative for high-grade glandular dysplasia or malignancy.  Colon polyp at 20 cm, biopsy:  Fragments of serrated polyp, favor sessile serrated lesion/polyp.  Colon polyp at 10 cm, biopsy:  Fragments of serrated polyp, favor sessile serrated lesion/polyp.     03/08/2023 - CT Chest without contrast:  Pulmonary emphysema with LEFT hilar/upper lobe lung mass measuring approximately 4 cm in greatest dimension. There is mild nodular airspace disease laterally to this lesion which may represent postobstructive atelectasis or infiltrate.   10 mm irregular nodule within the RIGHT lung apex (series 2, image 101). This is concerning for metastatic lesion. Additional 4 mm noncalcified nodule at the RIGHT lung apex (series 2, image 85).   Evidence of prior granulomatous disease.      03/09/2023 - Bronchoscopy/EBUS per :  Left upper lobe, EBUS (ThinPrep and cell block section):   Squamous cell carcinoma.   Bronchial washing (ThinPrep and cell block section):   No malignant cells identified.   Benign bronchial epithelial cells, and alveolar macrophages.      04/19/2023 -  Left upper lobectomy and lymph node excision per Dr.Robert Irwin:   Lymph node, level 9 lymph node biopsy: Benign fibroadipose tissue.   Lymph node, level 10 posterior hilar lymph node biopsy: 1 lymph node, negative for evidence of malignancy.   Lymph node, level 6 lymph node biopsy: 2 lymph nodes, negative for evidence of malignancy.   Lymph node, level 11 interlobar lymph node biopsy: 1 lymph node, negative for evidence of malignancy.   Lymph node, level 10 anterior hilar lymph node biopsy: 2 lymph nodes, negative for evidence of malignancy.   Lymph node, level 12 lobar lymph node biopsy: 1 lymph node, negative for evidence of malignancy.   Lung, left upper lobectomy:    Invasive moderately differentiated squamous cell carcinoma.   Invasive carcinoma measures 2.5 cm in greatest dimension grossly.   Invasive carcinoma extends to within 0.2 cm of the nearest bronchial surgical excision margin.   Squamous metaplasia identified at bronchial surgical excision margin.   Emphysematous changes identified involving the nonneoplastic lung parenchyma.   2 peribronchial lymph nodes, negative for evidence of malignancy.   AJCC STAGE:  pT1c, pN0, pMx      04/25/2023 -  CT Chest without contrast:  Interval placement of large bore chest tube with decrease in previously described large left hydropneumothorax.Residual hydropneumothorax present with air dissecting through anterior chest wall into subcutaneous soft tissues, unchanged.   Interval resolution of previously described debris within left bronchus.   Unchanged right apical 1.0 cm nodule.      07/24/2023 - Appointment with :  Left upper lobe SCC [id number], stage I,Jan 2023  -1/16/23 CT chest ([facility]): Spiculated perihilar left upper lobe pulmonary nodule measures 2.7cm. This is concerning for primary neoplasm. PET/CT or bronchoscopy is recommended to further evaluate. 0.8cm noncalcified pulmonary nodule in the right upper lobe. This is at the lower limits of normal for Pet/CT detection. Benign granulomatous disease, metastatic disease, or 2nd primary pulmonary malignancy could be considered. No enlarged mediastinal or hilar lymph nodes.  -1/26/23 Lung, left upper lobe, washing: Negative for malignant cells. Reactive bronchial cells, macrophages, and inflammation. Lung, left upper lobe, brushing: Negative for malignant cells. Markedly reactive bronchial cells, macrophages, and inflammation.  -2/9/23 PET scan ([facility]): 2.8 cm hypermetabolic central LEFT upper lobe pulmonary nodule. This likely represents a primary lung neoplasm. 0.8 cm hypermetabolic pulmonary nodule in the RIGHT upper lobe. This is highly suspicious for either a  contralateral pulmonary metastasis or second primary lung neoplasm. No hypermetabolic thoracic lymph nodes. No evidence of hypermetabolic metastatic disease in the neck, abdomen, or pelvis. 1.5 cm hypermetabolic nodule in the RIGHT anterior rectum, highly concerning for underlying rectal polyp. Recommend correlation with colonoscopy/sigmoidoscopy.   4/19/23 Lung, left upper lobectomy: Invasive moderately differentiated squamous cell carcinoma. Invasive carcinoma measures 2.5 cm in greatest dimension grossly. Invasive carcinoma extends to within 0.2 cm of the nearest bronchial surgical excision margin. Squamous metaplasia identified at bronchial surgical excision margin. Emphysematous changes identified involving the nonneoplastic lung parenchyma. 2 peribronchial lymph nodes, negative for evidence of malignancy.Lymph node, level 9 lymph node biopsy: Benign fibroadipose tissue. Lymph node, level 10 posterior hilar lymph node biopsy: 1 lymph node, negative for evidence of malignancy. Lymph node, level 6 lymph node biopsy: 2 lymph nodes, negative for evidence of malignancy. Lymph node, level 11 interlobar lymph node biopsy: 1 lymph node, negative for evidence of malignancy. Lymph node, level 10 anterior hilar lymph node biopsy: 2 lymph nodes, negative for evidence of malignancy. Lymph node, level 12 lobar lymph node biopsy: 1 lymph node, negative for evidence of malignancy. [id number], stage I  Plan:  -Repeat CT chest Nov 2023  RUL subcentimeter nodule (PET+)  -2/9/23 PET scan (P): 2.8 cm hypermetabolic central LEFT upper lobe pulmonary nodule. This likely represents a primary lung neoplasm. 0.8 cm hypermetabolic pulmonary nodule in the RIGHT upper lobe. This is highly suspicious for either a contralateral pulmonary metastasis or second primary lung neoplasm. No hypermetabolic thoracic lymph nodes. No evidence of hypermetabolic metastatic disease in the neck, abdomen, or pelvis. 1.5 cm hypermetabolic nodule in the RIGHT  anterior rectum, highly concerning for underlying rectal polyp. Recommend correlation with colonoscopy/sigmoidoscopy.   -Referral Dr Ortiz for consideration SBRT RUL hypermetabolic nodule.  PLAN:  RTC with MD 2 months in [place] office  CBC, CMP, B12, Folate, LDH, Haptoglobin, iron profile, Ferritin, retic  Continue follow-up with Dr.Robert Irwin/CardioThoracic Surgery  Continue to follow up with Dr. Rowe  Iron Sulfate 325 mg p.o. twice daily  Follow Up:  Return in 2 months (on 9/24/2023) for Appointment with Dr. Vaughan in [place].  Refer to Dr Ortiz      08/09/2023 - Appointment with :  will order PET scan, they will call you  Return in 2 weeks for further discussion     08/14/2023 - PET Scan:  Abnormal PET/CT, there is increased size of a hypermetabolic pulmonary nodule in the right upper lobe lung apex that measures 13 mm, compared with 8 mm on the previous PET/CT. This has a maximum SUV of 8.1. This is concerning for active neoplasm.  Interval resection of the left upper lobe with posttreatment changes along the medial margin of the upper mediastinum, including mild infiltration of the mediastinal fat planes.  There is a subcutaneous nodule with surrounding fatty infiltration over the low back at the level of the upper sacrum which demonstrates hypermetabolism. This could be inflammatory, less likely a metastatic nodule. This measures 21 mm, follow-up ultrasound is recommended to determine if this is solid. Ultrasound-guided biopsy could be performed if indicated.  Interval resolution of the hypermetabolic nodule associated with the right rectum, however there is a new hypermetabolic nodule that maps to the right prostate gland. Correlation with PSA values is recommended.     08/16/2023 - Consult with :  I have recommended to treat the right lung nodule with SBRT, I anticipate a course over 4-5 fractions, final course pending.   He does have a visible insect bite on his low back and I do  believe this correlates with the PET findings of activity on the upper sacrum. Will order PSA for prostate gland findings. He has not had one drawn in some time.   Plan:  Plan on 4-5 pinpoint treatments to the lung nodule.   We will call you when to start treatments.   PSA today     08/18/2023 - Documentation per :  I spoke on the phone with this patient's wife Sarah and discussed his PSA of 2.9. This is considered a normal level, however on recent PET scan imaging for a lung nodule workup, a nodule within the prostate was noted.   I will refer him to urology for further management recommendations.     09/12/2023 - 09/27/2023 - Completed radiation course:  Received 5000 cGy in 4 fractions to the right upper lobe of the lung.     11/20/2023 - MRI Pelvis with and without contrast:  No suspicious lesions in the prostate (PI-RADS 3 or greater).      11/29/2023 - Appointment with :  I personally reviewed MRI today.    His MRI is negative for any suspicious lesions.    He has a volume of 34 cc.    I believe his hypermetabolic activity on PET scan was likely inflammatory.  I do not think he requires a biopsy based on his negative MRI and PSA level.    He will follow-up as needed.      12/20/2023 - CT Chest without contrast:  Decreased size of 6 mm spiculated right upper lobe pulmonary nodule (previously 13 mm). This nodule was hypermetabolic on prior PET/CT and highly suspicious for primary lung neoplasm.  Postoperative change of left upper lobectomy. No definite change/increase in soft tissue thickening along the lobectomy margin and mediastinum. Recommend continued imaging surveillance.  No evidence of disease progression in the chest. No thoracic lymphadenopathy.     12/27/2023 - Appointment with :  Return in 3 months with a CT chest before.      03/19/2024 - CT chest without contrast:  1.1 cm irregular right upper lobe nodule, highly suspicious for malignancy.  5 mm left upper lobe nodule is  indeterminate but does raise the suspicion for malignancy.  Stable size of the previously irradiated right apex nodule. Minimally increased adjacent subpleural parenchymal abnormality, likely related to post radiation changes.  Left upper lobectomy with stable postsurgical margins.  No mediastinal lymphadenopathy.     03/27/2024 - Consult with :  Will refer to pulmonology for bronchoscopy considerations of the new right upper lung nodule. He will return in 3 weeks for further recommendations.  I have instructed him to continue to see the other health care providers as per their scheduling.  Plan:  Referral to pulmonology for biopsy of new right lung nodule  Return in 3 weeks     04/16/2024 - Appointment with :  For wheezing, add bronchodilator. 100 mcg trelegy sample given, we reviewed PFT showing severe obstruction.  Likely moderate to severe copd with hx smoking  For lung nodule, will plan ion bronchoscopy to better assess rul nodule  Ask anesthesia to take particular care with remaining teeth  We demonstrated proper technique for use of ellipta device  Follow Up   Return in about 4 weeks (around 5/14/2024).     04/22/2024 - CT Chest without contrast:  RIGHT upper lobe 17 x 11 mm nodule.  LEFT upper lobe 6 x 5 mm nodule.  No pneumothorax or pleural effusion.     04/29/2024 - Bronchoscopy with biopsy per :  Lung, right upper lobe, Ion fine-needle aspiration, smears (2), ThinPrep preparation (1) and cell block:   Positive for malignant cells, consistent with moderately differentiated squamous cell carcinoma.  Comment: Immunohistochemical stain for p40 is positive in the neoplastic cells, supporting the above diagnostic impression.The control stained appropriately.  Bronchial washings, ThinPrep preparation (1) and cell block:   Positive for malignant cells, consistent with moderately differentiated squamous cell carcinoma.  Lung, right upper lobe, bronchoalveolar lavage, ThinPrep preparation  (1):   Positive for malignant cells, consistent with moderately to poorly differentiated squamous cell carcinoma.  AJCC stage: pTX pNX     04/29/2024 - Documentation per :  Dr. Nicola Monsivais notified me that patient cytology was positive in his lung.  He is scheduled to return to our office for treatment recommendations.  Also, it was incidentally noted by anesthesia during his procedure that he has a lesion in the oral cavity that needs additional evaluation and may potentially be another malignancy.     05/06/2024 - Appointment with :  This patient does have biopsy-proven squamous cell carcinoma of the right upper lobe.  We will proceed with SBRT radiotherapy to this  lesion.  He also has a lesion in the left lung which is nondiagnostic and we will continue to follow that lesion with serial radiographs  Plan:  We will call you when to start radiation treatment in approximately 2 weeks  Return in about 2 weeks (around 5/20/2024) for the first Radiation Treatment.     05/28/2024 - 06/05/2024 - Completed radiation course:  Received 5000 cGy in 5 fractions to the right upper lobe of the lung via Stereotactic Radiation Therapy - SRT.      09/03/2024 - CT chest without contrast:  Lung nodules seen previously have resolved.  There are indeterminate soft tissue nodules within the LEFT bronchus.  Consider PET/CT correlation.     09/09/2024 - Appointment with :  We discussed the new left bronchus findings on imaging. I will notify pulmonology for review of the imaging and further recommendations. We will continue routine follow-up/surveillance as discussed in 3 months with follow up CT scan before visit and I have instructed him to continue to see the other health care providers as per their scheduling.  Plan:  will discuss your scans with pulmonology  return in 3 months unless you need to be seen sooner.     09/11/2024 - Documentation per Aston Medina APRCHATA - radiation oncology:  I reviewed the  recent CT chest imaging with Dr. Ortiz and discussed this with Dr. Monsivais and Erma Saucedo in pulmonology.   I will order a PET scan for further evaluation of the left bronchus finding and will get him in for an appointment to discuss further options with Dr. Ortiz.     09/16/2024 - PET Scan:  LEFT lower lobe pneumonia.  Neoplastic LEFT upper lobe lung nodules adjacent to the LEFT hilum.  Neoplastic nodule within the LEFT bronchus.  Small focus of indeterminate FDG uptake in the posterior midline neck between the C5 and C6 spinous processes.     10/08/2024 - Appointment with Mata Simon MD - [facility]:  Plan for bronchoscopy with APC and tumor debulking, EBUS/TBNA, bal next week.     10/17/2024 - Bronchus, left endobronchial mass, endobronchial biopsy:  Fragments of squamous cell carcinoma.        LABS    Lab Results - Last 18 Months   Lab Units 04/01/25  0908 03/18/25  0909 03/04/25  1155 02/17/25  1117 01/27/25  0854 01/20/25  0807   HEMOGLOBIN g/dL 10.6* 10.7* 11.4* 10.6* 11.0* 10.9*   HEMATOCRIT % 33.9* 33.3* 36.6* 33.4* 34.8* 34.3*   MCV fL 98.5* 96.8 96.3 94.4 95.3 93.5   WBC 10*3/mm3 7.28 6.03 7.97 9.18 5.11 4.55   RDW % 17.4* 18.6* 18.9* 18.5* 17.7* 16.8*   MPV fL 9.6 9.1 9.3 9.6 9.3 9.2   PLATELETS 10*3/mm3 250 252 174 217 292 190   IMM GRAN % % 1.4* 0.8* 0.6* 2.3* 0.4 0.4   NEUTROS ABS 10*3/mm3 5.45 4.53 6.09 6.90 3.55 3.21   LYMPHS ABS 10*3/mm3 0.67* 0.67* 0.97 0.89 0.84 0.83   MONOS ABS 10*3/mm3 0.74 0.56 0.59 0.99* 0.62 0.35   EOS ABS 10*3/mm3 0.28 0.20 0.23 0.12 0.06 0.10   BASOS ABS 10*3/mm3 0.04 0.02 0.04 0.07 0.02 0.04   IMMATURE GRANS (ABS) 10*3/mm3 0.10* 0.05 0.05 0.21* 0.02 0.02   NRBC /100 WBC 0.0 0.0 0.0 0.0 0.0 0.0       Lab Results - Last 18 Months   Lab Units 04/01/25  0908 03/18/25  0909 03/04/25  1155 02/17/25  1117 01/27/25  0854 01/20/25  0807   GLUCOSE mg/dL 142* 214* 80 124* 134* 141*   SODIUM mmol/L 138 138 140 135* 138 138   POTASSIUM mmol/L 4.6 4.1 5.0 4.9 5.0 4.5   CO2  "mmol/L 27.0 26.0 25.0 27.0 27.0 27.0   CHLORIDE mmol/L 98 99 98 96* 98 100   ANION GAP mmol/L 13.0 13.0 17.0* 12.0 13.0 11.0   CREATININE mg/dL 1.15 1.11 1.11 0.98 0.99 0.99   BUN mg/dL 25* 17 22 18 17 16   BUN / CREAT RATIO  21.7 15.3 19.8 18.4 17.2 16.2   CALCIUM mg/dL 10.0 9.1 10.0 9.5 9.8 9.6   ALK PHOS U/L 76 87 75 78 79 72   TOTAL PROTEIN g/dL 7.5 7.3 7.8 7.4 7.3 7.1   ALT (SGPT) U/L 7 8 7 8 6 8   AST (SGOT) U/L 8 7 9 9 8 9   BILIRUBIN mg/dL 0.2 0.2 0.2 0.2 <0.2 <0.2   ALBUMIN g/dL 4.0 4.0 4.2 4.0 3.9 4.0   GLOBULIN gm/dL 3.5 3.3 3.6 3.4 3.4 3.1       Lab Results - Last 18 Months   Lab Units 04/01/25  0908 03/04/25  1155 12/30/24  0821 11/26/24  1223   CEA ng/mL 3.23 3.44 4.42 3.46       Lab Results - Last 18 Months   Lab Units 04/01/25  0908 03/04/25  1155 11/26/24  1223   IRON mcg/dL  --   --  51*   TIBC mcg/dL  --   --  423   IRON SATURATION (TSAT) %  --   --  12*   FERRITIN ng/mL  --   --  166.40   TSH uIU/mL 1.890 2.370  --    FOLATE ng/mL  --   --  8.50         PAST MEDICAL HISTORY:  ALLERGIES:  Allergies   Allergen Reactions    Iodides Nausea And Vomiting     Contrast dye-\"Upset stomach x 2 days\". MRI ONLY     CURRENT MEDICATIONS:  Outpatient Encounter Medications as of 4/7/2025   Medication Sig Dispense Refill    acetaminophen (Tylenol) 325 MG tablet Take 3 tablets by mouth Every 8 (Eight) Hours. Take every 8 hours for 3 days then take prn as needed.      albuterol sulfate  (90 Base) MCG/ACT inhaler Every 6 (Six) Hours.      cetirizine (zyrTEC) 10 MG tablet Take 1 tablet by mouth Daily.      Cholecalciferol 125 MCG (5000 UT) tablet Take 1 tablet by mouth Daily.      divalproex (DEPAKOTE ER) 250 MG 24 hr tablet Take 1 tablet by mouth 2 (Two) Times a Day.      folic acid-vit B6-vit B12 (Folbee) 2.5-25-1 MG tablet tablet Take 1 tablet by mouth Daily. 30 tablet 0    furosemide (LASIX) 20 MG tablet TAKE 1 TABLET BY MOUTH ONCE DAILY Oral for 14 Days      glimepiride (AMARYL) 4 MG tablet Take 1 tablet " by mouth Daily.      hydroCHLOROthiazide (HYDRODIURIL) 25 MG tablet Take 1 tablet by mouth Daily.      lidocaine-prilocaine (EMLA) 2.5-2.5 % cream Apply to port site 30 minutes before it is to be accessed and cover with occlusive dressing. 30 g 1    lisinopril (PRINIVIL,ZESTRIL) 10 MG tablet Take 1 tablet by mouth Daily.      loratadine (CLARITIN) 10 MG tablet Take 1 tablet by mouth Daily.      metFORMIN (GLUCOPHAGE) 1000 MG tablet Take 1 tablet by mouth 2 (Two) Times a Day With Meals.      metoprolol succinate XL (TOPROL-XL) 25 MG 24 hr tablet Take 1 tablet by mouth Daily.      NON FORMULARY CPAP      Omega-3 Fatty Acids (fish oil) 1000 MG capsule capsule Take  by mouth Daily With Breakfast.      ondansetron (Zofran) 4 MG tablet Take 1 tablet by mouth Every 8 (Eight) Hours As Needed for Nausea or Vomiting. 15 tablet 0    ondansetron (ZOFRAN) 8 MG tablet Take 1 tablet by mouth Every 8 (Eight) Hours As Needed for Nausea or Vomiting. 30 tablet 3    pantoprazole (PROTONIX) 40 MG EC tablet Take 1 tablet by mouth 2 (Two) Times a Day.      primidone (MYSOLINE) 50 MG tablet Take 1 tablet by mouth 2 (Two) Times a Day. For essential tremors      simvastatin (ZOCOR) 40 MG tablet Take 1 tablet by mouth Every Night.      tiotropium bromide-olodaterol (Stiolto Respimat) 2.5-2.5 MCG/ACT aerosol solution inhaler Inhale 2 puffs Daily. 12 g 3    [] dexAMETHasone (DECADRON) injection 10 mg       [] diphenhydrAMINE (BENADRYL) injection 25 mg       [] durvalumab (IMFINZI) 740 mg in sodium chloride 0.9 % 264.8 mL chemo IVPB       [] famotidine (PEPCID) injection 10 mg       [] sodium chloride 0.9 % infusion       [DISCONTINUED] heparin injection 500 Units       [DISCONTINUED] sodium chloride 0.9 % flush 10 mL        No facility-administered encounter medications on file as of 2025.     ADULT ILLNESSES:  Patient Active Problem List   Diagnosis Code    Squamous cell carcinoma of left lung C34.92     S/P lobectomy of lung Z90.2    Former smoker Z87.891    History of radiation therapy Z92.3    Nodule of upper lobe of right lung R91.1    Malignant neoplasm of upper lobe of right lung C34.11    Mass of left lung R91.8    History of primary non-small cell carcinoma of left lung Z85.118    Malignant neoplasm of overlapping sites of lung C34.80    Port-A-Cath in place Z95.828    Function kidney decreased N28.9    Hypomagnesemia E83.42    Encounter for long-term (current) use of high-risk medication Z79.899     SURGERIES:  Past Surgical History:   Procedure Laterality Date    APPENDECTOMY      BRONCHOSCOPY  2023    BRONCHOSCOPY N/A 4/29/2024    Procedure: BRONCHOSCOPY WITH ENDOBRONCHIAL ULTRASOUND;  Surgeon: Nicola Monsivais MD;  Location:  [facility];  Service: Pulmonary;  Laterality: N/A;  pre Nodule of upper lobe of right lung  Dr. Alves    BRONCHOSCOPY N/A 10/17/2024    Procedure: BRONCHOSCOPY WITH APC & DEBULKING, cryotherapy, balloon dilation/tamponade;  Surgeon: Mata Simon MD;  Location:  [facility];  Service: Pulmonary;  Laterality: N/A;    BRONCHOSCOPY WITH ION ROBOTIC ASSIST N/A 4/29/2024    Procedure: BRONCHOSCOPY WITH ION ROBOT and BRONCHOSCOPY WITH ENDOBRONCHIAL ULTRASOUND;  Surgeon: Nicola Monsivais MD;  Location:  [facility];  Service: Robotics - Pulmonary;  Laterality: N/A;  pre  Nodule of upper lobe of right lung  post      BRONCHOSCOPY WITH ION ROBOTIC ASSIST  4/29/2024    Procedure: BRONCHOSCOPY NAVIGATION WITH ENDOBRONCHIAL ULTRASOUND AND ION ROBOT;  Surgeon: Nicola Monsivais MD;  Location:  [facility];  Service: Pulmonary;;    COLONOSCOPY  02/28/2023    HEMORRHOIDECTOMY      HERNIA REPAIR  1999    LUNG SURGERY Left 04/19/2023    VENOUS ACCESS DEVICE (PORT) INSERTION N/A 12/5/2024    Procedure: Single Lumen Port-a-cath insertion with flouroscopy;  Surgeon: Lorrie Hanson MD;  Location:  [facility];  Service: General;  Laterality: N/A;     HEALTH MAINTENANCE  ITEMS:  Health Maintenance Due   Topic Date Due    DIABETIC EYE EXAM  Never done    URINE MICROALBUMIN-CREATININE RATIO (uACR)  Never done    TDAP/TD VACCINES (1 - Tdap) Never done    ZOSTER VACCINE (1 of 2) Never done    COLORECTAL CANCER SCREENING  Never done    AAA SCREEN ONCE  Never done    HEPATITIS C SCREENING  Never done    ANNUAL WELLNESS VISIT  Never done    HEMOGLOBIN A1C  Never done    COVID-19 Vaccine ( season) 2024       <no information>  Last Completed Colonoscopy    This patient has no relevant Health Maintenance data.       Immunization History   Administered Date(s) Administered    Arexvy (RSV, Adults 60+ yrs) 2024    COVID-19 (MODERNA) 1st,2nd,3rd Dose Monovalent 2021, 03/15/2021    COVID-19 (UNSPECIFIED) 10/24/2021    Fluzone High-Dose 65+YRS 2024    Pneumococcal Conjugate 13-Valent (PCV13) 2017    Pneumococcal Polysaccharide (PPSV23) 2017     Last Completed Mammogram    This patient has no relevant Health Maintenance data.           FAMILY HISTORY:  Family History   Problem Relation Age of Onset    Diabetes Mother     Heart disease Mother     Cancer Father     Cancer Brother     Alcohol abuse Brother     Cancer Paternal Grandfather     Malig Hyperthermia Neg Hx      SOCIAL HISTORY:  Social History     Socioeconomic History    Marital status:    Tobacco Use    Smoking status: Former     Current packs/day: 0.00     Average packs/day: 2.0 packs/day for 57.2 years (114.4 ttl pk-yrs)     Types: Cigarettes     Start date:      Quit date: 3/17/2023     Years since quittin.0     Passive exposure: Past    Smokeless tobacco: Never   Vaping Use    Vaping status: Never Used   Substance and Sexual Activity    Alcohol use: Not Currently    Drug use: Never    Sexual activity: Defer       REVIEW OF SYSTEMS:  Review of Systems   Constitutional:  Positive for activity change, appetite change (Low to fair), fatigue (Chronic) and unexpected weight loss  "(35 pounds in the past 1.5-yrs).        Manages his personal ADLs, light chores, runs errands and is still driving.  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  No mouth sores.  No nausea. No vomiting.  No skin changes. No diarrhea.  \"Wearing me down\"   HENT: Negative.     Eyes: Negative.    Respiratory:  Positive for cough (With cream/clear-colored phlegm, \"now and then\"), shortness of breath (Baseline exertional dyspnea with some SOB with routine activity) and wheezing.         [id number] to 2 packs/day.  Quit in 2023    On inhalers     Cardiovascular: Negative.    Gastrointestinal: Negative.    Endocrine: Negative.    Genitourinary: Negative.    Musculoskeletal:  Positive for arthralgias (Chronic) and back pain (Chronic).   Skin: Negative.    Allergic/Immunologic: Negative.    Neurological:  Positive for tremors (Chronic).   Hematological: Negative.    Psychiatric/Behavioral: Negative.         /52   Pulse 90   Temp 97.2 °F (36.2 °C) (Temporal)   Resp 18   Ht 172.7 cm (68\")   Wt 72.8 kg (160 lb 6.4 oz)   SpO2 90%   BMI 24.39 kg/m²  Body surface area is 1.86 meters squared.  Pain Score    04/07/25 1022   PainSc: 0-No pain           Physical Exam  Vitals and nursing note reviewed.   Constitutional:       Comments: Pleasant, cooperative, heavyset, modestly kept elderly male.  Ambulatory.  ECOG 1-2.     Has lost 2 lb (had gained 4 lb at his prior visit) since the last visit   HENT:      Head: Normocephalic and atraumatic.   Eyes:      General: No scleral icterus.     Extraocular Movements: Extraocular movements intact.      Pupils: Pupils are equal, round, and reactive to light.   Cardiovascular:      Rate and Rhythm: Normal rate.   Pulmonary:      Effort: Pulmonary effort is normal.      Comments:   Port in left upper chest is well " seated.  The previous subtle surrounding fullness and prominent tenderness surrounding the site have resolved.    Distant breath sounds bilaterally  Abdominal:      Palpations: Abdomen is soft.      Tenderness: There is no abdominal tenderness.   Musculoskeletal:         General: Normal range of motion.      Cervical back: Normal range of motion.   Lymphadenopathy:      Cervical: No cervical adenopathy.   Skin:     General: Skin is warm.   Neurological:      General: No focal deficit present.      Mental Status: He is alert and oriented to person, place, and time.   Psychiatric:         Mood and Affect: Mood normal.         Behavior: Behavior normal.         Thought Content: Thought content normal.           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  -- Original tumor stage: TNM at least IIIA ([id number])  -- Original tumor burden:  -9/16/24- PET scan-LEFT lower lobe pneumonia.Neoplastic LEFT upper lobe lung nodules adjacent to the LEFT hilum. Neoplastic nodule within the LEFT bronchus.Small focus of indeterminate FDG uptake in the posterior midline neck between the C5 and C6 spinous processes  -10/17/24- Bronchoscopy-revealed Fragments of squamous cell carcinoma.      --Complications of tumor: Asthenia, cough, dyspnea, weight loss  -- Tumor status:   -Definitive radiation (6000 cGy/33 fx completed, 1/30/25) + concurrent paclitaxel+carboplatin- C1, 12/9/24; C2, 12/16/24; C3, 12/23/24; C4, 12/30/24; C5, 1/13/25; C6, 1/27/25   - Consolidation durvalumab- C1, 3/4/25; C2, 3/18/25; C3, 4/1/25     2.   Left upper lobectomy, 4/19/23 for stage I pT1c,pN0M0 squamous cell carcinoma.    3.   On 8/14/23 PET+ RUL lung neoplasm.   - Completed 5000 cGy in 4 fractions to the right upper lobe of the lung on 09/27/2023.   4.   Squamous cell of the right upper lobe via bronchoscopy, 4/29/24.   - Completed 5000 cGy  in 5 fractions to the right upper lobe of the lung on 06/05/2024.     5.   Normocytic anemia.   Contributions from malignancy/anemia of chronic disease+ chemo  --Hgb 10.6; MCV 98.5, 4/1/25 (prior: Hgb 10.7-12.9)  --11/26/24- ferritin 166, Fe 51, fe sat 12%, B12 229, folate 8.5  6.   COPD   7.   Ex heavy cigarette smoker  8.   Port tenderness.  Needs port study and surgical evaluation before it can be used again       Plan:  Review labs, 3/4/25-3/18/25-4/1/25 stable anemia, otherwise normal CBC, gluc 142 otherwise normal CMP, normal TSH/T4, CEA 3.2 (prior: 3.46-4.4)  Chemo tolerance: Some fatigue otherwise without inordinate toxicities  Prior careful review of available diagnostic information as outlined above.   Note imaging including PET scan 9/16/2024, as well as bronchoscopy/biopsy findings, 10/17/24 (above). 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  Review NCCN guidelines non-small cell lung cancer, stage III (unresectable).  Chemotherapy regimens used.  Radiation therapy (concurrent regimens usually radiation therapy): Paclitaxel 45 to 50 mg/m² weekly; carboplatin AUC, concurrent thoracic RT +/- additional 2 cycles every 21 days of paclitaxel 200 mg/m² and carboplatin AUC 6-followed by consolidation therapy for unresectable stage III NSCLC, PS 0-1 and no disease progression after 2 or more cycles of definitive chemoradiation: Durvalumab 10 mg/kg IV every 2 weeks for up to 12 months (category 1).    Durvalumab.  Potential toxicities of same discussed (to included but not limited to: Myelosuppression, alopecia, neuropathy, arthralgias/myalgias, nausea/vomiting, hypersensitivity reactions, diarrhea, mucositis, risks for infection, abnormal liver enzymes, asthenia, fever, low blood pressure, bleeding, renal insufficiency, edema, bradycardia, anaphylaxis, arrhythmias, thromboembolism, myocardial infarction, pulmonary toxicity, gastrointestinal (GI) obstruction/perforation, paralytic ileus, ischemic colitis, pancreatitis, severe skin reactions; electrolyte disorders, ototoxicity, nephrotoxicity, vision  loss). Questions answered. He agrees to a trial of therapy.     Schedule -C4, 4/15/25; C5, 4/29/25; C6, 5/13/25; C7, 5/27/25; C8, 6/10/25; C9, etc - durvalumab (plan: every 2 weeks x 24 cycles - 12 months) per administration guidelines - at [facility]                 durvalumab 10 mg/kg (WT = 50 kg; TD = 500 mg)             -Premeds:              Decadron 10 mg IV             Benadryl 25 mg IV             Pepcid 10 mg IV              7.   Upon initiation of durvalumab:  TSH, FT4, CMP, Mg++ and CBC with differential weekly with Procrit 40,000 units subcutaneous every 2 weeks if Hgb less than 10 and Hct less than 30     8.   Schedule CT chest, abdomen/pelvis with po/IV contrast after C6-5/20/25     9.   Continue currently identified medications.   10.  Return to office in 8 weeks.  Surveillance imaging every 6th cycle of durvalumab (C6; C12; C18, etc). Draw pre-office CBC with differential, CMP, and CEA.   11.  Importance of Smoking Cessation discussed with patient and informed patient additional information will be on today's [facility]. [place] [facility]'s Flyer - Plan to Be Tobacco Free handout provided to patient            I spent ~44 minutes caring for Boogie on this date of service. This time includes time spent by me in the following activities: preparing for the visit, reviewing tests, performing a medically appropriate examination and/or evaluation, counseling and educating the patient/family/caregiver, ordering medications, tests, or procedures and documenting information in the medical record

## 2025-04-07 ENCOUNTER — OFFICE VISIT (OUTPATIENT)
Dept: ONCOLOGY | Facility: CLINIC | Age: 75
End: 2025-04-07
Payer: MEDICARE

## 2025-04-07 VITALS
DIASTOLIC BLOOD PRESSURE: 52 MMHG | TEMPERATURE: 97.2 F | SYSTOLIC BLOOD PRESSURE: 130 MMHG | HEART RATE: 90 BPM | HEIGHT: 68 IN | RESPIRATION RATE: 18 BRPM | BODY MASS INDEX: 24.31 KG/M2 | OXYGEN SATURATION: 90 % | WEIGHT: 160.4 LBS

## 2025-04-07 DIAGNOSIS — C34.92 SQUAMOUS CELL CARCINOMA OF LEFT LUNG: Primary | ICD-10-CM

## 2025-04-07 DIAGNOSIS — R94.6 ABNORMAL RESULTS OF THYROID FUNCTION STUDIES: ICD-10-CM

## 2025-04-23 DIAGNOSIS — Z79.899 ENCOUNTER FOR LONG-TERM (CURRENT) USE OF HIGH-RISK MEDICATION: Primary | ICD-10-CM

## 2025-04-23 DIAGNOSIS — C34.92 SQUAMOUS CELL CARCINOMA OF LEFT LUNG: ICD-10-CM

## 2025-04-23 RX ORDER — SODIUM CHLORIDE 9 MG/ML
20 INJECTION, SOLUTION INTRAVENOUS ONCE
OUTPATIENT
Start: 2025-04-29

## 2025-04-23 RX ORDER — FAMOTIDINE 10 MG/ML
10 INJECTION, SOLUTION INTRAVENOUS ONCE
Start: 2025-04-29 | End: 2025-04-29

## 2025-04-23 RX ORDER — DIPHENHYDRAMINE HYDROCHLORIDE 50 MG/ML
25 INJECTION, SOLUTION INTRAMUSCULAR; INTRAVENOUS ONCE
Start: 2025-04-29 | End: 2025-04-29

## 2025-04-29 ENCOUNTER — INFUSION (OUTPATIENT)
Dept: ONCOLOGY | Facility: HOSPITAL | Age: 75
End: 2025-04-29
Payer: MEDICARE

## 2025-04-29 ENCOUNTER — RESULTS FOLLOW-UP (OUTPATIENT)
Dept: LAB | Facility: HOSPITAL | Age: 75
End: 2025-04-29
Payer: MEDICARE

## 2025-04-29 ENCOUNTER — TELEPHONE (OUTPATIENT)
Dept: ONCOLOGY | Facility: CLINIC | Age: 75
End: 2025-04-29

## 2025-04-29 ENCOUNTER — LAB (OUTPATIENT)
Dept: LAB | Facility: HOSPITAL | Age: 75
End: 2025-04-29
Payer: MEDICARE

## 2025-04-29 VITALS
HEIGHT: 68 IN | TEMPERATURE: 97.3 F | BODY MASS INDEX: 24.86 KG/M2 | HEART RATE: 69 BPM | RESPIRATION RATE: 18 BRPM | SYSTOLIC BLOOD PRESSURE: 103 MMHG | OXYGEN SATURATION: 90 % | WEIGHT: 164 LBS | DIASTOLIC BLOOD PRESSURE: 59 MMHG

## 2025-04-29 DIAGNOSIS — Z95.828 PORT-A-CATH IN PLACE: ICD-10-CM

## 2025-04-29 DIAGNOSIS — Z79.899 ENCOUNTER FOR LONG-TERM (CURRENT) USE OF HIGH-RISK MEDICATION: ICD-10-CM

## 2025-04-29 DIAGNOSIS — C34.92 SQUAMOUS CELL CARCINOMA OF LEFT LUNG: Primary | ICD-10-CM

## 2025-04-29 DIAGNOSIS — C34.92 SQUAMOUS CELL CARCINOMA OF LEFT LUNG: ICD-10-CM

## 2025-04-29 LAB
ALBUMIN SERPL-MCNC: 4 G/DL (ref 3.5–5.2)
ALBUMIN/GLOB SERPL: 1.3 G/DL
ALP SERPL-CCNC: 76 U/L (ref 39–117)
ALT SERPL W P-5'-P-CCNC: 10 U/L (ref 1–41)
ANION GAP SERPL CALCULATED.3IONS-SCNC: 13 MMOL/L (ref 5–15)
AST SERPL-CCNC: 10 U/L (ref 1–40)
BASOPHILS # BLD AUTO: 0.04 10*3/MM3 (ref 0–0.2)
BASOPHILS NFR BLD AUTO: 0.5 % (ref 0–1.5)
BILIRUB SERPL-MCNC: 0.2 MG/DL (ref 0–1.2)
BUN SERPL-MCNC: 19 MG/DL (ref 8–23)
BUN/CREAT SERPL: 15.1 (ref 7–25)
CALCIUM SPEC-SCNC: 9 MG/DL (ref 8.6–10.5)
CHLORIDE SERPL-SCNC: 102 MMOL/L (ref 98–107)
CO2 SERPL-SCNC: 24 MMOL/L (ref 22–29)
CREAT SERPL-MCNC: 1.26 MG/DL (ref 0.76–1.27)
DEPRECATED RDW RBC AUTO: 58.4 FL (ref 37–54)
EGFRCR SERPLBLD CKD-EPI 2021: 59.8 ML/MIN/1.73
EOSINOPHIL # BLD AUTO: 0.38 10*3/MM3 (ref 0–0.4)
EOSINOPHIL NFR BLD AUTO: 4.9 % (ref 0.3–6.2)
ERYTHROCYTE [DISTWIDTH] IN BLOOD BY AUTOMATED COUNT: 15.8 % (ref 12.3–15.4)
GLOBULIN UR ELPH-MCNC: 3.1 GM/DL
GLUCOSE SERPL-MCNC: 162 MG/DL (ref 65–99)
HCT VFR BLD AUTO: 33.3 % (ref 37.5–51)
HGB BLD-MCNC: 10.3 G/DL (ref 13–17.7)
IMM GRANULOCYTES # BLD AUTO: 0.04 10*3/MM3 (ref 0–0.05)
IMM GRANULOCYTES NFR BLD AUTO: 0.5 % (ref 0–0.5)
LYMPHOCYTES # BLD AUTO: 0.98 10*3/MM3 (ref 0.7–3.1)
LYMPHOCYTES NFR BLD AUTO: 12.7 % (ref 19.6–45.3)
MCH RBC QN AUTO: 30.9 PG (ref 26.6–33)
MCHC RBC AUTO-ENTMCNC: 30.9 G/DL (ref 31.5–35.7)
MCV RBC AUTO: 100 FL (ref 79–97)
MONOCYTES # BLD AUTO: 0.67 10*3/MM3 (ref 0.1–0.9)
MONOCYTES NFR BLD AUTO: 8.7 % (ref 5–12)
NEUTROPHILS NFR BLD AUTO: 5.63 10*3/MM3 (ref 1.7–7)
NEUTROPHILS NFR BLD AUTO: 72.7 % (ref 42.7–76)
NRBC BLD AUTO-RTO: 0 /100 WBC (ref 0–0.2)
PLATELET # BLD AUTO: 219 10*3/MM3 (ref 140–450)
PMV BLD AUTO: 9.4 FL (ref 6–12)
POTASSIUM SERPL-SCNC: 5.2 MMOL/L (ref 3.5–5.2)
PROT SERPL-MCNC: 7.1 G/DL (ref 6–8.5)
RBC # BLD AUTO: 3.33 10*6/MM3 (ref 4.14–5.8)
SODIUM SERPL-SCNC: 139 MMOL/L (ref 136–145)
T4 FREE SERPL-MCNC: 1.08 NG/DL (ref 0.93–1.7)
TSH SERPL DL<=0.05 MIU/L-ACNC: 1.38 UIU/ML (ref 0.27–4.2)
WBC NRBC COR # BLD AUTO: 7.74 10*3/MM3 (ref 3.4–10.8)

## 2025-04-29 PROCEDURE — 25010000002 HEPARIN LOCK FLUSH PER 10 UNITS: Performed by: INTERNAL MEDICINE

## 2025-04-29 PROCEDURE — 25810000003 SODIUM CHLORIDE 0.9 % SOLUTION: Performed by: INTERNAL MEDICINE

## 2025-04-29 PROCEDURE — 25010000002 DURVALUMAB 50 MG/ML SOLUTION 2.4 ML VIAL: Performed by: INTERNAL MEDICINE

## 2025-04-29 PROCEDURE — 36415 COLL VENOUS BLD VENIPUNCTURE: CPT

## 2025-04-29 PROCEDURE — 25010000002 FAMOTIDINE 10 MG/ML SOLUTION: Performed by: INTERNAL MEDICINE

## 2025-04-29 PROCEDURE — 96375 TX/PRO/DX INJ NEW DRUG ADDON: CPT

## 2025-04-29 PROCEDURE — 25810000003 SODIUM CHLORIDE 0.9 % SOLUTION 250 ML FLEX CONT: Performed by: INTERNAL MEDICINE

## 2025-04-29 PROCEDURE — 84443 ASSAY THYROID STIM HORMONE: CPT

## 2025-04-29 PROCEDURE — 84439 ASSAY OF FREE THYROXINE: CPT

## 2025-04-29 PROCEDURE — 80053 COMPREHEN METABOLIC PANEL: CPT

## 2025-04-29 PROCEDURE — 85025 COMPLETE CBC W/AUTO DIFF WBC: CPT

## 2025-04-29 PROCEDURE — 25010000002 DEXAMETHASONE PER 1 MG: Performed by: INTERNAL MEDICINE

## 2025-04-29 PROCEDURE — 25010000002 DIPHENHYDRAMINE PER 50 MG: Performed by: INTERNAL MEDICINE

## 2025-04-29 PROCEDURE — 25010000002 DURVALUMAB 50 MG/ML SOLUTION 10 ML VIAL: Performed by: INTERNAL MEDICINE

## 2025-04-29 PROCEDURE — 96413 CHEMO IV INFUSION 1 HR: CPT

## 2025-04-29 RX ORDER — DIPHENHYDRAMINE HYDROCHLORIDE 50 MG/ML
25 INJECTION, SOLUTION INTRAMUSCULAR; INTRAVENOUS ONCE
Status: COMPLETED | OUTPATIENT
Start: 2025-04-29 | End: 2025-04-29

## 2025-04-29 RX ORDER — HEPARIN SODIUM (PORCINE) LOCK FLUSH IV SOLN 100 UNIT/ML 100 UNIT/ML
500 SOLUTION INTRAVENOUS AS NEEDED
OUTPATIENT
Start: 2025-04-29

## 2025-04-29 RX ORDER — SODIUM CHLORIDE 0.9 % (FLUSH) 0.9 %
10 SYRINGE (ML) INJECTION AS NEEDED
OUTPATIENT
Start: 2025-04-29

## 2025-04-29 RX ORDER — DEXAMETHASONE SODIUM PHOSPHATE 10 MG/ML
10 INJECTION, SOLUTION INTRA-ARTICULAR; INTRALESIONAL; INTRAMUSCULAR; INTRAVENOUS; SOFT TISSUE ONCE
Status: COMPLETED | OUTPATIENT
Start: 2025-04-29 | End: 2025-04-29

## 2025-04-29 RX ORDER — FAMOTIDINE 10 MG/ML
10 INJECTION, SOLUTION INTRAVENOUS ONCE
Status: COMPLETED | OUTPATIENT
Start: 2025-04-29 | End: 2025-04-29

## 2025-04-29 RX ORDER — SODIUM CHLORIDE 0.9 % (FLUSH) 0.9 %
10 SYRINGE (ML) INJECTION AS NEEDED
Status: DISCONTINUED | OUTPATIENT
Start: 2025-04-29 | End: 2025-04-29 | Stop reason: HOSPADM

## 2025-04-29 RX ORDER — SODIUM CHLORIDE 9 MG/ML
20 INJECTION, SOLUTION INTRAVENOUS ONCE
Status: COMPLETED | OUTPATIENT
Start: 2025-04-29 | End: 2025-04-29

## 2025-04-29 RX ORDER — HEPARIN SODIUM (PORCINE) LOCK FLUSH IV SOLN 100 UNIT/ML 100 UNIT/ML
500 SOLUTION INTRAVENOUS AS NEEDED
Status: DISCONTINUED | OUTPATIENT
Start: 2025-04-29 | End: 2025-04-29 | Stop reason: HOSPADM

## 2025-04-29 RX ADMIN — DIPHENHYDRAMINE HYDROCHLORIDE 25 MG: 50 INJECTION, SOLUTION INTRAMUSCULAR; INTRAVENOUS at 10:11

## 2025-04-29 RX ADMIN — HEPARIN 500 UNITS: 100 SYRINGE at 11:29

## 2025-04-29 RX ADMIN — SODIUM CHLORIDE 740 MG: 9 INJECTION, SOLUTION INTRAVENOUS at 10:15

## 2025-04-29 RX ADMIN — FAMOTIDINE 10 MG: 10 INJECTION INTRAVENOUS at 10:10

## 2025-04-29 RX ADMIN — Medication 10 ML: at 11:29

## 2025-04-29 RX ADMIN — DEXAMETHASONE SODIUM PHOSPHATE 10 MG: 10 INJECTION INTRAMUSCULAR; INTRAVENOUS at 10:06

## 2025-04-29 RX ADMIN — SODIUM CHLORIDE 20 ML/HR: 9 INJECTION, SOLUTION INTRAVENOUS at 10:00

## 2025-04-29 NOTE — TELEPHONE ENCOUNTER
Caller: WILLIAM PEREZ    Relationship: Emergency Contact    Best call back number: [phone]    What is the best time to reach you: ANYTIME    Who are you requesting to speak with (clinical staff, provider,  specific staff member): ATUL / CLINICAL    What was the call regarding: REQUESTING A CALL BACK FROM ATUL IN REGARDS TO A MISSED CALL, SHE WAS UNABLE TO RECEIVE VM

## 2025-04-29 NOTE — TELEPHONE ENCOUNTER
Return call back, spoke with patient wife, explained due to decline in Boogie GFR value, Dr Lombardi request that he increase his oral intake, drink more water x 1 liter per day and will recheck lab again next week 5/6/25 to see if kidney function has recovered, apt time and date relayed to wife, she v/u.

## 2025-04-29 NOTE — TELEPHONE ENCOUNTER
----- Message from Timoteo Lombardi sent at 4/29/2025 10:04 AM CDT -----  Hydrate 1 L p.o. daily-repeat BMP in 1 week  ----- Message -----  From: Lab, Background User  Sent: 4/29/2025   9:14 AM CDT  To: Timoteo Lombardi MD

## 2025-04-29 NOTE — TELEPHONE ENCOUNTER
Unable to contact patient Boogie Artis, message was left on patient wife voice mail, explained that Dr Lombardi had reviewed todays lab results 4/29/25, noted a decrease in in kidney function,   GFR: 59.8  Per Dr Ortiz instructions patient was asked to increase his oral intake, fluids x 1 liter per day, drink more water and will recheck his labs again x 1 week.  Apt [facility] 5/6/25  Lab order placed: CMP  If any questions or concern please contact the office, phone number was provided.

## 2025-05-06 ENCOUNTER — LAB (OUTPATIENT)
Dept: LAB | Facility: HOSPITAL | Age: 75
End: 2025-05-06
Payer: MEDICARE

## 2025-05-06 DIAGNOSIS — Z79.899 ENCOUNTER FOR LONG-TERM (CURRENT) USE OF HIGH-RISK MEDICATION: Primary | ICD-10-CM

## 2025-05-06 DIAGNOSIS — C34.92 SQUAMOUS CELL CARCINOMA OF LEFT LUNG: ICD-10-CM

## 2025-05-06 LAB
ALBUMIN SERPL-MCNC: 4.1 G/DL (ref 3.5–5.2)
ALBUMIN/GLOB SERPL: 1.3 G/DL
ALP SERPL-CCNC: 78 U/L (ref 39–117)
ALT SERPL W P-5'-P-CCNC: 7 U/L (ref 1–41)
ANION GAP SERPL CALCULATED.3IONS-SCNC: 10 MMOL/L (ref 5–15)
AST SERPL-CCNC: 9 U/L (ref 1–40)
BILIRUB SERPL-MCNC: 0.2 MG/DL (ref 0–1.2)
BUN SERPL-MCNC: 20 MG/DL (ref 8–23)
BUN/CREAT SERPL: 16 (ref 7–25)
CALCIUM SPEC-SCNC: 9 MG/DL (ref 8.6–10.5)
CHLORIDE SERPL-SCNC: 99 MMOL/L (ref 98–107)
CO2 SERPL-SCNC: 28 MMOL/L (ref 22–29)
CREAT SERPL-MCNC: 1.25 MG/DL (ref 0.76–1.27)
EGFRCR SERPLBLD CKD-EPI 2021: 60.4 ML/MIN/1.73
GLOBULIN UR ELPH-MCNC: 3.2 GM/DL
GLUCOSE SERPL-MCNC: 139 MG/DL (ref 65–99)
POTASSIUM SERPL-SCNC: 4.8 MMOL/L (ref 3.5–5.2)
PROT SERPL-MCNC: 7.3 G/DL (ref 6–8.5)
SODIUM SERPL-SCNC: 137 MMOL/L (ref 136–145)

## 2025-05-06 PROCEDURE — 80053 COMPREHEN METABOLIC PANEL: CPT

## 2025-05-06 PROCEDURE — 36415 COLL VENOUS BLD VENIPUNCTURE: CPT

## 2025-05-06 RX ORDER — FAMOTIDINE 10 MG/ML
10 INJECTION, SOLUTION INTRAVENOUS ONCE
Start: 2025-05-13 | End: 2025-05-13

## 2025-05-06 RX ORDER — SODIUM CHLORIDE 9 MG/ML
20 INJECTION, SOLUTION INTRAVENOUS ONCE
OUTPATIENT
Start: 2025-05-13

## 2025-05-06 RX ORDER — DIPHENHYDRAMINE HYDROCHLORIDE 50 MG/ML
25 INJECTION, SOLUTION INTRAMUSCULAR; INTRAVENOUS ONCE
Start: 2025-05-13 | End: 2025-05-13

## 2025-05-10 DIAGNOSIS — C34.92 SQUAMOUS CELL CARCINOMA OF LEFT LUNG: ICD-10-CM

## 2025-05-10 DIAGNOSIS — Z79.899 ENCOUNTER FOR LONG-TERM (CURRENT) USE OF HIGH-RISK MEDICATION: Primary | ICD-10-CM

## 2025-05-10 RX ORDER — SODIUM CHLORIDE 9 MG/ML
20 INJECTION, SOLUTION INTRAVENOUS ONCE
OUTPATIENT
Start: 2025-05-27

## 2025-05-10 RX ORDER — DIPHENHYDRAMINE HYDROCHLORIDE 50 MG/ML
25 INJECTION, SOLUTION INTRAMUSCULAR; INTRAVENOUS ONCE
Start: 2025-05-27 | End: 2025-05-27

## 2025-05-10 RX ORDER — FAMOTIDINE 10 MG/ML
10 INJECTION, SOLUTION INTRAVENOUS ONCE
Start: 2025-05-27 | End: 2025-05-27

## 2025-05-13 ENCOUNTER — LAB (OUTPATIENT)
Dept: LAB | Facility: HOSPITAL | Age: 75
End: 2025-05-13
Payer: MEDICARE

## 2025-05-13 ENCOUNTER — INFUSION (OUTPATIENT)
Dept: ONCOLOGY | Facility: HOSPITAL | Age: 75
End: 2025-05-13
Payer: MEDICARE

## 2025-05-13 VITALS
HEIGHT: 68 IN | WEIGHT: 162.5 LBS | DIASTOLIC BLOOD PRESSURE: 56 MMHG | SYSTOLIC BLOOD PRESSURE: 114 MMHG | OXYGEN SATURATION: 91 % | HEART RATE: 71 BPM | TEMPERATURE: 97.1 F | RESPIRATION RATE: 18 BRPM | BODY MASS INDEX: 24.63 KG/M2

## 2025-05-13 DIAGNOSIS — C34.92 SQUAMOUS CELL CARCINOMA OF LEFT LUNG: Primary | ICD-10-CM

## 2025-05-13 DIAGNOSIS — C34.92 SQUAMOUS CELL CARCINOMA OF LEFT LUNG: ICD-10-CM

## 2025-05-13 DIAGNOSIS — Z95.828 PORT-A-CATH IN PLACE: ICD-10-CM

## 2025-05-13 DIAGNOSIS — Z79.899 ENCOUNTER FOR LONG-TERM (CURRENT) USE OF HIGH-RISK MEDICATION: ICD-10-CM

## 2025-05-13 DIAGNOSIS — R94.6 ABNORMAL RESULTS OF THYROID FUNCTION STUDIES: ICD-10-CM

## 2025-05-13 LAB
ALBUMIN SERPL-MCNC: 4 G/DL (ref 3.5–5.2)
ALBUMIN/GLOB SERPL: 1.2 G/DL
ALP SERPL-CCNC: 80 U/L (ref 39–117)
ALT SERPL W P-5'-P-CCNC: 7 U/L (ref 1–41)
ANION GAP SERPL CALCULATED.3IONS-SCNC: 13 MMOL/L (ref 5–15)
AST SERPL-CCNC: 9 U/L (ref 1–40)
BASOPHILS # BLD AUTO: 0.07 10*3/MM3 (ref 0–0.2)
BASOPHILS NFR BLD AUTO: 0.8 % (ref 0–1.5)
BILIRUB SERPL-MCNC: 0.2 MG/DL (ref 0–1.2)
BUN SERPL-MCNC: 22 MG/DL (ref 8–23)
BUN/CREAT SERPL: 17.6 (ref 7–25)
CALCIUM SPEC-SCNC: 9.5 MG/DL (ref 8.6–10.5)
CHLORIDE SERPL-SCNC: 98 MMOL/L (ref 98–107)
CO2 SERPL-SCNC: 26 MMOL/L (ref 22–29)
CREAT SERPL-MCNC: 1.25 MG/DL (ref 0.76–1.27)
DEPRECATED RDW RBC AUTO: 52.3 FL (ref 37–54)
EGFRCR SERPLBLD CKD-EPI 2021: 60.4 ML/MIN/1.73
EOSINOPHIL # BLD AUTO: 0.32 10*3/MM3 (ref 0–0.4)
EOSINOPHIL NFR BLD AUTO: 3.5 % (ref 0.3–6.2)
ERYTHROCYTE [DISTWIDTH] IN BLOOD BY AUTOMATED COUNT: 14.5 % (ref 12.3–15.4)
GLOBULIN UR ELPH-MCNC: 3.3 GM/DL
GLUCOSE SERPL-MCNC: 159 MG/DL (ref 65–99)
HCT VFR BLD AUTO: 37.2 % (ref 37.5–51)
HGB BLD-MCNC: 11.4 G/DL (ref 13–17.7)
IMM GRANULOCYTES # BLD AUTO: 0.12 10*3/MM3 (ref 0–0.05)
IMM GRANULOCYTES NFR BLD AUTO: 1.3 % (ref 0–0.5)
LYMPHOCYTES # BLD AUTO: 1.13 10*3/MM3 (ref 0.7–3.1)
LYMPHOCYTES NFR BLD AUTO: 12.5 % (ref 19.6–45.3)
MAGNESIUM SERPL-MCNC: 1.7 MG/DL (ref 1.6–2.4)
MCH RBC QN AUTO: 30.2 PG (ref 26.6–33)
MCHC RBC AUTO-ENTMCNC: 30.6 G/DL (ref 31.5–35.7)
MCV RBC AUTO: 98.4 FL (ref 79–97)
MONOCYTES # BLD AUTO: 0.72 10*3/MM3 (ref 0.1–0.9)
MONOCYTES NFR BLD AUTO: 7.9 % (ref 5–12)
NEUTROPHILS NFR BLD AUTO: 6.71 10*3/MM3 (ref 1.7–7)
NEUTROPHILS NFR BLD AUTO: 74 % (ref 42.7–76)
NRBC BLD AUTO-RTO: 0 /100 WBC (ref 0–0.2)
PLATELET # BLD AUTO: 236 10*3/MM3 (ref 140–450)
PMV BLD AUTO: 9.5 FL (ref 6–12)
POTASSIUM SERPL-SCNC: 4.8 MMOL/L (ref 3.5–5.2)
PROT SERPL-MCNC: 7.3 G/DL (ref 6–8.5)
RBC # BLD AUTO: 3.78 10*6/MM3 (ref 4.14–5.8)
SODIUM SERPL-SCNC: 137 MMOL/L (ref 136–145)
T4 FREE SERPL-MCNC: 1.16 NG/DL (ref 0.92–1.68)
TSH SERPL DL<=0.05 MIU/L-ACNC: 1.47 UIU/ML (ref 0.27–4.2)
WBC NRBC COR # BLD AUTO: 9.07 10*3/MM3 (ref 3.4–10.8)

## 2025-05-13 PROCEDURE — 25010000002 DEXAMETHASONE PER 1 MG: Performed by: INTERNAL MEDICINE

## 2025-05-13 PROCEDURE — 36415 COLL VENOUS BLD VENIPUNCTURE: CPT

## 2025-05-13 PROCEDURE — 96413 CHEMO IV INFUSION 1 HR: CPT

## 2025-05-13 PROCEDURE — 84443 ASSAY THYROID STIM HORMONE: CPT

## 2025-05-13 PROCEDURE — 96375 TX/PRO/DX INJ NEW DRUG ADDON: CPT

## 2025-05-13 PROCEDURE — 25010000002 HEPARIN LOCK FLUSH PER 10 UNITS: Performed by: INTERNAL MEDICINE

## 2025-05-13 PROCEDURE — 25010000002 DIPHENHYDRAMINE PER 50 MG: Performed by: INTERNAL MEDICINE

## 2025-05-13 PROCEDURE — 85025 COMPLETE CBC W/AUTO DIFF WBC: CPT

## 2025-05-13 PROCEDURE — 25010000002 FAMOTIDINE 10 MG/ML SOLUTION: Performed by: INTERNAL MEDICINE

## 2025-05-13 PROCEDURE — 25010000002 DURVALUMAB 50 MG/ML SOLUTION 2.4 ML VIAL: Performed by: INTERNAL MEDICINE

## 2025-05-13 PROCEDURE — 83735 ASSAY OF MAGNESIUM: CPT

## 2025-05-13 PROCEDURE — 80053 COMPREHEN METABOLIC PANEL: CPT

## 2025-05-13 PROCEDURE — 25810000003 SODIUM CHLORIDE 0.9 % SOLUTION: Performed by: INTERNAL MEDICINE

## 2025-05-13 PROCEDURE — 25810000003 SODIUM CHLORIDE 0.9 % SOLUTION 250 ML FLEX CONT: Performed by: INTERNAL MEDICINE

## 2025-05-13 PROCEDURE — 25010000002 DURVALUMAB 50 MG/ML SOLUTION 10 ML VIAL: Performed by: INTERNAL MEDICINE

## 2025-05-13 PROCEDURE — 84439 ASSAY OF FREE THYROXINE: CPT

## 2025-05-13 RX ORDER — SODIUM CHLORIDE 9 MG/ML
20 INJECTION, SOLUTION INTRAVENOUS ONCE
Status: COMPLETED | OUTPATIENT
Start: 2025-05-13 | End: 2025-05-13

## 2025-05-13 RX ORDER — SODIUM CHLORIDE 0.9 % (FLUSH) 0.9 %
10 SYRINGE (ML) INJECTION AS NEEDED
OUTPATIENT
Start: 2025-05-13

## 2025-05-13 RX ORDER — DIPHENHYDRAMINE HYDROCHLORIDE 50 MG/ML
25 INJECTION, SOLUTION INTRAMUSCULAR; INTRAVENOUS ONCE
Status: COMPLETED | OUTPATIENT
Start: 2025-05-13 | End: 2025-05-13

## 2025-05-13 RX ORDER — HEPARIN SODIUM (PORCINE) LOCK FLUSH IV SOLN 100 UNIT/ML 100 UNIT/ML
500 SOLUTION INTRAVENOUS AS NEEDED
Status: DISCONTINUED | OUTPATIENT
Start: 2025-05-13 | End: 2025-05-13 | Stop reason: HOSPADM

## 2025-05-13 RX ORDER — HEPARIN SODIUM (PORCINE) LOCK FLUSH IV SOLN 100 UNIT/ML 100 UNIT/ML
500 SOLUTION INTRAVENOUS AS NEEDED
OUTPATIENT
Start: 2025-05-13

## 2025-05-13 RX ORDER — FAMOTIDINE 10 MG/ML
10 INJECTION, SOLUTION INTRAVENOUS ONCE
Status: COMPLETED | OUTPATIENT
Start: 2025-05-13 | End: 2025-05-13

## 2025-05-13 RX ORDER — DEXAMETHASONE SODIUM PHOSPHATE 10 MG/ML
10 INJECTION, SOLUTION INTRA-ARTICULAR; INTRALESIONAL; INTRAMUSCULAR; INTRAVENOUS; SOFT TISSUE ONCE
Status: COMPLETED | OUTPATIENT
Start: 2025-05-13 | End: 2025-05-13

## 2025-05-13 RX ORDER — SODIUM CHLORIDE 0.9 % (FLUSH) 0.9 %
10 SYRINGE (ML) INJECTION AS NEEDED
Status: DISCONTINUED | OUTPATIENT
Start: 2025-05-13 | End: 2025-05-13 | Stop reason: HOSPADM

## 2025-05-13 RX ADMIN — HEPARIN 500 UNITS: 100 SYRINGE at 11:19

## 2025-05-13 RX ADMIN — FAMOTIDINE 10 MG: 10 INJECTION INTRAVENOUS at 09:48

## 2025-05-13 RX ADMIN — Medication 10 ML: at 11:19

## 2025-05-13 RX ADMIN — SODIUM CHLORIDE 20 ML/HR: 9 INJECTION, SOLUTION INTRAVENOUS at 09:48

## 2025-05-13 RX ADMIN — DEXAMETHASONE SODIUM PHOSPHATE 10 MG: 10 INJECTION INTRAMUSCULAR; INTRAVENOUS at 09:55

## 2025-05-13 RX ADMIN — SODIUM CHLORIDE 740 MG: 9 INJECTION, SOLUTION INTRAVENOUS at 10:05

## 2025-05-13 RX ADMIN — DIPHENHYDRAMINE HYDROCHLORIDE 25 MG: 50 INJECTION, SOLUTION INTRAMUSCULAR; INTRAVENOUS at 09:52

## 2025-05-19 ENCOUNTER — TELEPHONE (OUTPATIENT)
Dept: PULMONOLOGY | Facility: CLINIC | Age: 75
End: 2025-05-19
Payer: MEDICARE

## 2025-05-19 DIAGNOSIS — J44.9 STAGE 3 SEVERE COPD BY GOLD CLASSIFICATION: Primary | ICD-10-CM

## 2025-05-19 NOTE — TELEPHONE ENCOUNTER
[facility] called stating that they was wanting a prior authorization on Breztri for the patient. A prior authorization was started for the patient on the phone with the ref # of  [id number].  The only other thing that is approved on the patients insurance is Ipratropium/Albuterol Neb solution. I told her that it wouldn't be reasonable for the patient to be able to get that since he couldn't access that all of the time and needing a nebulizer to be able to use that.    72 hours to hear back from approval.

## 2025-05-21 ENCOUNTER — HOSPITAL ENCOUNTER (OUTPATIENT)
Dept: CT IMAGING | Facility: HOSPITAL | Age: 75
Discharge: HOME | End: 2025-05-21
Admitting: INTERNAL MEDICINE
Payer: MEDICARE

## 2025-05-21 DIAGNOSIS — C34.92 SQUAMOUS CELL CARCINOMA OF LEFT LUNG: ICD-10-CM

## 2025-05-21 LAB — CREAT BLDA-MCNC: 1.5 MG/DL (ref 0.6–1.3)

## 2025-05-21 PROCEDURE — 74176 CT ABD & PELVIS W/O CONTRAST: CPT

## 2025-05-21 PROCEDURE — 71250 CT THORAX DX C-: CPT

## 2025-05-21 PROCEDURE — 82565 ASSAY OF CREATININE: CPT

## 2025-05-21 RX ORDER — IOPAMIDOL 612 MG/ML
100 INJECTION, SOLUTION INTRAVASCULAR
Status: DISCONTINUED | OUTPATIENT
Start: 2025-05-21 | End: 2025-05-21

## 2025-05-21 NOTE — TELEPHONE ENCOUNTER
Patient walked in office stating the breztri works good and doesn't raise his sugar like the trelegy did.       He is wanting a script sent to [facility] on [address] due to his daughter has signed him up to get it free for 1 year.      I told him that Dr. Monsivais is out but I would send to the on call provider.            Rx Refill Note  Requested Prescriptions     Pending Prescriptions Disp Refills    Budeson-Glycopyrrol-Formoterol (BREZTRI) 160-9-4.8 MCG/ACT aerosol inhaler 10.7 g 11     Sig: Inhale 2 puffs 2 (Two) Times a Day.      Last office visit with prescribing clinician: 3/25/2025   Last telemedicine visit with prescribing clinician: Visit date not found   Next office visit with prescribing clinician: Visit date not found                         Would you like a call back once the refill request has been completed: [] Yes [] No    If the office needs to give you a call back, can they leave a voicemail: [] Yes [] No    Alicia Giles, TRISTAN  05/21/25, 10:46 CDT

## 2025-05-22 ENCOUNTER — RESULTS FOLLOW-UP (OUTPATIENT)
Dept: ONCOLOGY | Facility: CLINIC | Age: 75
End: 2025-05-22
Payer: MEDICARE

## 2025-05-22 DIAGNOSIS — R91.1 NODULE OF UPPER LOBE OF RIGHT LUNG: ICD-10-CM

## 2025-05-22 DIAGNOSIS — R91.8 MASS OF LEFT LUNG: ICD-10-CM

## 2025-05-22 DIAGNOSIS — C34.11 MALIGNANT NEOPLASM OF UPPER LOBE OF RIGHT LUNG: ICD-10-CM

## 2025-05-22 DIAGNOSIS — C34.92 SQUAMOUS CELL CARCINOMA OF LEFT LUNG: Primary | ICD-10-CM

## 2025-05-22 NOTE — TELEPHONE ENCOUNTER
----- Message from Timoteo Lombardi sent at 5/21/2025  6:58 PM CDT -----  Orally hydrate 1 liter po daily  Repeat creatinine in 1 week  Schedule PET scan neck to thighs  ----- Message -----  From: Shama, Rad Results [place] In  Sent: 5/21/2025   1:24 PM CDT  To: Timoteo Lombardi MD

## 2025-05-22 NOTE — TELEPHONE ENCOUNTER
Spoke with patient spouse, Sarah. I notified her to increase fluid intake to 1 liter daily, to repeat labs next week and that we are setting up for the patient to have a PET scan due to lung nodules found on his CT. I will call her with that appointment information. She voiced understanding. No c/o were voiced at this time.

## 2025-05-22 NOTE — TELEPHONE ENCOUNTER
----- Message from Timoteo Lombardi sent at 5/21/2025  6:58 PM CDT -----  Orally hydrate 1 liter po daily  Repeat creatinine in 1 week  Schedule PET scan neck to thighs  ----- Message -----  From: Shama, Rad Results [place] In  Sent: 5/21/2025   1:24 PM CDT  To: Timoteo Lombardi MD     Unable to assess due to medical condition

## 2025-05-22 NOTE — TELEPHONE ENCOUNTER
Notified Sarah of PET scan appointment. Sending instructions to home address. Monday, June 2 at 8 am at the [facility]. She verbalized understanding. No c/o were voiced at this time.

## 2025-05-27 ENCOUNTER — LAB (OUTPATIENT)
Dept: LAB | Facility: HOSPITAL | Age: 75
End: 2025-05-27
Payer: MEDICARE

## 2025-05-27 ENCOUNTER — INFUSION (OUTPATIENT)
Dept: ONCOLOGY | Facility: HOSPITAL | Age: 75
End: 2025-05-27
Payer: MEDICARE

## 2025-05-27 VITALS
HEIGHT: 68 IN | TEMPERATURE: 98.4 F | BODY MASS INDEX: 24.55 KG/M2 | SYSTOLIC BLOOD PRESSURE: 113 MMHG | WEIGHT: 162 LBS | HEART RATE: 71 BPM | RESPIRATION RATE: 18 BRPM | DIASTOLIC BLOOD PRESSURE: 55 MMHG | OXYGEN SATURATION: 93 %

## 2025-05-27 DIAGNOSIS — C34.92 SQUAMOUS CELL CARCINOMA OF LEFT LUNG: Primary | ICD-10-CM

## 2025-05-27 DIAGNOSIS — Z79.899 ENCOUNTER FOR LONG-TERM (CURRENT) USE OF HIGH-RISK MEDICATION: ICD-10-CM

## 2025-05-27 DIAGNOSIS — Z95.828 PORT-A-CATH IN PLACE: ICD-10-CM

## 2025-05-27 DIAGNOSIS — R94.6 ABNORMAL RESULTS OF THYROID FUNCTION STUDIES: ICD-10-CM

## 2025-05-27 DIAGNOSIS — C34.92 SQUAMOUS CELL CARCINOMA OF LEFT LUNG: ICD-10-CM

## 2025-05-27 LAB
ALBUMIN SERPL-MCNC: 3.9 G/DL (ref 3.5–5.2)
ALBUMIN/GLOB SERPL: 1.2 G/DL
ALP SERPL-CCNC: 69 U/L (ref 39–117)
ALT SERPL W P-5'-P-CCNC: 5 U/L (ref 1–41)
ANION GAP SERPL CALCULATED.3IONS-SCNC: 14 MMOL/L (ref 5–15)
AST SERPL-CCNC: 7 U/L (ref 1–40)
BASOPHILS # BLD AUTO: 0.03 10*3/MM3 (ref 0–0.2)
BASOPHILS NFR BLD AUTO: 0.4 % (ref 0–1.5)
BILIRUB SERPL-MCNC: 0.2 MG/DL (ref 0–1.2)
BUN SERPL-MCNC: 17 MG/DL (ref 8–23)
BUN/CREAT SERPL: 13.6 (ref 7–25)
CALCIUM SPEC-SCNC: 9.5 MG/DL (ref 8.6–10.5)
CEA SERPL-MCNC: 3.62 NG/ML
CHLORIDE SERPL-SCNC: 100 MMOL/L (ref 98–107)
CO2 SERPL-SCNC: 25 MMOL/L (ref 22–29)
CREAT SERPL-MCNC: 1.25 MG/DL (ref 0.76–1.27)
DEPRECATED RDW RBC AUTO: 51.2 FL (ref 37–54)
EGFRCR SERPLBLD CKD-EPI 2021: 60.4 ML/MIN/1.73
EOSINOPHIL # BLD AUTO: 0.21 10*3/MM3 (ref 0–0.4)
EOSINOPHIL NFR BLD AUTO: 2.5 % (ref 0.3–6.2)
ERYTHROCYTE [DISTWIDTH] IN BLOOD BY AUTOMATED COUNT: 14.2 % (ref 12.3–15.4)
GLOBULIN UR ELPH-MCNC: 3.2 GM/DL
GLUCOSE SERPL-MCNC: 96 MG/DL (ref 65–99)
HCT VFR BLD AUTO: 35.1 % (ref 37.5–51)
HGB BLD-MCNC: 10.7 G/DL (ref 13–17.7)
IMM GRANULOCYTES # BLD AUTO: 0.06 10*3/MM3 (ref 0–0.05)
IMM GRANULOCYTES NFR BLD AUTO: 0.7 % (ref 0–0.5)
LYMPHOCYTES # BLD AUTO: 1.03 10*3/MM3 (ref 0.7–3.1)
LYMPHOCYTES NFR BLD AUTO: 12.5 % (ref 19.6–45.3)
MAGNESIUM SERPL-MCNC: 1.8 MG/DL (ref 1.6–2.4)
MCH RBC QN AUTO: 29.9 PG (ref 26.6–33)
MCHC RBC AUTO-ENTMCNC: 30.5 G/DL (ref 31.5–35.7)
MCV RBC AUTO: 98 FL (ref 79–97)
MONOCYTES # BLD AUTO: 0.7 10*3/MM3 (ref 0.1–0.9)
MONOCYTES NFR BLD AUTO: 8.5 % (ref 5–12)
NEUTROPHILS NFR BLD AUTO: 6.21 10*3/MM3 (ref 1.7–7)
NEUTROPHILS NFR BLD AUTO: 75.4 % (ref 42.7–76)
NRBC BLD AUTO-RTO: 0 /100 WBC (ref 0–0.2)
PLATELET # BLD AUTO: 228 10*3/MM3 (ref 140–450)
PMV BLD AUTO: 9.3 FL (ref 6–12)
POTASSIUM SERPL-SCNC: 4.6 MMOL/L (ref 3.5–5.2)
PROT SERPL-MCNC: 7.1 G/DL (ref 6–8.5)
RBC # BLD AUTO: 3.58 10*6/MM3 (ref 4.14–5.8)
SODIUM SERPL-SCNC: 139 MMOL/L (ref 136–145)
T4 FREE SERPL-MCNC: 1.11 NG/DL (ref 0.92–1.68)
TSH SERPL DL<=0.05 MIU/L-ACNC: 1.29 UIU/ML (ref 0.27–4.2)
WBC NRBC COR # BLD AUTO: 8.24 10*3/MM3 (ref 3.4–10.8)

## 2025-05-27 PROCEDURE — 25010000002 DURVALUMAB 50 MG/ML SOLUTION 10 ML VIAL: Performed by: INTERNAL MEDICINE

## 2025-05-27 PROCEDURE — 83735 ASSAY OF MAGNESIUM: CPT

## 2025-05-27 PROCEDURE — 80053 COMPREHEN METABOLIC PANEL: CPT

## 2025-05-27 PROCEDURE — 25010000002 DEXAMETHASONE PER 1 MG: Performed by: INTERNAL MEDICINE

## 2025-05-27 PROCEDURE — 96375 TX/PRO/DX INJ NEW DRUG ADDON: CPT

## 2025-05-27 PROCEDURE — 84439 ASSAY OF FREE THYROXINE: CPT

## 2025-05-27 PROCEDURE — 25010000002 DURVALUMAB 50 MG/ML SOLUTION 2.4 ML VIAL: Performed by: INTERNAL MEDICINE

## 2025-05-27 PROCEDURE — 25010000002 DIPHENHYDRAMINE PER 50 MG: Performed by: INTERNAL MEDICINE

## 2025-05-27 PROCEDURE — 25810000003 SODIUM CHLORIDE 0.9 % SOLUTION 250 ML FLEX CONT: Performed by: INTERNAL MEDICINE

## 2025-05-27 PROCEDURE — 36415 COLL VENOUS BLD VENIPUNCTURE: CPT

## 2025-05-27 PROCEDURE — 25010000002 FAMOTIDINE 10 MG/ML SOLUTION: Performed by: INTERNAL MEDICINE

## 2025-05-27 PROCEDURE — 25810000003 SODIUM CHLORIDE 0.9 % SOLUTION: Performed by: INTERNAL MEDICINE

## 2025-05-27 PROCEDURE — 96413 CHEMO IV INFUSION 1 HR: CPT

## 2025-05-27 PROCEDURE — 25010000002 HEPARIN LOCK FLUSH PER 10 UNITS: Performed by: INTERNAL MEDICINE

## 2025-05-27 PROCEDURE — 84443 ASSAY THYROID STIM HORMONE: CPT

## 2025-05-27 PROCEDURE — 82378 CARCINOEMBRYONIC ANTIGEN: CPT

## 2025-05-27 PROCEDURE — 85025 COMPLETE CBC W/AUTO DIFF WBC: CPT

## 2025-05-27 RX ORDER — DIPHENHYDRAMINE HYDROCHLORIDE 50 MG/ML
25 INJECTION, SOLUTION INTRAMUSCULAR; INTRAVENOUS ONCE
Status: COMPLETED | OUTPATIENT
Start: 2025-05-27 | End: 2025-05-27

## 2025-05-27 RX ORDER — SODIUM CHLORIDE 0.9 % (FLUSH) 0.9 %
10 SYRINGE (ML) INJECTION AS NEEDED
OUTPATIENT
Start: 2025-05-27

## 2025-05-27 RX ORDER — FAMOTIDINE 10 MG/ML
10 INJECTION, SOLUTION INTRAVENOUS ONCE
Status: COMPLETED | OUTPATIENT
Start: 2025-05-27 | End: 2025-05-27

## 2025-05-27 RX ORDER — SODIUM CHLORIDE 0.9 % (FLUSH) 0.9 %
10 SYRINGE (ML) INJECTION AS NEEDED
Status: DISCONTINUED | OUTPATIENT
Start: 2025-05-27 | End: 2025-05-27 | Stop reason: HOSPADM

## 2025-05-27 RX ORDER — HEPARIN SODIUM (PORCINE) LOCK FLUSH IV SOLN 100 UNIT/ML 100 UNIT/ML
500 SOLUTION INTRAVENOUS AS NEEDED
Status: DISCONTINUED | OUTPATIENT
Start: 2025-05-27 | End: 2025-05-27 | Stop reason: HOSPADM

## 2025-05-27 RX ORDER — DEXAMETHASONE SODIUM PHOSPHATE 10 MG/ML
10 INJECTION, SOLUTION INTRA-ARTICULAR; INTRALESIONAL; INTRAMUSCULAR; INTRAVENOUS; SOFT TISSUE ONCE
Status: COMPLETED | OUTPATIENT
Start: 2025-05-27 | End: 2025-05-27

## 2025-05-27 RX ORDER — HEPARIN SODIUM (PORCINE) LOCK FLUSH IV SOLN 100 UNIT/ML 100 UNIT/ML
500 SOLUTION INTRAVENOUS AS NEEDED
OUTPATIENT
Start: 2025-05-27

## 2025-05-27 RX ORDER — SODIUM CHLORIDE 9 MG/ML
20 INJECTION, SOLUTION INTRAVENOUS ONCE
Status: COMPLETED | OUTPATIENT
Start: 2025-05-27 | End: 2025-05-27

## 2025-05-27 RX ADMIN — DEXAMETHASONE SODIUM PHOSPHATE 10 MG: 10 INJECTION INTRAMUSCULAR; INTRAVENOUS at 10:15

## 2025-05-27 RX ADMIN — DIPHENHYDRAMINE HYDROCHLORIDE 25 MG: 50 INJECTION, SOLUTION INTRAMUSCULAR; INTRAVENOUS at 10:22

## 2025-05-27 RX ADMIN — SODIUM CHLORIDE 20 ML/HR: 9 INJECTION, SOLUTION INTRAVENOUS at 10:15

## 2025-05-27 RX ADMIN — FAMOTIDINE 10 MG: 10 INJECTION INTRAVENOUS at 10:19

## 2025-05-27 RX ADMIN — SODIUM CHLORIDE 740 MG: 9 INJECTION, SOLUTION INTRAVENOUS at 10:34

## 2025-05-27 RX ADMIN — HEPARIN 500 UNITS: 100 SYRINGE at 12:09

## 2025-05-27 RX ADMIN — Medication 10 ML: at 12:09

## 2025-05-27 NOTE — PROGRESS NOTES
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  He has received definitive radiation (6000 cGy/33 fx), completed, 1/30/25 + concurrent paclitaxel+carboplatin- C1, 12/9/24; C2, 12/16/24; C3, 12/23/24; C4, 12/30/24; C5, 1/13/25; C6, 1/27/25 - then consolidation durvalumab- C1, 3/4/25; C2, 3/18/25; C3, 4/1/25; C4, 4/29/25; C5, 5/13/25; C6, 5/27/25. He is accompanied by his wife Sarah (previously with a daughter, Linette).    I have reviewed the HPI and verified with the patient the accuracy of it. No changes to interval history since the information was documented. Timoteo Lombardi MD 06/02/25      Summary of lung cancer histories/prior interventions:  - Medical history includes COPD, MRSA infection (respiratory culture, 5/1/24), migraines, bleeding ulcer, transfusion, left upper lobectomy, 4/19/23 for stage I pT1c,pN0M0 squamous cell carcinoma.    -  8/14/23 PET+ RUL lung neoplasm. He completed 5000 cGy in 4 fractions to the right upper lobe of the lung on 09/27/2023.   -  4/29/24- Diagnosed with squamous cell of the right upper lobe via bronchoscopy.   -  Completed 5000 cGy  in 5 fractions to the right upper lobe of the lung on 06/05/2024.   -  9/16/24- PET Scan- LEFT lower lobe pneumonia. Neoplastic LEFT upper lobe lung nodules adjacent to the LEFT hilum. Neoplastic nodule within the LEFT bronchus. Small focus of indeterminate FDG uptake in the posterior midline neck between the C5 and C6 spinous processes.   - 10/17/24- Bronchoscopy revealed fragments of squamous cell carcinoma: Stage IIIa (T3 N1 M0) squamous cell  carcinoma left upper lobe of the lung.    -  1/29/24- Consult by Dr. Ortiz who recommended a course of external beam radiation, likely delivered concurrently with systemic therapy under the medical oncology service, anticipate 6600 cGy over 33 treatments.   - 12/6/24- MRI brain- No mets  - Definitive radiation (6000 cGy/33 fractions), completed 1/30/25  -Definitive concurrent paclitaxel+carboplatin- C1, 12/9/24; C2, 12/16/24; C3, 12/23/24; C4, 12/30/24; C5, 1/13/25; C6, 1/27/25  -Consolidation durvalumab- C1, 3/4/25; C2, 3/18/25; C3, 4/1/25; C4, 4/29/25; C5, 5/13/25; C6, 5/27/25.     Diagnostic abnormalities:  01/16/2023 - CT Chest - [place]:  Spiculated perihilar left upper lobe pulmonary nodule measures 2.7 cm. This is concerning for primary neoplasm. PET/CT or bronch recommended for further evaluation.   0.8 cm noncalcified pulmonary nodule in the right upper lobe. This is at the lower limits of normal for PET/CT detection. Benign granulomatous disease, metastatic disease, or 2nd primary pulmonary malignancy could be considered.   No enlarged mediastinal or hilar lymph nodes     01/17/2023 - Pulmonary Function Tests - [place]:  Moderate obstructive lung disease     01/26/2023 - Bronchoscopy with biopsy per :  RUL, lower lobe, middle lobe were negative. The left upper lobe, no lesion, lingula no lesion seen.  SAMINA washing:  Negative for malignant cells; reactive bronchial cells, macrophages, and inflammation  SAMINA brushing:  Negative for malignant cells; markedly reactive bronchial cells, macrophages, and inflammation     02/09/2023 - PET Scan:  2.8 cm hypermetabolic central LEFT upper lobe pulmonary nodule. This likely represents a primary lung neoplasm.  0.8 cm hypermetabolic pulmonary nodule in the RIGHT upper lobe. This is highly suspicious for either a contralateral pulmonary metastasis or second primary lung neoplasm.   No hypermetabolic thoracic lymph nodes. No evidence of hypermetabolic  metastatic disease in the neck, abdomen, or pelvis.  1.5 cm hypermetabolic nodule in the RIGHT anterior rectum, highly concerning for underlying rectal polyp. Recommend correlation with colonoscopy/sigmoidoscopy.     02/27/2023 - Colonoscopy:  Rectal polyp, excision:  Villous adenoma, negative for high-grade glandular dysplasia or malignancy.  Colon polyp at 20 cm, biopsy:  Fragments of serrated polyp, favor sessile serrated lesion/polyp.  Colon polyp at 10 cm, biopsy:  Fragments of serrated polyp, favor sessile serrated lesion/polyp.     03/08/2023 - CT Chest without contrast:  Pulmonary emphysema with LEFT hilar/upper lobe lung mass measuring approximately 4 cm in greatest dimension. There is mild nodular airspace disease laterally to this lesion which may represent postobstructive atelectasis or infiltrate.   10 mm irregular nodule within the RIGHT lung apex (series 2, image 101). This is concerning for metastatic lesion. Additional 4 mm noncalcified nodule at the RIGHT lung apex (series 2, image 85).   Evidence of prior granulomatous disease.      03/09/2023 - Bronchoscopy/EBUS per :  Left upper lobe, EBUS (ThinPrep and cell block section):   Squamous cell carcinoma.   Bronchial washing (ThinPrep and cell block section):   No malignant cells identified.   Benign bronchial epithelial cells, and alveolar macrophages.      04/19/2023 -  Left upper lobectomy and lymph node excision per Dr.Robert Irwin:   Lymph node, level 9 lymph node biopsy: Benign fibroadipose tissue.   Lymph node, level 10 posterior hilar lymph node biopsy: 1 lymph node, negative for evidence of malignancy.   Lymph node, level 6 lymph node biopsy: 2 lymph nodes, negative for evidence of malignancy.   Lymph node, level 11 interlobar lymph node biopsy: 1 lymph node, negative for evidence of malignancy.   Lymph node, level 10 anterior hilar lymph node biopsy: 2 lymph nodes, negative for evidence of malignancy.   Lymph node, level 12 lobar  lymph node biopsy: 1 lymph node, negative for evidence of malignancy.   Lung, left upper lobectomy:   Invasive moderately differentiated squamous cell carcinoma.   Invasive carcinoma measures 2.5 cm in greatest dimension grossly.   Invasive carcinoma extends to within 0.2 cm of the nearest bronchial surgical excision margin.   Squamous metaplasia identified at bronchial surgical excision margin.   Emphysematous changes identified involving the nonneoplastic lung parenchyma.   2 peribronchial lymph nodes, negative for evidence of malignancy.   AJCC STAGE:  pT1c, pN0, pMx      04/25/2023 -  CT Chest without contrast:  Interval placement of large bore chest tube with decrease in previously described large left hydropneumothorax.Residual hydropneumothorax present with air dissecting through anterior chest wall into subcutaneous soft tissues, unchanged.   Interval resolution of previously described debris within left bronchus.   Unchanged right apical 1.0 cm nodule.      07/24/2023 - Appointment with :  Left upper lobe SCC [id number], stage I,Jan 2023  -1/16/23 CT chest ([facility]): Spiculated perihilar left upper lobe pulmonary nodule measures 2.7cm. This is concerning for primary neoplasm. PET/CT or bronchoscopy is recommended to further evaluate. 0.8cm noncalcified pulmonary nodule in the right upper lobe. This is at the lower limits of normal for Pet/CT detection. Benign granulomatous disease, metastatic disease, or 2nd primary pulmonary malignancy could be considered. No enlarged mediastinal or hilar lymph nodes.  -1/26/23 Lung, left upper lobe, washing: Negative for malignant cells. Reactive bronchial cells, macrophages, and inflammation. Lung, left upper lobe, brushing: Negative for malignant cells. Markedly reactive bronchial cells, macrophages, and inflammation.  -2/9/23 [facility] scan ([facility]): 2.8 cm hypermetabolic central LEFT upper lobe pulmonary nodule. This likely represents a primary lung neoplasm. 0.8 cm  hypermetabolic pulmonary nodule in the RIGHT upper lobe. This is highly suspicious for either a contralateral pulmonary metastasis or second primary lung neoplasm. No hypermetabolic thoracic lymph nodes. No evidence of hypermetabolic metastatic disease in the neck, abdomen, or pelvis. 1.5 cm hypermetabolic nodule in the RIGHT anterior rectum, highly concerning for underlying rectal polyp. Recommend correlation with colonoscopy/sigmoidoscopy.   4/19/23 Lung, left upper lobectomy: Invasive moderately differentiated squamous cell carcinoma. Invasive carcinoma measures 2.5 cm in greatest dimension grossly. Invasive carcinoma extends to within 0.2 cm of the nearest bronchial surgical excision margin. Squamous metaplasia identified at bronchial surgical excision margin. Emphysematous changes identified involving the nonneoplastic lung parenchyma. 2 peribronchial lymph nodes, negative for evidence of malignancy.Lymph node, level 9 lymph node biopsy: Benign fibroadipose tissue. Lymph node, level 10 posterior hilar lymph node biopsy: 1 lymph node, negative for evidence of malignancy. Lymph node, level 6 lymph node biopsy: 2 lymph nodes, negative for evidence of malignancy. Lymph node, level 11 interlobar lymph node biopsy: 1 lymph node, negative for evidence of malignancy. Lymph node, level 10 anterior hilar lymph node biopsy: 2 lymph nodes, negative for evidence of malignancy. Lymph node, level 12 lobar lymph node biopsy: 1 lymph node, negative for evidence of malignancy. [id number], stage I  Plan:  -Repeat CT chest Nov 2023  RUL subcentimeter nodule (PET+)  -2/9/23 PET scan ([facility]): 2.8 cm hypermetabolic central LEFT upper lobe pulmonary nodule. This likely represents a primary lung neoplasm. 0.8 cm hypermetabolic pulmonary nodule in the RIGHT upper lobe. This is highly suspicious for either a contralateral pulmonary metastasis or second primary lung neoplasm. No hypermetabolic thoracic lymph nodes. No evidence of  hypermetabolic metastatic disease in the neck, abdomen, or pelvis. 1.5 cm hypermetabolic nodule in the RIGHT anterior rectum, highly concerning for underlying rectal polyp. Recommend correlation with colonoscopy/sigmoidoscopy.   -Referral Dr Ortiz for consideration SBRT RUL hypermetabolic nodule.  PLAN:  RTC with MD 2 months in [place] office  CBC, CMP, B12, Folate, LDH, Haptoglobin, iron profile, Ferritin, retic  Continue follow-up with Dr.Robert Irwin/CardioThoracic Surgery  Continue to follow up with Dr. Rowe  Iron Sulfate 325 mg p.o. twice daily  Follow Up:  Return in 2 months (on 9/24/2023) for Appointment with Dr. Vaughan in [place].  Refer to Dr Ortiz      08/09/2023 - Appointment with :  will order PET scan, they will call you  Return in 2 weeks for further discussion     08/14/2023 - PET Scan:  Abnormal PET/CT, there is increased size of a hypermetabolic pulmonary nodule in the right upper lobe lung apex that measures 13 mm, compared with 8 mm on the previous PET/CT. This has a maximum SUV of 8.1. This is concerning for active neoplasm.  Interval resection of the left upper lobe with posttreatment changes along the medial margin of the upper mediastinum, including mild infiltration of the mediastinal fat planes.  There is a subcutaneous nodule with surrounding fatty infiltration over the low back at the level of the upper sacrum which demonstrates hypermetabolism. This could be inflammatory, less likely a metastatic nodule. This measures 21 mm, follow-up ultrasound is recommended to determine if this is solid. Ultrasound-guided biopsy could be performed if indicated.  Interval resolution of the hypermetabolic nodule associated with the right rectum, however there is a new hypermetabolic nodule that maps to the right prostate gland. Correlation with PSA values is recommended.     08/16/2023 - Consult with :  I have recommended to treat the right lung nodule with SBRT, I anticipate a  course over 4-5 fractions, final course pending.   He does have a visible insect bite on his low back and I do believe this correlates with the PET findings of activity on the upper sacrum. Will order PSA for prostate gland findings. He has not had one drawn in some time.   Plan:  Plan on 4-5 pinpoint treatments to the lung nodule.   We will call you when to start treatments.   PSA today     08/18/2023 - Documentation per :  I spoke on the phone with this patient's wife Sarah and discussed his PSA of 2.9. This is considered a normal level, however on recent PET scan imaging for a lung nodule workup, a nodule within the prostate was noted.   I will refer him to urology for further management recommendations.     09/12/2023 - 09/27/2023 - Completed radiation course:  Received 5000 cGy in 4 fractions to the right upper lobe of the lung.     11/20/2023 - MRI Pelvis with and without contrast:  No suspicious lesions in the prostate (PI-RADS 3 or greater).      11/29/2023 - Appointment with :  I personally reviewed MRI today.    His MRI is negative for any suspicious lesions.    He has a volume of 34 cc.    I believe his hypermetabolic activity on PET scan was likely inflammatory.  I do not think he requires a biopsy based on his negative MRI and PSA level.    He will follow-up as needed.      12/20/2023 - CT Chest without contrast:  Decreased size of 6 mm spiculated right upper lobe pulmonary nodule (previously 13 mm). This nodule was hypermetabolic on prior PET/CT and highly suspicious for primary lung neoplasm.  Postoperative change of left upper lobectomy. No definite change/increase in soft tissue thickening along the lobectomy margin and mediastinum. Recommend continued imaging surveillance.  No evidence of disease progression in the chest. No thoracic lymphadenopathy.     12/27/2023 - Appointment with :  Return in 3 months with a CT chest before.      03/19/2024 - CT chest without  contrast:  1.1 cm irregular right upper lobe nodule, highly suspicious for malignancy.  5 mm left upper lobe nodule is indeterminate but does raise the suspicion for malignancy.  Stable size of the previously irradiated right apex nodule. Minimally increased adjacent subpleural parenchymal abnormality, likely related to post radiation changes.  Left upper lobectomy with stable postsurgical margins.  No mediastinal lymphadenopathy.     03/27/2024 - Consult with :  Will refer to pulmonology for bronchoscopy considerations of the new right upper lung nodule. He will return in 3 weeks for further recommendations.  I have instructed him to continue to see the other health care providers as per their scheduling.  Plan:  Referral to pulmonology for biopsy of new right lung nodule  Return in 3 weeks     04/16/2024 - Appointment with :  For wheezing, add bronchodilator. 100 mcg trelegy sample given, we reviewed PFT showing severe obstruction.  Likely moderate to severe copd with hx smoking  For lung nodule, will plan ion bronchoscopy to better assess rul nodule  Ask anesthesia to take particular care with remaining teeth  We demonstrated proper technique for use of ellipta device  Follow Up   Return in about 4 weeks (around 5/14/2024).     04/22/2024 - CT Chest without contrast:  RIGHT upper lobe 17 x 11 mm nodule.  LEFT upper lobe 6 x 5 mm nodule.  No pneumothorax or pleural effusion.     04/29/2024 - Bronchoscopy with biopsy per :  Lung, right upper lobe, Ion fine-needle aspiration, smears (2), ThinPrep preparation (1) and cell block:   Positive for malignant cells, consistent with moderately differentiated squamous cell carcinoma.  Comment: Immunohistochemical stain for p40 is positive in the neoplastic cells, supporting the above diagnostic impression.The control stained appropriately.  Bronchial washings, ThinPrep preparation (1) and cell block:   Positive for malignant cells, consistent with  moderately differentiated squamous cell carcinoma.  Lung, right upper lobe, bronchoalveolar lavage, ThinPrep preparation (1):   Positive for malignant cells, consistent with moderately to poorly differentiated squamous cell carcinoma.  AJCC stage: pTX pNX     04/29/2024 - Documentation per :  Dr. Nicola Monsivais notified me that patient cytology was positive in his lung.  He is scheduled to return to our office for treatment recommendations.  Also, it was incidentally noted by anesthesia during his procedure that he has a lesion in the oral cavity that needs additional evaluation and may potentially be another malignancy.     05/06/2024 - Appointment with :  This patient does have biopsy-proven squamous cell carcinoma of the right upper lobe.  We will proceed with SBRT radiotherapy to this  lesion.  He also has a lesion in the left lung which is nondiagnostic and we will continue to follow that lesion with serial radiographs  Plan:  We will call you when to start radiation treatment in approximately 2 weeks  Return in about 2 weeks (around 5/20/2024) for the first Radiation Treatment.     05/28/2024 - 06/05/2024 - Completed radiation course:  Received 5000 cGy in 5 fractions to the right upper lobe of the lung via Stereotactic Radiation Therapy - SRT.      09/03/2024 - CT chest without contrast:  Lung nodules seen previously have resolved.  There are indeterminate soft tissue nodules within the LEFT bronchus.  Consider PET/CT correlation.     09/09/2024 - Appointment with :  We discussed the new left bronchus findings on imaging. I will notify pulmonology for review of the imaging and further recommendations. We will continue routine follow-up/surveillance as discussed in 3 months with follow up CT scan before visit and I have instructed him to continue to see the other health care providers as per their scheduling.  Plan:  will discuss your scans with pulmonology  return in 3 months unless  you need to be seen sooner.     09/11/2024 - Documentation per Aston Medina APRN - radiation oncology:  I reviewed the recent CT chest imaging with Dr. Ortiz and discussed this with Dr. Monsivais and Erma Saucedo in pulmonology.   I will order a PET scan for further evaluation of the left bronchus finding and will get him in for an appointment to discuss further options with Dr. Ortiz.     09/16/2024 - PET Scan:  LEFT lower lobe pneumonia.  Neoplastic LEFT upper lobe lung nodules adjacent to the LEFT hilum.  Neoplastic nodule within the LEFT bronchus.  Small focus of indeterminate FDG uptake in the posterior midline neck between the C5 and C6 spinous processes.     10/08/2024 - Appointment with Mata Simon MD - [facility]:  Plan for bronchoscopy with APC and tumor debulking, EBUS/TBNA, bal next week.     10/17/2024 - Bronchus, left endobronchial mass, endobronchial biopsy:  Fragments of squamous cell carcinoma.        LABS    Lab Results - Last 18 Months   Lab Units 05/27/25  0903 05/13/25  0853 04/29/25  0906 04/01/25  0908 03/18/25  0909 03/04/25  1155   HEMOGLOBIN g/dL 10.7* 11.4* 10.3* 10.6* 10.7* 11.4*   HEMATOCRIT % 35.1* 37.2* 33.3* 33.9* 33.3* 36.6*   MCV fL 98.0* 98.4* 100.0* 98.5* 96.8 96.3   WBC 10*3/mm3 8.24 9.07 7.74 7.28 6.03 7.97   RDW % 14.2 14.5 15.8* 17.4* 18.6* 18.9*   MPV fL 9.3 9.5 9.4 9.6 9.1 9.3   PLATELETS 10*3/mm3 228 236 219 250 252 174   IMM GRAN % % 0.7* 1.3* 0.5 1.4* 0.8* 0.6*   NEUTROS ABS 10*3/mm3 6.21 6.71 5.63 5.45 4.53 6.09   LYMPHS ABS 10*3/mm3 1.03 1.13 0.98 0.67* 0.67* 0.97   MONOS ABS 10*3/mm3 0.70 0.72 0.67 0.74 0.56 0.59   EOS ABS 10*3/mm3 0.21 0.32 0.38 0.28 0.20 0.23   BASOS ABS 10*3/mm3 0.03 0.07 0.04 0.04 0.02 0.04   IMMATURE GRANS (ABS) 10*3/mm3 0.06* 0.12* 0.04 0.10* 0.05 0.05   NRBC /100 WBC 0.0 0.0 0.0 0.0 0.0 0.0       Lab Results - Last 18 Months   Lab Units 05/27/25  0903 05/21/25  0955 05/13/25  0853 05/06/25  0911 04/29/25  0906 04/01/25  0908 03/18/25  0909  "  GLUCOSE mg/dL 96  --  159* 139* 162* 142* 214*   SODIUM mmol/L 139  --  137 137 139 138 138   POTASSIUM mmol/L 4.6  --  4.8 4.8 5.2 4.6 4.1   CO2 mmol/L 25.0  --  26.0 28.0 24.0 27.0 26.0   CHLORIDE mmol/L 100  --  98 99 102 98 99   ANION GAP mmol/L 14.0  --  13.0 10.0 13.0 13.0 13.0   CREATININE mg/dL 1.25 1.50* 1.25 1.25 1.26 1.15 1.11   BUN mg/dL 17  --  22 20 19 25* 17   BUN / CREAT RATIO  13.6  --  17.6 16.0 15.1 21.7 15.3   CALCIUM mg/dL 9.5  --  9.5 9.0 9.0 10.0 9.1   ALK PHOS U/L 69  --  80 78 76 76 87   TOTAL PROTEIN g/dL 7.1  --  7.3 7.3 7.1 7.5 7.3   ALT (SGPT) U/L 5  --  7 7 10 7 8   AST (SGOT) U/L 7  --  9 9 10 8 7   BILIRUBIN mg/dL 0.2  --  0.2 0.2 0.2 0.2 0.2   ALBUMIN g/dL 3.9  --  4.0 4.1 4.0 4.0 4.0   GLOBULIN gm/dL 3.2  --  3.3 3.2 3.1 3.5 3.3       Lab Results - Last 18 Months   Lab Units 05/27/25  0903 04/01/25  0908 03/04/25  1155 12/30/24  0821 11/26/24  1223   CEA ng/mL 3.62 3.23 3.44 4.42 3.46       Lab Results - Last 18 Months   Lab Units 05/27/25  0903 05/13/25  0853 04/29/25  0906 04/01/25  0908 03/04/25  1155 11/26/24  1223   IRON mcg/dL  --   --   --   --   --  51*   TIBC mcg/dL  --   --   --   --   --  423   IRON SATURATION (TSAT) %  --   --   --   --   --  12*   FERRITIN ng/mL  --   --   --   --   --  166.40   TSH uIU/mL 1.290 1.470 1.380 1.890 2.370  --    FOLATE ng/mL  --   --   --   --   --  8.50         PAST MEDICAL HISTORY:  ALLERGIES:  Allergies   Allergen Reactions    Iodides Nausea And Vomiting     Contrast dye-\"Upset stomach x 2 days\". MRI ONLY     CURRENT MEDICATIONS:  Outpatient Encounter Medications as of 6/2/2025   Medication Sig Dispense Refill    acetaminophen (Tylenol) 325 MG tablet Take 3 tablets by mouth Every 8 (Eight) Hours. Take every 8 hours for 3 days then take prn as needed.      albuterol sulfate  (90 Base) MCG/ACT inhaler Every 6 (Six) Hours.      Budeson-Glycopyrrol-Formoterol (BREZTRI) 160-9-4.8 MCG/ACT aerosol inhaler Inhale 2 puffs 2 (Two) Times " a Day. 10.7 g 11    cetirizine (zyrTEC) 10 MG tablet Take 1 tablet by mouth Daily.      Cholecalciferol 125 MCG (5000 UT) tablet Take 1 tablet by mouth Daily.      divalproex (DEPAKOTE ER) 250 MG 24 hr tablet Take 1 tablet by mouth 2 (Two) Times a Day.      folic acid-vit B6-vit B12 (Folbee) 2.5-25-1 MG tablet tablet Take 1 tablet by mouth Daily. 30 tablet 0    furosemide (LASIX) 20 MG tablet TAKE 1 TABLET BY MOUTH ONCE DAILY Oral for 14 Days      glimepiride (AMARYL) 4 MG tablet Take 1 tablet by mouth Daily.      hydroCHLOROthiazide (HYDRODIURIL) 25 MG tablet Take 1 tablet by mouth Daily.      lidocaine-prilocaine (EMLA) 2.5-2.5 % cream Apply to port site 30 minutes before it is to be accessed and cover with occlusive dressing. 30 g 1    lisinopril (PRINIVIL,ZESTRIL) 10 MG tablet Take 1 tablet by mouth Daily.      loratadine (CLARITIN) 10 MG tablet Take 1 tablet by mouth Daily.      metFORMIN (GLUCOPHAGE) 1000 MG tablet Take 1 tablet by mouth 2 (Two) Times a Day With Meals.      metoprolol succinate XL (TOPROL-XL) 25 MG 24 hr tablet Take 1 tablet by mouth Daily.      NON FORMULARY CPAP      Omega-3 Fatty Acids (fish oil) 1000 MG capsule capsule Take  by mouth Daily With Breakfast.      ondansetron (Zofran) 4 MG tablet Take 1 tablet by mouth Every 8 (Eight) Hours As Needed for Nausea or Vomiting. 15 tablet 0    ondansetron (ZOFRAN) 8 MG tablet Take 1 tablet by mouth Every 8 (Eight) Hours As Needed for Nausea or Vomiting. 30 tablet 3    pantoprazole (PROTONIX) 40 MG EC tablet Take 1 tablet by mouth 2 (Two) Times a Day.      primidone (MYSOLINE) 50 MG tablet Take 1 tablet by mouth 2 (Two) Times a Day. For essential tremors      simvastatin (ZOCOR) 40 MG tablet Take 1 tablet by mouth Every Night.       Facility-Administered Encounter Medications as of 6/2/2025   Medication Dose Route Frequency Provider Last Rate Last Admin    [COMPLETED] dexAMETHasone (DECADRON) injection 10 mg  10 mg Intravenous Once Timoteo Lombardi  MD DEMI   10 mg at 05/27/25 1015    [COMPLETED] diphenhydrAMINE (BENADRYL) injection 25 mg  25 mg Intravenous Once Timoteo Lombardi MD   25 mg at 05/27/25 1022    [COMPLETED] durvalumab (IMFINZI) 740 mg in sodium chloride 0.9 % 264.8 mL chemo IVPB  10 mg/kg (Treatment Plan Recorded) Intravenous Once Timoteo Lombardi MD   Stopped at 05/27/25 1148    [COMPLETED] famotidine (PEPCID) injection 10 mg  10 mg Intravenous Once Timoteo Lombardi MD   10 mg at 05/27/25 1019    [COMPLETED] fludeoxyglucose F18 injection 1 dose  1 dose Intravenous Once in imaging Timoteo Lombardi MD   1 dose at 06/02/25 0800    [COMPLETED] sodium chloride 0.9 % infusion  20 mL/hr Intravenous Once Timoteo Lombardi MD   Stopped at 05/27/25 1209    [DISCONTINUED] heparin injection 500 Units  500 Units Intravenous PRN Timoteo Lombardi MD   500 Units at 05/27/25 1209    [DISCONTINUED] sodium chloride 0.9 % flush 10 mL  10 mL Intravenous PRN Timoteo Lombardi MD   10 mL at 05/27/25 1209     ADULT ILLNESSES:  Patient Active Problem List   Diagnosis Code    Squamous cell carcinoma of left lung C34.92    S/P lobectomy of lung Z90.2    Former smoker Z87.891    History of radiation therapy Z92.3    Nodule of upper lobe of right lung R91.1    Malignant neoplasm of upper lobe of right lung C34.11    Mass of left lung R91.8    History of primary non-small cell carcinoma of left lung Z85.118    Malignant neoplasm of overlapping sites of lung C34.80    Port-A-Cath in place Z95.828    Function kidney decreased N28.9    Hypomagnesemia E83.42    Encounter for long-term (current) use of high-risk medication Z79.899     SURGERIES:  Past Surgical History:   Procedure Laterality Date    APPENDECTOMY      BRONCHOSCOPY  2023    BRONCHOSCOPY N/A 4/29/2024    Procedure: BRONCHOSCOPY WITH ENDOBRONCHIAL ULTRASOUND;  Surgeon: Nicola Monsivais MD;  Location: [facility] OR;  Service: Pulmonary;  Laterality: N/A;  pre Nodule of upper lobe of right lung    Long    BRONCHOSCOPY N/A 10/17/2024    Procedure: BRONCHOSCOPY WITH APC & DEBULKING, cryotherapy, balloon dilation/tamponade;  Surgeon: Mata Simon MD;  Location:  [facility];  Service: Pulmonary;  Laterality: N/A;    BRONCHOSCOPY WITH ION ROBOTIC ASSIST N/A 4/29/2024    Procedure: BRONCHOSCOPY WITH ION ROBOT and BRONCHOSCOPY WITH ENDOBRONCHIAL ULTRASOUND;  Surgeon: Nicola Monsivais MD;  Location:  [facility] OR;  Service: Robotics - Pulmonary;  Laterality: N/A;  pre  Nodule of upper lobe of right lung  post      BRONCHOSCOPY WITH ION ROBOTIC ASSIST  4/29/2024    Procedure: BRONCHOSCOPY NAVIGATION WITH ENDOBRONCHIAL ULTRASOUND AND ION ROBOT;  Surgeon: Nicola Monsivais MD;  Location:  [facility] OR;  Service: Pulmonary;;    COLONOSCOPY  02/28/2023    HEMORRHOIDECTOMY      HERNIA REPAIR  1999    LUNG SURGERY Left 04/19/2023    VENOUS ACCESS DEVICE (PORT) INSERTION N/A 12/5/2024    Procedure: Single Lumen Port-a-cath insertion with flouroscopy;  Surgeon: Lorrie Hanson MD;  Location:  [facility] OR;  Service: General;  Laterality: N/A;     HEALTH MAINTENANCE ITEMS:  Health Maintenance Due   Topic Date Due    DIABETIC FOOT EXAM  Never done    DIABETIC EYE EXAM  Never done    URINE MICROALBUMIN-CREATININE RATIO (uACR)  Never done    TDAP/TD VACCINES (1 - Tdap) Never done    ZOSTER VACCINE (1 of 2) Never done    COLORECTAL CANCER SCREENING  Never done    HEPATITIS C SCREENING  Never done    ANNUAL WELLNESS VISIT  Never done    HEMOGLOBIN A1C  Never done    COVID-19 Vaccine (4 - 2024-25 season) 09/01/2024       <no information>  Last Completed Colonoscopy    This patient has no relevant Health Maintenance data.       Immunization History   Administered Date(s) Administered    Arexvy (RSV, Adults 60+ yrs) 11/11/2024    COVID-19 (MODERNA) 1st,2nd,3rd Dose Monovalent 02/09/2021, 03/15/2021    COVID-19 (UNSPECIFIED) 10/24/2021    Fluzone High-Dose 65+YRS 11/11/2024    Pneumococcal Conjugate 13-Valent  "(PCV13) 2017    Pneumococcal Polysaccharide (PPSV23) 2017     Last Completed Mammogram    This patient has no relevant Health Maintenance data.           FAMILY HISTORY:  Family History   Problem Relation Age of Onset    Diabetes Mother     Heart disease Mother     Cancer Father     Cancer Brother     Alcohol abuse Brother     Cancer Paternal Grandfather     Malig Hyperthermia Neg Hx      SOCIAL HISTORY:  Social History     Socioeconomic History    Marital status:    Tobacco Use    Smoking status: Former     Current packs/day: 0.00     Average packs/day: 2.0 packs/day for 57.2 years (114.4 ttl pk-yrs)     Types: Cigarettes     Start date:      Quit date: 3/17/2023     Years since quittin.2     Passive exposure: Past    Smokeless tobacco: Never   Vaping Use    Vaping status: Never Used   Substance and Sexual Activity    Alcohol use: Not Currently    Drug use: Never    Sexual activity: Defer       REVIEW OF SYSTEMS:  Review of Systems   Constitutional:  Positive for activity change, appetite change (Low to fair), fatigue (Chronic) and unexpected weight loss (35 pounds in the past 1.5-yrs).        Manages his personal ADLs, light chores, runs errands and is still driving.  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  No mouth sores.  No nausea. No vomiting.  No skin changes. Soft stools, \"2x/day.\" No diarrhea.     HENT: Negative.     Eyes: Negative.    Respiratory:  Positive for cough (With cream/clear-colored phlegm, \"now and then\"), shortness of breath (Baseline exertional dyspnea with some SOB with routine activity) and wheezing.         [id number] to 2 packs/day.  Quit in     On inhalers     Cardiovascular: Negative.    Gastrointestinal: Negative.    Endocrine: Negative.    Genitourinary: Negative.  " "  Musculoskeletal:  Positive for arthralgias (Chronic) and back pain (Chronic).   Skin: Negative.    Allergic/Immunologic: Negative.    Neurological:  Positive for tremors (Chronic).   Hematological: Negative.    Psychiatric/Behavioral: Negative.       /72   Pulse 90   Temp 97.3 °F (36.3 °C) (Temporal)   Resp 18   Ht 172.7 cm (68\")   Wt 73.2 kg (161 lb 4.8 oz)   SpO2 94%   BMI 24.53 kg/m²  Body surface area is 1.87 meters squared.  Pain Score    06/02/25 0937   PainSc: 0-No pain     I have reexamined the patient and the results are consistent with the previously documented exam. Timoteo Lombardi MD      Physical Exam  Vitals and nursing note reviewed.   Constitutional:       Comments: Pleasant, cooperative, heavyset, modestly kept elderly male.  Ambulatory.  ECOG 1-2.     Has regained 1 lb (had lost 2 lb at his prior visit) since the last visit   HENT:      Head: Normocephalic and atraumatic.   Eyes:      General: No scleral icterus.     Extraocular Movements: Extraocular movements intact.      Pupils: Pupils are equal, round, and reactive to light.   Cardiovascular:      Rate and Rhythm: Normal rate.   Pulmonary:      Effort: Pulmonary effort is normal.      Comments:   Port in left upper chest is well seated.  The previous subtle surrounding fullness and prominent tenderness surrounding the site have resolved.    Distant breath sounds bilaterally  Abdominal:      Palpations: Abdomen is soft.      Tenderness: There is no abdominal tenderness.   Musculoskeletal:         General: Normal range of motion.      Cervical back: Normal range of motion.   Lymphadenopathy:      Cervical: No cervical adenopathy.   Skin:     General: Skin is warm.   Neurological:      General: No focal deficit present.      Mental Status: He is alert and oriented to person, place, and time.   Psychiatric:         Mood and Affect: Mood normal.         Behavior: Behavior normal.         Thought Content: Thought content normal. 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  -- Original tumor stage: TNM at least IIIA ([id number])  -- Original tumor burden:  -9/16/24- PET scan-LEFT lower lobe pneumonia.Neoplastic LEFT upper lobe lung nodules adjacent to the LEFT hilum. Neoplastic nodule within the LEFT bronchus.Small focus of indeterminate FDG uptake in the posterior midline neck between the C5 and C6 spinous processes  -10/17/24- Bronchoscopy-revealed Fragments of squamous cell carcinoma.      --Complications of tumor: Asthenia, cough, dyspnea, weight loss  -- Tumor status:   -Definitive radiation (6000 cGy/33 fx completed, 1/30/25) + concurrent paclitaxel+carboplatin- C1, 12/9/24; C2, 12/16/24; C3, 12/23/24; C4, 12/30/24; C5, 1/13/25; C6, 1/27/25   - Consolidation durvalumab- C1, 3/4/25; C2, 3/18/25; C3, 4/1/25; C4, 4/29/25; C5, 5/13/25; C6, 5/27/25.   - 5/21/25- CT CAP- A spiculated 1.7 cm density or nodule in the left lower lobe at the level of the descending thoracic aorta. This could be a true nodule/neoplasm. A focal area of inflammatory change less likely. This is at the level of the proximal descending thoracic aorta. Prior left upper lobectomy. A 6.1 x 3.1 cm masslike opacity surrounding the central portion of  the right upper lobe bronchus. There is an adjacent opacity posteriorly measuring 2.8 x 2.7 cm. These findings could be neoplastic. Infectious/inflammatory change is also considered. A 2.7 x 1.6 cm nodule versus nodular consolidation in the right lower lobe image 114 series 4. Neoplasm, infection and inflammation considered. There are nearby small nodules in this area measuring up to 7 mm in size. Probable infectious/inflammatory consolidation in the right lower lobe inferiorly and posteriorly. Atheromatous calcification of the thoracic aorta and coronary arteries. Ascending aorta ectatic measuring 3.7 cm. Small pericardial effusion. No evidence of metastatic disease in the abdomen or pelvis.     2.   Left  upper lobectomy, 4/19/23 for stage I pT1c,pN0M0 squamous cell carcinoma.    3.   On 8/14/23 PET+ RUL lung neoplasm.   - Completed 5000 cGy in 4 fractions to the right upper lobe of the lung on 09/27/2023.   4.   Squamous cell of the right upper lobe via bronchoscopy, 4/29/24.   - Completed 5000 cGy  in 5 fractions to the right upper lobe of the lung on 06/05/2024.     5.   Normocytic anemia.  Contributions from malignancy/anemia of chronic disease+ chemo  --Hgb 10.7; MCV 98, 5/27/25 (prior: Hgb 10.6-12.9)  --11/26/24- ferritin 166, Fe 51, fe sat 12%, B12 229, folate 8.5  6.   COPD   7.   Ex heavy cigarette smoker  8.   Port tenderness.  Needs port study and surgical evaluation before it can be used again       Plan:  Review labs, 5/7/25 stable anemia, otherwise normal CBC, gluc 96 otherwise stable/normal CMP, normal TSH/T4, CEA p (prior: 3.2-4.4)  Durvalumab tolerance: Some fatigue otherwise without inordinate toxicities  Review CT CAP, 5/21/25 (above). Spiculated 1.7 cm density or nodule in the left lower lobe at the level of the descending thoracic aorta. This could be a true nodule/neoplasm.  Other mass like opacities and nodules as described.  Inflammation vs neoplasm considered.  PET scan on 6/2/25.  Consider pulmonary referral pending results. Already has follow-up next week with radiation oncology.  Prior careful review of available diagnostic information as outlined above.   Note imaging including PET scan 9/16/2024, as well as bronchoscopy/biopsy findings, 10/17/24 (above). 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  Review NCCN guidelines non-small cell lung cancer, stage III (unresectable).  Chemotherapy regimens used.  Radiation therapy (concurrent regimens usually radiation therapy): Paclitaxel 45 to 50 mg/m² weekly; carboplatin AUC, concurrent thoracic RT +/- additional 2 cycles every 21 days of paclitaxel 200 mg/m² and carboplatin AUC 6-followed by consolidation therapy for unresectable stage  III NSCLC, PS 0-1 and no disease progression after 2 or more cycles of definitive chemoradiation: Durvalumab 10 mg/kg IV every 2 weeks for up to 12 months (category 1).    Durvalumab.  Potential toxicities of same discussed (to included but not limited to: Myelosuppression, alopecia, neuropathy, arthralgias/myalgias, nausea/vomiting, hypersensitivity reactions, diarrhea, mucositis, risks for infection, abnormal liver enzymes, asthenia, fever, low blood pressure, bleeding, renal insufficiency, edema, bradycardia, anaphylaxis, arrhythmias, thromboembolism, myocardial infarction, pulmonary toxicity, gastrointestinal (GI) obstruction/perforation, paralytic ileus, ischemic colitis, pancreatitis, severe skin reactions; electrolyte disorders, ototoxicity, nephrotoxicity, vision loss). Questions answered. He agrees to a trial of therapy.     Schedule -C7, 6/10/25; C8, 6/24/25; C9, 7/8/25; C10, 7/22/25; C11, 8/5/25; C12, 8/19/25 - durvalumab (plan: every 2 weeks x 24 cycles - 12 months) per administration guidelines - at [facility]                 durvalumab 10 mg/kg              -Premeds:              Decadron 10 mg IV             Benadryl 25 mg IV             Pepcid 10 mg IV              8.   Upon initiation of durvalumab:  TSH, FT4, CMP, Mg++ and CBC with differential weekly with Procrit 40,000 units subcutaneous every 2 weeks if Hgb less than 10 and Hct less than 30     9.   Schedule CT chest, abdomen/pelvis with po/IV contrast after C12-8/26/25  10.  Follow-up radiation oncology next week.  11.  Return to office in 12 weeks (8/27/25).  Surveillance imaging every 6th cycle of durvalumab (C6; C12; C18, etc). Draw pre-office CBC with differential, CMP, and CEA.   11.  Importance of Smoking Cessation discussed with patient and informed patient additional information will be on today's [facility]. [place] [facility]'s Flyer - Plan to Be Tobacco Free handout provided to patient            I spent ~ 43 minutes caring for Boogie on  this date of service. This time includes time spent by me in the following activities: preparing for the visit, reviewing tests, performing a medically appropriate examination and/or evaluation, counseling and educating the patient/family/caregiver, ordering medications, tests, or procedures and documenting information in the medical record

## 2025-05-27 NOTE — PROGRESS NOTES
Patients reports of diarrhea and labs ready for review sent to Dr. Lombardi's office secure chat and doctor gave ok to treat and no lomotil will be called in. Instructed patient to continue using immodium AD and let office know if diarrhea worsens/immodium not working. Anni Whitaker RN

## 2025-06-02 ENCOUNTER — HOSPITAL ENCOUNTER (OUTPATIENT)
Dept: CT IMAGING | Facility: HOSPITAL | Age: 75
Discharge: HOME | End: 2025-06-02
Payer: MEDICARE

## 2025-06-02 ENCOUNTER — OFFICE VISIT (OUTPATIENT)
Dept: ONCOLOGY | Facility: CLINIC | Age: 75
End: 2025-06-02
Payer: MEDICARE

## 2025-06-02 VITALS
SYSTOLIC BLOOD PRESSURE: 120 MMHG | TEMPERATURE: 97.3 F | WEIGHT: 161.3 LBS | HEIGHT: 68 IN | BODY MASS INDEX: 24.44 KG/M2 | DIASTOLIC BLOOD PRESSURE: 72 MMHG | RESPIRATION RATE: 18 BRPM | HEART RATE: 90 BPM | OXYGEN SATURATION: 94 %

## 2025-06-02 DIAGNOSIS — C34.11 MALIGNANT NEOPLASM OF UPPER LOBE OF RIGHT LUNG: Primary | ICD-10-CM

## 2025-06-02 DIAGNOSIS — R94.6 ABNORMAL RESULTS OF THYROID FUNCTION STUDIES: ICD-10-CM

## 2025-06-02 DIAGNOSIS — R91.1 NODULE OF UPPER LOBE OF RIGHT LUNG: ICD-10-CM

## 2025-06-02 DIAGNOSIS — C34.11 MALIGNANT NEOPLASM OF UPPER LOBE OF RIGHT LUNG: ICD-10-CM

## 2025-06-02 DIAGNOSIS — C34.92 SQUAMOUS CELL CARCINOMA OF LEFT LUNG: ICD-10-CM

## 2025-06-02 DIAGNOSIS — R91.8 MASS OF LEFT LUNG: ICD-10-CM

## 2025-06-02 PROCEDURE — 1126F AMNT PAIN NOTED NONE PRSNT: CPT | Performed by: INTERNAL MEDICINE

## 2025-06-02 PROCEDURE — 78815 PET IMAGE W/CT SKULL-THIGH: CPT

## 2025-06-02 PROCEDURE — 99215 OFFICE O/P EST HI 40 MIN: CPT | Performed by: INTERNAL MEDICINE

## 2025-06-02 PROCEDURE — 34310000005 FLUDEOXYGLUCOSE F18 SOLUTION: Performed by: INTERNAL MEDICINE

## 2025-06-02 PROCEDURE — 1160F RVW MEDS BY RX/DR IN RCRD: CPT | Performed by: INTERNAL MEDICINE

## 2025-06-02 PROCEDURE — 1159F MED LIST DOCD IN RCRD: CPT | Performed by: INTERNAL MEDICINE

## 2025-06-02 PROCEDURE — A9552 F18 FDG: HCPCS | Performed by: INTERNAL MEDICINE

## 2025-06-02 RX ADMIN — FLUDEOXYGLUCOSE F18 1 DOSE: 300 INJECTION INTRAVENOUS at 08:00

## 2025-06-03 ENCOUNTER — RESULTS FOLLOW-UP (OUTPATIENT)
Dept: ONCOLOGY | Facility: CLINIC | Age: 75
End: 2025-06-03
Payer: MEDICARE

## 2025-06-03 ENCOUNTER — TELEPHONE (OUTPATIENT)
Dept: ONCOLOGY | Facility: CLINIC | Age: 75
End: 2025-06-03
Payer: MEDICARE

## 2025-06-03 NOTE — TELEPHONE ENCOUNTER
Patient already sees Dr. Monsivais. Routed PET to Dr. Monsivais. Called and spoke with Melissa. The patient already has an appointment with Adam Saucedo NP on 6/25/25 and can't get him seen earlier. Melissa reports that the patient is aware of this appointment.

## 2025-06-03 NOTE — TELEPHONE ENCOUNTER
Melissa called back with a cancellation on Dr. Monsivais's schedule and the patient has an appointment Thursday, June 12 at 1115 am. I accepted the appointment on the patient's behalf.    I called the patient, Boogie, notified him of his changed appointment. He verbalized understanding. No c/o were voiced at this time.

## 2025-06-03 NOTE — TELEPHONE ENCOUNTER
Received call from patient wife Sarah, requesting results of scans performed yesterday 6/2/25, she explained that results had not been read when Boogie was seen at yesterdays f/u apt with Dr Lombardi.     NOTE:  Reviewed patient chart today, Scans were not resulted this am @ 8:40 am  Wife encouraged to try and call later this afternoon or tomorrow, hopefully they will be read at that time.

## 2025-06-03 NOTE — TELEPHONE ENCOUNTER
----- Message from Timoteo Lombardi sent at 6/3/2025 12:08 PM CDT -----  Refer to pulmonary re:  assess for diagnostic bronchoscopy/ebus. Thank u  ----- Message -----  From: Interface, Rad Results [facility]  Sent: 6/3/2025  10:59 AM CDT  To: Timoteo Lombardi MD

## 2025-06-04 DIAGNOSIS — Z79.899 ENCOUNTER FOR LONG-TERM (CURRENT) USE OF HIGH-RISK MEDICATION: Primary | ICD-10-CM

## 2025-06-04 DIAGNOSIS — C34.92 SQUAMOUS CELL CARCINOMA OF LEFT LUNG: ICD-10-CM

## 2025-06-04 RX ORDER — DIPHENHYDRAMINE HYDROCHLORIDE 50 MG/ML
25 INJECTION, SOLUTION INTRAMUSCULAR; INTRAVENOUS ONCE
Start: 2025-06-10 | End: 2025-06-10

## 2025-06-04 RX ORDER — FAMOTIDINE 10 MG/ML
10 INJECTION, SOLUTION INTRAVENOUS ONCE
Start: 2025-06-10 | End: 2025-06-10

## 2025-06-04 RX ORDER — SODIUM CHLORIDE 9 MG/ML
20 INJECTION, SOLUTION INTRAVENOUS ONCE
OUTPATIENT
Start: 2025-06-10

## 2025-06-09 ENCOUNTER — TELEPHONE (OUTPATIENT)
Dept: PULMONOLOGY | Facility: CLINIC | Age: 75
End: 2025-06-09
Payer: MEDICARE

## 2025-06-09 DIAGNOSIS — Z85.118 HISTORY OF PRIMARY MALIGNANT NEOPLASM OF RIGHT LUNG: ICD-10-CM

## 2025-06-09 DIAGNOSIS — R91.1 NODULE OF UPPER LOBE OF RIGHT LUNG: Primary | ICD-10-CM

## 2025-06-09 NOTE — TELEPHONE ENCOUNTER
----- Message from Nicola Monsivais sent at 6/6/2025 10:56 AM CDT -----  Let pt know we got this ct and pet scan, and at the next visit we will discuss doing a robotic bronchoscopy.  See if the ct from 5/21 can be reformatted with ion protocol  ----- Message -----  From: Gogo Prince RN  Sent: 6/3/2025   1:24 PM CDT  To: Nicola Monsivais MD    Route to Dr Monsivais re:  assess for diagnostic bronchoscopy/ebus. Thank lisa

## 2025-06-09 NOTE — TELEPHONE ENCOUNTER
Spoke to spouse and informed her that imaging has been received and to keep appointment on 06/12 to discuss scheduling ION Bronch.  She voiced understanding.

## 2025-06-12 ENCOUNTER — PREP FOR SURGERY (OUTPATIENT)
Dept: OTHER | Facility: HOSPITAL | Age: 75
End: 2025-06-12
Payer: MEDICARE

## 2025-06-12 ENCOUNTER — TELEPHONE (OUTPATIENT)
Dept: PULMONOLOGY | Facility: CLINIC | Age: 75
End: 2025-06-12

## 2025-06-12 ENCOUNTER — OFFICE VISIT (OUTPATIENT)
Dept: PULMONOLOGY | Facility: CLINIC | Age: 75
End: 2025-06-12
Payer: MEDICARE

## 2025-06-12 VITALS
BODY MASS INDEX: 24.71 KG/M2 | OXYGEN SATURATION: 96 % | HEART RATE: 78 BPM | SYSTOLIC BLOOD PRESSURE: 122 MMHG | WEIGHT: 163 LBS | HEIGHT: 68 IN | DIASTOLIC BLOOD PRESSURE: 78 MMHG

## 2025-06-12 DIAGNOSIS — R91.1 NODULE OF LOWER LOBE OF LEFT LUNG: Primary | ICD-10-CM

## 2025-06-12 DIAGNOSIS — J44.9 STAGE 3 SEVERE COPD BY GOLD CLASSIFICATION: ICD-10-CM

## 2025-06-12 RX ORDER — SODIUM CHLORIDE 9 MG/ML
40 INJECTION, SOLUTION INTRAVENOUS AS NEEDED
OUTPATIENT
Start: 2025-06-12

## 2025-06-12 RX ORDER — SODIUM CHLORIDE 0.9 % (FLUSH) 0.9 %
10 SYRINGE (ML) INJECTION AS NEEDED
OUTPATIENT
Start: 2025-06-12

## 2025-06-12 RX ORDER — SODIUM CHLORIDE 0.9 % (FLUSH) 0.9 %
10 SYRINGE (ML) INJECTION EVERY 12 HOURS SCHEDULED
OUTPATIENT
Start: 2025-06-12

## 2025-06-12 NOTE — PATIENT INSTRUCTIONS
Robotic bronchoscopy is an updated version of traditional bronchoscopy, which allows us to use your 3-D CT scan to map your lungs and find a way to get to the spot in question  It requires general anesthesia in the Operating Room.  It takes about an hour, and after that you wake up and can go home after recovering in the recovery room.  Afterward you should take it easy the rest of the day, including avoiding driving.  The following day should be a normal day.  Our robot is the [employer] Ion bronchoscope, which is the state-of-the-art technology for this type of procedure  Risks are very low.  There is a reported 1-3% risk of bleeding requiring attention, and a 1-3% risk of lung collapse (pneumothorax).  The lung can recover from a collapse on its own, although sometimes a tube has to be inserted between the ribs to help draw the lung back up.  We can get a clear diagnosis about 90% of the time.  Other times we might not get a definite diagnosis, but a strong sense of the likely diagnosis.  Occasionally additional testing is needed to clarify the diagnosis when it is not clear up front.      About Ion by "[employer] (https://www.[employer]/en-us/patients/ion-robotic-bronchoscopy/about-the-system)  Robotic bronchoscopy for minimally invasive lung biopsies  The Ion endoluminal* system gives doctors a nonsurgical way to biopsy lung nodules. With Ion, your doctor uses your lung’s airways to travel to a lung nodule found on a chest X-ray, CT scan, or MRI.    Ion’is unique design allows your doctor to navigate the lung airways’ tiny bends and curves. From deep in your lung to its far edges, your doctor can biopsy nodules less than a centimeter wide.1,2 For reference, that’s about the size of a pea. The ability to sample small nodules throughout the lung may help doctors diagnose lung cancer at an early stage.    Your biopsy begins with a plan  Your lungs fill with air through a complex structure of airways that get  smaller as they branch out from your trachea (windpipe). Ion’s special planning software creates a detailed 3D airway map from your CT scan. The map shows possible paths to reach the nodule. Your doctor chooses a path and uploads the plan to the Ion system.    Your Ion procedure  In conventional bronchoscope biopsies, doctors manually move thicker catheters while viewing images on a screen. With Ion, your doctor steers an ultrathin tube (catheter) to the nodule. The preplanned map provides GPS-like navigation, giving your doctor [id number] directions.    Ion’s navigation system and a lighted vision probe show what’s ahead, allowing your doctor to guide the catheter to its destination. Ion also uses shape sensing, a novel technology that measures the catheter’s full shape hundreds of times per second.3 With shape sensing, Ion provides the precision and stability needed to reach and biopsy small nodules all the way out to the edges of the lungs.    Once the catheter reaches the nodule, Ion’s flexible, steerable tip lets your doctor collect multiple samples from different angles.     Biopsies with the Ion system require general anesthesia and are usually performed as an outpatient procedure. An outpatient procedure means you’ll go home the same day.    * Endoluminal is a word doctors use to describe the area inside a tube within the body, like a blood vessel or lung airway.

## 2025-06-12 NOTE — PROGRESS NOTES
Background:  Pt w moo lung ca resected 4/2023, rul lung ca, xrt 9/2023,, apc ablation cryobiopsy left mainstem 9/2024, new hypermetabolic medial left remaining lung 6/2025   Chief Complaint  COPD    Subjective    History of Present Illness     Boogie Artis is here for follow up with [facility] PULMONARY & CRITICAL CARE MEDICINE.  History of Present Illness  He has been undergoing lung cancer tx with Dr. Lombardi.  At last visit we adjusted inhalers for copd.  In meantime surveillance identified new nodule in left lung.  His history is significant for lung cancer on the left side which was resected and associated with a prolonged hospital stay.  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  He has been through medical treatment.  A new CT scan has shown a new hypermetabolic lesion in the left upper lobe when said lesion was evaluated on PET scan.  Dr. Trent on is concerned about progression of disease despite treatment and is asked me to evaluate this further as it could affect his course of therapy going forward.     Tobacco Use: Medium Risk (6/12/2025)    Patient History     Smoking Tobacco Use: Former     Smokeless Tobacco Use: Never     Passive Exposure: Past      Current Outpatient Medications   Medication Instructions    acetaminophen (TYLENOL) 975 mg, Oral, Every 8 Hours, Take every 8 hours for 3 days then take prn as needed.    albuterol sulfate  (90 Base) MCG/ACT inhaler Every 6 Hours Scheduled    Budeson-Glycopyrrol-Formoterol (BREZTRI) 160-9-4.8 MCG/ACT aerosol inhaler 2 puffs, Inhalation, 2 Times Daily    cetirizine (zyrTEC) 10 MG tablet 1 tablet, Daily    Cholecalciferol 125 MCG (5000 UT) tablet 1 tablet, Daily    divalproex (DEPAKOTE ER) 250 mg, 2 Times  "Daily    folic acid-vit B6-vit B12 (Folbee) 2.5-25-1 MG tablet tablet 1 tablet, Oral, Daily    furosemide (LASIX) 20 MG tablet TAKE 1 TABLET BY MOUTH ONCE DAILY Oral for 14 Days    glimepiride (AMARYL) 4 MG tablet 1 tablet, Daily    hydroCHLOROthiazide (HYDRODIURIL) 25 MG tablet 1 tablet, Daily    lidocaine-prilocaine (EMLA) 2.5-2.5 % cream Apply to port site 30 minutes before it is to be accessed and cover with occlusive dressing.    lisinopril (PRINIVIL,ZESTRIL) 10 mg, Daily    loratadine (CLARITIN) 10 MG tablet 1 tablet, Daily    metFORMIN (GLUCOPHAGE) 1,000 mg, 2 Times Daily With Meals    metoprolol succinate XL (TOPROL-XL) 25 mg, Daily    NON FORMULARY CPAP    Omega-3 Fatty Acids (fish oil) 1000 MG capsule capsule Daily With Breakfast    ondansetron (ZOFRAN) 8 mg, Oral, Every 8 Hours PRN    ondansetron (ZOFRAN) 4 mg, Oral, Every 8 Hours PRN    pantoprazole (PROTONIX) 40 MG EC tablet 1 tablet, 2 Times Daily    primidone (MYSOLINE) 50 MG tablet 1 tablet, 2 Times Daily    simvastatin (ZOCOR) 40 mg, Nightly      Objective     Vital Signs:   /78   Pulse 78   Ht 172.7 cm (68\")   Wt 73.9 kg (163 lb)   SpO2 96% Comment: RA  BMI 24.78 kg/m²   Physical Exam  Constitutional:       General: He is not in acute distress.     Appearance: He is ill-appearing. He is not diaphoretic.   HENT:      Nose: Nose normal.   Pulmonary:      Effort: No respiratory distress.      Breath sounds: No wheezing.        Result Review  Data Reviewed:    NM PET/CT Skull Base to Mid Thigh  Result Date: 6/3/2025  Impression: 1. Previous left upper lobe resection. The recently described new irregular nodule in the medial left upper lung demonstrates intense abnormal metabolic activity suggesting a neoplastic process. There is also an additional subpleural nodule in the posterior left lung apex demonstrating sufficient metabolic activity that it is concerning for a lung neoplasm. 2. [place] focus of nodular consolidation in the right upper " lobe which extends back into the right hilum does contain an area of more central hyperintensity. A neoplastic process is not excluded. The more posterior focus of consolidation also demonstrates increased metabolic activity measuring SUVs of 4.0. The focus of consolidation more inferiorly within the right lower lobe does not demonstrate as intense of metabolic activity and may represent some inflammatory change/scarring. 3. No additional sites of abnormal metabolic activity identified within the head or neck or abdomen and pelvis.    This report was signed and finalized on 6/3/2025 10:56 AM by Dr. Branden Leavitt MD.      CT Chest Without Contrast Diagnostic  Result Date: 5/21/2025  Impression: 1. A spiculated 1.7 cm density or nodule in the left lower lobe at the level of the descending thoracic aorta. This could be a true nodule/neoplasm. A focal area of inflammatory change less likely. This is at the level of the proximal descending thoracic aorta. 2. Prior left upper lobectomy. 3. A 6.1 x 3.1 cm masslike opacity surrounding the central portion of the right upper lobe bronchus. There is an adjacent opacity posteriorly measuring 2.8 x 2.7 cm. These findings could be neoplastic. Infectious/inflammatory change is also considered. 4. A 2.7 x 1.6 cm nodule versus nodular consolidation in the right lower lobe image 114 series 4. Neoplasm, infection and inflammation considered. There are nearby small nodules in this area measuring up to 7 mm in size. 5. Probable infectious/inflammatory consolidation in the right lower lobe inferiorly and posteriorly. 6. Atheromatous calcification of the thoracic aorta and coronary arteries. Ascending aorta ectatic measuring 3.7 cm. 7. Small pericardial effusion.    This report was signed and finalized on 5/21/2025 1:21 PM by Dr. Keith Palomino MD.      CT Abdomen Pelvis Without Contrast  Result Date: 5/21/2025  Impression:  No evidence of metastatic disease in the abdomen or pelvis.     This report was signed and finalized on 5/21/2025 1:15 PM by Dr. Johnny Palmer MD.        PFT Values          4/16/2024    10:15 11/25/2024    10:30   Pre Drug PFT Results   FVC 68 72   FEV1 48 60   FEF 25-75% 24 51   FEV1/FVC 55.73 64                Assessment and Plan    Diagnoses and all orders for this visit:    1. Nodule of lower lobe of left lung (Primary)    2. Stage 3 severe COPD by GOLD classification    Will plan ion bronchoscopy for 6/23, first case if available.  We discussed risk benefits alternatives and gave patient instructions regarding this procedure.  This will be similar to what we did previously.  Continue brezri. I gave 2 weeks samples given expense  Total of 65 minutes time spent with pt interview, exam, counseling, data review, discussion with other treating providers, documentation and treatment.    Follow Up   Return in about 2 months (around 8/12/2025).  Patient was given instructions and counseling regarding his condition or for health maintenance advice. Please see specific information pulled into the AVS if appropriate.    Electronically signed by Nicola Monsivais MD, 6/12/2025, 11:47 CDT

## 2025-06-12 NOTE — H&P (VIEW-ONLY)
Background:  Pt w moo lung ca resected 4/2023, rul lung ca, xrt 9/2023,, apc ablation cryobiopsy left mainstem 9/2024, new hypermetabolic medial left remaining lung 6/2025   Chief Complaint  COPD    Subjective    History of Present Illness     Boogie Artis is here for follow up with [facility] PULMONARY & CRITICAL CARE MEDICINE.  History of Present Illness  He has been undergoing lung cancer tx with Dr. Lombardi.  At last visit we adjusted inhalers for copd.  In meantime surveillance identified new nodule in left lung.  His history is significant for lung cancer on the left side which was resected and associated with a prolonged hospital stay.  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  He has been through medical treatment.  A new CT scan has shown a new hypermetabolic lesion in the left upper lobe when said lesion was evaluated on PET scan.  Dr. Trent on is concerned about progression of disease despite treatment and is asked me to evaluate this further as it could affect his course of therapy going forward.     Tobacco Use: Medium Risk (6/12/2025)    Patient History     Smoking Tobacco Use: Former     Smokeless Tobacco Use: Never     Passive Exposure: Past      Current Outpatient Medications   Medication Instructions    acetaminophen (TYLENOL) 975 mg, Oral, Every 8 Hours, Take every 8 hours for 3 days then take prn as needed.    albuterol sulfate  (90 Base) MCG/ACT inhaler Every 6 Hours Scheduled    Budeson-Glycopyrrol-Formoterol (BREZTRI) 160-9-4.8 MCG/ACT aerosol inhaler 2 puffs, Inhalation, 2 Times Daily    cetirizine (zyrTEC) 10 MG tablet 1 tablet, Daily    Cholecalciferol 125 MCG (5000 UT) tablet 1 tablet, Daily    divalproex (DEPAKOTE ER) 250 mg, 2 Times  "Daily    folic acid-vit B6-vit B12 (Folbee) 2.5-25-1 MG tablet tablet 1 tablet, Oral, Daily    furosemide (LASIX) 20 MG tablet TAKE 1 TABLET BY MOUTH ONCE DAILY Oral for 14 Days    glimepiride (AMARYL) 4 MG tablet 1 tablet, Daily    hydroCHLOROthiazide (HYDRODIURIL) 25 MG tablet 1 tablet, Daily    lidocaine-prilocaine (EMLA) 2.5-2.5 % cream Apply to port site 30 minutes before it is to be accessed and cover with occlusive dressing.    lisinopril (PRINIVIL,ZESTRIL) 10 mg, Daily    loratadine (CLARITIN) 10 MG tablet 1 tablet, Daily    metFORMIN (GLUCOPHAGE) 1,000 mg, 2 Times Daily With Meals    metoprolol succinate XL (TOPROL-XL) 25 mg, Daily    NON FORMULARY CPAP    Omega-3 Fatty Acids (fish oil) 1000 MG capsule capsule Daily With Breakfast    ondansetron (ZOFRAN) 8 mg, Oral, Every 8 Hours PRN    ondansetron (ZOFRAN) 4 mg, Oral, Every 8 Hours PRN    pantoprazole (PROTONIX) 40 MG EC tablet 1 tablet, 2 Times Daily    primidone (MYSOLINE) 50 MG tablet 1 tablet, 2 Times Daily    simvastatin (ZOCOR) 40 mg, Nightly      Objective     Vital Signs:   /78   Pulse 78   Ht 172.7 cm (68\")   Wt 73.9 kg (163 lb)   SpO2 96% Comment: RA  BMI 24.78 kg/m²   Physical Exam  Constitutional:       General: He is not in acute distress.     Appearance: He is ill-appearing. He is not diaphoretic.   HENT:      Nose: Nose normal.   Pulmonary:      Effort: No respiratory distress.      Breath sounds: No wheezing.        Result Review  Data Reviewed:    NM PET/CT Skull Base to Mid Thigh  Result Date: 6/3/2025  Impression: 1. Previous left upper lobe resection. The recently described new irregular nodule in the medial left upper lung demonstrates intense abnormal metabolic activity suggesting a neoplastic process. There is also an additional subpleural nodule in the posterior left lung apex demonstrating sufficient metabolic activity that it is concerning for a lung neoplasm. 2. [place] focus of nodular consolidation in the right upper " lobe which extends back into the right hilum does contain an area of more central hyperintensity. A neoplastic process is not excluded. The more posterior focus of consolidation also demonstrates increased metabolic activity measuring SUVs of 4.0. The focus of consolidation more inferiorly within the right lower lobe does not demonstrate as intense of metabolic activity and may represent some inflammatory change/scarring. 3. No additional sites of abnormal metabolic activity identified within the head or neck or abdomen and pelvis.    This report was signed and finalized on 6/3/2025 10:56 AM by Dr. Branden Leavitt MD.      CT Chest Without Contrast Diagnostic  Result Date: 5/21/2025  Impression: 1. A spiculated 1.7 cm density or nodule in the left lower lobe at the level of the descending thoracic aorta. This could be a true nodule/neoplasm. A focal area of inflammatory change less likely. This is at the level of the proximal descending thoracic aorta. 2. Prior left upper lobectomy. 3. A 6.1 x 3.1 cm masslike opacity surrounding the central portion of the right upper lobe bronchus. There is an adjacent opacity posteriorly measuring 2.8 x 2.7 cm. These findings could be neoplastic. Infectious/inflammatory change is also considered. 4. A 2.7 x 1.6 cm nodule versus nodular consolidation in the right lower lobe image 114 series 4. Neoplasm, infection and inflammation considered. There are nearby small nodules in this area measuring up to 7 mm in size. 5. Probable infectious/inflammatory consolidation in the right lower lobe inferiorly and posteriorly. 6. Atheromatous calcification of the thoracic aorta and coronary arteries. Ascending aorta ectatic measuring 3.7 cm. 7. Small pericardial effusion.    This report was signed and finalized on 5/21/2025 1:21 PM by Dr. Keith Palomino MD.      CT Abdomen Pelvis Without Contrast  Result Date: 5/21/2025  Impression:  No evidence of metastatic disease in the abdomen or pelvis.     This report was signed and finalized on 5/21/2025 1:15 PM by Dr. Johnny Palmer MD.        PFT Values          4/16/2024    10:15 11/25/2024    10:30   Pre Drug PFT Results   FVC 68 72   FEV1 48 60   FEF 25-75% 24 51   FEV1/FVC 55.73 64                Assessment and Plan    Diagnoses and all orders for this visit:    1. Nodule of lower lobe of left lung (Primary)    2. Stage 3 severe COPD by GOLD classification    Will plan ion bronchoscopy for 6/23, first case if available.  We discussed risk benefits alternatives and gave patient instructions regarding this procedure.  This will be similar to what we did previously.  Continue brezri. I gave 2 weeks samples given expense  Total of 65 minutes time spent with pt interview, exam, counseling, data review, discussion with other treating providers, documentation and treatment.    Follow Up   Return in about 2 months (around 8/12/2025).  Patient was given instructions and counseling regarding his condition or for health maintenance advice. Please see specific information pulled into the AVS if appropriate.    Electronically signed by Nicola Monsivais MD, 6/12/2025, 11:47 CDT

## 2025-06-12 NOTE — TELEPHONE ENCOUNTER
Patient is scheduled for Bronchoscopy with [facility] on Monday 06/23/25 at 7:00 am.      Do you want patient to come back for [facility] follow up in 8 days or keep the 2 month appointment with Erma in August?

## 2025-06-12 NOTE — H&P
Background:  Pt w moo lung ca resected 4/2023, rul lung ca, xrt 9/2023,, apc ablation cryobiopsy left mainstem 9/2024, new hypermetabolic medial left remaining lung 6/2025   Chief Complaint  COPD    Subjective    History of Present Illness     Boogie Artis is here for follow up with [facility] PULMONARY & CRITICAL CARE MEDICINE.  History of Present Illness  He has been undergoing lung cancer tx with Dr. Lombardi.  At last visit we adjusted inhalers for copd.  In meantime surveillance identified new nodule in left lung.  His history is significant for lung cancer on the left side which was resected and associated with a prolonged hospital stay.  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  He has been through medical treatment.  A new CT scan has shown a new hypermetabolic lesion in the left upper lobe when said lesion was evaluated on PET scan.  Dr. Trent on is concerned about progression of disease despite treatment and is asked me to evaluate this further as it could affect his course of therapy going forward.     Tobacco Use: Medium Risk (6/12/2025)    Patient History     Smoking Tobacco Use: Former     Smokeless Tobacco Use: Never     Passive Exposure: Past      Current Outpatient Medications   Medication Instructions    acetaminophen (TYLENOL) 975 mg, Oral, Every 8 Hours, Take every 8 hours for 3 days then take prn as needed.    albuterol sulfate  (90 Base) MCG/ACT inhaler Every 6 Hours Scheduled    Budeson-Glycopyrrol-Formoterol (BREZTRI) 160-9-4.8 MCG/ACT aerosol inhaler 2 puffs, Inhalation, 2 Times Daily    cetirizine (zyrTEC) 10 MG tablet 1 tablet, Daily    Cholecalciferol 125 MCG (5000 UT) tablet 1 tablet, Daily    divalproex (DEPAKOTE ER) 250 mg, 2 Times  "Daily    folic acid-vit B6-vit B12 (Folbee) 2.5-25-1 MG tablet tablet 1 tablet, Oral, Daily    furosemide (LASIX) 20 MG tablet TAKE 1 TABLET BY MOUTH ONCE DAILY Oral for 14 Days    glimepiride (AMARYL) 4 MG tablet 1 tablet, Daily    hydroCHLOROthiazide (HYDRODIURIL) 25 MG tablet 1 tablet, Daily    lidocaine-prilocaine (EMLA) 2.5-2.5 % cream Apply to port site 30 minutes before it is to be accessed and cover with occlusive dressing.    lisinopril (PRINIVIL,ZESTRIL) 10 mg, Daily    loratadine (CLARITIN) 10 MG tablet 1 tablet, Daily    metFORMIN (GLUCOPHAGE) 1,000 mg, 2 Times Daily With Meals    metoprolol succinate XL (TOPROL-XL) 25 mg, Daily    NON FORMULARY CPAP    Omega-3 Fatty Acids (fish oil) 1000 MG capsule capsule Daily With Breakfast    ondansetron (ZOFRAN) 8 mg, Oral, Every 8 Hours PRN    ondansetron (ZOFRAN) 4 mg, Oral, Every 8 Hours PRN    pantoprazole (PROTONIX) 40 MG EC tablet 1 tablet, 2 Times Daily    primidone (MYSOLINE) 50 MG tablet 1 tablet, 2 Times Daily    simvastatin (ZOCOR) 40 mg, Nightly      Objective     Vital Signs:   /78   Pulse 78   Ht 172.7 cm (68\")   Wt 73.9 kg (163 lb)   SpO2 96% Comment: RA  BMI 24.78 kg/m²   Physical Exam  Constitutional:       General: He is not in acute distress.     Appearance: He is ill-appearing. He is not diaphoretic.   HENT:      Nose: Nose normal.   Pulmonary:      Effort: No respiratory distress.      Breath sounds: No wheezing.        Result Review  Data Reviewed:    NM PET/CT Skull Base to Mid Thigh  Result Date: 6/3/2025  Impression: 1. Previous left upper lobe resection. The recently described new irregular nodule in the medial left upper lung demonstrates intense abnormal metabolic activity suggesting a neoplastic process. There is also an additional subpleural nodule in the posterior left lung apex demonstrating sufficient metabolic activity that it is concerning for a lung neoplasm. 2. [place] focus of nodular consolidation in the right upper " lobe which extends back into the right hilum does contain an area of more central hyperintensity. A neoplastic process is not excluded. The more posterior focus of consolidation also demonstrates increased metabolic activity measuring SUVs of 4.0. The focus of consolidation more inferiorly within the right lower lobe does not demonstrate as intense of metabolic activity and may represent some inflammatory change/scarring. 3. No additional sites of abnormal metabolic activity identified within the head or neck or abdomen and pelvis.    This report was signed and finalized on 6/3/2025 10:56 AM by Dr. Branden Leavitt MD.      CT Chest Without Contrast Diagnostic  Result Date: 5/21/2025  Impression: 1. A spiculated 1.7 cm density or nodule in the left lower lobe at the level of the descending thoracic aorta. This could be a true nodule/neoplasm. A focal area of inflammatory change less likely. This is at the level of the proximal descending thoracic aorta. 2. Prior left upper lobectomy. 3. A 6.1 x 3.1 cm masslike opacity surrounding the central portion of the right upper lobe bronchus. There is an adjacent opacity posteriorly measuring 2.8 x 2.7 cm. These findings could be neoplastic. Infectious/inflammatory change is also considered. 4. A 2.7 x 1.6 cm nodule versus nodular consolidation in the right lower lobe image 114 series 4. Neoplasm, infection and inflammation considered. There are nearby small nodules in this area measuring up to 7 mm in size. 5. Probable infectious/inflammatory consolidation in the right lower lobe inferiorly and posteriorly. 6. Atheromatous calcification of the thoracic aorta and coronary arteries. Ascending aorta ectatic measuring 3.7 cm. 7. Small pericardial effusion.    This report was signed and finalized on 5/21/2025 1:21 PM by Dr. Keith Palomino MD.      CT Abdomen Pelvis Without Contrast  Result Date: 5/21/2025  Impression:  No evidence of metastatic disease in the abdomen or pelvis.     This report was signed and finalized on 5/21/2025 1:15 PM by Dr. Johnny Palmer MD.        PFT Values          4/16/2024    10:15 11/25/2024    10:30   Pre Drug PFT Results   FVC 68 72   FEV1 48 60   FEF 25-75% 24 51   FEV1/FVC 55.73 64                Assessment and Plan    Diagnoses and all orders for this visit:    1. Nodule of lower lobe of left lung (Primary)    2. Stage 3 severe COPD by GOLD classification    Will plan ion bronchoscopy for 6/23, first case if available.  We discussed risk benefits alternatives and gave patient instructions regarding this procedure.  This will be similar to what we did previously.  Continue brezri. I gave 2 weeks samples given expense  Total of 65 minutes time spent with pt interview, exam, counseling, data review, discussion with other treating providers, documentation and treatment.    Follow Up   Return in about 2 months (around 8/12/2025).  Patient was given instructions and counseling regarding his condition or for health maintenance advice. Please see specific information pulled into the AVS if appropriate.    Electronically signed by Nicola Monsivais MD, 6/12/2025, 11:47 CDT

## 2025-06-18 ENCOUNTER — PRE-ADMISSION TESTING (OUTPATIENT)
Dept: PREADMISSION TESTING | Facility: HOSPITAL | Age: 75
End: 2025-06-18
Payer: MEDICARE

## 2025-06-18 ENCOUNTER — HOSPITAL ENCOUNTER (OUTPATIENT)
Dept: CT IMAGING | Facility: HOSPITAL | Age: 75
Discharge: HOME | End: 2025-06-18
Payer: MEDICARE

## 2025-06-18 VITALS
RESPIRATION RATE: 20 BRPM | SYSTOLIC BLOOD PRESSURE: 117 MMHG | DIASTOLIC BLOOD PRESSURE: 55 MMHG | OXYGEN SATURATION: 93 % | HEIGHT: 68 IN | BODY MASS INDEX: 25.23 KG/M2 | WEIGHT: 166.45 LBS | HEART RATE: 80 BPM

## 2025-06-18 DIAGNOSIS — Z85.118 HISTORY OF PRIMARY MALIGNANT NEOPLASM OF RIGHT LUNG: ICD-10-CM

## 2025-06-18 DIAGNOSIS — R91.1 NODULE OF UPPER LOBE OF RIGHT LUNG: ICD-10-CM

## 2025-06-18 PROCEDURE — 71250 CT THORAX DX C-: CPT

## 2025-06-18 PROCEDURE — 93005 ELECTROCARDIOGRAM TRACING: CPT

## 2025-06-18 NOTE — DISCHARGE INSTRUCTIONS

## 2025-06-21 LAB
QT INTERVAL: 368 MS
QTC INTERVAL: 410 MS

## 2025-06-23 ENCOUNTER — APPOINTMENT (OUTPATIENT)
Dept: GENERAL RADIOLOGY | Facility: HOSPITAL | Age: 75
End: 2025-06-23
Payer: MEDICARE

## 2025-06-23 ENCOUNTER — ANESTHESIA (OUTPATIENT)
Dept: PERIOP | Facility: HOSPITAL | Age: 75
End: 2025-06-23
Payer: MEDICARE

## 2025-06-23 ENCOUNTER — HOSPITAL ENCOUNTER (OUTPATIENT)
Facility: HOSPITAL | Age: 75
Setting detail: HOSPITAL OUTPATIENT SURGERY
Discharge: HOME | End: 2025-06-23
Attending: INTERNAL MEDICINE | Admitting: INTERNAL MEDICINE
Payer: MEDICARE

## 2025-06-23 ENCOUNTER — ANESTHESIA EVENT (OUTPATIENT)
Dept: PERIOP | Facility: HOSPITAL | Age: 75
End: 2025-06-23
Payer: MEDICARE

## 2025-06-23 VITALS
HEART RATE: 73 BPM | OXYGEN SATURATION: 92 % | RESPIRATION RATE: 16 BRPM | TEMPERATURE: 97.3 F | DIASTOLIC BLOOD PRESSURE: 68 MMHG | SYSTOLIC BLOOD PRESSURE: 94 MMHG

## 2025-06-23 DIAGNOSIS — R91.1 NODULE OF LOWER LOBE OF LEFT LUNG: ICD-10-CM

## 2025-06-23 LAB
GLUCOSE BLDC GLUCOMTR-MCNC: 124 MG/DL (ref 70–130)
GLUCOSE BLDC GLUCOMTR-MCNC: 158 MG/DL (ref 70–130)
KOH PREP NAIL: NORMAL

## 2025-06-23 PROCEDURE — 31624 DX BRONCHOSCOPE/LAVAGE: CPT | Performed by: INTERNAL MEDICINE

## 2025-06-23 PROCEDURE — 88112 CYTOPATH CELL ENHANCE TECH: CPT | Performed by: INTERNAL MEDICINE

## 2025-06-23 PROCEDURE — 31629 BRONCHOSCOPY/NEEDLE BX EACH: CPT | Performed by: INTERNAL MEDICINE

## 2025-06-23 PROCEDURE — 87205 SMEAR GRAM STAIN: CPT | Performed by: INTERNAL MEDICINE

## 2025-06-23 PROCEDURE — 82948 REAGENT STRIP/BLOOD GLUCOSE: CPT

## 2025-06-23 PROCEDURE — 88172 CYTP DX EVAL FNA 1ST EA SITE: CPT | Performed by: INTERNAL MEDICINE

## 2025-06-23 PROCEDURE — 88341 IMHCHEM/IMCYTCHM EA ADD ANTB: CPT | Performed by: INTERNAL MEDICINE

## 2025-06-23 PROCEDURE — 25810000003 LACTATED RINGERS PER 1000 ML: Performed by: INTERNAL MEDICINE

## 2025-06-23 PROCEDURE — 71045 X-RAY EXAM CHEST 1 VIEW: CPT

## 2025-06-23 PROCEDURE — 25010000002 ONDANSETRON PER 1 MG

## 2025-06-23 PROCEDURE — 88305 TISSUE EXAM BY PATHOLOGIST: CPT | Performed by: INTERNAL MEDICINE

## 2025-06-23 PROCEDURE — 36415 COLL VENOUS BLD VENIPUNCTURE: CPT | Performed by: INTERNAL MEDICINE

## 2025-06-23 PROCEDURE — 25010000002 PROPOFOL 10 MG/ML EMULSION

## 2025-06-23 PROCEDURE — 87102 FUNGUS ISOLATION CULTURE: CPT | Performed by: INTERNAL MEDICINE

## 2025-06-23 PROCEDURE — 25010000002 LIDOCAINE PF 2% 2 % SOLUTION

## 2025-06-23 PROCEDURE — 87220 TISSUE EXAM FOR FUNGI: CPT | Performed by: INTERNAL MEDICINE

## 2025-06-23 PROCEDURE — 31628 BRONCHOSCOPY/LUNG BX EACH: CPT | Performed by: INTERNAL MEDICINE

## 2025-06-23 PROCEDURE — 76000 FLUOROSCOPY <1 HR PHYS/QHP: CPT

## 2025-06-23 PROCEDURE — 25010000002 SUGAMMADEX 200 MG/2ML SOLUTION

## 2025-06-23 PROCEDURE — 87206 SMEAR FLUORESCENT/ACID STAI: CPT | Performed by: INTERNAL MEDICINE

## 2025-06-23 PROCEDURE — 87070 CULTURE OTHR SPECIMN AEROBIC: CPT | Performed by: INTERNAL MEDICINE

## 2025-06-23 PROCEDURE — 25010000002 DEXAMETHASONE PER 1 MG

## 2025-06-23 PROCEDURE — 87116 MYCOBACTERIA CULTURE: CPT | Performed by: INTERNAL MEDICINE

## 2025-06-23 PROCEDURE — 88342 IMHCHEM/IMCYTCHM 1ST ANTB: CPT | Performed by: INTERNAL MEDICINE

## 2025-06-23 PROCEDURE — 31654 BRONCH EBUS IVNTJ PERPH LES: CPT | Performed by: INTERNAL MEDICINE

## 2025-06-23 PROCEDURE — C1726 CATH, BAL DIL, NON-VASCULAR: HCPCS | Performed by: INTERNAL MEDICINE

## 2025-06-23 PROCEDURE — 31627 NAVIGATIONAL BRONCHOSCOPY: CPT | Performed by: INTERNAL MEDICINE

## 2025-06-23 PROCEDURE — 31652 BRONCH EBUS SAMPLNG 1/2 NODE: CPT | Performed by: INTERNAL MEDICINE

## 2025-06-23 RX ORDER — PROPOFOL 10 MG/ML
VIAL (ML) INTRAVENOUS AS NEEDED
Status: DISCONTINUED | OUTPATIENT
Start: 2025-06-23 | End: 2025-06-23 | Stop reason: SURG

## 2025-06-23 RX ORDER — SODIUM CHLORIDE 9 MG/ML
40 INJECTION, SOLUTION INTRAVENOUS AS NEEDED
Status: DISCONTINUED | OUTPATIENT
Start: 2025-06-23 | End: 2025-06-23 | Stop reason: HOSPADM

## 2025-06-23 RX ORDER — ONDANSETRON 2 MG/ML
INJECTION INTRAMUSCULAR; INTRAVENOUS AS NEEDED
Status: DISCONTINUED | OUTPATIENT
Start: 2025-06-23 | End: 2025-06-23 | Stop reason: SURG

## 2025-06-23 RX ORDER — ROCURONIUM BROMIDE 10 MG/ML
INJECTION, SOLUTION INTRAVENOUS AS NEEDED
Status: DISCONTINUED | OUTPATIENT
Start: 2025-06-23 | End: 2025-06-23 | Stop reason: SURG

## 2025-06-23 RX ORDER — LIDOCAINE HYDROCHLORIDE 10 MG/ML
0.5 INJECTION, SOLUTION EPIDURAL; INFILTRATION; INTRACAUDAL; PERINEURAL ONCE AS NEEDED
Status: DISCONTINUED | OUTPATIENT
Start: 2025-06-23 | End: 2025-06-23 | Stop reason: HOSPADM

## 2025-06-23 RX ORDER — SODIUM CHLORIDE 0.9 % (FLUSH) 0.9 %
10 SYRINGE (ML) INJECTION AS NEEDED
Status: DISCONTINUED | OUTPATIENT
Start: 2025-06-23 | End: 2025-06-23 | Stop reason: HOSPADM

## 2025-06-23 RX ORDER — LIDOCAINE HYDROCHLORIDE 20 MG/ML
INJECTION, SOLUTION EPIDURAL; INFILTRATION; INTRACAUDAL; PERINEURAL AS NEEDED
Status: DISCONTINUED | OUTPATIENT
Start: 2025-06-23 | End: 2025-06-23 | Stop reason: SURG

## 2025-06-23 RX ORDER — SODIUM CHLORIDE 0.9 % (FLUSH) 0.9 %
10 SYRINGE (ML) INJECTION EVERY 12 HOURS SCHEDULED
Status: DISCONTINUED | OUTPATIENT
Start: 2025-06-23 | End: 2025-06-23 | Stop reason: HOSPADM

## 2025-06-23 RX ORDER — SODIUM CHLORIDE, SODIUM LACTATE, POTASSIUM CHLORIDE, CALCIUM CHLORIDE 600; 310; 30; 20 MG/100ML; MG/100ML; MG/100ML; MG/100ML
1000 INJECTION, SOLUTION INTRAVENOUS CONTINUOUS
Status: DISCONTINUED | OUTPATIENT
Start: 2025-06-23 | End: 2025-06-23 | Stop reason: HOSPADM

## 2025-06-23 RX ORDER — DEXAMETHASONE SODIUM PHOSPHATE 4 MG/ML
INJECTION, SOLUTION INTRA-ARTICULAR; INTRALESIONAL; INTRAMUSCULAR; INTRAVENOUS; SOFT TISSUE AS NEEDED
Status: DISCONTINUED | OUTPATIENT
Start: 2025-06-23 | End: 2025-06-23 | Stop reason: SURG

## 2025-06-23 RX ORDER — SODIUM CHLORIDE 0.9 % (FLUSH) 0.9 %
3 SYRINGE (ML) INJECTION AS NEEDED
Status: DISCONTINUED | OUTPATIENT
Start: 2025-06-23 | End: 2025-06-23 | Stop reason: HOSPADM

## 2025-06-23 RX ADMIN — LIDOCAINE HYDROCHLORIDE 80 MG: 20 INJECTION, SOLUTION EPIDURAL; INFILTRATION; INTRACAUDAL; PERINEURAL at 07:56

## 2025-06-23 RX ADMIN — SUGAMMADEX 200 MG: 100 INJECTION, SOLUTION INTRAVENOUS at 08:44

## 2025-06-23 RX ADMIN — SODIUM CHLORIDE, POTASSIUM CHLORIDE, SODIUM LACTATE AND CALCIUM CHLORIDE 1000 ML: 600; 310; 30; 20 INJECTION, SOLUTION INTRAVENOUS at 06:07

## 2025-06-23 RX ADMIN — PROPOFOL 150 MG: 10 INJECTION, EMULSION INTRAVENOUS at 07:56

## 2025-06-23 RX ADMIN — DEXAMETHASONE SODIUM PHOSPHATE 4 MG: 4 INJECTION, SOLUTION INTRA-ARTICULAR; INTRALESIONAL; INTRAMUSCULAR; INTRAVENOUS; SOFT TISSUE at 08:30

## 2025-06-23 RX ADMIN — ONDANSETRON 4 MG: 2 INJECTION INTRAMUSCULAR; INTRAVENOUS at 08:30

## 2025-06-23 RX ADMIN — ROCURONIUM BROMIDE 50 MG: 10 INJECTION, SOLUTION INTRAVENOUS at 07:56

## 2025-06-23 NOTE — OP NOTE
Procedure Note (AdvancedBronchoscopy)    Date of Operation: 06/23/25  Pre-op Diagnosis: Nodule of lower lobe of left lung  Post-op Diagnosis: Same  Surgeon: Nicola Monsivais MD  Anesthesia: General    Operation: Flexible fiberoptic bronchoscopy, Bronchoscopy, Diagnostic, Ion robotic shape sensing navigational bronchoscopy, Radial probe endobronchial ultrasound, Linear endobronchial ultrasound, Bronchoalveolar lavage, BAL, Bronchial wash, Transbronchial biopsy, Transbronchial needle aspirate of mediastinum, and Transbronchial needle aspirate of lung with navigational and fluoroscopic guidance  Findings: Stump left upper lobe, see photo.  Mild mucous plugging right lower lobe.  Concentric radial probe view left lower lobe.  Queenie positive.  Station 10R mediastinal node 6 mm.  Specimen:   Specimens       ID Source Type Tests Collected By Collected At Frozen?    1 Bronchus Wash FUNGAL CULTURE  TITUS PREP  AFB CULTURE  RESPIRATORY CULTURE   Nicola Monsivais MD 6/23/25 0841     Description: WASH- MICRO ONLY    A Lung, Left Lower Lobe Fine Needle Aspirate FINE NEEDLE ASPIRATION   Nicola Monsivais MD 6/23/25 0809     Description: LLL FNA    B Lung, Left Lower Lobe Tissue TISSUE PATHOLOGY EXAM   Nicola Monsivais MD 6/23/25 0809     Description: LLL bx    C Lung, Left Lower Lobe Lavage NON-GYNECOLOGIC CYTOLOGY   Nicola Monsivais MD 6/23/25 0826     Description: LLL LAVAGE    D Mediastinum Lymph Node FINE NEEDLE ASPIRATION   Nicola Monsivais MD 6/23/25 0835     Description: STATION 10R          Estimated Blood Loss: Minimal  Complications: none    Indications and History:  The patient is a 74 y.o.  male with Nodule of lower lobe of left lung.  The risks, benefits, complications, treatment options and expected outcomes were discussed with the patient.  The possibilities of reaction to medication, pulmonary aspiration, perforation of a viscus, bleeding, failure to diagnose a condition and creating a  complication requiring transfusion or operation were discussed with the patient who freely signed the consent.  Time out was taken prior to the procedure.    Description of Procedure:    After the induction of general anesthesia, the patient was positioned supine and the Olympus BF H190 bronchoscope was introduced through the endotracheal tube.  The scope was then passed into the trachea.     The trachea, mainstem bronchi, RUL, RML, Bronchus Intermedius, RLL, SAMINA, SAMINA upper division and lingula, and LLL, and all primary segmental airways were examined and were normal except as described below:    Endobronchial findings:  Trachea: Normal mucosa  Isabela: Sharp  Right main bronchus: Normal mucosa  Right upper lobe bronchus: Normal mucosa  Right middle lobe bronchus: Normal mucosa  Right lower lobe bronchus: Normal mucosa and segmetal mucus plugs, cleared with aspiration  Left main bronchus: Normal mucosa  Left upper lobe bronchus: stump with sutures  Left lower lobe bronchus: Edematous mucosa    The conventional bronchoscope was removed .    Using the planning laptop and Planpoint software, the lesion of interest was identified, and marked, a pathway was generated, and anatomic borders were identified.The ION system was magnetically docked to the ETT. The shape sensing catheter with vision probe was introduced via the  into the ETT.    Registration was started by advancing the bronchoscope into the right and left mainstem bronchi. The main isabela was confirmed by rotating the live-view image to match the navigation-view image of the main isabela. Registration was completed by advancing the catheter tip into the upper and lower lobar bronchi bilaterally with positional confirmation by the robotic system. There was no significant visual divergence. Navigation was complicated by : airway collapse. Using the , the shape sensing bronchoscope was advanced to the target lesion. The mobile C-Arm was  brought in, and the vision probe was removed. A peripheral radial ultrasound probe was utilized to better localize the lesion in the left lower lobe superior segment, giving a concentric view.     Transbronchial needle aspirations of the target lesion were performed using an Intuitive Flexision 23 gauge needle and sent for routine cytology. The procedure was guided by fluoroscopy and radial ultrasound. Transbronchial needle aspiration technique was selected because the sampling site was not accessible using standard bronchoscopic techniques. 8 needle aspirations were obtained. Rapid On-Site Evaluation: positive    Transbronchial biopsies of the target lesion were performed using a [place] DBF-1.8-S Captura spikeless forceps and sent for histopathology examination. The procedure was guided by fluoroscopy and radial ultrasound. Transbronchial biopsy technique was selected because the sampling site was not visible endoscopically. 27 biopsy samples were obtained.  Prep was consistent with needle sample Bronchioalveolar lavage was then performed. 10 ml of iced saline was instilled through the shape sensing catheter. The catheter was connected to the suction syringe and the catheter was retracted slowly, producing an aspiration of 4.5 ml fluid which was cloudy. The vision probe was reintruced to examine the biopsied area, and then the vision probe and shape sensing catheter were extracted and catheter guide was disconnected.  The Olympus BF-[id number] EBUS bronchoscope was introduced and the mediastinum was examined via linear ultrasound.  Findings: 6 mm station 10R node.  Station 4R, 4L, 10L, 7, 11R, 11L were normal. TBNA under ultrasound guidance was collected using an Olympus VisiShot 2 22g needle from station 10L x 3 passes., Rapid onsite evaluation: Deferred due to small size of node and sample.  Iced saline was instilled and aspirated in small aliquots between needle passes for hemostasis. No ongoing bleeding was  identified.  The bronchoscope was removed.    The patient was undocked from the ion robot arm.  The Patient was extubated and taken to the Recovery Room in satisfactory condition.      Nicola Monsivais MD at 08:46 CDT, 6/23/2025

## 2025-06-23 NOTE — ANESTHESIA POSTPROCEDURE EVALUATION
Patient: Boogie Artis    Procedure Summary       Date: 06/23/25 Room / Location: [facility] OR 09 /  [facility] OR    Anesthesia Start: 0752 Anesthesia Stop: 0857    Procedure: BRONCHOSCOPY WITH ION ROBOT and BRONCHOSCOPY WITH ENDOBRONCHIAL ULTRASOUND (Bronchus) Diagnosis:       Nodule of lower lobe of left lung      (Nodule of lower lobe of left lung [R91.1])    Surgeons: Nicola Monsivais MD Provider: Ginny Lozoya CRNA    Anesthesia Type: general ASA Status: 3            Anesthesia Type: general    Vitals  Vitals Value Taken Time   /56 06/23/25 09:31   Temp 97.3 °F (36.3 °C) 06/23/25 08:55   Pulse 77 06/23/25 09:39   Resp 22 06/23/25 09:30   SpO2 91 % 06/23/25 09:39   Vitals shown include unfiled device data.        Post Anesthesia Care and Evaluation    Patient location during evaluation: PHASE II  Patient participation: complete - patient participated  Level of consciousness: awake and awake and alert  Pain score: 0  Pain management: adequate    Airway patency: patent  Anesthetic complications: No anesthetic complications  PONV Status: none  Cardiovascular status: acceptable  Respiratory status: acceptable  Hydration status: acceptable    Comments: Patient discharged according to acceptable Stephy score per RN assessment. See nursing records for further information.     Blood pressure 110/53, pulse 77, temperature 97.3 °F (36.3 °C), temperature source Temporal, resp. rate 16, SpO2 92%.

## 2025-06-23 NOTE — ANESTHESIA PROCEDURE NOTES
Airway  Date/Time: 6/23/2025 7:58 AM  Airway not difficult    General Information and Staff    Patient location during procedure: OR  CRNA/CAA: Ginny Lozoya CRNA    Indications and Patient Condition  Indications for airway management: airway protection    Preoxygenated: yes    Mask difficulty assessment: 0 - not attempted    Final Airway Details    Final airway type: endotracheal airway      Successful airway: ETT  Cuffed: yes   Successful intubation technique: video laryngoscopy  Adjuncts used in placement: intubating stylet  Endotracheal tube insertion site: oral  Blade: Malik  Blade size: 3  ETT size (mm): 8.0  Cormack-Lehane Classification: grade IIa - partial view of glottis  Placement verified by: chest auscultation and capnometry   Cuff volume (mL): 7  Measured from: teeth  ETT/EBT  to teeth (cm): 22  Number of attempts at approach: 1  Assessment: lips, teeth, and gum same as pre-op and atraumatic intubation    Additional Comments  Scab on lip prior to intubation, intact after intubation

## 2025-06-23 NOTE — ANESTHESIA PREPROCEDURE EVALUATION
Anesthesia Evaluation     Patient summary reviewed   no history of anesthetic complications:   NPO Solid Status: > 8 hours  NPO Liquid Status: > 8 hours           Airway   Mallampati: I  No difficulty expected  Dental    (+) poor dentition        Pulmonary    (+) a smoker Former, lung cancer, COPD,sleep apnea    ROS comment: Pt reports h/o bronch, moo resection at [facility], prolonged hospitalization after an arterial injury  Dental injury from anesthesia  Cardiovascular   Exercise tolerance: poor (<4 METS)    (+) hypertension, hyperlipidemia      Neuro/Psych  (+) seizures, headaches, tremors    ROS Comment:  Headache, short term memory loss, felt to be seizures and on depakote  GI/Hepatic/Renal/Endo    (+) GERD, renal disease- stones and CRI, diabetes mellitus    Musculoskeletal     Abdominal    Substance History      OB/GYN          Other   blood dyscrasia anemia,   history of cancer                      Anesthesia Plan    ASA 3     general     intravenous induction     Anesthetic plan, risks, benefits, and alternatives have been provided, discussed and informed consent has been obtained with: patient.      CODE STATUS:

## 2025-06-24 ENCOUNTER — INFUSION (OUTPATIENT)
Dept: ONCOLOGY | Facility: HOSPITAL | Age: 75
End: 2025-06-24
Payer: MEDICARE

## 2025-06-24 ENCOUNTER — LAB (OUTPATIENT)
Dept: LAB | Facility: HOSPITAL | Age: 75
End: 2025-06-24
Payer: MEDICARE

## 2025-06-24 VITALS
OXYGEN SATURATION: 96 % | BODY MASS INDEX: 25.01 KG/M2 | WEIGHT: 165.4 LBS | SYSTOLIC BLOOD PRESSURE: 113 MMHG | HEART RATE: 73 BPM | TEMPERATURE: 96.6 F | RESPIRATION RATE: 16 BRPM | DIASTOLIC BLOOD PRESSURE: 48 MMHG

## 2025-06-24 DIAGNOSIS — R94.6 ABNORMAL RESULTS OF THYROID FUNCTION STUDIES: ICD-10-CM

## 2025-06-24 DIAGNOSIS — Z95.828 PORT-A-CATH IN PLACE: ICD-10-CM

## 2025-06-24 DIAGNOSIS — C34.92 SQUAMOUS CELL CARCINOMA OF LEFT LUNG: ICD-10-CM

## 2025-06-24 DIAGNOSIS — Z79.899 ENCOUNTER FOR LONG-TERM (CURRENT) USE OF HIGH-RISK MEDICATION: ICD-10-CM

## 2025-06-24 DIAGNOSIS — C34.11 MALIGNANT NEOPLASM OF UPPER LOBE OF RIGHT LUNG: ICD-10-CM

## 2025-06-24 DIAGNOSIS — C34.92 SQUAMOUS CELL CARCINOMA OF LEFT LUNG: Primary | ICD-10-CM

## 2025-06-24 LAB
ALBUMIN SERPL-MCNC: 4 G/DL (ref 3.5–5.2)
ALBUMIN/GLOB SERPL: 1.4 G/DL
ALP SERPL-CCNC: 74 U/L (ref 39–117)
ALT SERPL W P-5'-P-CCNC: 5 U/L (ref 1–41)
ANION GAP SERPL CALCULATED.3IONS-SCNC: 13 MMOL/L (ref 5–15)
AST SERPL-CCNC: 7 U/L (ref 1–40)
BASOPHILS # BLD AUTO: 0.04 10*3/MM3 (ref 0–0.2)
BASOPHILS NFR BLD AUTO: 0.5 % (ref 0–1.5)
BILIRUB SERPL-MCNC: 0.2 MG/DL (ref 0–1.2)
BUN SERPL-MCNC: 15.7 MG/DL (ref 8–23)
BUN/CREAT SERPL: 12.8 (ref 7–25)
CALCIUM SPEC-SCNC: 9.6 MG/DL (ref 8.6–10.5)
CHLORIDE SERPL-SCNC: 98 MMOL/L (ref 98–107)
CO2 SERPL-SCNC: 25 MMOL/L (ref 22–29)
CREAT SERPL-MCNC: 1.23 MG/DL (ref 0.76–1.27)
DEPRECATED RDW RBC AUTO: 52 FL (ref 37–54)
EGFRCR SERPLBLD CKD-EPI 2021: 61.6 ML/MIN/1.73
EOSINOPHIL # BLD AUTO: 0.13 10*3/MM3 (ref 0–0.4)
EOSINOPHIL NFR BLD AUTO: 1.7 % (ref 0.3–6.2)
ERYTHROCYTE [DISTWIDTH] IN BLOOD BY AUTOMATED COUNT: 14.6 % (ref 12.3–15.4)
GLOBULIN UR ELPH-MCNC: 2.9 GM/DL
GLUCOSE SERPL-MCNC: 115 MG/DL (ref 65–99)
HCT VFR BLD AUTO: 35 % (ref 37.5–51)
HGB BLD-MCNC: 10.7 G/DL (ref 13–17.7)
IMM GRANULOCYTES # BLD AUTO: 0.06 10*3/MM3 (ref 0–0.05)
IMM GRANULOCYTES NFR BLD AUTO: 0.8 % (ref 0–0.5)
LYMPHOCYTES # BLD AUTO: 1.02 10*3/MM3 (ref 0.7–3.1)
LYMPHOCYTES NFR BLD AUTO: 13 % (ref 19.6–45.3)
MAGNESIUM SERPL-MCNC: 1.9 MG/DL (ref 1.6–2.4)
MCH RBC QN AUTO: 29.6 PG (ref 26.6–33)
MCHC RBC AUTO-ENTMCNC: 30.6 G/DL (ref 31.5–35.7)
MCV RBC AUTO: 97 FL (ref 79–97)
MONOCYTES # BLD AUTO: 0.79 10*3/MM3 (ref 0.1–0.9)
MONOCYTES NFR BLD AUTO: 10 % (ref 5–12)
NEUTROPHILS NFR BLD AUTO: 5.83 10*3/MM3 (ref 1.7–7)
NEUTROPHILS NFR BLD AUTO: 74 % (ref 42.7–76)
NRBC BLD AUTO-RTO: 0 /100 WBC (ref 0–0.2)
PLATELET # BLD AUTO: 215 10*3/MM3 (ref 140–450)
PMV BLD AUTO: 9.3 FL (ref 6–12)
POTASSIUM SERPL-SCNC: 4.6 MMOL/L (ref 3.5–5.2)
PROT SERPL-MCNC: 6.9 G/DL (ref 6–8.5)
RBC # BLD AUTO: 3.61 10*6/MM3 (ref 4.14–5.8)
SODIUM SERPL-SCNC: 136 MMOL/L (ref 136–145)
T4 FREE SERPL-MCNC: 1.1 NG/DL (ref 0.92–1.68)
TSH SERPL DL<=0.05 MIU/L-ACNC: 0.69 UIU/ML (ref 0.27–4.2)
WBC NRBC COR # BLD AUTO: 7.87 10*3/MM3 (ref 3.4–10.8)

## 2025-06-24 PROCEDURE — 96375 TX/PRO/DX INJ NEW DRUG ADDON: CPT

## 2025-06-24 PROCEDURE — 80053 COMPREHEN METABOLIC PANEL: CPT

## 2025-06-24 PROCEDURE — 25010000002 DURVALUMAB 50 MG/ML SOLUTION 2.4 ML VIAL: Performed by: INTERNAL MEDICINE

## 2025-06-24 PROCEDURE — 25010000002 DURVALUMAB 50 MG/ML SOLUTION 10 ML VIAL: Performed by: INTERNAL MEDICINE

## 2025-06-24 PROCEDURE — 25010000002 HEPARIN LOCK FLUSH PER 10 UNITS: Performed by: INTERNAL MEDICINE

## 2025-06-24 PROCEDURE — 36415 COLL VENOUS BLD VENIPUNCTURE: CPT

## 2025-06-24 PROCEDURE — 25010000002 DEXAMETHASONE PER 1 MG: Performed by: INTERNAL MEDICINE

## 2025-06-24 PROCEDURE — 25810000003 SODIUM CHLORIDE 0.9 % SOLUTION: Performed by: INTERNAL MEDICINE

## 2025-06-24 PROCEDURE — 84439 ASSAY OF FREE THYROXINE: CPT

## 2025-06-24 PROCEDURE — 25810000003 SODIUM CHLORIDE 0.9 % SOLUTION 250 ML FLEX CONT: Performed by: INTERNAL MEDICINE

## 2025-06-24 PROCEDURE — 25010000002 DIPHENHYDRAMINE PER 50 MG: Performed by: INTERNAL MEDICINE

## 2025-06-24 PROCEDURE — 85025 COMPLETE CBC W/AUTO DIFF WBC: CPT

## 2025-06-24 PROCEDURE — 83735 ASSAY OF MAGNESIUM: CPT

## 2025-06-24 PROCEDURE — 84443 ASSAY THYROID STIM HORMONE: CPT

## 2025-06-24 PROCEDURE — 96413 CHEMO IV INFUSION 1 HR: CPT

## 2025-06-24 PROCEDURE — 25010000002 FAMOTIDINE 10 MG/ML SOLUTION: Performed by: INTERNAL MEDICINE

## 2025-06-24 RX ORDER — FAMOTIDINE 10 MG/ML
10 INJECTION, SOLUTION INTRAVENOUS ONCE
Status: COMPLETED | OUTPATIENT
Start: 2025-06-24 | End: 2025-06-24

## 2025-06-24 RX ORDER — HEPARIN SODIUM (PORCINE) LOCK FLUSH IV SOLN 100 UNIT/ML 100 UNIT/ML
500 SOLUTION INTRAVENOUS AS NEEDED
Status: DISCONTINUED | OUTPATIENT
Start: 2025-06-24 | End: 2025-06-24 | Stop reason: HOSPADM

## 2025-06-24 RX ORDER — DIPHENHYDRAMINE HYDROCHLORIDE 50 MG/ML
25 INJECTION, SOLUTION INTRAMUSCULAR; INTRAVENOUS ONCE
Status: COMPLETED | OUTPATIENT
Start: 2025-06-24 | End: 2025-06-24

## 2025-06-24 RX ORDER — DEXAMETHASONE SODIUM PHOSPHATE 10 MG/ML
10 INJECTION, SOLUTION INTRA-ARTICULAR; INTRALESIONAL; INTRAMUSCULAR; INTRAVENOUS; SOFT TISSUE ONCE
Status: COMPLETED | OUTPATIENT
Start: 2025-06-24 | End: 2025-06-24

## 2025-06-24 RX ORDER — SODIUM CHLORIDE 9 MG/ML
20 INJECTION, SOLUTION INTRAVENOUS ONCE
Status: COMPLETED | OUTPATIENT
Start: 2025-06-24 | End: 2025-06-24

## 2025-06-24 RX ORDER — HEPARIN SODIUM (PORCINE) LOCK FLUSH IV SOLN 100 UNIT/ML 100 UNIT/ML
500 SOLUTION INTRAVENOUS AS NEEDED
OUTPATIENT
Start: 2025-06-24

## 2025-06-24 RX ORDER — SODIUM CHLORIDE 0.9 % (FLUSH) 0.9 %
10 SYRINGE (ML) INJECTION AS NEEDED
OUTPATIENT
Start: 2025-06-24

## 2025-06-24 RX ORDER — SODIUM CHLORIDE 0.9 % (FLUSH) 0.9 %
10 SYRINGE (ML) INJECTION AS NEEDED
Status: DISCONTINUED | OUTPATIENT
Start: 2025-06-24 | End: 2025-06-24 | Stop reason: HOSPADM

## 2025-06-24 RX ADMIN — SODIUM CHLORIDE 20 ML/HR: 9 INJECTION, SOLUTION INTRAVENOUS at 10:33

## 2025-06-24 RX ADMIN — FAMOTIDINE 10 MG: 10 INJECTION INTRAVENOUS at 10:37

## 2025-06-24 RX ADMIN — SODIUM CHLORIDE 740 MG: 9 INJECTION, SOLUTION INTRAVENOUS at 10:55

## 2025-06-24 RX ADMIN — Medication 500 UNITS: at 12:30

## 2025-06-24 RX ADMIN — Medication 10 ML: at 12:29

## 2025-06-24 RX ADMIN — DEXAMETHASONE SODIUM PHOSPHATE 10 MG: 10 INJECTION INTRAMUSCULAR; INTRAVENOUS at 10:33

## 2025-06-24 RX ADMIN — DIPHENHYDRAMINE HYDROCHLORIDE 25 MG: 50 INJECTION, SOLUTION INTRAMUSCULAR; INTRAVENOUS at 10:40

## 2025-06-25 LAB
BACTERIA SPEC RESP CULT: NORMAL
GRAM STN SPEC: NORMAL

## 2025-06-28 LAB
BEAKER LAB AP INTRAOPERATIVE CONSULTATION: NORMAL
CYTO UR: NORMAL
LAB AP CASE REPORT: NORMAL
LAB AP CLINICAL INFORMATION: NORMAL
LAB AP DIAGNOSIS COMMENT: NORMAL
LAB AP SPECIAL STAINS: NORMAL
Lab: NORMAL
PATH REPORT.FINAL DX SPEC: NORMAL
PATH REPORT.GROSS SPEC: NORMAL

## 2025-07-01 ENCOUNTER — RESULTS FOLLOW-UP (OUTPATIENT)
Dept: PULMONOLOGY | Facility: CLINIC | Age: 75
End: 2025-07-01
Payer: MEDICARE

## 2025-07-01 ENCOUNTER — TELEPHONE (OUTPATIENT)
Dept: ONCOLOGY | Facility: CLINIC | Age: 75
End: 2025-07-01
Payer: MEDICARE

## 2025-07-01 DIAGNOSIS — C34.92 SQUAMOUS CELL CARCINOMA OF LEFT LUNG: Primary | ICD-10-CM

## 2025-07-01 NOTE — TELEPHONE ENCOUNTER
----- Message from Timoteo Lombardi sent at 7/1/2025  3:18 PM CDT -----  Is a very limited assay.  They did not run molecular studies on the other potential mutations that [facility] does routinely.  Lets run a [facility] XT and if not enough tissue then lets do an XF (peripheral blood).  Thank you  ----- Message -----  From: Carline Higuera MA  Sent: 7/1/2025   2:59 PM CDT  To: Timoteo Lombardi MD    Looks like they have results for a circulogene test which looks to be similar to [facility]. Scanned to Lab tab dated 06/24/25  ----- Message -----  From: Timoteo Lombardi MD  Sent: 7/1/2025  11:50 AM CDT  To: Carline Higuera MA; Gogo Prince RN    Sent for [facility] XT non-small cell lung cancer panel  ----- Message -----  From: Miri Roberts MA  Sent: 7/1/2025  10:14 AM CDT  To: Timoteo Lombardi MD; Zacarias Ortiz III, MD

## 2025-07-01 NOTE — TELEPHONE ENCOUNTER
Umesh called me back and I went over the results with him. He asked about his stage. I explained that the original stage does not change but this pathology shows that his disease has progressed and he is metastatic. He is still stage IIIA with metastatic progression. I let him know that we would stop the imfinzi appointments and we would be sending the tissue for further testing. This testing will let us know if there is another treatment that we could try. He is already scheduled for follow up with Dr. Ortiz on 07/09/2025.

## 2025-07-01 NOTE — TELEPHONE ENCOUNTER
----- Message from Timoteo Lombardi sent at 7/1/2025 11:49 AM CDT -----  Oligometastatic recurrence.  May be candidate for palliative RT.  Please reappoint to radiation oncology for reassessment.  ----- Message -----  From: Miri Roberts MA  Sent: 7/1/2025  10:14 AM CDT  To: Timoteo Lombardi MD; Zacarias Ortiz III, MD

## 2025-07-02 DIAGNOSIS — C34.92 SQUAMOUS CELL CARCINOMA OF LEFT LUNG: ICD-10-CM

## 2025-07-02 DIAGNOSIS — Z79.899 ENCOUNTER FOR LONG-TERM (CURRENT) USE OF HIGH-RISK MEDICATION: Primary | ICD-10-CM

## 2025-07-02 RX ORDER — DIPHENHYDRAMINE HYDROCHLORIDE 50 MG/ML
25 INJECTION, SOLUTION INTRAMUSCULAR; INTRAVENOUS ONCE
Start: 2025-07-08 | End: 2025-07-08

## 2025-07-02 RX ORDER — SODIUM CHLORIDE 9 MG/ML
20 INJECTION, SOLUTION INTRAVENOUS ONCE
OUTPATIENT
Start: 2025-07-08

## 2025-07-02 RX ORDER — FAMOTIDINE 10 MG/ML
10 INJECTION, SOLUTION INTRAVENOUS ONCE
Start: 2025-07-08 | End: 2025-07-08

## 2025-07-07 NOTE — PROGRESS NOTES
[facility]  Radiation Oncology Clinic   Zacarias Gonzales MD, FACR  Adam OBREGON  _______________________________________________  [facility]  Department of Radiation Oncology  [address] [place] [id number]  Office: [phone]  Fax: [phone]    DATE: 07/09/2025  PATIENT: Boogie Artis  1950                         MEDICAL RECORD #: [id number]    REASON FOR VISIT:    Chief Complaint   Patient presents with    Lung Cancer     1. Squamous cell carcinoma of left lung    2. Malignant neoplasm of upper lobe of right lung    3. S/P lobectomy of lung    4. History of radiation therapy    5. Former smoker                                              REASON FOR VISIT:    Chief Complaint   Patient presents with    Lung Cancer     Boogie Artis is a 74 y.o. male that has been referred to our clinic to be evaluated for consideration of radiotherapy to the lung.    This patient has a long history of multiple lung carcinoma episodes.    He had previous left upper lobectomy in 2023 by Dr. Irwin for squamous cell carcinoma of the lung.    Subsequent to that he received SBRT to a right upper lobe lung tumor in September 2023.    Additional SBRT of the right upper lobe was delivered in June 2024 for squamous cell carcinoma of the right upper lobe.    Unfortunately, he developed more locally advanced stage IIIa disease with squamous cell carcinoma of the left upper lobe.  This was treated with definitive external beam radiotherapy for total dose of 5940 cGy completing January 30, 2025.    The patient now appears to have a fifth lung tumor in the superior portion of the remaining left lower lobe.  This was evident on radiographic studies including PET scan.    Biopsy by Dr. Monsivais did confirm malignancy consistent with squamous cell carcinoma.    The patient returns at this time for consideration of additional radiotherapy treatment options.  This  course is certainly complicated by his multiple previous malignancies.    He remains on Durvalumab under the direction of Dr. Lombardi and is tolerating this fairly well.    Other than some fatigue he has no acute complaints.           HISTORY OF PRESENT ILLNESS:  Diagnosed with a PET+ neoplasm of the lung, RUL. He completed 5000 cGy in 4 fractions to the right upper lobe of the lung on 09/27/2023.  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 He completed 5000 cGy in 5 fractions to the right upper lobe of the lung on 06/05/2024.  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 He completed 5940 cGy in 33 fractions to the left lung on 01/30/2025.    01/16/2023 - CT Chest - [place]:  Spiculated perihilar left upper lobe pulmonary nodule measures 2.7 cm. This is concerning for primary neoplasm. PET/CT or bronch recommended for further evaluation.   0.8 cm noncalcified pulmonary nodule in the right upper lobe. This is at the lower limits of normal for PET/CT detection. Benign granulomatous disease, metastatic disease, or 2nd primary pulmonary malignancy could be considered.   No enlarged mediastinal or hilar lymph nodes    01/17/2023 - Pulmonary Function Tests - [place]:  Moderate obstructive lung disease    01/26/2023 - Bronchoscopy with biopsy per :  RUL, lower lobe, middle lobe were negative. The left upper lobe, no lesion, lingula no lesion seen.  SAMINA washing:  Negative for malignant cells; reactive bronchial cells, macrophages, and inflammation  SAMINA brushing:  Negative for malignant cells; markedly reactive bronchial cells, macrophages, and inflammation    02/09/2023 - PET Scan:  2.8 cm hypermetabolic central LEFT upper lobe pulmonary nodule. This likely represents a primary lung neoplasm.  0.8 cm hypermetabolic pulmonary nodule in the RIGHT upper lobe. This is highly suspicious for either a contralateral pulmonary metastasis or second primary  lung neoplasm.   No hypermetabolic thoracic lymph nodes. No evidence of hypermetabolic metastatic disease in the neck, abdomen, or pelvis.  1.5 cm hypermetabolic nodule in the RIGHT anterior rectum, highly concerning for underlying rectal polyp. Recommend correlation with colonoscopy/sigmoidoscopy.    02/27/2023 - Colonoscopy:  Rectal polyp, excision:  Villous adenoma, negative for high-grade glandular dysplasia or malignancy.  Colon polyp at 20 cm, biopsy:  Fragments of serrated polyp, favor sessile serrated lesion/polyp.  Colon polyp at 10 cm, biopsy:  Fragments of serrated polyp, favor sessile serrated lesion/polyp.    03/08/2023 - CT Chest without contrast:  Pulmonary emphysema with LEFT hilar/upper lobe lung mass measuring approximately 4 cm in greatest dimension. There is mild nodular airspace disease laterally to this lesion which may represent postobstructive atelectasis or infiltrate.   10 mm irregular nodule within the RIGHT lung apex (series 2, image 101). This is concerning for metastatic lesion. Additional 4 mm noncalcified nodule at the RIGHT lung apex (series 2, image 85).   Evidence of prior granulomatous disease.     03/09/2023 - Bronchoscopy/EBUS per :  Left upper lobe, EBUS (ThinPrep and cell block section):   Squamous cell carcinoma.   Bronchial washing (ThinPrep and cell block section):   No malignant cells identified.   Benign bronchial epithelial cells, and alveolar macrophages.     04/19/2023 -  Left upper lobectomy and lymph node excision per Dr.Robert Irwin:   Lymph node, level 9 lymph node biopsy: Benign fibroadipose tissue.   Lymph node, level 10 posterior hilar lymph node biopsy: 1 lymph node, negative for evidence of malignancy.   Lymph node, level 6 lymph node biopsy: 2 lymph nodes, negative for evidence of malignancy.   Lymph node, level 11 interlobar lymph node biopsy: 1 lymph node, negative for evidence of malignancy.   Lymph node, level 10 anterior hilar lymph node  biopsy: 2 lymph nodes, negative for evidence of malignancy.   Lymph node, level 12 lobar lymph node biopsy: 1 lymph node, negative for evidence of malignancy.   Lung, left upper lobectomy:   Invasive moderately differentiated squamous cell carcinoma.   Invasive carcinoma measures 2.5 cm in greatest dimension grossly.   Invasive carcinoma extends to within 0.2 cm of the nearest bronchial surgical excision margin.   Squamous metaplasia identified at bronchial surgical excision margin.   Emphysematous changes identified involving the nonneoplastic lung parenchyma.   2 peribronchial lymph nodes, negative for evidence of malignancy.   AJCC STAGE:  pT1c, pN0, pMx     04/25/2023 -  CT Chest without contrast:  Interval placement of large bore chest tube with decrease in previously described large left hydropneumothorax.Residual hydropneumothorax present with air dissecting through anterior chest wall into subcutaneous soft tissues, unchanged.   Interval resolution of previously described debris within left bronchus.   Unchanged right apical 1.0 cm nodule.     07/24/2023 - Appointment with :  Left upper lobe SCC [id number], stage I,Jan 2023  -1/16/23 CT chest ([facility]): Spiculated perihilar left upper lobe pulmonary nodule measures 2.7cm. This is concerning for primary neoplasm. PET/CT or bronchoscopy is recommended to further evaluate. 0.8cm noncalcified pulmonary nodule in the right upper lobe. This is at the lower limits of normal for Pet/CT detection. Benign granulomatous disease, metastatic disease, or 2nd primary pulmonary malignancy could be considered. No enlarged mediastinal or hilar lymph nodes.  -1/26/23 Lung, left upper lobe, washing: Negative for malignant cells. Reactive bronchial cells, macrophages, and inflammation. Lung, left upper lobe, brushing: Negative for malignant cells. Markedly reactive bronchial cells, macrophages, and inflammation.  -2/9/23 PET scan ([facility]): 2.8 cm hypermetabolic central LEFT  upper lobe pulmonary nodule. This likely represents a primary lung neoplasm. 0.8 cm hypermetabolic pulmonary nodule in the RIGHT upper lobe. This is highly suspicious for either a contralateral pulmonary metastasis or second primary lung neoplasm. No hypermetabolic thoracic lymph nodes. No evidence of hypermetabolic metastatic disease in the neck, abdomen, or pelvis. 1.5 cm hypermetabolic nodule in the RIGHT anterior rectum, highly concerning for underlying rectal polyp. Recommend correlation with colonoscopy/sigmoidoscopy.   4/19/23 Lung, left upper lobectomy: Invasive moderately differentiated squamous cell carcinoma. Invasive carcinoma measures 2.5 cm in greatest dimension grossly. Invasive carcinoma extends to within 0.2 cm of the nearest bronchial surgical excision margin. Squamous metaplasia identified at bronchial surgical excision margin. Emphysematous changes identified involving the nonneoplastic lung parenchyma. 2 peribronchial lymph nodes, negative for evidence of malignancy.Lymph node, level 9 lymph node biopsy: Benign fibroadipose tissue. Lymph node, level 10 posterior hilar lymph node biopsy: 1 lymph node, negative for evidence of malignancy. Lymph node, level 6 lymph node biopsy: 2 lymph nodes, negative for evidence of malignancy. Lymph node, level 11 interlobar lymph node biopsy: 1 lymph node, negative for evidence of malignancy. Lymph node, level 10 anterior hilar lymph node biopsy: 2 lymph nodes, negative for evidence of malignancy. Lymph node, level 12 lobar lymph node biopsy: 1 lymph node, negative for evidence of malignancy. [id number], stage I  Plan:  -Repeat CT chest Nov 2023  RUL subcentimeter nodule (PET+)  -2/9/23 PET scan ([facility]): 2.8 cm hypermetabolic central LEFT upper lobe pulmonary nodule. This likely represents a primary lung neoplasm. 0.8 cm hypermetabolic pulmonary nodule in the RIGHT upper lobe. This is highly suspicious for either a contralateral pulmonary metastasis or second  primary lung neoplasm. No hypermetabolic thoracic lymph nodes. No evidence of hypermetabolic metastatic disease in the neck, abdomen, or pelvis. 1.5 cm hypermetabolic nodule in the RIGHT anterior rectum, highly concerning for underlying rectal polyp. Recommend correlation with colonoscopy/sigmoidoscopy.   -Referral Dr Ortiz for consideration SBRT RUL hypermetabolic nodule.  PLAN:  RTC with MD 2 months in [place] office  CBC, CMP, B12, Folate, LDH, Haptoglobin, iron profile, Ferritin, retic  Continue follow-up with Dr.Robert Irwin/CardioThoracic Surgery  Continue to follow up with Dr. Rowe  Iron Sulfate 325 mg p.o. twice daily  Follow Up:  Return in 2 months (on 9/24/2023) for Appointment with Dr. Vaughan in [place].  Refer to Dr Ortiz     08/09/2023 - Appointment with :  will order PET scan, they will call you  Return in 2 weeks for further discussion    08/14/2023 - PET Scan:  Abnormal PET/CT, there is increased size of a hypermetabolic pulmonary nodule in the right upper lobe lung apex that measures 13 mm, compared with 8 mm on the previous PET/CT. This has a maximum SUV of 8.1. This is concerning for active neoplasm.  Interval resection of the left upper lobe with posttreatment changes along the medial margin of the upper mediastinum, including mild infiltration of the mediastinal fat planes.  There is a subcutaneous nodule with surrounding fatty infiltration over the low back at the level of the upper sacrum which demonstrates hypermetabolism. This could be inflammatory, less likely a metastatic nodule. This measures 21 mm, follow-up ultrasound is recommended to determine if this is solid. Ultrasound-guided biopsy could be performed if indicated.  Interval resolution of the hypermetabolic nodule associated with the right rectum, however there is a new hypermetabolic nodule that maps to the right prostate gland. Correlation with PSA values is recommended.    08/16/2023 - Consult with :  I  have recommended to treat the right lung nodule with SBRT, I anticipate a course over 4-5 fractions, final course pending.   He does have a visible insect bite on his low back and I do believe this correlates with the PET findings of activity on the upper sacrum. Will order PSA for prostate gland findings. He has not had one drawn in some time.   Plan:  Plan on 4-5 pinpoint treatments to the lung nodule.   We will call you when to start treatments.   PSA today    08/18/2023 - Documentation per :  I spoke on the phone with this patient's wife Sarah and discussed his PSA of 2.9. This is considered a normal level, however on recent PET scan imaging for a lung nodule workup, a nodule within the prostate was noted.   I will refer him to urology for further management recommendations.    09/12/2023 - 09/27/2023 - Completed radiation course:  Received 5000 cGy in 4 fractions to the right upper lobe of the lung.    11/20/2023 - MRI Pelvis with and without contrast:  No suspicious lesions in the prostate (PI-RADS 3 or greater).     11/29/2023 - Appointment with :  I personally reviewed MRI today.    His MRI is negative for any suspicious lesions.    He has a volume of 34 cc.    I believe his hypermetabolic activity on PET scan was likely inflammatory.  I do not think he requires a biopsy based on his negative MRI and PSA level.    He will follow-up as needed.     12/20/2023 - CT Chest without contrast:  Decreased size of 6 mm spiculated right upper lobe pulmonary nodule (previously 13 mm). This nodule was hypermetabolic on prior PET/CT and highly suspicious for primary lung neoplasm.  Postoperative change of left upper lobectomy. No definite change/increase in soft tissue thickening along the lobectomy margin and mediastinum. Recommend continued imaging surveillance.  No evidence of disease progression in the chest. No thoracic lymphadenopathy.    12/27/2023 - Appointment with :  Return in 3 months  with a CT chest before.     03/19/2024 - CT chest without contrast:  1.1 cm irregular right upper lobe nodule, highly suspicious for malignancy.  5 mm left upper lobe nodule is indeterminate but does raise the suspicion for malignancy.  Stable size of the previously irradiated right apex nodule. Minimally increased adjacent subpleural parenchymal abnormality, likely related to post radiation changes.  Left upper lobectomy with stable postsurgical margins.  No mediastinal lymphadenopathy.    03/27/2024 - Consult with :  Will refer to pulmonology for bronchoscopy considerations of the new right upper lung nodule. He will return in 3 weeks for further recommendations.  I have instructed him to continue to see the other health care providers as per their scheduling.  Plan:  Referral to pulmonology for biopsy of new right lung nodule  Return in 3 weeks    04/16/2024 - Appointment with :  For wheezing, add bronchodilator. 100 mcg trelegy sample given, we reviewed PFT showing severe obstruction.  Likely moderate to severe copd with hx smoking  For lung nodule, will plan ion bronchoscopy to better assess rul nodule  Ask anesthesia to take particular care with remaining teeth  We demonstrated proper technique for use of ellipta device  Follow Up   Return in about 4 weeks (around 5/14/2024).    04/22/2024 - CT Chest without contrast:  RIGHT upper lobe 17 x 11 mm nodule.  LEFT upper lobe 6 x 5 mm nodule.  No pneumothorax or pleural effusion.    04/29/2024 - Bronchoscopy with biopsy per :  Lung, right upper lobe, Ion fine-needle aspiration, smears (2), ThinPrep preparation (1) and cell block:   Positive for malignant cells, consistent with moderately differentiated squamous cell carcinoma.  Comment: Immunohistochemical stain for p40 is positive in the neoplastic cells, supporting the above diagnostic impression.The control stained appropriately.  Bronchial washings, ThinPrep preparation (1) and cell  block:   Positive for malignant cells, consistent with moderately differentiated squamous cell carcinoma.  Lung, right upper lobe, bronchoalveolar lavage, ThinPrep preparation (1):   Positive for malignant cells, consistent with moderately to poorly differentiated squamous cell carcinoma.  AJCC stage: pTX pNX    04/29/2024 - Documentation per :  Dr. Nicola Monsivais notified me that patient cytology was positive in his lung.  He is scheduled to return to our office for treatment recommendations.  Also, it was incidentally noted by anesthesia during his procedure that he has a lesion in the oral cavity that needs additional evaluation and may potentially be another malignancy.    05/06/2024 - Appointment with :  This patient does have biopsy-proven squamous cell carcinoma of the right upper lobe.  We will proceed with SBRT radiotherapy to this  lesion.  He also has a lesion in the left lung which is nondiagnostic and we will continue to follow that lesion with serial radiographs  Plan:  We will call you when to start radiation treatment in approximately 2 weeks  Return in about 2 weeks (around 5/20/2024) for the first Radiation Treatment.    05/28/2024 - 06/05/2024 - Completed radiation course:  Received 5000 cGy in 5 fractions to the right upper lobe of the lung via Stereotactic Radiation Therapy - SRT.     09/03/2024 - CT chest without contrast:  Lung nodules seen previously have resolved.  There are indeterminate soft tissue nodules within the LEFT bronchus.  Consider PET/CT correlation.    09/09/2024 - Appointment with :  We discussed the new left bronchus findings on imaging. I will notify pulmonology for review of the imaging and further recommendations. We will continue routine follow-up/surveillance as discussed in 3 months with follow up CT scan before visit and I have instructed him to continue to see the other health care providers as per their scheduling.  Plan:  will discuss your  scans with pulmonology  return in 3 months unless you need to be seen sooner.    09/11/2024 - Documentation per Aston Medina - radiation oncology:  I reviewed the recent CT chest imaging with Dr. Ortiz and discussed this with Dr. Monsivais and Erma Saucedo in pulmonology.   I will order a PET scan for further evaluation of the left bronchus finding and will get him in for an appointment to discuss further options with Dr. Ortiz.    09/16/2024 - PET Scan:  LEFT lower lobe pneumonia.  Neoplastic LEFT upper lobe lung nodules adjacent to the LEFT hilum.  Neoplastic nodule within the LEFT bronchus.  Small focus of indeterminate FDG uptake in the posterior midline neck between the C5 and C6 spinous processes.    10/08/2024 - Appointment with Mata Simon MD - [facility]:  Plan for bronchoscopy with APC and tumor debulking, EBUS/TBNA, bal next week.    10/17/2024 - Bronchus, left endobronchial mass, endobronchial biopsy:  Fragments of squamous cell carcinoma.    10/29/2024 - Appointment with :  He has stage IIIa (T3 N1 M0) squamous cell carcinoma left upper lobe of the lung.  I have reviewed the chart and other physicians records. Following this discussion and in consideration of the diagnostic data/evaluation of the patient, I recommended a course of external beam radiation, likely delivered concurrently with systemic therapy under the medical oncology service, I anticipate 6600 cGy over 33 treatments, final course pending.   Referral to /[facility] for chemotherapy. Continue ongoing management per primary care physician and other specialists.   Plan:  Plan on 33 radiation treatments M-F for about 30 minutes daily  Referral to chemotherapy doctor, they will call you  Return to Dr. Ortiz for simulation markings in 7-10 days    11/26/2024 - Consult with :  Plan:  Carefully reviewed available diagnostic information as outlined above.   Note imaging including PET scan 9/16/2024, as well  as bronchoscopy/biopsy findings, 10/17/24 (above). 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  Draw CBC w diff, iron, fe sat, ferritin, B12, folate, CMP, CEA  Schedule brain MRI at [facility]  Teaching sheets for Taxol and carboplatin. Potential toxicities of same discussed (to included but not limited to: Myelosuppression, alopecia, neuropathy, arthralgias/myalgias, nausea/vomiting, hypersensitivity reactions, diarrhea, mucositis, risks for infection, abnormal liver enzymes, asthenia, fever, low blood pressure, bleeding, renal insufficiency, edema, bradycardia, anaphylaxis, arrhythmias, thromboembolism, myocardial infarction, pulmonary toxicity, gastrointestinal (GI) obstruction/perforation, paralytic ileus, ischemic colitis, pancreatitis, severe skin reactions; electrolyte disorders, ototoxicity, nephrotoxicity, vision loss). Questions answered. He will agree to a trial of therapy.  The patient and his family acknowledge that given his history of recurrent lung malignancy, increased risk of recurrence in spite of (definitive) therapy remains elevated.  Review NCCN guidelines non-small cell lung cancer, stage III (unresectable).  Chemotherapy regimens used.  Radiation therapy (concurrent regimens usually radiation therapy): Paclitaxel 45 to 50 mg/m² weekly; carboplatin AUC, concurrent thoracic RT +/- additional 2 cycles every 21 days of paclitaxel 200 mg/m² and carboplatin AUC 6-followed by consolidation therapy for unresectable stage III NSCLC, PS 0-1 and no disease progression after 2 or more cycles of definitive chemoradiation: Durvalumab 10 mg/kg IV every 2 weeks for up to 12 months (category 1).  Anticipate chemotherapy beginning -we will coordinate with radiation therapy--week 1 to week 6:  Taxol 45 mg/m2 per administration guidelines weekly x6 weeks with radiation.   Carboplatin AUC 2 per administration guidelines weekly x6 weeks with radiation.   Premedicate with:   Aloxi 0.25 mg IV before  chemotherapy  Decadron 10 mg IV before chemo  Pepcid 20 mg IV   Benadryl 50 mg IV   CMP, Mg++ and CBC with differential weekly with Procrit 40,000 units subcutaneous every week if Hgb less than 10 and Hct less than 30; Zarxio 480 mcg subcutaneously daily x3 if ANC less than 1 -[facility]  eRx:   Zofran 8 mg p.o. 3 times daily as needed, #30 - Rx   Follow-up with radiation oncology Re: Definitive concurrent radiation  Appoint to general surgery Re: Mediport placement.   Continue currently identified medications.   Return to the office in 5-6 weeks with pre-office CBC with differential, CMP, and CEA.   Importance of Smoking Cessation discussed with patient and informed patient additional information will be on today's AVS. [place] [facility]'s Flyer - Plan to Be Tobacco Free handout provided to patient     12/06/2024 - MRI Brain with and without contrast:  No evidence of intracranial metastasis. No acute signs of the ischemia, hemorrhage, or mass.  Moderate generalized volume loss. Nonenhancing hyperintense FLAIR signal changes favoring chronic small vessel ischemia.    12/09/2024 - 01/30/2025 - Completed radiation course:  Received 5940 cGy in 33 fractions to the left lung.     02/24/2025 - Appointment with :  Plan:  Review labs, 1/27/25 - 2/17/25 stable anemia, otherwise normal CBC, gluc 124 sodium 135, Mag 1.5 (2 gm IV mag) otherwise normal CMP, CEA p (prior: 3.46-4.4)  Chemo tolerance:  Doing well  Note port study, 2/3/25-The images demonstrate patency of the catheter, with satisfactory position, the tip is at the mid SVC. No fibrin sheath is identified at the tip of the catheter.  Prior careful review of available diagnostic information as outlined above.   Note imaging including PET scan 9/16/2024, as well as bronchoscopy/biopsy findings, 10/17/24 (above). 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  Review NCCN guidelines non-small cell lung cancer, stage III (unresectable).  Chemotherapy  regimens used.  Radiation therapy (concurrent regimens usually radiation therapy): Paclitaxel 45 to 50 mg/m² weekly; carboplatin AUC, concurrent thoracic RT +/- additional 2 cycles every 21 days of paclitaxel 200 mg/m² and carboplatin AUC 6-followed by consolidation therapy for unresectable stage III NSCLC, PS 0-1 and no disease progression after 2 or more cycles of definitive chemoradiation: Durvalumab 10 mg/kg IV every 2 weeks for up to 12 months (category 1).  Durvalumab.  Potential toxicities of same discussed (to included but not limited to: Myelosuppression, alopecia, neuropathy, arthralgias/myalgias, nausea/vomiting, hypersensitivity reactions, diarrhea, mucositis, risks for infection, abnormal liver enzymes, asthenia, fever, low blood pressure, bleeding, renal insufficiency, edema, bradycardia, anaphylaxis, arrhythmias, thromboembolism, myocardial infarction, pulmonary toxicity, gastrointestinal (GI) obstruction/perforation, paralytic ileus, ischemic colitis, pancreatitis, severe skin reactions; electrolyte disorders, ototoxicity, nephrotoxicity, vision loss). Questions answered. He agrees to a trial of therapy.   Schedule -C1, 2/3/25; C2, 3/17/25; C3, 3/31/25; C4, 4/14/25; C5, 4/28/25; C6, 5/12/25; C7, etc - durvalumab (plan: every 2 weeks x 24 cycles - 12 months) per administration guidelines - at [facility]   durvalumab 10 mg/kg (WT = 50 kg; TD = 500 mg)   -Premeds:    Decadron 10 mg IV   Benadryl 25 mg IV   Pepcid 10 mg IV   Upon initiation of durvalumab:  TSH, FT4, CMP, Mg++ and CBC with differential weekly with Procrit 40,000 units subcutaneous every 2 weeks if Hgb less than 10 and Hct less than 30  Schedule CT chest, abdomen/pelvis with po/IV contrast after C6-5/19/25  Continue currently identified medications.   Return to office in 6 weeks.  Surveillance imaging every 6th cycle of durvalumab (C6; C12; C18, etc). Draw pre-office CBC with differential, CMP, and CEA.   Importance of Smoking Cessation discussed  with patient and informed patient additional information will be on today's AVS. [facility]'s Flyer - Plan to Be Tobacco Free handout provided to patient     03/10/2025 - Appointment with [facility] - Radiation oncology:  Follow up in 3 months     03/25/2025 - Appointment with :  Will give trials of breztri and also trelegy, 2 weeks of breztri and 4 weeks of 100 mcg trelegy, instead of stiolto.  Could try change to nebs if the above are unaffordable.  Repeat imaging sooner if no improvement with scheduled bronchodilators.  If above fails, try ohtuvayre.  Lung cancer tx could be contributing to the problem  Follow Up   Return in about 3 months (around 6/25/2025) for full PFT.    04/07/2025 - Appointment with :  Plan:  Review labs, 3/4/25-3/18/25-4/1/25 stable anemia, otherwise normal CBC, gluc 142 otherwise normal CMP, normal TSH/T4, CEA 3.2 (prior: 3.46-4.4)  Chemo tolerance: Some fatigue otherwise without inordinate toxicities  Prior careful review of available diagnostic information as outlined above.   Note imaging including PET scan 9/16/2024, as well as bronchoscopy/biopsy findings, 10/17/24 (above). 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  Review NCCN guidelines non-small cell lung cancer, stage III (unresectable).  Chemotherapy regimens used.  Radiation therapy (concurrent regimens usually radiation therapy): Paclitaxel 45 to 50 mg/m² weekly; carboplatin AUC, concurrent thoracic RT +/- additional 2 cycles every 21 days of paclitaxel 200 mg/m² and carboplatin AUC 6-followed by consolidation therapy for unresectable stage III NSCLC, PS 0-1 and no disease progression after 2 or more cycles of definitive chemoradiation: Durvalumab 10 mg/kg IV every 2 weeks for up to 12 months (category 1).  Durvalumab.  Potential toxicities of same discussed (to included but not limited to: Myelosuppression, alopecia, neuropathy, arthralgias/myalgias, nausea/vomiting,  hypersensitivity reactions, diarrhea, mucositis, risks for infection, abnormal liver enzymes, asthenia, fever, low blood pressure, bleeding, renal insufficiency, edema, bradycardia, anaphylaxis, arrhythmias, thromboembolism, myocardial infarction, pulmonary toxicity, gastrointestinal (GI) obstruction/perforation, paralytic ileus, ischemic colitis, pancreatitis, severe skin reactions; electrolyte disorders, ototoxicity, nephrotoxicity, vision loss). Questions answered. He agrees to a trial of therapy.   Schedule -C4, 4/15/25; C5, 4/29/25; C6, 5/13/25; C7, 5/27/25; C8, 6/10/25; C9, etc - durvalumab (plan: every 2 weeks x 24 cycles - 12 months) per administration guidelines - at [facility]   durvalumab 10 mg/kg (WT = 50 kg; TD = 500 mg)   -Premeds:    Decadron 10 mg IV   Benadryl 25 mg IV   Pepcid 10 mg IV   Upon initiation of durvalumab:  TSH, FT4, CMP, Mg++ and CBC with differential weekly with Procrit 40,000 units subcutaneous every 2 weeks if Hgb less than 10 and Hct less than 30  Schedule CT chest, abdomen/pelvis with po/IV contrast after C6-5/20/25  Continue currently identified medications.   Return to office in 8 weeks.  Surveillance imaging every 6th cycle of durvalumab (C6; C12; C18, etc). Draw pre-office CBC with differential, CMP, and CEA.   Importance of Smoking Cessation discussed with patient and informed patient additional information will be on today's AVS. [facility] Flyer - Plan to Be Tobacco Free handout provided to patient     05/21/2025 - CT chest without contrast:  A spiculated 1.7 cm density or nodule in the left lower lobe at the level of the descending thoracic aorta. This could be a true nodule/neoplasm. A focal area of inflammatory change less likely. This is at the level of the proximal descending thoracic aorta.  Prior left upper lobectomy.  A 6.1 x 3.1 cm masslike opacity surrounding the central portion of the right upper lobe bronchus. There is an adjacent opacity posteriorly  measuring 2.8 x 2.7 cm. These findings could be neoplastic. Infectious/inflammatory change is also considered.  A 2.7 x 1.6 cm nodule versus nodular consolidation in the right lower lobe image 114 series 4. Neoplasm, infection and inflammation considered. There are nearby small nodules in this area measuring up to 7 mm in size.  Probable infectious/inflammatory consolidation in the right lower lobe inferiorly and posteriorly.  Atheromatous calcification of the thoracic aorta and coronary arteries. Ascending aorta ectatic measuring 3.7 cm.  Small pericardial effusion.    05/21/2025 - CT Abdomen/Pelvis with contrast:  No evidence of metastatic disease in the abdomen or pelvis.    06/02/2025 - PET Scan:  Previous left upper lobe resection. The recently described new irregular nodule in the medial left upper lung demonstrates intense abnormal metabolic activity suggesting a neoplastic process. There is also an additional subpleural nodule in the posterior left lung apex demonstrating sufficient metabolic activity that it is concerning for a lung neoplasm.  Trenary focus of nodular consolidation in the right upper lobe which extends back into the right hilum does contain an area of more central hyperintensity. A neoplastic process is not excluded. The more posterior focus of consolidation also demonstrates increased metabolic activity measuring SUVs of 4.0. The focus of consolidation more inferiorly within the right lower lobe does not demonstrate as intense of metabolic activity and may represent some inflammatory change/scarring.  No additional sites of abnormal metabolic activity identified within the head or neck or abdomen and pelvis.    06/02/2025 - Appointment with :  Plan:  Review labs, 5/7/25 stable anemia, otherwise normal CBC, gluc 96 otherwise stable/normal CMP, normal TSH/T4, CEA p (prior: 3.2-4.4)  Durvalumab tolerance: Some fatigue otherwise without inordinate toxicities  Review CT CAP, 5/21/25  (above). Spiculated 1.7 cm density or nodule in the left lower lobe at the level of the descending thoracic aorta. This could be a true nodule/neoplasm.  Other mass like opacities and nodules as described.  Inflammation vs neoplasm considered.  PET scan on 6/2/25.  Consider pulmonary referral pending results. Already has follow-up next week with radiation oncology.  Prior careful review of available diagnostic information as outlined above.   Note imaging including PET scan 9/16/2024, as well as bronchoscopy/biopsy findings, 10/17/24 (above). 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  Review NCCN guidelines non-small cell lung cancer, stage III (unresectable).  Chemotherapy regimens used.  Radiation therapy (concurrent regimens usually radiation therapy): Paclitaxel 45 to 50 mg/m² weekly; carboplatin AUC, concurrent thoracic RT +/- additional 2 cycles every 21 days of paclitaxel 200 mg/m² and carboplatin AUC 6-followed by consolidation therapy for unresectable stage III NSCLC, PS 0-1 and no disease progression after 2 or more cycles of definitive chemoradiation: Durvalumab 10 mg/kg IV every 2 weeks for up to 12 months (category 1).  Durvalumab.  Potential toxicities of same discussed (to included but not limited to: Myelosuppression, alopecia, neuropathy, arthralgias/myalgias, nausea/vomiting, hypersensitivity reactions, diarrhea, mucositis, risks for infection, abnormal liver enzymes, asthenia, fever, low blood pressure, bleeding, renal insufficiency, edema, bradycardia, anaphylaxis, arrhythmias, thromboembolism, myocardial infarction, pulmonary toxicity, gastrointestinal (GI) obstruction/perforation, paralytic ileus, ischemic colitis, pancreatitis, severe skin reactions; electrolyte disorders, ototoxicity, nephrotoxicity, vision loss). Questions answered. He agrees to a trial of therapy.   Schedule -C7, 6/10/25; C8, 6/24/25; C9, 7/8/25; C10, 7/22/25; C11, 8/5/25; C12, 8/19/25 - durvalumab (plan: every 2  weeks x 24 cycles - 12 months) per administration guidelines - at [facility]   durvalumab 10 mg/kg    -Premeds:    Decadron 10 mg IV   Benadryl 25 mg IV   Pepcid 10 mg IV   Upon initiation of durvalumab:  TSH, FT4, CMP, Mg++ and CBC with differential weekly with Procrit 40,000 units subcutaneous every 2 weeks if Hgb less than 10 and Hct less than 30  Schedule CT chest, abdomen/pelvis with po/IV contrast after C12-8/26/25  Follow-up radiation oncology next week.  Return to office in 12 weeks (8/27/25).  Surveillance imaging every 6th cycle of durvalumab (C6; C12; C18, etc). Draw pre-office CBC with differential, CMP, and CEA.   Importance of Smoking Cessation discussed with patient and informed patient additional information will be on today's AVS. [facility] Flyer - Plan to Be Tobacco Free handout provided to patient     06/12/2025 - Appointment with :  Will plan ion bronchoscopy for 6/23, first case if available.  We discussed risk benefits alternatives and gave patient instructions regarding this procedure.  This will be similar to what we did previously.  Continue brezri. I gave 2 weeks samples given expense  Follow Up   Return in about 2 months (around 8/12/2025).    06/18/2025 - CT Chest with contrast:  Similar to mildly increased pulmonary findings as detailed above. Differential includes posttreatment changes, infection/inflammation, or recurrent/progression of malignancy.  Mildly enlarged ascending thoracic aorta measuring 3.7 cm.  Scattered coronary artery calcifications and trace pericardial fluid.    06/23/2025 - Bronchoscopy with :  Lung, left lower lobe, Ion fine-needle aspiration, smear (1), ThinPrep preparation (1) and cell block:   Moderately to poorly differentiated non-small cell carcinoma consistent with squamous cell carcinoma.  Lung, left lower lobe, Ion biopsy and touch preparations:   Moderately to poorly differentiated non-small cell carcinoma consistent with squamous  "cell carcinoma.  Lung, left lower lobe, bronchoalveolar lavage, ThinPrep preparation (1) and cell block:   Moderately to poorly differentiated non-small cell carcinoma consistent with squamous cell carcinoma.  Station 10R lymph node, endobronchial ultrasound-guided fine-needle aspiration, ThinPrep preparation (1) and cell block:  Lymphoid elements.  A few macrophages.  Bronchial epithelial cells.  Negative for malignant cells.  AJCC stage: pTX pNX    08/11/2025 - Scheduled appointment with Erma Saucedo APRN.    08/27/2025 - Scheduled appointment with .      History obtained from  PATIENT, FAMILY, and CHART    PAST MEDICAL HISTORY  Past Medical History:   Diagnosis Date    Bleeding ulcer     Cancer     lung cancer left    COPD (chronic obstructive pulmonary disease)     Diabetes mellitus     Elevated cholesterol     Essential tremor     GERD (gastroesophageal reflux disease)     History of MRSA infection 05/01/2024    Respiratory culture    History of seasonal allergies     History of transfusion     Hyperlipidemia     Hypertension     Kidney infection     Kidney stone     Lung cancer     Lung nodule     Migraines     Seizure     \"one neurologist said it was a type of seizure, one didn't think so\"    Sleep apnea     cpap    Vitamin D deficiency       PAST SURGICAL HISTORY  Past Surgical History:   Procedure Laterality Date    APPENDECTOMY      BRONCHOSCOPY  2023    BRONCHOSCOPY N/A 04/29/2024    Procedure: BRONCHOSCOPY WITH ENDOBRONCHIAL ULTRASOUND;  Surgeon: Nicola Monsivais MD;  Location: [facility] OR;  Service: Pulmonary;  Laterality: N/A;  pre Nodule of upper lobe of right lung  Dr. Alves    BRONCHOSCOPY N/A 10/17/2024    Procedure: BRONCHOSCOPY WITH APC & DEBULKING, cryotherapy, balloon dilation/tamponade;  Surgeon: Mata Simon MD;  Location: [facility] OR;  Service: Pulmonary;  Laterality: N/A;    BRONCHOSCOPY WITH ION ROBOTIC ASSIST N/A 04/29/2024    Procedure: BRONCHOSCOPY WITH ION " ROBOT and BRONCHOSCOPY WITH ENDOBRONCHIAL ULTRASOUND;  Surgeon: Nicola Monsivais MD;  Location:  [facility] OR;  Service: Robotics - Pulmonary;  Laterality: N/A;  pre  Nodule of upper lobe of right lung  post      BRONCHOSCOPY WITH ION ROBOTIC ASSIST  2024    Procedure: BRONCHOSCOPY NAVIGATION WITH ENDOBRONCHIAL ULTRASOUND AND ION ROBOT;  Surgeon: Nicola Monsivais MD;  Location:  [facility] OR;  Service: Pulmonary;;    BRONCHOSCOPY WITH ION ROBOTIC ASSIST N/A 2025    Procedure: BRONCHOSCOPY WITH ION ROBOT and BRONCHOSCOPY WITH ENDOBRONCHIAL ULTRASOUND;  Surgeon: Nicola Monsivais MD;  Location:  [facility] OR;  Service: Robotics - Pulmonary;  Laterality: N/A;  preop; lung nodule   postop  PCP Lucho Alves    CATARACT EXTRACTION, BILATERAL      COLONOSCOPY  2023    HEMORRHOIDECTOMY      HERNIA REPAIR      LUNG SURGERY Left 2023    VENOUS ACCESS DEVICE (PORT) INSERTION N/A 2024    Procedure: Single Lumen Port-a-cath insertion with flouroscopy;  Surgeon: Lorrie Hanson MD;  Location:  [facility] OR;  Service: General;  Laterality: N/A;      FAMILY HISTORY  family history includes Alcohol abuse in his brother; Cancer in his brother, father, and paternal grandfather; Diabetes in his mother; Heart disease in his mother.    SOCIAL HISTORY  Social History     Tobacco Use    Smoking status: Former     Current packs/day: 0.00     Average packs/day: 2.0 packs/day for 57.2 years (114.4 ttl pk-yrs)     Types: Cigarettes     Start date:      Quit date: 3/17/2023     Years since quittin.3     Passive exposure: Past    Smokeless tobacco: Never   Vaping Use    Vaping status: Never Used   Substance Use Topics    Alcohol use: Not Currently    Drug use: Never     ALLERGIES  Iodides     MEDICATIONS    Current Outpatient Medications:     acetaminophen (Tylenol) 325 MG tablet, Take 3 tablets by mouth Every 8 (Eight) Hours. Take every 8 hours for 3 days then take prn as needed., Disp: , Rfl:      albuterol sulfate  (90 Base) MCG/ACT inhaler, Inhale 2 puffs Every 4 (Four) Hours As Needed for Shortness of Air., Disp: , Rfl:     Budeson-Glycopyrrol-Formoterol (BREZTRI) 160-9-4.8 MCG/ACT aerosol inhaler, Inhale 2 puffs 2 (Two) Times a Day., Disp: 10.7 g, Rfl: 11    cetirizine (zyrTEC) 10 MG tablet, Take 1 tablet by mouth Daily., Disp: , Rfl:     Cholecalciferol 125 MCG (5000 UT) tablet, Take 1 tablet by mouth Daily., Disp: , Rfl:     divalproex (DEPAKOTE ER) 250 MG 24 hr tablet, Take 1 tablet by mouth 2 (Two) Times a Day., Disp: , Rfl:     furosemide (LASIX) 20 MG tablet, TAKE 1 TABLET BY MOUTH ONCE DAILY Oral for 14 Days, Disp: , Rfl:     glimepiride (AMARYL) 4 MG tablet, Take 1 tablet by mouth Daily., Disp: , Rfl:     hydroCHLOROthiazide (HYDRODIURIL) 25 MG tablet, Take 1 tablet by mouth Daily., Disp: , Rfl:     lidocaine-prilocaine (EMLA) 2.5-2.5 % cream, Apply to port site 30 minutes before it is to be accessed and cover with occlusive dressing., Disp: 30 g, Rfl: 1    lisinopril (PRINIVIL,ZESTRIL) 10 MG tablet, Take 1 tablet by mouth Daily., Disp: , Rfl:     metFORMIN (GLUCOPHAGE) 500 MG tablet, Take 1 tablet by mouth 2 (Two) Times a Day With Meals., Disp: , Rfl:     metoprolol succinate XL (TOPROL-XL) 25 MG 24 hr tablet, Take 1 tablet by mouth Daily., Disp: , Rfl:     NON FORMULARY, CPAP, Disp: , Rfl:     Omega-3 Fatty Acids (fish oil) 1000 MG capsule capsule, Take  by mouth Daily With Breakfast., Disp: , Rfl:     ondansetron (Zofran) 4 MG tablet, Take 1 tablet by mouth Every 8 (Eight) Hours As Needed for Nausea or Vomiting., Disp: 15 tablet, Rfl: 0    ondansetron (ZOFRAN) 8 MG tablet, Take 1 tablet by mouth Every 8 (Eight) Hours As Needed for Nausea or Vomiting., Disp: 30 tablet, Rfl: 3    pantoprazole (PROTONIX) 40 MG EC tablet, Take 1 tablet by mouth 2 (Two) Times a Day., Disp: , Rfl:     primidone (MYSOLINE) 50 MG tablet, Take 1 tablet by mouth 2 (Two) Times a Day. For essential tremors,  "Disp: , Rfl:     simvastatin (ZOCOR) 40 MG tablet, Take 1 tablet by mouth Every Night., Disp: , Rfl:     The following portions of the patient's history were reviewed and updated as appropriate: allergies, current medications, past family history, past medical history, past social history, past surgical history and problem list.    REVIEW OF SYSTEMS  Review of Systems   Constitutional: Negative.    HENT:  Negative.     Eyes: Negative.    Respiratory:  Positive for shortness of breath (worsening per patient). Negative for hemoptysis.    Cardiovascular:  Positive for chest pain (left lower chest, present since biopsy).   Gastrointestinal:  Negative for nausea and vomiting.   Endocrine: Negative.    Genitourinary: Negative.     Musculoskeletal: Negative.    Skin: Negative.    Neurological:  Negative for dizziness, headaches and light-headedness.   Hematological: Negative.    Psychiatric/Behavioral: Negative.       Implant: NO    PHYSICAL EXAM  VITAL SIGNS:   Vitals:    07/09/25 1108   BP: 115/59   Pulse: 64   SpO2: 95%  Comment: room air   Weight: 74.3 kg (163 lb 14.4 oz)   Height: 172.7 cm (68\")   PainSc: 6    PainLoc: Chest  Comment: left lower chest     Physical Exam  Vitals reviewed.   Constitutional:       Appearance: Normal appearance.      Comments: Patient enjoys an excellent performance status consideration of his past medical history with multiple malignancies.   HENT:      Head: Normocephalic.      Nose: Nose normal.   Eyes:      Pupils: Pupils are equal, round, and reactive to light.   Cardiovascular:      Rate and Rhythm: Normal rate and regular rhythm.      Pulses: Normal pulses.      Heart sounds: Normal heart sounds.   Pulmonary:      Effort: Pulmonary effort is normal. No respiratory distress.      Breath sounds: Normal breath sounds. No wheezing.   Abdominal:      General: Bowel sounds are normal.      Palpations: There is no mass.   Musculoskeletal:         General: Normal range of motion.      " Cervical back: Normal range of motion and neck supple. No tenderness.   Lymphadenopathy:      Cervical: No cervical adenopathy.   Skin:     General: Skin is warm and dry.      Capillary Refill: Capillary refill takes less than 2 seconds.   Neurological:      General: No focal deficit present.      Mental Status: He is alert and oriented to person, place, and time.      Motor: No weakness.   Psychiatric:         Mood and Affect: Mood normal.         Behavior: Behavior normal.          Performance Status: ECOG (1) Restricted in physically strenuous activity, ambulatory and able to do work of light nature    Clinical Quality Measures  - Pain Documented by Standardized Tool, FPS  Boogie Artis reports a pain score of 6.  Given his pain assessment as noted, treatment options were discussed and the following options were decided upon as a follow-up plan to address the patient's pain: continuation of current treatment plan for pain and use of non-medical modalities (ice, heat, stretching and/or behavior modifications).    - Body Mass Index Screening and Follow-Up Plan  BMI is within normal parameters. No other follow-up for BMI required.    - Tobacco Use: Screening and Cessation Intervention  Social History    Tobacco Use      Smoking status: Former        Packs/day: 0.00        Years: 2.0 packs/day for 57.2 years (114.4 ttl pk-yrs)        Types: Cigarettes        Start date:         Quit date: 3/17/2023        Years since quittin.3        Passive exposure: Past      Smokeless tobacco: Never    - Advanced Care Planning Advance Care Planning   ACP discussion was held with the patient during this visit. Patient does not have an advance directive, information provided.    - PHQ-2 Depression Screening:  Little interest or pleasure in doing things? Not at all   Feeling down, depressed, or hopeless? Not at all   PHQ-2 Total Score 0     ASSESSMENT AND PLAN  1. Squamous cell carcinoma of left lung    2. Malignant  neoplasm of upper lobe of right lung    3. S/P lobectomy of lung    4. History of radiation therapy    5. Former smoker        RECOMMENDATIONS: Boogie Artis was diagnosed with additional squamous cell carcinoma in the superior segment of the left lower lobe which is biopsy-proven malignancy consistent with squamous cell carcinoma.  He also has a lesion in the right lung which will require additional follow-up.    This is a very complicated management problem due to the number of previous malignancies and prior treatment with both surgery as well as multiple courses of radiation therapy.  He has received both fractionated external beam radiation as well as SBRT.    Unfortunately, he does not have many options as he is already on immunotherapy and received previous radiation to this region.    We did discuss additional radiation to this lesion and the increased risk of therapy with repeated course of radiation treatment.    The patient verbalizes his thoughts that at some point this malignancy will be the end of him, but is willing to try additional therapy to prolong his life.    I have recommended proceeding with simulation and will likely consider additional external beam radiotherapy localized to the recurrent tumor in the upper portion of the left lower lobe utilizing the SABR regimen.    He verbalizes understanding of this discussion, voices no additional questions and agrees to except this treatment regimen.  He is somewhat fatalistic about his ultimate prognosis.  However, in consideration of his history this is a very realistic attitude.    Continue ongoing management per primary care physician and other specialists.     Thank you for allowing me to assist in this patients care.     Time Spent: I spent 75 minutes caring for Boogie on this date of service. This time includes time spent by me in the following activities: preparing for the visit, reviewing tests, obtaining and/or reviewing a separately  obtained history, performing a medically appropriate examination and/or evaluation, counseling and educating the patient/family/caregiver, ordering medications, tests, or procedures, and documenting information in the medical record.   Zacarias Ortiz III, MD  07/09/2025

## 2025-07-08 ENCOUNTER — HOSPITAL ENCOUNTER (OUTPATIENT)
Dept: RADIATION ONCOLOGY | Facility: HOSPITAL | Age: 75
Setting detail: RADIATION/ONCOLOGY SERIES
End: 2025-07-08
Payer: MEDICARE

## 2025-07-08 LAB
BEAKER LAB AP INTRAOPERATIVE CONSULTATION: NORMAL
CYTO UR: NORMAL
LAB AP CASE REPORT: NORMAL
LAB AP CLINICAL INFORMATION: NORMAL
LAB AP DIAGNOSIS COMMENT: NORMAL
LAB AP SPECIAL STAINS: NORMAL
Lab: NORMAL
PATH REPORT.ADDENDUM SPEC: NORMAL
PATH REPORT.FINAL DX SPEC: NORMAL
PATH REPORT.GROSS SPEC: NORMAL

## 2025-07-09 ENCOUNTER — OFFICE VISIT (OUTPATIENT)
Age: 75
End: 2025-07-09
Payer: MEDICARE

## 2025-07-09 ENCOUNTER — HOSPITAL ENCOUNTER (OUTPATIENT)
Dept: RADIATION ONCOLOGY | Facility: HOSPITAL | Age: 75
Discharge: HOME | End: 2025-07-09

## 2025-07-09 VITALS
BODY MASS INDEX: 24.84 KG/M2 | DIASTOLIC BLOOD PRESSURE: 59 MMHG | HEIGHT: 68 IN | HEART RATE: 64 BPM | SYSTOLIC BLOOD PRESSURE: 115 MMHG | WEIGHT: 163.9 LBS | OXYGEN SATURATION: 95 %

## 2025-07-09 DIAGNOSIS — Z92.3 HISTORY OF RADIATION THERAPY: ICD-10-CM

## 2025-07-09 DIAGNOSIS — Z90.2 S/P LOBECTOMY OF LUNG: ICD-10-CM

## 2025-07-09 DIAGNOSIS — C34.11 MALIGNANT NEOPLASM OF UPPER LOBE OF RIGHT LUNG: ICD-10-CM

## 2025-07-09 DIAGNOSIS — Z87.891 FORMER SMOKER: ICD-10-CM

## 2025-07-09 DIAGNOSIS — C34.92 SQUAMOUS CELL CARCINOMA OF LEFT LUNG: Primary | ICD-10-CM

## 2025-07-09 PROCEDURE — 77263 THER RADIOLOGY TX PLNG CPLX: CPT | Performed by: RADIOLOGY

## 2025-07-09 PROCEDURE — G0463 HOSPITAL OUTPT CLINIC VISIT: HCPCS | Performed by: RADIOLOGY

## 2025-07-09 PROCEDURE — 77334 RADIATION TREATMENT AID(S): CPT | Performed by: RADIOLOGY

## 2025-07-09 PROCEDURE — 1159F MED LIST DOCD IN RCRD: CPT | Performed by: RADIOLOGY

## 2025-07-09 PROCEDURE — G2212 PROLONG OUTPT/OFFICE VIS: HCPCS | Performed by: RADIOLOGY

## 2025-07-09 PROCEDURE — 77470 SPECIAL RADIATION TREATMENT: CPT | Performed by: RADIOLOGY

## 2025-07-09 PROCEDURE — 99215 OFFICE O/P EST HI 40 MIN: CPT | Performed by: RADIOLOGY

## 2025-07-09 PROCEDURE — 1125F AMNT PAIN NOTED PAIN PRSNT: CPT | Performed by: RADIOLOGY

## 2025-07-09 PROCEDURE — 1160F RVW MEDS BY RX/DR IN RCRD: CPT | Performed by: RADIOLOGY

## 2025-07-13 LAB
DNA RANGE(S) EXAMINED NAR: NORMAL
GENE DIS ANL INTERP-IMP: POSITIVE
GENE DIS ASSESSED: NORMAL
GENE MUT TESTED BLD/T: 5.8 M/MB
MSI CA SPEC-IMP: NORMAL
PD-L1 BY 22C3 TISS IMSTN DOC: NEGATIVE
REASON FOR STUDY: NORMAL
TEMPUS GERMLINE NOTE: NORMAL
TEMPUS PD-L1 (22C3) COMBINED POSITIVE SCORE: <1
TEMPUS PD-L1 (22C3) TUMOR PROPORTION SCORE: <1 %
TEMPUS PERTINENTNEGATIVES: NORMAL
TEMPUS PORTAL: NORMAL
TEMPUS TREATMENT IMPLICATIONS NOTE: NORMAL
TEMPUS TRIALCOUNT: 3
TEMPUS TRIALMATCHES1: NORMAL
TEMPUS TRIALMATCHES2: NORMAL
TEMPUS TRIALMATCHES3: NORMAL
TEMPUS XR RESULT 1: NORMAL

## 2025-07-14 DIAGNOSIS — C34.92 SQUAMOUS CELL CARCINOMA OF LEFT LUNG: ICD-10-CM

## 2025-07-14 DIAGNOSIS — Z79.899 ENCOUNTER FOR LONG-TERM (CURRENT) USE OF HIGH-RISK MEDICATION: Primary | ICD-10-CM

## 2025-07-14 RX ORDER — FAMOTIDINE 10 MG/ML
10 INJECTION, SOLUTION INTRAVENOUS ONCE
Start: 2025-07-22 | End: 2025-07-22

## 2025-07-14 RX ORDER — DIPHENHYDRAMINE HYDROCHLORIDE 50 MG/ML
25 INJECTION, SOLUTION INTRAMUSCULAR; INTRAVENOUS ONCE
Start: 2025-07-22 | End: 2025-07-22

## 2025-07-14 RX ORDER — SODIUM CHLORIDE 9 MG/ML
20 INJECTION, SOLUTION INTRAVENOUS ONCE
OUTPATIENT
Start: 2025-07-22

## 2025-07-16 PROCEDURE — 77293 RESPIRATOR MOTION MGMT SIMUL: CPT | Performed by: RADIOLOGY

## 2025-07-16 PROCEDURE — 77300 RADIATION THERAPY DOSE PLAN: CPT | Performed by: RADIOLOGY

## 2025-07-16 PROCEDURE — 77301 RADIOTHERAPY DOSE PLAN IMRT: CPT | Performed by: RADIOLOGY

## 2025-07-16 PROCEDURE — 77338 DESIGN MLC DEVICE FOR IMRT: CPT | Performed by: RADIOLOGY

## 2025-07-24 LAB
RAD ONC ARIA COURSE END DATE: NORMAL
RAD ONC ARIA COURSE END DATE: NORMAL
RAD ONC ARIA COURSE ID: NORMAL
RAD ONC ARIA COURSE ID: NORMAL
RAD ONC ARIA COURSE INTENT: NORMAL
RAD ONC ARIA COURSE LAST TREATMENT DATE: NORMAL
RAD ONC ARIA COURSE LAST TREATMENT DATE: NORMAL
RAD ONC ARIA COURSE START DATE: NORMAL
RAD ONC ARIA COURSE START DATE: NORMAL
RAD ONC ARIA COURSE TREATMENT ELAPSED DAYS: 52
RAD ONC ARIA COURSE TREATMENT ELAPSED DAYS: 8
RAD ONC ARIA FIRST TREATMENT DATE: NORMAL
RAD ONC ARIA FIRST TREATMENT DATE: NORMAL
RAD ONC ARIA PLAN FRACTIONS TREATED TO DATE: 33
RAD ONC ARIA PLAN FRACTIONS TREATED TO DATE: 5
RAD ONC ARIA PLAN ID: NORMAL
RAD ONC ARIA PLAN ID: NORMAL
RAD ONC ARIA PLAN NAME: NORMAL
RAD ONC ARIA PLAN NAME: NORMAL
RAD ONC ARIA PLAN PRESCRIBED DOSE PER FRACTION: 1.8 GY
RAD ONC ARIA PLAN PRESCRIBED DOSE PER FRACTION: 10 GY
RAD ONC ARIA PLAN PRIMARY REFERENCE POINT: NORMAL
RAD ONC ARIA PLAN PRIMARY REFERENCE POINT: NORMAL
RAD ONC ARIA PLAN TOTAL FRACTIONS PRESCRIBED: 33
RAD ONC ARIA PLAN TOTAL FRACTIONS PRESCRIBED: 5
RAD ONC ARIA PLAN TOTAL PRESCRIBED DOSE: 5000 CGY
RAD ONC ARIA PLAN TOTAL PRESCRIBED DOSE: 5940 CGY
RAD ONC ARIA REFERENCE POINT DOSAGE GIVEN TO DATE: 50 GY
RAD ONC ARIA REFERENCE POINT DOSAGE GIVEN TO DATE: 59.4 GY
RAD ONC ARIA REFERENCE POINT ID: NORMAL
RAD ONC ARIA REFERENCE POINT ID: NORMAL

## 2025-08-01 RX ORDER — GUAIFENESIN 1200 MG/1
TABLET, EXTENDED RELEASE ORAL EVERY 12 HOURS SCHEDULED
COMMUNITY
Start: 2025-07-30 | End: 2025-09-28

## 2025-08-04 ENCOUNTER — TELEPHONE (OUTPATIENT)
Dept: ONCOLOGY | Facility: CLINIC | Age: 75
End: 2025-08-04
Payer: MEDICARE

## 2025-08-04 LAB
FUNGUS WND CULT: NORMAL
MYCOBACTERIUM SPEC CULT: NORMAL
NIGHT BLUE STAIN TISS: NORMAL
NIGHT BLUE STAIN TISS: NORMAL

## 2025-08-11 ENCOUNTER — OFFICE VISIT (OUTPATIENT)
Dept: PULMONOLOGY | Facility: CLINIC | Age: 75
End: 2025-08-11
Payer: MEDICARE

## 2025-08-11 VITALS
SYSTOLIC BLOOD PRESSURE: 134 MMHG | BODY MASS INDEX: 25.16 KG/M2 | OXYGEN SATURATION: 100 % | HEART RATE: 76 BPM | HEIGHT: 68 IN | DIASTOLIC BLOOD PRESSURE: 72 MMHG | WEIGHT: 166 LBS

## 2025-08-11 DIAGNOSIS — Z87.891 HISTORY OF CIGARETTE SMOKING: Chronic | ICD-10-CM

## 2025-08-11 DIAGNOSIS — R91.1 NODULE OF LOWER LOBE OF LEFT LUNG: Chronic | ICD-10-CM

## 2025-08-11 DIAGNOSIS — J44.9 STAGE 3 SEVERE COPD BY GOLD CLASSIFICATION: Primary | Chronic | ICD-10-CM

## 2025-08-11 PROCEDURE — 1160F RVW MEDS BY RX/DR IN RCRD: CPT | Performed by: NURSE PRACTITIONER

## 2025-08-11 PROCEDURE — 99214 OFFICE O/P EST MOD 30 MIN: CPT | Performed by: NURSE PRACTITIONER

## 2025-08-11 PROCEDURE — 1159F MED LIST DOCD IN RCRD: CPT | Performed by: NURSE PRACTITIONER

## 2025-08-12 DIAGNOSIS — J44.9 STAGE 3 SEVERE COPD BY GOLD CLASSIFICATION: ICD-10-CM

## 2025-08-12 RX ORDER — ALBUTEROL SULFATE 90 UG/1
2 INHALANT RESPIRATORY (INHALATION) EVERY 4 HOURS PRN
Qty: 54 G | Refills: 3 | Status: SHIPPED | OUTPATIENT
Start: 2025-08-12

## 2025-08-18 ENCOUNTER — HOSPITAL ENCOUNTER (OUTPATIENT)
Dept: RADIATION ONCOLOGY | Facility: HOSPITAL | Age: 75
Discharge: HOME | End: 2025-08-18
Payer: MEDICARE

## 2025-08-18 LAB
RAD ONC ARIA COURSE ID: NORMAL
RAD ONC ARIA COURSE INTENT: NORMAL
RAD ONC ARIA COURSE LAST TREATMENT DATE: NORMAL
RAD ONC ARIA COURSE START DATE: NORMAL
RAD ONC ARIA COURSE TREATMENT ELAPSED DAYS: 0
RAD ONC ARIA FIRST TREATMENT DATE: NORMAL
RAD ONC ARIA PLAN FRACTIONS TREATED TO DATE: 1
RAD ONC ARIA PLAN ID: NORMAL
RAD ONC ARIA PLAN PRESCRIBED DOSE PER FRACTION: 1.8 GY
RAD ONC ARIA PLAN PRIMARY REFERENCE POINT: NORMAL
RAD ONC ARIA PLAN TOTAL FRACTIONS PRESCRIBED: 33
RAD ONC ARIA PLAN TOTAL PRESCRIBED DOSE: 5940 CGY
RAD ONC ARIA REFERENCE POINT DOSAGE GIVEN TO DATE: 1.8 GY
RAD ONC ARIA REFERENCE POINT ID: NORMAL
RAD ONC ARIA REFERENCE POINT SESSION DOSAGE GIVEN: 1.8 GY

## 2025-08-19 ENCOUNTER — HOSPITAL ENCOUNTER (OUTPATIENT)
Dept: RADIATION ONCOLOGY | Facility: HOSPITAL | Age: 75
Discharge: HOME | End: 2025-08-19

## 2025-08-20 ENCOUNTER — DOCUMENTATION (OUTPATIENT)
Dept: RADIATION ONCOLOGY | Facility: HOSPITAL | Age: 75
End: 2025-08-20
Payer: MEDICARE

## 2025-08-20 ENCOUNTER — HOSPITAL ENCOUNTER (OUTPATIENT)
Dept: RADIATION ONCOLOGY | Facility: HOSPITAL | Age: 75
Discharge: HOME | End: 2025-08-20

## 2025-08-25 ENCOUNTER — HOSPITAL ENCOUNTER (OUTPATIENT)
Dept: RADIATION ONCOLOGY | Facility: HOSPITAL | Age: 75
Discharge: HOME | End: 2025-08-25
Payer: MEDICARE

## 2025-08-25 LAB
RAD ONC ARIA COURSE ID: NORMAL
RAD ONC ARIA COURSE INTENT: NORMAL
RAD ONC ARIA COURSE LAST TREATMENT DATE: NORMAL
RAD ONC ARIA COURSE START DATE: NORMAL
RAD ONC ARIA COURSE TREATMENT ELAPSED DAYS: 7
RAD ONC ARIA FIRST TREATMENT DATE: NORMAL
RAD ONC ARIA PLAN FRACTIONS TREATED TO DATE: 4
RAD ONC ARIA PLAN ID: NORMAL
RAD ONC ARIA PLAN PRESCRIBED DOSE PER FRACTION: 1.8 GY
RAD ONC ARIA PLAN PRIMARY REFERENCE POINT: NORMAL
RAD ONC ARIA PLAN TOTAL FRACTIONS PRESCRIBED: 33
RAD ONC ARIA PLAN TOTAL PRESCRIBED DOSE: 5940 CGY
RAD ONC ARIA REFERENCE POINT DOSAGE GIVEN TO DATE: 7.2 GY
RAD ONC ARIA REFERENCE POINT ID: NORMAL
RAD ONC ARIA REFERENCE POINT SESSION DOSAGE GIVEN: 1.8 GY

## 2025-08-26 ENCOUNTER — HOSPITAL ENCOUNTER (OUTPATIENT)
Dept: RADIATION ONCOLOGY | Facility: HOSPITAL | Age: 75
Discharge: HOME | End: 2025-08-26

## 2025-08-26 LAB
RAD ONC ARIA COURSE ID: NORMAL
RAD ONC ARIA COURSE INTENT: NORMAL
RAD ONC ARIA COURSE LAST TREATMENT DATE: NORMAL
RAD ONC ARIA COURSE START DATE: NORMAL
RAD ONC ARIA COURSE TREATMENT ELAPSED DAYS: 8
RAD ONC ARIA FIRST TREATMENT DATE: NORMAL
RAD ONC ARIA PLAN FRACTIONS TREATED TO DATE: 5
RAD ONC ARIA PLAN ID: NORMAL
RAD ONC ARIA PLAN PRESCRIBED DOSE PER FRACTION: 1.8 GY
RAD ONC ARIA PLAN PRIMARY REFERENCE POINT: NORMAL
RAD ONC ARIA PLAN TOTAL FRACTIONS PRESCRIBED: 33
RAD ONC ARIA PLAN TOTAL PRESCRIBED DOSE: 5940 CGY
RAD ONC ARIA REFERENCE POINT DOSAGE GIVEN TO DATE: 9 GY
RAD ONC ARIA REFERENCE POINT ID: NORMAL
RAD ONC ARIA REFERENCE POINT SESSION DOSAGE GIVEN: 1.8 GY

## 2025-08-27 ENCOUNTER — OFFICE VISIT (OUTPATIENT)
Dept: ONCOLOGY | Facility: CLINIC | Age: 75
End: 2025-08-27
Payer: MEDICARE

## 2025-08-27 ENCOUNTER — HOSPITAL ENCOUNTER (OUTPATIENT)
Dept: RADIATION ONCOLOGY | Facility: HOSPITAL | Age: 75
Discharge: HOME | End: 2025-08-27

## 2025-08-27 VITALS
BODY MASS INDEX: 25.34 KG/M2 | SYSTOLIC BLOOD PRESSURE: 138 MMHG | WEIGHT: 167.2 LBS | RESPIRATION RATE: 18 BRPM | HEIGHT: 68 IN | TEMPERATURE: 97.2 F | OXYGEN SATURATION: 93 % | DIASTOLIC BLOOD PRESSURE: 64 MMHG | HEART RATE: 86 BPM

## 2025-08-27 DIAGNOSIS — C34.92 SQUAMOUS CELL CARCINOMA OF LEFT LUNG: Primary | ICD-10-CM

## 2025-08-27 LAB
RAD ONC ARIA COURSE ID: NORMAL
RAD ONC ARIA COURSE INTENT: NORMAL
RAD ONC ARIA COURSE LAST TREATMENT DATE: NORMAL
RAD ONC ARIA COURSE START DATE: NORMAL
RAD ONC ARIA COURSE TREATMENT ELAPSED DAYS: 9
RAD ONC ARIA FIRST TREATMENT DATE: NORMAL
RAD ONC ARIA PLAN FRACTIONS TREATED TO DATE: 6
RAD ONC ARIA PLAN ID: NORMAL
RAD ONC ARIA PLAN PRESCRIBED DOSE PER FRACTION: 1.8 GY
RAD ONC ARIA PLAN PRIMARY REFERENCE POINT: NORMAL
RAD ONC ARIA PLAN TOTAL FRACTIONS PRESCRIBED: 33
RAD ONC ARIA PLAN TOTAL PRESCRIBED DOSE: 5940 CGY
RAD ONC ARIA REFERENCE POINT DOSAGE GIVEN TO DATE: 10.8 GY
RAD ONC ARIA REFERENCE POINT ID: NORMAL
RAD ONC ARIA REFERENCE POINT SESSION DOSAGE GIVEN: 1.8 GY

## (undated) DEVICE — FRCP BX RADJAW4 PULM NDL STD 1.8MM 100CM

## (undated) DEVICE — KT ASP VIZISHOT 5SYRG 5BIOPSY/VLV 22G

## (undated) DEVICE — SEALANT TISS GLUE 4ML PLEUR AIR LEAK PROGEL

## (undated) DEVICE — Device: Brand: ION

## (undated) DEVICE — SWIVEL CONNECTOR

## (undated) DEVICE — DRAPE,UTILITY,XL,4/PK,STERILE: Brand: MEDLINE

## (undated) DEVICE — TUBE ET 8.5MM NSL ORAL BASIC CUF INTMED MURPHY EYE RADPQ

## (undated) DEVICE — TUBE ET 8MM NSL ORAL BASIC CUF INTMED MURPHY EYE RADPQ MRK

## (undated) DEVICE — Device

## (undated) DEVICE — PLEDGET SURG W0.375XL0.062IN THK1.5MM WHT SFT PTFE RECT

## (undated) DEVICE — TRAP,MUCUS SPECIMEN, 80CC: Brand: MEDLINE

## (undated) DEVICE — THE DISPOSABLE RAPTOR GRASPING DEVICE IS USED TO GRASP TISSUE AND/OR RETRIEVE FOREIGN BODIES, EXCISED TISSUE AND STENTS DURING ENDOSCOPIC PROCEDURES.: Brand: RAPTOR

## (undated) DEVICE — THE CHANNEL CLEANING BRUSH IS A NYLON FLEXI BRUSH ATTACHED TO A FLEXIBLE PLASTIC SHEATH DESIGNED TO SAFELY REMOVE DEBRIS FROM FLEXIBLE ENDOSCOPES.

## (undated) DEVICE — SEALANT TISS 10 CC FIBRIN VISTASEAL

## (undated) DEVICE — ANTIBACTERIAL UNDYED BRAIDED (POLYGLACTIN 910), SYNTHETIC ABSORBABLE SUTURE: Brand: COATED VICRYL

## (undated) DEVICE — TUBE ENDOBRONCH 41FR OD13.7MM ID5.4MM W/ L POLYUR CUF BLU

## (undated) DEVICE — TK® TI-KNOT® DEVICE: Brand: TK® TI-KNOT®

## (undated) DEVICE — CVR UNIV C/ARM

## (undated) DEVICE — AGENT HEMSTAT W2XL4IN FIBRIN SEAL PTCH DSG EVARREST

## (undated) DEVICE — RELOAD STPL OPN 4.2MM CLS 2.3MM BLK CART

## (undated) DEVICE — ADAPT CLN BIOGUARD AIR/H2O DISP

## (undated) DEVICE — TBG SMPL FLTR LINE NASL 02/C02 A/ BX/100

## (undated) DEVICE — SYR LL TP 10ML STRL

## (undated) DEVICE — VISION PROBE ADAPTER AND SUCTION ADAPTER

## (undated) DEVICE — SUTURE VCRL SZ 2-0 L27IN ABSRB UD L26MM SH 1/2 CIR J417H

## (undated) DEVICE — DECANTER: Brand: UNBRANDED

## (undated) DEVICE — SINGLE USE BIOPSY VALVE MAJ-210: Brand: SINGLE USE BIOPSY VALVE (STERILE)

## (undated) DEVICE — KIT,ANTI FOG,W/SPONGE & FLUID,SOFT PACK: Brand: MEDLINE

## (undated) DEVICE — DRAIN,WOUND,ROUND,24FR,5/16",FULL-FLUTED: Brand: MEDLINE

## (undated) DEVICE — VITAL SIGNS™ JACKSON-REES CIRCUITS: Brand: VITAL SIGNS™

## (undated) DEVICE — SUTURE MCRYL SZ 4-0 L18IN ABSRB UD L19MM PS-2 3/8 CIR PRIM Y496G

## (undated) DEVICE — FRCP BX RADJAW4 PULM WO NDL STD1.8X2 100

## (undated) DEVICE — CURAVIEW LED LARYNSCP BLDE

## (undated) DEVICE — LOOP VES W1.3MM THK0.9MM MINI WHT SIL FLD REPELLENT

## (undated) DEVICE — TRAP FLD MINIVAC MEGADYNE 100ML

## (undated) DEVICE — SINGLE USE SUCTION VALVE MAJ-209: Brand: SINGLE USE SUCTION VALVE (STERILE)

## (undated) DEVICE — DRSNG SURESITE WNDW 2.38X2.75

## (undated) DEVICE — BIPOLAR CAUTERY CORD

## (undated) DEVICE — SUTURE VCRL SZ 2-0 L36IN ABSRB UD L36MM CT-1 1/2 CIR J945H

## (undated) DEVICE — COLUMN DRAPE

## (undated) DEVICE — AGENT HEMSTAT W4XL4IN OXIDIZED REGENERATED CELOS STRUCTURED

## (undated) DEVICE — ULTRACLEAN ACCESSORY ELECTRODE 4" (10.16 CM) COATED BLADE WITH EXTENDED INSULATION: Brand: ULTRACLEAN

## (undated) DEVICE — SEAL

## (undated) DEVICE — ADAPTER TBNG DIA15MM SWVL FBROPT BRONCHSCP TERM 2 AXIS PEEP

## (undated) DEVICE — Device: Brand: BALLOON

## (undated) DEVICE — SUT SILK 2/0 SUTUPAK TIES 24IN SA75H

## (undated) DEVICE — SUTURE VCRL SZ 0 L27IN ABSRB VLT L48MM CTX 1/2 CIR TAPR PNT J364H

## (undated) DEVICE — SPONGE,LAP,12"X12",XR,ST,5/PK,40PK/CS: Brand: MEDLINE

## (undated) DEVICE — SOLUTION ANTIFOG VIS SYS CLEARIFY LAPSCP

## (undated) DEVICE — ROYAL SILK SURGICAL GOWN, XXL: Brand: CONVERTORS

## (undated) DEVICE — STAPLER SKIN L320MM 35MM VASC TISS 12 FIRING B FRM PWR

## (undated) DEVICE — SUTURE PERMAHAND SZ 2-0 L18IN NONABSORBABLE BLK L26MM SH C012D

## (undated) DEVICE — ANTIBACTERIAL VIOLET BRAIDED (POLYGLACTIN 910), SYNTHETIC ABSORBABLE SUTURE: Brand: COATED VICRYL

## (undated) DEVICE — 6F 80 CM LEMAITRE EMBOLECTOMY CATHETER, EIFU: Brand: LEMAITRE EMBOLECTOMY CATHETER

## (undated) DEVICE — ADAPT SWVL FIBROPTIC BRONCH

## (undated) DEVICE — 4-PORT MANIFOLD: Brand: NEPTUNE 2

## (undated) DEVICE — STAPLER SKIN LN REINF 60 MM ECHELON ENDOPATH

## (undated) DEVICE — RELOAD STPL L45MM H2-4.1MM THCK TISS GRN GRIPPING SURF B

## (undated) DEVICE — NEEDLE ASPIR 22GA L700MM US GUID TREAT DST END FOR

## (undated) DEVICE — REDUCER: Brand: ENDOWRIST

## (undated) DEVICE — TIP APPL L29CM TISS GLUE EXT SPR FOR PLEUR AIR LEAK PROGEL

## (undated) DEVICE — RELOAD STPL H1-2.5XL35MM VASC THN TISS WHT B FRM NAT ARTC

## (undated) DEVICE — SENSR O2 OXIMAX FNGR A/ 18IN NONSTR

## (undated) DEVICE — GLIDESCOPE BFLEX 3.8 BRONCHOSCOPE

## (undated) DEVICE — AGENT HEMSTAT 8ML FLX TIP MTRX + DISP SURGIFLO

## (undated) DEVICE — YANKAUER,TAPERED BULBOUS TIP,W/O VENT: Brand: MEDLINE

## (undated) DEVICE — 3M™ IOBAN™ 2 ANTIMICROBIAL INCISE DRAPE 6650EZ: Brand: IOBAN™ 2

## (undated) DEVICE — GOWN,PREVENTION PLUS,XL,ST,24/CS: Brand: MEDLINE

## (undated) DEVICE — TOTAL TRAY, 16FR 10ML SIL FOLEY, URN: Brand: MEDLINE

## (undated) DEVICE — MONOPOLAR CAUTERY CORD

## (undated) DEVICE — SOLUTION IRRIG 1000ML STRL H2O USP PLAS POUR BTL

## (undated) DEVICE — SYSTEM SKIN CLSR 22CM DERMBND PRINEO

## (undated) DEVICE — BLANKET WRM W40.2XL55.9IN IORT LO BODY + MISTRAL AIR

## (undated) DEVICE — GAUZE,SPONGE,8"X4",12PLY,XRAY,STRL,LF: Brand: MEDLINE

## (undated) DEVICE — PACK,UNIVERSAL,NO GOWNS: Brand: MEDLINE

## (undated) DEVICE — CONNECTOR DRNGE W3/8-0.5XH3/16XL3/16IN 2:1 SIL CARD STR

## (undated) DEVICE — SOLUTION IV 1000ML 0.9% SOD CHL PH 5 INJ USP VIAFLX PLAS

## (undated) DEVICE — ELECTROSURGICAL PENCIL BUTTON SWITCH NON COATED BLADE ELECTRODE 10 FT (3 M) CORD HOLSTER: Brand: MEGADYNE

## (undated) DEVICE — GLV SURG SENSICARE W/ALOE PF LF 6.5 STRL

## (undated) DEVICE — SUTURE VCRL 1 L27IN ABSRB VLT TP-1 L65MM 1/2 CIR TAPERPOINT J650G

## (undated) DEVICE — ADHS SKIN PREMIERPRO EXOFIN TOPICAL HI/VISC .5ML

## (undated) DEVICE — TBG PRESS EXT

## (undated) DEVICE — TUBING, SUCTION, 1/4" X 10', STRAIGHT: Brand: MEDLINE

## (undated) DEVICE — STAPLER INT L340MM 45MM STD 12 FIRING B FRM PWR + GRIPPING

## (undated) DEVICE — GENERAL LAP CDS

## (undated) DEVICE — DRSNG TELFA PAD NONADH STR 1S 3X4IN

## (undated) DEVICE — BIOPSY NEEDLE, 23G: Brand: FLEXISION

## (undated) DEVICE — SUCTION IRRIGATOR: Brand: ENDOWRIST

## (undated) DEVICE — SYR CONTRL LUERLOK 10CC

## (undated) DEVICE — SUTURE PROL SZ 3-0 L36IN NONABSORBABLE BLU L26MM SH 1/2 CIR 8522H

## (undated) DEVICE — TIP COVER ACCESSORY

## (undated) DEVICE — APPLICATOR LAP 35 CM 2 RIGID VISTASEAL

## (undated) DEVICE — PAD MINOR UNIVERSAL: Brand: MEDLINE INDUSTRIES, INC.

## (undated) DEVICE — COVER TRANSDUCER TELESCOPICALLY FOLDED 3.5X36 IN CIV-FLEX

## (undated) DEVICE — MSK AIRWY LARYNG LMA PILOT SZ4

## (undated) DEVICE — INTENDED FOR TISSUE SEPARATION, AND OTHER PROCEDURES THAT REQUIRE A SHARP SURGICAL BLADE TO PUNCTURE OR CUT.: Brand: BARD-PARKER ® STAINLESS STEEL BLADES

## (undated) DEVICE — ELECTRD BLD EZ CLN MOD XLNG 2.75IN

## (undated) DEVICE — CANNULA SEAL

## (undated) DEVICE — GLOVE,SURG,SENSICARE,ALOE,LF,PF,6: Brand: MEDLINE

## (undated) DEVICE — FIAPC® PROBE W/ FILTER 1500 A OD 1.5MM/4.5FR; L 1.5M/4.9FT: Brand: ERBE

## (undated) DEVICE — SUT MNCRYL 4/0 PS2 27IN UD MCP426H

## (undated) DEVICE — ENDO KIT,LOURDES HOSPITAL: Brand: MEDLINE INDUSTRIES, INC.

## (undated) DEVICE — CONTAINER,SPECIMEN,OR STERILE,4OZ: Brand: MEDLINE

## (undated) DEVICE — AIRSEAL 12 MM ACCESS PORT AND PALM GRIP OBTURATOR WITH BLADELESS OPTICAL TIP, 120 MM LENGTH: Brand: AIRSEAL

## (undated) DEVICE — DRAIN SURG SGL COLL PT TB FOR ATS BG OASIS

## (undated) DEVICE — ADHESIVE SKIN CLSR 0.7ML TOP DERMBND ADV

## (undated) DEVICE — TOWEL,OR,DSP,ST,BLUE,DLX,4/PK,20PK/CS: Brand: MEDLINE

## (undated) DEVICE — DRESSING HEMSTAT W1X2IN ABSRB SURGCEL SNOW

## (undated) DEVICE — PAD,ARMBOARD,CONV,FOAM,2X8X20",12PR/CS: Brand: MEDLINE

## (undated) DEVICE — TK® QUICK LOAD® UNIT: Brand: TK® QUICK LOAD®

## (undated) DEVICE — DRAIN SURG L3/8-1/2IN DIA3/16IN SIL CARD CONN 1:1 BLAK

## (undated) DEVICE — ARM DRAPE

## (undated) DEVICE — GAUZE,SPONGE,4"X4",8PLY,STRL,LF,10/TRAY: Brand: MEDLINE

## (undated) DEVICE — DD MATRIXPRO II BATTERY SINGLE USE S

## (undated) DEVICE — GLOVE SURG SZ 75 L12IN FNGR THK79MIL GRN LTX FREE

## (undated) DEVICE — NDL HYPO PRECISIONGLIDE REG 25G 1 1/2

## (undated) DEVICE — SOLUTION IV IRRIG POUR BRL 0.9% SODIUM CHL 2F7124

## (undated) DEVICE — VAGINAL PACKING: Brand: DEROYAL